# Patient Record
Sex: FEMALE | Race: WHITE | NOT HISPANIC OR LATINO | Employment: FULL TIME | URBAN - METROPOLITAN AREA
[De-identification: names, ages, dates, MRNs, and addresses within clinical notes are randomized per-mention and may not be internally consistent; named-entity substitution may affect disease eponyms.]

---

## 2017-01-04 ENCOUNTER — MYC MEDICAL ADVICE (OUTPATIENT)
Dept: FAMILY MEDICINE | Facility: CLINIC | Age: 47
End: 2017-01-04

## 2017-01-04 DIAGNOSIS — B00.1 RECURRENT COLD SORES: Primary | ICD-10-CM

## 2017-01-05 RX ORDER — VALACYCLOVIR HYDROCHLORIDE 500 MG/1
TABLET, FILM COATED ORAL
Qty: 36 TABLET | Refills: 11 | Status: SHIPPED | OUTPATIENT
Start: 2017-01-05 | End: 2018-02-07

## 2017-01-10 DIAGNOSIS — K95.09 COMPLICATION OF GASTRIC BANDING: Primary | ICD-10-CM

## 2017-01-11 DIAGNOSIS — K95.09 COMPLICATION OF GASTRIC BAND PROCEDURE: Primary | ICD-10-CM

## 2017-01-16 ENCOUNTER — OFFICE VISIT (OUTPATIENT)
Dept: SURGERY | Facility: CLINIC | Age: 47
End: 2017-01-16

## 2017-01-16 ENCOUNTER — ANESTHESIA EVENT (OUTPATIENT)
Dept: SURGERY | Facility: CLINIC | Age: 47
End: 2017-01-16
Payer: COMMERCIAL

## 2017-01-16 ENCOUNTER — ALLIED HEALTH/NURSE VISIT (OUTPATIENT)
Dept: SURGERY | Facility: CLINIC | Age: 47
End: 2017-01-16

## 2017-01-16 VITALS
OXYGEN SATURATION: 97 % | SYSTOLIC BLOOD PRESSURE: 132 MMHG | HEIGHT: 64 IN | DIASTOLIC BLOOD PRESSURE: 57 MMHG | HEART RATE: 86 BPM | BODY MASS INDEX: 43.71 KG/M2 | TEMPERATURE: 98 F | WEIGHT: 256 LBS | RESPIRATION RATE: 16 BRPM

## 2017-01-16 DIAGNOSIS — Z01.818 PREOP GENERAL PHYSICAL EXAM: ICD-10-CM

## 2017-01-16 DIAGNOSIS — Z01.818 PREOP GENERAL PHYSICAL EXAM: Primary | ICD-10-CM

## 2017-01-16 LAB
ABO + RH BLD: NORMAL
ABO + RH BLD: NORMAL
ALBUMIN UR-MCNC: NEGATIVE MG/DL
ANION GAP SERPL CALCULATED.3IONS-SCNC: 6 MMOL/L (ref 3–14)
APPEARANCE UR: CLEAR
BILIRUB UR QL STRIP: NEGATIVE
BLD GP AB SCN SERPL QL: NORMAL
BLOOD BANK CMNT PATIENT-IMP: NORMAL
BLOOD BANK CMNT PATIENT-IMP: NORMAL
BUN SERPL-MCNC: 9 MG/DL (ref 7–30)
CALCIUM SERPL-MCNC: 9.2 MG/DL (ref 8.5–10.1)
CHLORIDE SERPL-SCNC: 106 MMOL/L (ref 94–109)
CO2 SERPL-SCNC: 28 MMOL/L (ref 20–32)
COLOR UR AUTO: ABNORMAL
CREAT SERPL-MCNC: 0.78 MG/DL (ref 0.52–1.04)
ERYTHROCYTE [DISTWIDTH] IN BLOOD BY AUTOMATED COUNT: 16.5 % (ref 10–15)
GFR SERPL CREATININE-BSD FRML MDRD: 80 ML/MIN/1.7M2
GLUCOSE SERPL-MCNC: 90 MG/DL (ref 70–99)
GLUCOSE UR STRIP-MCNC: NEGATIVE MG/DL
HCT VFR BLD AUTO: 35.2 % (ref 35–47)
HGB BLD-MCNC: 10.5 G/DL (ref 11.7–15.7)
HGB UR QL STRIP: ABNORMAL
KETONES UR STRIP-MCNC: NEGATIVE MG/DL
LEUKOCYTE ESTERASE UR QL STRIP: NEGATIVE
MCH RBC QN AUTO: 22.5 PG (ref 26.5–33)
MCHC RBC AUTO-ENTMCNC: 29.8 G/DL (ref 31.5–36.5)
MCV RBC AUTO: 76 FL (ref 78–100)
NITRATE UR QL: NEGATIVE
PH UR STRIP: 8 PH (ref 5–7)
PLATELET # BLD AUTO: 304 10E9/L (ref 150–450)
POTASSIUM SERPL-SCNC: 4.3 MMOL/L (ref 3.4–5.3)
RBC # BLD AUTO: 4.66 10E12/L (ref 3.8–5.2)
RBC #/AREA URNS AUTO: 1 /HPF (ref 0–2)
SODIUM SERPL-SCNC: 140 MMOL/L (ref 133–144)
SP GR UR STRIP: 1.01 (ref 1–1.03)
SPECIMEN EXP DATE BLD: NORMAL
SQUAMOUS #/AREA URNS AUTO: 1 /HPF (ref 0–1)
URN SPEC COLLECT METH UR: ABNORMAL
UROBILINOGEN UR STRIP-MCNC: 0 MG/DL (ref 0–2)
WBC # BLD AUTO: 5.7 10E9/L (ref 4–11)
WBC #/AREA URNS AUTO: 1 /HPF (ref 0–2)

## 2017-01-16 ASSESSMENT — LIFESTYLE VARIABLES: TOBACCO_USE: 1

## 2017-01-16 NOTE — MR AVS SNAPSHOT
After Visit Summary   2017    Elaine Awad    MRN: [id number]           Patient Information     Date Of Birth          1970        Visit Information        Provider Department      2017 9:00 AM Rn, [facility]        Care Instructions    Preparing for Your Surgery      Name:  Elaine Awad   MRN:  [id number]   :  1970   Today's Date:  2017     Arriving for surgery:  Surgery date:  17  Surgery time:  7:30 am  Arrival time:  5:30 am  ]  Please come to:   [facility] [address]  [address] [place]  [address]    -   parking is available in front of the hospital from 5:15 am to 8:00 pm    -  Stop at the Information Desk in the lobby    -   Inform the information person that you are here for surgery. An escort to 3c will be provided. If you would not like an escort, please proceed to 3C on the 3rd floor. [phone]   -  Bring your ID and insurance card.    What can I eat or drink?  -  You may have solid food or milk products until 8 hours prior to your surgery. (11:30 pm 17)  -  You may have water, apple juice, BLACK coffee (NO creamer or nondairy creamer), or 7up/Sprite until 2 hours prior to your surgery. (5:30 am)    Which medicines can I take?  -Do not bring your own medications to the hospital.       -  No Aspirin the week before surgery.  -  No Ibuprofen the day before surgery.  -  Do NOT take these medications in the morning, the day of surgery:  No lotions, gels, creams, or lotions on the skin.    -  Please take these medications the day of surgery:  Omeprazole,        Acetaminophen (Tylenol) if needed    How do I prepare myself?  -  Take two showers: one the night before surgery; and one the morning of surgery.         Use Scrubcare or Hibiclens to wash from neck down.  You may use your own shampoo and conditioner. No other hair products.   -  Do NOT use lotion, powder,  deodorant, or antiperspirant the day of your surgery.  -  Do NOT wear any makeup, fingernail polish or jewelry.    -  Begin using Incentive Spirometer 1 week prior to surgery.  Use 4 times per day, up to 5-10 breaths each time.  Bring Incentive Spirometer to hospital.      Questions or Concerns:  If you have questions or concerns, please call the  Preoperative Assessment Center, Monday-Friday 7AM-7PM:  [phone]      AFTER YOUR SURGERY  Breathing exercises   Breathing exercises help you recover faster. Take deep breaths and let the air out slowly. This will:     Help you wake up after surgery.    Help prevent complications like pneumonia.  Preventing complications will help you go home sooner.   We may give you a breathing device (incentive spirometer) to encourage you to breathe deeply.   Nausea and vomiting   You may feel sick to your stomach after surgery; if so, let your nurse know.    Pain control:  After surgery, you may have pain. Our goal is to help you manage your pain. Pain medicine will help you feel comfortable enough to do activities that will help you heal.  These activities may include breathing exercises, walking and physical therapy.   To help your health care team treat your pain we will ask: 1) If you have pain  2) where it is located 3) describe your pain in your words  Methods of pain control include medications given by mouth, vein or by nerve block for some surgeries.  We may give you a pain control pump that will:  1) Deliver the medicine through a tube placed in your vein  2) Control the amount of medicine you receive  3) Allow you to push a button to deliver a dose of pain medicine  Sequential Compression Device (SCD) or Pneumo Boots:  You may need to wear SCD S on your legs or feet. These are wraps connected to a machine that pumps in air and releases it. The repeated pumping helps prevent blood clots from forming.   Using an Incentive Spirometer  Soon after your surgery, a nurse or  therapist will teach you breathing exercises. These keep your lungs clear, strengthen your breathing muscles, and help prevent complications.  The exercises include doing a deep-breathing exercise using a device called an incentive spirometer.  To do these exercises, you will breathe in through your mouth and not your nose. The incentive spirometer only works correctly if you breathe in through your mouth.  Four steps to clear lungs     Deep breathing expands the lungs, aids circulation, and helps prevent pneumonia.   1. Exhale normally.    Relax and breathe out.  2. Place your lips tightly around the mouthpiece.    Make sure the device is upright and not tilted.  3. Inhale as much air as you can through the mouthpiece (don't breath through your nose).    Inhale slowly and deeply.    Hold your breath long enough to keep the balls or disk raised for at least 3 seconds.    If you re inhaling too quickly, your device may make a tone. If you hear this tone, inhale more slowly.  4. Repeat the exercise regularly.    Do this exercise every hour while you're awake, or as your health care provider instructs.    You will also be taught coughing exercises and be asked to do them regularly on your own.    9161-3132 [employer]. [address] [place] [address]. All rights reserved. This information is not intended as a substitute for professional medical care. Always follow your healthcare professional's instructions.              Follow-ups after your visit        Your next 10 appointments already scheduled     Jan 16, 2017  9:30 AM   (Arrive by 9:15 AM)   PAC EVALUATION with  Pac Cecelia 7   [facility] ([facility])    [address] [place] [id number] [phone]            Jan 16, 2017 10:30 AM   (Arrive by 10:15 AM)   PAC Anesthesia Consult with  Pac Anesthesiologist   [facility] ([facility]  [facility])    [address] [place] [id number] [phone]            Jan 16, 2017 10:45 AM   LAB with  LAB   [facility] ([facility])    [address] [place] [id number] [phone]           Patient must bring picture ID.  Patient should be prepared to give a urine specimen  Please do not eat 10-12 hours before your appointment if you are coming in fasting for labs on lipids, cholesterol, or glucose (sugar).  Pregnant women should follow their Care Team instructions. Water with medications is okay. Do not drink coffee or other fluids.   If you have concerns about taking  your medications, please ask at office or if scheduling via [employer], send a message by clicking on Secure Messaging, Message Your Care Team.            Jan 19, 2017   Procedure with Ciro Deras MD   [facility], Same Day Surgery (--)    [address] [facility] [id number] [phone]            Feb 01, 2017 11:30 AM   (Arrive by 11:15 AM)   RETURN BARIATRIC SURGERY with Ciro Deras MD   [facility] ([facility])    [address] [place] [id number] [phone]              Who to contact     Please call your clinic at [phone] to:    Ask questions about your health    Make or cancel appointments    Discuss your medicines    Learn about your test results    Speak to your doctor   If you have compliments or concerns about an experience at your clinic, or if you wish to file a complaint, please contact [facility] Patient Relations at [phone] or email us at [email or web address]         Additional Information About Your Visit        [employer] Information     [employer] gives you secure access to your electronic health record. If you see a primary care provider, you can also send messages to your care team and make  appointments. If you have questions, please call your primary care clinic.  If you do not have a primary care provider, please call [phone] and they will assist you.      [employer] is an electronic gateway that provides easy, online access to your medical records. With [employer], you can request a clinic appointment, read your test results, renew a prescription or communicate with your care team.     To access your existing account, please contact your [facility] or call [phone] for assistance.        Care EveryWhere ID     This is your Care EveryWhere ID. This could be used by other organizations to access your [facility] medical records  [id number]        Your Vitals Were     Last Period                   01/15/2017            Blood Pressure from Last 3 Encounters:   12/14/16 122/86   11/08/16 145/97   10/27/16 142/74    Weight from Last 3 Encounters:   12/14/16 115.985 kg (255 lb 11.2 oz)   11/08/16 114.76 kg (253 lb)   10/27/16 115.214 kg (254 lb)              Today, you had the following     No orders found for display       Primary Care Provider Office Phone # Fax #    Fernanda Rodriguez [phone]       [facility] [address] [facility] [address]        Thank you!     Thank you for choosing [facility] PREOPERATIVE [facility]ESS[facility] [place]  for your care. Our goal is always to provide you with excellent care. Hearing back from our patients is one way we can continue to improve our services. Please take a few minutes to complete the written survey that you may receive in the mail after your visit with us. Thank you!             Your Updated Medication List - Protect others around you: Learn how to safely use, store and throw away your medicines at www.disposemymeds.org.          This list is accurate as of: 1/16/17  9:19 AM.  Always use your most recent med list.                   Brand Name Dispense Instructions for use    cetirizine 10 MG tablet     zyrTEC     Take 10 mg by mouth daily as needed       fluconazole 150 MG tablet    DIFLUCAN    12 tablet    Take 1 tablet (150 mg) by mouth once a week       fluocinonide 0.05 % solution    LIDEX    30 mL    Apply sparingly to affected area twice daily as needed.  Do not apply to face.       omeprazole 20 MG CR capsule    priLOSEC    90 capsule    TAKE ONE CAPSULE BY MOUTH ONE TIME DAILY       SUMAtriptan 50 MG tablet    IMITREX    9 tablet    TAKE 2 TABLETS BY MOUTH AT ONSET OF HEADACHE FOR MIGRAINE. MAY REPEAT IN 2 HOURS IF NEEDED.. MAX 4 TABS A DAY       triamcinolone 0.1 % ointment    KENALOG    15 g    Apply topically externally to affected areas three times daily as needed       valACYclovir 500 MG tablet    VALTREX    36 tablet    TAKE ONE TABLET BY MOUTH ONE TIME DAILY. THEN INCREASE DOSE TO 4 TABLETS TWICE DAILY FOR 1 DAY AT ONSET OF OUTBREAK AS NEEDED.

## 2017-01-16 NOTE — ANESTHESIA PREPROCEDURE EVALUATION
Anesthesia Evaluation     . Pt has had prior anesthetic. Type: General and MAC      ROS/MED HX    ENT/Pulmonary:     (+)tobacco use, Past use 0.5 PPD for 5 years, quit 1997 packs/day  , . .    Neurologic:       Cardiovascular:  - neg cardiovascular ROS   (+) ----. : . . . :. . No previous cardiac testing       METS/Exercise Tolerance:  >4 METS   Hematologic:  - neg hematologic  ROS       Musculoskeletal:  - neg musculoskeletal ROS       GI/Hepatic:     (+) GERD Asymptomatic on medication,       Renal/Genitourinary:  - ROS Renal section negative       Endo:     (+) Obesity, .      Psychiatric:         Infectious Disease:  - neg infectious disease ROS       Malignancy:      - no malignancy   Other:    (+) No chance of pregnancy C-spine cleared: N/A, no H/O Chronic Pain,no other significant disability   - neg other ROS           Physical Exam  Normal systems: cardiovascular, pulmonary and dental    Airway   Mallampati: III  TM distance: >3 FB  Neck ROM: full    Dental     Cardiovascular   Rhythm and rate: regular and normal      Pulmonary    breath sounds clear to auscultation    Other findings: WBC      5.7   1/16/2017  RBC     4.66   1/16/2017  HGB     10.5   1/16/2017  HCT     35.2   1/16/2017  MCV       76   1/16/2017  MCH     22.5   1/16/2017  MCHC     29.8   1/16/2017  RDW     16.5   1/16/2017  PLT      304   1/16/2017        Last Basic Metabolic Panel:  NA      140   1/16/2017   POTASSIUM      4.3   1/16/2017  CHLORIDE      106   1/16/2017  TAMEKA      9.2   1/16/2017  CO2       28   1/16/2017  BUN        9   1/16/2017  CR     0.78   1/16/2017  GLC       90   1/16/2017             PAC Discussion and Assessment    ASA Classification: 2  Case is suitable for:   Anesthetic techniques and relevant risks discussed: GA  Invasive monitoring and risk discussed: Yes  Types:   Possibility and Risk of blood transfusion discussed: Yes  NPO instructions given:   Additional anesthetic preparation and risks discussed:   Needs  early admission to pre-op area:   Other:     PAC Resident/NP Anesthesia Assessment:  Elaine Aawd is a 47 yo female scheduled for Laparoscopic Removal Of Adjustable Gastric Band, Port And Tube, Esophagogastroduodenoscopy on 1/19/2017 by Dr. Deras.  PAC referral by Dr. Deras for assessment and optimization of anesthesia due to GERD. Previous anesthesia, in 2014 at [facility], without complications.    Cardiac: No current cardiac diagnosis or symptoms  Pulmonary: No current pulmonary diagnosis or symptoms. Smoked 0.5 PPD for 5 years, quit 1997.  GI: GERD, symptoms well controlled with omeprazole.  Eczema, large dry patch on back of neck  Normal activity tolerance without chest pain or shortness of breath. Feasible airway. BMI 44.03     Pt also evaluated by . See recommendations below. Type and screen, CBC and BMP drawn today.  I spent 20 minutes face to face with pt, assessing, examining, and educating        Reviewed and Signed by PAC Mid-Level Provider/Resident  Mid-Level Provider/Resident: KYLE Rojas  Date: 1/16/2017  Time: 1000    Attending Anesthesiologist Anesthesia Assessment:  46 year old for lap removal of gastric band. Chart reviewed, patient seen and evaluated; agree with above assessment.  Patient reports PONV with prior anesthesia. No cardiopulmonary disease; obese.    Patient is appropriate for the planned procedure without further workup or medical management change. The final anesthesia plan will be determined by the physician anesthesiologist caring for the patient on the day of surgery.    Reviewed and Signed by PAC Anesthesiologist  Anesthesiologist: Pamela Gamino MD  Date: 1/16/2017  Time:   Pass/Fail: Pass  Disposition:     PAC Pharmacist Assessment:        Pharmacist:   Date:   Time:      Anesthesia Plan      History & Physical Review  History and physical reviewed and following examination; no interval change.    ASA Status:  2 .    NPO Status:  > 8 hours    Plan for General,  RSI and ETT with Intravenous induction. Maintenance will be Inhalation and Balanced.    PONV prophylaxis:  Ondansetron (or other 5HT-3) and Other (See comment)       Postoperative Care  Postoperative pain management:  IV analgesics and Multi-modal analgesia.      Consents  Anesthetic plan, risks, benefits and alternatives discussed with:  Patient..                          .

## 2017-01-16 NOTE — PATIENT INSTRUCTIONS
Preparing for Your Surgery      Name:  Elaine Awad   MRN:  [id number]   :  1970   Today's Date:  2017     Arriving for surgery:  Surgery date:  17  Surgery time:  7:30 am  Arrival time:  5:30 am  ]  Please come to:   [facility] [address]  [address] [place]  [address]    -   parking is available in front of the hospital from 5:15 am to 8:00 pm    -  Stop at the Information Desk in the lobby    -   Inform the information person that you are here for surgery. An escort to 3c will be provided. If you would not like an escort, please proceed to 3C on the 3rd floor. [phone]   -  Bring your ID and insurance card.    What can I eat or drink?  -  You may have solid food or milk products until 8 hours prior to your surgery. (11:30 pm 17)  -  You may have water, apple juice, BLACK coffee (NO creamer or nondairy creamer), or 7up/Sprite until 2 hours prior to your surgery. (5:30 am)    Which medicines can I take?  -Do not bring your own medications to the hospital.       -  No Aspirin the week before surgery.  -  No Ibuprofen the day before surgery.  -  Do NOT take these medications in the morning, the day of surgery:  No lotions, gels, creams, or lotions on the skin.    -  Please take these medications the day of surgery:  Omeprazole,        Acetaminophen (Tylenol) if needed    How do I prepare myself?  -  Take two showers: one the night before surgery; and one the morning of surgery.         Use Scrubcare or Hibiclens to wash from neck down.  You may use your own shampoo and conditioner. No other hair products.   -  Do NOT use lotion, powder, deodorant, or antiperspirant the day of your surgery.  -  Do NOT wear any makeup, fingernail polish or jewelry.    -  Begin using Incentive Spirometer 1 week prior to surgery.  Use 4 times per day, up to 5-10 breaths each time.  Bring Incentive Spirometer to hospital.      Questions or Concerns:  If you  have questions or concerns, please call the  Preoperative Assessment Center, Monday-Friday 7AM-7PM:  [phone]      AFTER YOUR SURGERY  Breathing exercises   Breathing exercises help you recover faster. Take deep breaths and let the air out slowly. This will:     Help you wake up after surgery.    Help prevent complications like pneumonia.  Preventing complications will help you go home sooner.   We may give you a breathing device (incentive spirometer) to encourage you to breathe deeply.   Nausea and vomiting   You may feel sick to your stomach after surgery; if so, let your nurse know.    Pain control:  After surgery, you may have pain. Our goal is to help you manage your pain. Pain medicine will help you feel comfortable enough to do activities that will help you heal.  These activities may include breathing exercises, walking and physical therapy.   To help your health care team treat your pain we will ask: 1) If you have pain  2) where it is located 3) describe your pain in your words  Methods of pain control include medications given by mouth, vein or by nerve block for some surgeries.  We may give you a pain control pump that will:  1) Deliver the medicine through a tube placed in your vein  2) Control the amount of medicine you receive  3) Allow you to push a button to deliver a dose of pain medicine  Sequential Compression Device (SCD) or Pneumo Boots:  You may need to wear SCD S on your legs or feet. These are wraps connected to a machine that pumps in air and releases it. The repeated pumping helps prevent blood clots from forming.   Using an Incentive Spirometer  Soon after your surgery, a nurse or therapist will teach you breathing exercises. These keep your lungs clear, strengthen your breathing muscles, and help prevent complications.  The exercises include doing a deep-breathing exercise using a device called an incentive spirometer.  To do these exercises, you will breathe in through your mouth  and not your nose. The incentive spirometer only works correctly if you breathe in through your mouth.  Four steps to clear lungs     Deep breathing expands the lungs, aids circulation, and helps prevent pneumonia.   1. Exhale normally.    Relax and breathe out.  2. Place your lips tightly around the mouthpiece.    Make sure the device is upright and not tilted.  3. Inhale as much air as you can through the mouthpiece (don't breath through your nose).    Inhale slowly and deeply.    Hold your breath long enough to keep the balls or disk raised for at least 3 seconds.    If you re inhaling too quickly, your device may make a tone. If you hear this tone, inhale more slowly.  4. Repeat the exercise regularly.    Do this exercise every hour while you're awake, or as your health care provider instructs.    You will also be taught coughing exercises and be asked to do them regularly on your own.    6929-6505 [employer]. [address] [place] [address], [place] [address]. All rights reserved. This information is not intended as a substitute for professional medical care. Always follow your healthcare professional's instructions.

## 2017-01-16 NOTE — H&P
Pre-Operative H & P     CC:  Preoperative exam to assess for increased cardiopulmonary risk while undergoing surgery and anesthesia.    Date of Encounter: 1/16/2017  Primary Care Physician:  Fernanda Rodriguez  Elaine Awad is a 46 year old female who presents for pre-operative H & P in preparation for Laparoscopic Removal Of Adjustable Gastric Band, Port And Tube, Esophagogastroduodenoscopy  with Dr. Deras on 1/19/17 at [facility].       History is obtained from the patient.   Pt had gastric band in 2014 and has experienced nausea, vomiting and intermittent abdominal pain.        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     Yeast infection of the vagina, recurrent       Morbid obesity (H)      HDL deficiency 9/17/2013     Eczema        Past Surgical History  Past Surgical History   Procedure Laterality Date     Colposcopy,bx cervix/endocerv curr  7/1994     Lithotripsy       C treat ectopic preg,rmv tube/ovary       LT     Esophagoscopy, gastroscopy, duodenoscopy (egd), combined  1/2/2014     Procedure: COMBINED ESOPHAGOSCOPY, GASTROSCOPY, DUODENOSCOPY (EGD);;  Surgeon: Eden Stacy MD;  Location: [facility]     Laparoscopic gastric adjustable banding  4/28/2014     Procedure: LAPAROSCOPIC GASTRIC ADJUSTABLE BANDING;  Surgeon: Ciro Deras MD;  Location: [facility] OR     Laparoscopic herniorrhaphy hiatal  4/28/2014     Procedure: LAPAROSCOPIC HERNIORRHAPHY HIATAL;  Surgeon: Ciro Deras MD;  Location: [facility] OR       Hx of Blood transfusions/reactions: none    Hx of abnormal bleeding or anti-platelet use: none    Menstrual history: Patient's last menstrual period was 01/15/2017.:     Steroid use in the last year: none    Personal or FH with difficulty with  Anesthesia:  none    Prior to Admission Medications  Current Outpatient Prescriptions   Medication Sig Dispense Refill     valACYclovir (VALTREX) 500 MG tablet TAKE ONE TABLET BY MOUTH ONE TIME DAILY. THEN INCREASE DOSE TO 4 TABLETS TWICE DAILY FOR 1 DAY AT ONSET OF OUTBREAK AS NEEDED. 36 tablet 11     fluconazole (DIFLUCAN) 150 MG tablet Take 1 tablet (150 mg) by mouth once a week 12 tablet 3     omeprazole (PRILOSEC) 20 MG capsule TAKE ONE CAPSULE BY MOUTH ONE TIME DAILY 90 capsule 3     cetirizine (ZYRTEC) 10 MG tablet Take 10 mg by mouth daily as needed        fluocinonide (LIDEX) 0.05 % external solution Apply sparingly to affected area twice daily as needed.  Do not apply to face. 30 mL 0     SUMAtriptan (IMITREX) 50 MG tablet TAKE 2 TABLETS BY MOUTH AT ONSET OF HEADACHE FOR MIGRAINE. MAY REPEAT IN 2 HOURS IF NEEDED.. 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     Social History Main Topics     Smoking status: Former Smoker -- 0.50 packs/day for 10 years     Types: Cigarettes     Quit date: 12/01/1997     Smokeless tobacco: Never Used     Alcohol Use: 0.0 - 0.5 oz/week      Comment: 1 - 3 per month     Drug Use: No     Sexual Activity:     Partners: Male     Birth Control/ Protection: None     Other Topics Concern      Service No     Blood Transfusions No     Caffeine Concern No     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 pancreatic, melanoma      DIABETES Mother      HEART DISEASE Maternal Grandfather      CHF     HEART DISEASE Paternal Grandmother      CHF     Respiratory Paternal Grandfather      TB     Obesity Brother      C.A.D. Mother      Breast Cancer Maternal Grandmother      Other Cancer Father      pancreatic cancer     Colon Polyps Paternal Grandmother      Colon Polyps Father            Anesthesia Evaluation     . Pt has had prior anesthetic. Type: General and MAC      ROS/MED HX    ENT/Pulmonary:     (+)tobacco use, Past use 0.5 PPD for 5 years, quit 1997 packs/day  , . .    Neurologic:       Cardiovascular:  - neg cardiovascular ROS   (+) ----. : . . . :. . No previous cardiac testing       METS/Exercise Tolerance:  >4 METS   Hematologic:  - neg hematologic  ROS       Musculoskeletal:  - neg musculoskeletal ROS       GI/Hepatic:     (+) GERD Asymptomatic on medication,       Renal/Genitourinary:  - ROS Renal section negative       Endo:     (+) Obesity, .      Psychiatric:     Negative     Infectious Disease:  - neg infectious disease ROS       Malignancy:      - no malignancy   Other:    (+) No chance of pregnancy C-spine cleared: N/A, no H/O Chronic Pain,no other significant disability   - neg other ROS           Physical Exam  Normal systems: cardiovascular, pulmonary and dental    Airway   Mallampati: III  TM distance: >3 FB  Neck ROM: full    Dental   Normal    Cardiovascular   Rhythm and rate: regular and normal      Pulmonary    breath sounds clear to auscultation               The complete review of systems is negative other than noted in the HPI or here.   Temp: 98  F (36.7  C) Temp src: Oral BP: 132/57 mmHg Pulse: 86   Resp: 16 SpO2: 97 %         256 lbs 0 oz  5' 4\"   Body mass index is 43.92 kg/(m^2).       Physical Exam  Constitutional: Awake, alert, cooperative, no " apparent distress, and appears stated age.  Eyes: Pupils equal, round and reactive to light, extra ocular muscles intact, sclera clear, conjunctiva normal.  HENT: Normocephalic, oral pharynx with moist mucus membranes, good dentition. No goiter appreciated.   Respiratory: Clear to auscultation bilaterally, no crackles or wheezing.  Cardiovascular: Regular rate and rhythm, normal S1 and S2, and no murmur noted.  Carotids +2, no bruits. No edema. Palpable pulses to radial  DP and PT arteries.   GI: Normal bowel sounds, soft, non-distended, non-tender, no masses palpated, no hepatosplenomegaly.  Surgical scars: abdomen  Lymph/Hematologic: No cervical lymphadenopathy and no supraclavicular lymphadenopathy.  Genitourinary:  deferred  Skin: Warm and dry.  No rashes at anticipated surgical site. Eczema on back of head, dry skin  Musculoskeletal: Full ROM of neck. There is no redness, warmth, or swelling of the joints. Gross motor strength is normal.    Neurologic: Awake, alert, oriented to name, place and time. Cranial nerves II-XII are grossly intact. Gait is normal.   Neuropsychiatric: Calm, cooperative. Normal affect.     Labs: (personally reviewed)  WBC      5.7   1/16/2017  RBC     4.66   1/16/2017  HGB     10.5   1/16/2017  HCT     35.2   1/16/2017  MCV       76   1/16/2017  MCH     22.5   1/16/2017  MCHC     29.8   1/16/2017  RDW     16.5   1/16/2017  PLT      304   1/16/2017        Last Basic Metabolic Panel:  NA      140   1/16/2017   POTASSIUM      4.3   1/16/2017  CHLORIDE      106   1/16/2017  TAMEKA      9.2   1/16/2017  CO2       28   1/16/2017  BUN        9   1/16/2017  CR     0.78   1/16/2017  GLC       90   1/16/2017        UA RESULTS:  Recent Labs   Lab Test  01/16/17   1019   COLOR  Straw   APPEARANCE  Clear   URINEGLC  Negative   URINEBILI  Negative   URINEKETONE  Negative   SG  1.008   UBLD  Moderate*   URINEPH  8.0*   PROTEIN  Negative   NITRITE  Negative   LEUKEST  Negative   RBCU  1   WBCU  1          EKG: Personally reviewed but formal cardiology read pending: not indicated      ASSESSMENT and PLAN  Elaine Awad is a 46 year old female scheduled to undergo Laparoscopic Removal Of Adjustable Gastric Band, Port And Tube, Esophagogastroduodenoscopy. She has the following specific operative considerations:   - RCRI : Low serious cardiac risks.  0.4% risk of major adverse cardiac event.   - Anesthesia considerations:  Refer to PAC assessment in anesthesia records  - VTE risk: low risk  - SAKSHI # of risks 2/8 = 25%  - Post-op delirium risk: low risk  - Risk of PONV score = 2.  If > 2, anti-emetic intervention recommended.        Previous anesthesia, in 2014 at [facility], without complications.  Cardiac: No current cardiac diagnosis or symptoms  Pulmonary: No current pulmonary diagnosis or symptoms. Smoked 0.5 PPD for 5 years, quit 1997.  GI: GERD, symptoms well controlled with omeprazole.  Eczema, large dry patch on back of neck  Normal activity tolerance without chest pain or shortness of breath. Feasible airway. BMI 44.03        Patient was discussed with Dr Gamino.    KYLE Doan CNS  Preoperative Assessment Center  [facility]  Phone: [phone]  Fax: [phone]

## 2017-01-19 ENCOUNTER — HOSPITAL ENCOUNTER (OUTPATIENT)
Facility: CLINIC | Age: 47
Discharge: HOME | End: 2017-01-19
Attending: SURGERY | Admitting: SURGERY
Payer: COMMERCIAL

## 2017-01-19 ENCOUNTER — ANESTHESIA (OUTPATIENT)
Dept: SURGERY | Facility: CLINIC | Age: 47
End: 2017-01-19
Payer: COMMERCIAL

## 2017-01-19 VITALS
HEIGHT: 64 IN | DIASTOLIC BLOOD PRESSURE: 77 MMHG | TEMPERATURE: 98 F | WEIGHT: 257.5 LBS | OXYGEN SATURATION: 100 % | HEART RATE: 88 BPM | RESPIRATION RATE: 16 BRPM | SYSTOLIC BLOOD PRESSURE: 119 MMHG | BODY MASS INDEX: 43.96 KG/M2

## 2017-01-19 DIAGNOSIS — Z98.84 HISTORY OF REMOVAL OF LAPAROSCOPIC GASTRIC BANDING DEVICE: Primary | ICD-10-CM

## 2017-01-19 LAB — HCG UR QL: NEGATIVE

## 2017-01-19 PROCEDURE — 27210794 ZZH OR GENERAL SUPPLY STERILE: Performed by: SURGERY

## 2017-01-19 PROCEDURE — 25000125 ZZHC RX 250: Performed by: PHYSICIAN ASSISTANT

## 2017-01-19 PROCEDURE — 81025 URINE PREGNANCY TEST: CPT | Performed by: ANESTHESIOLOGY

## 2017-01-19 PROCEDURE — 25000128 H RX IP 250 OP 636: Performed by: NURSE ANESTHETIST, CERTIFIED REGISTERED

## 2017-01-19 PROCEDURE — 25000125 ZZHC RX 250: Performed by: NURSE ANESTHETIST, CERTIFIED REGISTERED

## 2017-01-19 PROCEDURE — 36000070 ZZH SURGERY LEVEL 5 EA 15 ADDTL MIN - UMMC: Performed by: SURGERY

## 2017-01-19 PROCEDURE — 37000009 ZZH ANESTHESIA TECHNICAL FEE, EACH ADDTL 15 MIN: Performed by: SURGERY

## 2017-01-19 PROCEDURE — 37000008 ZZH ANESTHESIA TECHNICAL FEE, 1ST 30 MIN: Performed by: SURGERY

## 2017-01-19 PROCEDURE — 25800025 ZZH RX 258: Performed by: NURSE ANESTHETIST, CERTIFIED REGISTERED

## 2017-01-19 PROCEDURE — 71000027 ZZH RECOVERY PHASE 2 EACH 15 MINS: Performed by: SURGERY

## 2017-01-19 PROCEDURE — 36000068 ZZH SURGERY LEVEL 5 1ST 30 MIN - UMMC: Performed by: SURGERY

## 2017-01-19 PROCEDURE — 25000125 ZZHC RX 250: Performed by: SURGERY

## 2017-01-19 PROCEDURE — 88300 SURGICAL PATH GROSS: CPT | Performed by: SURGERY

## 2017-01-19 PROCEDURE — 25000125 ZZHC RX 250: Performed by: ANESTHESIOLOGY

## 2017-01-19 PROCEDURE — 25000565 ZZH ISOFLURANE, EA 15 MIN: Performed by: SURGERY

## 2017-01-19 PROCEDURE — 40000170 ZZH STATISTIC PRE-PROCEDURE ASSESSMENT II: Performed by: SURGERY

## 2017-01-19 PROCEDURE — 71000014 ZZH RECOVERY PHASE 1 LEVEL 2 FIRST HR: Performed by: SURGERY

## 2017-01-19 PROCEDURE — 25000132 ZZH RX MED GY IP 250 OP 250 PS 637: Performed by: STUDENT IN AN ORGANIZED HEALTH CARE EDUCATION/TRAINING PROGRAM

## 2017-01-19 RX ORDER — NALOXONE HYDROCHLORIDE 0.4 MG/ML
.1-.4 INJECTION, SOLUTION INTRAMUSCULAR; INTRAVENOUS; SUBCUTANEOUS
Status: DISCONTINUED | OUTPATIENT
Start: 2017-01-19 | End: 2017-01-19 | Stop reason: HOSPADM

## 2017-01-19 RX ORDER — SODIUM CHLORIDE, SODIUM LACTATE, POTASSIUM CHLORIDE, CALCIUM CHLORIDE 600; 310; 30; 20 MG/100ML; MG/100ML; MG/100ML; MG/100ML
INJECTION, SOLUTION INTRAVENOUS CONTINUOUS PRN
Status: DISCONTINUED | OUTPATIENT
Start: 2017-01-19 | End: 2017-01-19

## 2017-01-19 RX ORDER — HYDROMORPHONE HYDROCHLORIDE 1 MG/ML
.3-.5 INJECTION, SOLUTION INTRAMUSCULAR; INTRAVENOUS; SUBCUTANEOUS EVERY 10 MIN PRN
Status: DISCONTINUED | OUTPATIENT
Start: 2017-01-19 | End: 2017-01-19 | Stop reason: HOSPADM

## 2017-01-19 RX ORDER — FENTANYL CITRATE 50 UG/ML
25-50 INJECTION, SOLUTION INTRAMUSCULAR; INTRAVENOUS
Status: DISCONTINUED | OUTPATIENT
Start: 2017-01-19 | End: 2017-01-19 | Stop reason: HOSPADM

## 2017-01-19 RX ORDER — SODIUM CHLORIDE, SODIUM LACTATE, POTASSIUM CHLORIDE, CALCIUM CHLORIDE 600; 310; 30; 20 MG/100ML; MG/100ML; MG/100ML; MG/100ML
INJECTION, SOLUTION INTRAVENOUS CONTINUOUS
Status: DISCONTINUED | OUTPATIENT
Start: 2017-01-19 | End: 2017-01-19 | Stop reason: HOSPADM

## 2017-01-19 RX ORDER — ONDANSETRON 2 MG/ML
INJECTION INTRAMUSCULAR; INTRAVENOUS PRN
Status: DISCONTINUED | OUTPATIENT
Start: 2017-01-19 | End: 2017-01-19

## 2017-01-19 RX ORDER — DEXAMETHASONE SODIUM PHOSPHATE 4 MG/ML
INJECTION, SOLUTION INTRA-ARTICULAR; INTRALESIONAL; INTRAMUSCULAR; INTRAVENOUS; SOFT TISSUE PRN
Status: DISCONTINUED | OUTPATIENT
Start: 2017-01-19 | End: 2017-01-19

## 2017-01-19 RX ORDER — ONDANSETRON 4 MG/1
4 TABLET, ORALLY DISINTEGRATING ORAL EVERY 30 MIN PRN
Status: DISCONTINUED | OUTPATIENT
Start: 2017-01-19 | End: 2017-01-19 | Stop reason: HOSPADM

## 2017-01-19 RX ORDER — GLYCOPYRROLATE 0.2 MG/ML
INJECTION, SOLUTION INTRAMUSCULAR; INTRAVENOUS PRN
Status: DISCONTINUED | OUTPATIENT
Start: 2017-01-19 | End: 2017-01-19

## 2017-01-19 RX ORDER — OXYCODONE HYDROCHLORIDE 5 MG/1
5-10 TABLET ORAL EVERY 4 HOURS PRN
Qty: 18 TABLET | Refills: 0 | Status: SHIPPED | OUTPATIENT
Start: 2017-01-19 | End: 2017-02-01

## 2017-01-19 RX ORDER — OXYCODONE HCL 5 MG/5 ML
5-10 SOLUTION, ORAL ORAL EVERY 4 HOURS PRN
Status: DISCONTINUED | OUTPATIENT
Start: 2017-01-19 | End: 2017-01-19 | Stop reason: HOSPADM

## 2017-01-19 RX ORDER — NEOSTIGMINE METHYLSULFATE 1 MG/ML
VIAL (ML) INJECTION PRN
Status: DISCONTINUED | OUTPATIENT
Start: 2017-01-19 | End: 2017-01-19

## 2017-01-19 RX ORDER — PROPOFOL 10 MG/ML
INJECTION, EMULSION INTRAVENOUS PRN
Status: DISCONTINUED | OUTPATIENT
Start: 2017-01-19 | End: 2017-01-19

## 2017-01-19 RX ORDER — ONDANSETRON 2 MG/ML
4 INJECTION INTRAMUSCULAR; INTRAVENOUS EVERY 30 MIN PRN
Status: DISCONTINUED | OUTPATIENT
Start: 2017-01-19 | End: 2017-01-19 | Stop reason: HOSPADM

## 2017-01-19 RX ORDER — FENTANYL CITRATE 50 UG/ML
INJECTION, SOLUTION INTRAMUSCULAR; INTRAVENOUS PRN
Status: DISCONTINUED | OUTPATIENT
Start: 2017-01-19 | End: 2017-01-19

## 2017-01-19 RX ORDER — MEPERIDINE HYDROCHLORIDE 25 MG/ML
12.5 INJECTION INTRAMUSCULAR; INTRAVENOUS; SUBCUTANEOUS
Status: DISCONTINUED | OUTPATIENT
Start: 2017-01-19 | End: 2017-01-19 | Stop reason: HOSPADM

## 2017-01-19 RX ORDER — CLINDAMYCIN PHOSPHATE 900 MG/50ML
900 INJECTION, SOLUTION INTRAVENOUS SEE ADMIN INSTRUCTIONS
Status: DISCONTINUED | OUTPATIENT
Start: 2017-01-19 | End: 2017-01-19 | Stop reason: HOSPADM

## 2017-01-19 RX ORDER — CLINDAMYCIN PHOSPHATE 900 MG/50ML
900 INJECTION, SOLUTION INTRAVENOUS
Status: DISCONTINUED | OUTPATIENT
Start: 2017-01-19 | End: 2017-01-19 | Stop reason: HOSPADM

## 2017-01-19 RX ORDER — ONDANSETRON 4 MG/1
4 TABLET, FILM COATED ORAL EVERY 6 HOURS PRN
Qty: 25 TABLET | Refills: 0 | Status: SHIPPED | OUTPATIENT
Start: 2017-01-19 | End: 2017-02-01

## 2017-01-19 RX ORDER — LIDOCAINE HYDROCHLORIDE 20 MG/ML
INJECTION, SOLUTION INFILTRATION; PERINEURAL PRN
Status: DISCONTINUED | OUTPATIENT
Start: 2017-01-19 | End: 2017-01-19

## 2017-01-19 RX ADMIN — SODIUM CHLORIDE, POTASSIUM CHLORIDE, SODIUM LACTATE AND CALCIUM CHLORIDE: 600; 310; 30; 20 INJECTION, SOLUTION INTRAVENOUS at 08:45

## 2017-01-19 RX ADMIN — FENTANYL CITRATE 50 MCG: 50 INJECTION, SOLUTION INTRAMUSCULAR; INTRAVENOUS at 07:57

## 2017-01-19 RX ADMIN — ONDANSETRON 4 MG: 2 INJECTION INTRAMUSCULAR; INTRAVENOUS at 09:03

## 2017-01-19 RX ADMIN — HYDROMORPHONE HYDROCHLORIDE 0.5 MG: 10 INJECTION, SOLUTION INTRAMUSCULAR; INTRAVENOUS; SUBCUTANEOUS at 09:51

## 2017-01-19 RX ADMIN — PHENYLEPHRINE HYDROCHLORIDE 100 MCG: 10 INJECTION, SOLUTION INTRAMUSCULAR; INTRAVENOUS; SUBCUTANEOUS at 08:27

## 2017-01-19 RX ADMIN — OXYCODONE HYDROCHLORIDE 5 MG: 5 SOLUTION ORAL at 12:36

## 2017-01-19 RX ADMIN — LIDOCAINE HYDROCHLORIDE 80 MG: 20 INJECTION, SOLUTION INFILTRATION; PERINEURAL at 07:30

## 2017-01-19 RX ADMIN — ROCURONIUM BROMIDE 10 MG: 10 INJECTION INTRAVENOUS at 08:00

## 2017-01-19 RX ADMIN — FENTANYL CITRATE 100 MCG: 50 INJECTION, SOLUTION INTRAMUSCULAR; INTRAVENOUS at 07:30

## 2017-01-19 RX ADMIN — ROCURONIUM BROMIDE 20 MG: 10 INJECTION INTRAVENOUS at 07:48

## 2017-01-19 RX ADMIN — Medication 5 MG: at 09:05

## 2017-01-19 RX ADMIN — PROPOFOL 30 MG: 10 INJECTION, EMULSION INTRAVENOUS at 09:09

## 2017-01-19 RX ADMIN — HYDROMORPHONE HYDROCHLORIDE 0.3 MG: 10 INJECTION, SOLUTION INTRAMUSCULAR; INTRAVENOUS; SUBCUTANEOUS at 09:33

## 2017-01-19 RX ADMIN — DEXAMETHASONE SODIUM PHOSPHATE 6 MG: 4 INJECTION, SOLUTION INTRAMUSCULAR; INTRAVENOUS at 07:49

## 2017-01-19 RX ADMIN — MIDAZOLAM HYDROCHLORIDE 2 MG: 1 INJECTION, SOLUTION INTRAMUSCULAR; INTRAVENOUS at 07:14

## 2017-01-19 RX ADMIN — ROCURONIUM BROMIDE 20 MG: 10 INJECTION INTRAVENOUS at 07:34

## 2017-01-19 RX ADMIN — SODIUM CHLORIDE, POTASSIUM CHLORIDE, SODIUM LACTATE AND CALCIUM CHLORIDE: 600; 310; 30; 20 INJECTION, SOLUTION INTRAVENOUS at 06:54

## 2017-01-19 RX ADMIN — ENOXAPARIN SODIUM 40 MG: 40 INJECTION SUBCUTANEOUS at 07:07

## 2017-01-19 RX ADMIN — PROPOFOL 150 MG: 10 INJECTION, EMULSION INTRAVENOUS at 07:30

## 2017-01-19 RX ADMIN — GLYCOPYRROLATE 0.8 MG: 0.2 INJECTION, SOLUTION INTRAMUSCULAR; INTRAVENOUS at 09:05

## 2017-01-19 RX ADMIN — ROCURONIUM BROMIDE 20 MG: 10 INJECTION INTRAVENOUS at 08:09

## 2017-01-19 RX ADMIN — HYDROMORPHONE HYDROCHLORIDE 0.2 MG: 10 INJECTION, SOLUTION INTRAMUSCULAR; INTRAVENOUS; SUBCUTANEOUS at 09:42

## 2017-01-19 RX ADMIN — ONDANSETRON 4 MG: 2 INJECTION INTRAMUSCULAR; INTRAVENOUS at 10:51

## 2017-01-19 RX ADMIN — SUCCINYLCHOLINE CHLORIDE 100 MG: 20 INJECTION, SOLUTION INTRAMUSCULAR; INTRAVENOUS at 07:31

## 2017-01-19 ASSESSMENT — PAIN DESCRIPTION - DESCRIPTORS
DESCRIPTORS: ACHING
DESCRIPTORS: ACHING
DESCRIPTORS: CONSTANT;SHARP

## 2017-01-19 NOTE — OR NURSING
Script sent for oxycodone on patients arrival to PACU. Patient instructed on incentive spirometry. Doing well with it. Lavender and peppermint inhalation offered for breathing and relaxation.

## 2017-01-19 NOTE — IP AVS SNAPSHOT
Same Day Surgery [address] [address]    Phone:  [phone]                                       After Visit Summary   1/19/2017    Elaine Awad    MRN: [id number]           After Visit Summary Signature Page     I have received my discharge instructions, and my questions have been answered. I have discussed any challenges I see with this plan with the nurse or doctor.    ..........................................................................................................................................  Patient/Patient Representative Signature      ..........................................................................................................................................  Patient Representative Print Name and Relationship to Patient    ..................................................               ................................................  Date                                            Time    ..........................................................................................................................................  Reviewed by Signature/Title    ...................................................              ..............................................  Date                                                            Time

## 2017-01-19 NOTE — PROGRESS NOTES
Patient here for a lap band removal.  Will also perform an upper endoscopy.  Will remove the band and all of its components in rare cases small fragments of the band can remain in place. Cleared for surgery.

## 2017-01-19 NOTE — OP NOTE
[facility], [place]  Operative Note    Pre-operative diagnosis: Complication Of Gastric Banding; Band intolerance   Post-operative diagnosis Same   Procedure: Procedure(s):  Laparoscopic Removal Of Adjustable Gastric Band, Port And Tube, Esophagogastroduodenoscopy - Wound Class: I-Clean   - Wound Class: II-Clean Contaminated   Surgeon: Surgeon(s) and Role:     * Ciro Deras MD - Primary     * Carina Benitez MD - Resident - Assisting   Anesthesia: General    Estimated blood loss: 10mL   Drains: None   Specimens:   ID Type Source Tests Collected by Time Destination   1 : LAP BAND AND COMPONENTS Other (specify in comments) Other LABORATORY MISCELLANEOUS ORDER Ciro Deras MD 1/19/2017  8:50 AM       Findings: Lab band removed in whole.  No evidence of erosion of band into stomach.  Upper endoscopy freely patent passage of scope from esophagus into stomach.  .   Complications: None.   Implants: None.       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 Deras in 2014.  Patient interested in band removal as a result of the preoperative diagnosis outlined above.    General risks related to abdominal surgery were reviewed with the patient.    These include, but are not limited to, death, myocardial infarction, pneumonia, urinary tract infection, deep venous thrombosis with or without pulmonary embolus, abdominal infection from bowel injury or abscess, bowel obstruction, wound infection, and bleeding.    Risks related to  adjustable band removal were previously reviewed with the patient.      OPERATIVE PROCEDURE:    The patient was taken to the OR and placed in supine position.  General endotracheal anesthesia was induced without complication.  The patient was prepped and draped in normal sterile fashion.  A time out for patient safety was performed.      The operation was started by making an incision at the location of the subcutaneous port.  This was dissected using cautery.  The port was taken off of the underlying fascia.      A left upper quadrant Veress needle was inserted and the abdomen insufflated to a pressure of 15mmHg with CO2 without complication.  Four ports were placed in total.  The camera port was a 12-mm port.    Attention was then turned towards the intraabdominal removal of the band.  The tubing was followed under the liver and to the upper stomach.  The capsule and adhesions to the band were divided using the hook cautery.      The band was unlatched using a combination of a grasper and [place].  The band was removed from its circumferential pathway.  The capsule was divided to release the restriction of the upper stomach using the cautery.   The band was removed from the abdomen using a grasper.    At this time an upper endoscopy was performed.  This showed a patent passage from esophagus into stomach.  No ulcerations.     Hemostasis was assured.    At the port site 3-0 Vicryl was used on the deeper layers of the skin and then 4-0 Monocryl.  The skin of the 5mm ports were closed with 4-0 Monocryl.  Dermabond were used on the skin.  The patient tolerated the procedure well.      All components of the band were removed and sent to pathology for gross identification.  All sponge and needle counts were correct x 2 at the end of the procedure.  Dr. Deras was present and scrubbed for the entirety of the procedure.      Carina Benitez, PGY-7  [phone]

## 2017-01-19 NOTE — IP AVS SNAPSHOT
MRN:[id number]                      After Visit Summary   1/19/2017    Elaine Awad    MRN: [id number]           Thank you!     Thank you for choosing [facility] for your care. Our goal is always to provide you with excellent care. Hearing back from our patients is one way we can continue to improve our services. Please take a few minutes to complete the written survey that you may receive in the mail after you visit with us. Thank you!        Patient Information     Date Of Birth          1970        About your hospital stay     You were admitted on:  January 19, 2017 You last received care in the:  Same Day Surgery [facility]    You were discharged on:  January 19, 2017       Who to Call     For medical emergencies, please call 911.  For non-urgent questions about your medical care, please call your primary care provider or clinic, [phone]  For questions related to your surgery, please call your surgery clinic        Attending Provider     Provider    Ciro Deras MD       Primary Care Provider Office Phone # Fax #    Fernanda Rodriguez [phone]       [place] [address]        After Care Instructions     Activity       For 4 weeks, lifting restriction of 20 pounds and no abdominal exercises.            Diet as Tolerated: For Post Op Adjustable Band       Clear liquid diet immediately post-operatively, advance diet as tolerated            Dressing       May shower and remove dressing after 24 hours                  Follow-up Appointments     Follow Up       Return to clinic in 1 week                  Your next 10 appointments already scheduled     Feb 01, 2017 11:30 AM   (Arrive by 11:15 AM)   RETURN BARIATRIC SURGERY with Ciro Deras MD   [facility] ([facility])    [address] [place] [address] [id number] [phone]               Further instructions from your care team       [facility], [place]  Same-Day Surgery   Adult Discharge Orders & Instructions     For 24 hours after surgery    1. Get plenty of rest.  A responsible adult must stay with you for at least 24 hours after you leave the hospital.   2. Do not drive or use heavy equipment.  If you have weakness or tingling, don't drive or use heavy equipment until this feeling goes away.  3. Do not drink alcohol.  4. Avoid strenuous or risky activities.  Ask for help when climbing stairs.   5. You may feel lightheaded.  IF so, sit for a few minutes before standing.  Have someone help you get up.   6. If you have nausea (feel sick to your stomach): Drink only clear liquids such as apple juice, ginger ale, broth or 7-Up.  Rest may also help.  Be sure to drink enough fluids.  Move to a regular diet as you feel able.  7. You may have a slight fever. Call the doctor if your fever is over 100 F (37.7 C) (taken under the tongue) or lasts longer than 24 hours.  8. You may have a dry mouth, a sore throat, muscle aches or trouble sleeping.  These should go away after 24 hours.  9. Do not make important or legal decisions.   Call your doctor for any of the followin.  Signs of infection (fever, growing tenderness at the surgery site, a large amount of drainage or bleeding, severe pain, foul-smelling drainage, redness, swelling).    2. It has been over 8 to 10 hours since surgery and you are still not able to urinate (pass water).    To contact a doctor, call Dr Deras's office at [phone] (Bariatric clinic) or:        [phone] and ask for the resident on call for GI (answered 24 hours a day)      Emergency Department:    [facility]: [phone]       (TTY for hearing impaired: [phone])          Pending Results     Date and Time Order Name Status Description    2017 1037 Surgical pathology exam In process             Admission  "Information        Provider Department Dept Phone    1/19/2017 Ciro Deras MD  Preop/Phase -[phone]      Your Vitals Were     Blood Pressure Pulse Temperature    119/77 mmHg 88 98  F (36.7  C) (Oral)    Respirations Height Weight    16 1.626 m (5' 4\") 116.8 kg (257 lb 8 oz)    BMI (Body Mass Index) Pulse Oximetry       44.18 kg/m2 100%       MyChart Information     [facility] gives you secure access to your electronic health record. If you see a primary care provider, you can also send messages to your care team and make appointments. If you have questions, please call your primary care clinic.  If you do not have a primary care provider, please call [phone] and they will assist you.        Care EveryWhere ID     This is your Care EveryWhere ID. This could be used by other organizations to access your [facility] medical records  [id number]           Review of your medicines      START taking        Dose / Directions    ondansetron 4 MG tablet   Commonly known as:  ZOFRAN   Used for:  History of removal of laparoscopic gastric banding device        Dose:  4 mg   Take 1 tablet (4 mg) by mouth every 6 hours as needed for nausea   Quantity:  25 tablet   Refills:  0       oxyCODONE 5 MG IR tablet   Commonly known as:  ROXICODONE   Used for:  History of removal of laparoscopic gastric banding device        Dose:  5-10 mg   Take 1-2 tablets (5-10 mg) by mouth every 4 hours as needed for pain maximum 12 tablet(s) per day   Quantity:  18 tablet   Refills:  0         CONTINUE these medicines which have NOT CHANGED        Dose / Directions    cetirizine 10 MG tablet   Commonly known as:  zyrTEC        Dose:  10 mg   Take 10 mg by mouth daily as needed   Refills:  0       fluconazole 150 MG tablet   Commonly known as:  DIFLUCAN   Used for:  Yeast infection of the vagina        Dose:  150 mg   Take 1 tablet (150 mg) by mouth once a week   Quantity:  12 tablet   Refills:  3       fluocinonide 0.05 % solution "   Commonly known as:  LIDEX   Used for:  Eczema, unspecified type        Apply sparingly to affected area twice daily as needed.  Do not apply to face.   Quantity:  30 mL   Refills:  0       omeprazole 20 MG CR capsule   Commonly known as:  priLOSEC   Used for:  Gastroesophageal reflux disease without esophagitis        TAKE ONE CAPSULE BY MOUTH ONE TIME DAILY   Quantity:  90 capsule   Refills:  3       SUMAtriptan 50 MG tablet   Commonly known as:  IMITREX   Used for:  Migraine without status migrainosus, not intractable, unspecified migraine type        TAKE 2 TABLETS BY MOUTH AT ONSET OF HEADACHE FOR MIGRAINE. MAY REPEAT IN 2 HOURS IF NEEDED.. MAX 4 TABS A DAY   Quantity:  9 tablet   Refills:  11       triamcinolone 0.1 % ointment   Commonly known as:  KENALOG   Used for:  Yeast infection of the vagina        Apply topically externally to affected areas three times daily as needed   Quantity:  15 g   Refills:  1       valACYclovir 500 MG tablet   Commonly known as:  VALTREX   Used for:  Recurrent cold sores        TAKE ONE TABLET BY MOUTH ONE TIME DAILY. THEN INCREASE DOSE TO 4 TABLETS TWICE DAILY FOR 1 DAY AT ONSET OF OUTBREAK AS NEEDED.   Quantity:  36 tablet   Refills:  11            Where to get your medicines      These medications were sent to [facility] [place], [place] - [address]  [address] [address]     Phone:  [phone]    - ondansetron 4 MG tablet      Some of these will need a paper prescription and others can be bought over the counter. Ask your nurse if you have questions.     Bring a paper prescription for each of these medications    - oxyCODONE 5 MG IR tablet             Protect others around you: Learn how to safely use, store and throw away your medicines at www.disposemymeds.org.             Medication List: This is a list of all your medications and when to take them. Check marks below indicate your daily home schedule. Keep this list as  a reference.      Medications           Morning Afternoon Evening Bedtime As Needed    cetirizine 10 MG tablet   Commonly known as:  zyrTEC   Take 10 mg by mouth daily as needed                                fluconazole 150 MG tablet   Commonly known as:  DIFLUCAN   Take 1 tablet (150 mg) by mouth once a week                                fluocinonide 0.05 % solution   Commonly known as:  LIDEX   Apply sparingly to affected area twice daily as needed.  Do not apply to face.                                omeprazole 20 MG CR capsule   Commonly known as:  priLOSEC   TAKE ONE CAPSULE BY MOUTH ONE TIME DAILY                                ondansetron 4 MG tablet   Commonly known as:  ZOFRAN   Take 1 tablet (4 mg) by mouth every 6 hours as needed for nausea                                oxyCODONE 5 MG IR tablet   Commonly known as:  ROXICODONE   Take 1-2 tablets (5-10 mg) by mouth every 4 hours as needed for pain maximum 12 tablet(s) per day                                SUMAtriptan 50 MG tablet   Commonly known as:  IMITREX   TAKE 2 TABLETS BY MOUTH AT ONSET OF HEADACHE FOR MIGRAINE. MAY REPEAT IN 2 HOURS IF NEEDED.. MAX 4 TABS A DAY                                triamcinolone 0.1 % ointment   Commonly known as:  KENALOG   Apply topically externally to affected areas three times daily as needed                                valACYclovir 500 MG tablet   Commonly known as:  VALTREX   TAKE ONE TABLET BY MOUTH ONE TIME DAILY. THEN INCREASE DOSE TO 4 TABLETS TWICE DAILY FOR 1 DAY AT ONSET OF OUTBREAK AS NEEDED.

## 2017-01-19 NOTE — ANESTHESIA CARE TRANSFER NOTE
Patient: Elaine ANGUIANO Ritesh    LAPAROSCOPIC REMOVAL GASTRIC ADJUSTABLE BAND (N/A Abdomen)  COMBINED ESOPHAGOSCOPY, GASTROSCOPY, DUODENOSCOPY (EGD) (N/A Esophagus)  Additional InformationProcedure(s):  Laparoscopic Removal Of Adjustable Gastric Band, Port And Tube, Esophagogastroduodenoscopy - Wound Class: I-Clean   - Wound Class: II-Clean Contaminated    Diagnosis: Complication Of Gastric Banding  Diagnosis Additional Information: No value filed.    Anesthesia Type:   General, RSI     Note:  Airway :Face Mask  Patient transferred to:PACU  Comments: To  PACU awakening and ventilating well color pink  VSS  IV infusing well color pink  Report to nurses  Janes Luke CRNA      Vitals: (Last set prior to Anesthesia Care Transfer)              Electronically Signed By: KYLE VIZCARRA CRNA  January 19, 2017  9:23 AM

## 2017-01-19 NOTE — BRIEF OP NOTE
[facility], [place]    Brief Operative Note    Pre-operative diagnosis: Complication Of Gastric Banding  Post-operative diagnosis * No post-op diagnosis entered *  Procedure: Procedure(s):  Laparoscopic Removal Of Adjustable Gastric Band, Port And Tube, Esophagogastroduodenoscopy - Wound Class: I-Clean   - Wound Class: II-Clean Contaminated  Surgeon: Surgeon(s) and Role:     * Ciro Deras MD - Primary     * Carina Benitez MD - Resident - Assisting  Anesthesia: General   Estimated blood loss: Less than 10 mL  Drains: None  Specimens:   ID Type Source Tests Collected by Time Destination   1 : LAP BAND AND COMPONENTS Other (specify in comments) Other LABORATORY MISCELLANEOUS ORDER Ciro Deras MD 1/19/2017  8:50 AM      Findings:   Intact adjustable lap band removed. Scar band released. Hemostatic at the end of procedure. EGD demonstrated widely patent esophagus and stomach  Complications: None.  Implants: None.

## 2017-01-19 NOTE — ANESTHESIA POSTPROCEDURE EVALUATION
Patient: Elaine ANGUIANO Ritesh    LAPAROSCOPIC REMOVAL GASTRIC ADJUSTABLE BAND (N/A Abdomen)  COMBINED ESOPHAGOSCOPY, GASTROSCOPY, DUODENOSCOPY (EGD) (N/A Esophagus)  Additional InformationProcedure(s):  Laparoscopic Removal Of Adjustable Gastric Band, Port And Tube, Esophagogastroduodenoscopy - Wound Class: I-Clean   - Wound Class: II-Clean Contaminated    Diagnosis:Complication Of Gastric Banding  Diagnosis Additional Information: No value filed.    Anesthesia Type:  General, RSI    Note:  Anesthesia Post Evaluation    Patient location during evaluation: PACU  Patient participation: Able to fully participate in evaluation  Level of consciousness: awake and alert  Pain management: adequate  Airway patency: patent  Cardiovascular status: acceptable  Respiratory status: acceptable  Hydration status: acceptable  PONV: none     Anesthetic complications: None          Last vitals:  Filed Vitals:    01/19/17 0616   BP: 125/85   Pulse: 88   Temp: 36.8  C (98.2  F)   Resp: 16   SpO2: 97%       Electronically Signed By: Jenn Mancera MD  January 19, 2017  9:34 AM

## 2017-01-19 NOTE — DISCHARGE INSTRUCTIONS
[facility]  Same-Day Surgery   Adult Discharge Orders & Instructions     For 24 hours after surgery    1. Get plenty of rest.  A responsible adult must stay with you for at least 24 hours after you leave the hospital.   2. Do not drive or use heavy equipment.  If you have weakness or tingling, don't drive or use heavy equipment until this feeling goes away.  3. Do not drink alcohol.  4. Avoid strenuous or risky activities.  Ask for help when climbing stairs.   5. You may feel lightheaded.  IF so, sit for a few minutes before standing.  Have someone help you get up.   6. If you have nausea (feel sick to your stomach): Drink only clear liquids such as apple juice, ginger ale, broth or 7-Up.  Rest may also help.  Be sure to drink enough fluids.  Move to a regular diet as you feel able.  7. You may have a slight fever. Call the doctor if your fever is over 100 F (37.7 C) (taken under the tongue) or lasts longer than 24 hours.  8. You may have a dry mouth, a sore throat, muscle aches or trouble sleeping.  These should go away after 24 hours.  9. Do not make important or legal decisions.   Call your doctor for any of the followin.  Signs of infection (fever, growing tenderness at the surgery site, a large amount of drainage or bleeding, severe pain, foul-smelling drainage, redness, swelling).    2. It has been over 8 to 10 hours since surgery and you are still not able to urinate (pass water).    To contact a doctor, call Dr Deras's office at [phone] (Bariatric clinic) or:        [phone] and ask for the resident on call for GI (answered 24 hours a day)      Emergency Department:    [facility]: [phone]       (TTY for hearing impaired: [phone])

## 2017-01-20 ENCOUNTER — CARE COORDINATION (OUTPATIENT)
Dept: SURGERY | Facility: CLINIC | Age: 47
End: 2017-01-20

## 2017-01-20 LAB — COPATH REPORT: NORMAL

## 2017-01-20 NOTE — PROGRESS NOTES
RN Post Op Care Coordination Note    Ms. Elaine Awad is a 46 year old female who underwent Gastric band and port removal on 1/19/17 with Dr. Deras for a history of gastric band and port intolerance.  Spoke with Patient.    Support  Patient able to care for self independently     Health Status  Fevers/chills: Patient denies any fever or chills.  Nausea/Vomiting: patient had two episodes of of emesis this morning. Patient relates this to anesthesia. No more episodes since this morning. She is able to tolerate fluids, and food.   Eating/drinking: Patient is able to eat and drink without any complaints.  Bowel habits: Patient reports no bowel movement since surgery. Patient was advised to start taking stool softener.   Urinary: Voiding normally  Drains (KATHY): N/A  Incisions: Patient denies any signs and symptoms of infection. and Patient denies any drainage.  Vascular Assessment: Patient reports good color motion sensitivity with no numbness or tingling.  Pain: Patient reports pain as a 6 out of 10 without pain medication.  The pain is located left sided incision . Pain is managed with Narcotics  Ice/Heat. Ice only.     Activity/Restrictions  Following restrictions outlined by surgeon.    Whom and When to Call  Patient acknowledges understanding of how to manage any medication changes and when to seek medical care.     Patient advised that if after hour medical concerns arise to please call [phone] and choose option 4 to speak to the physician on call.       Follow up appointment scheduled for 2/1/2017 with Dr. Deras.    Patient appreciated phone call.

## 2017-02-01 ENCOUNTER — OFFICE VISIT (OUTPATIENT)
Dept: SURGERY | Facility: CLINIC | Age: 47
End: 2017-02-01

## 2017-02-01 VITALS
WEIGHT: 251.8 LBS | BODY MASS INDEX: 42.99 KG/M2 | SYSTOLIC BLOOD PRESSURE: 126 MMHG | TEMPERATURE: 98.4 F | DIASTOLIC BLOOD PRESSURE: 85 MMHG | HEART RATE: 95 BPM | OXYGEN SATURATION: 98 % | HEIGHT: 64 IN

## 2017-02-01 DIAGNOSIS — E66.09 OBESITY DUE TO EXCESS CALORIES, UNSPECIFIED OBESITY SEVERITY: Primary | ICD-10-CM

## 2017-02-01 NOTE — PATIENT INSTRUCTIONS
Please see the medical weight management team. Referral entered.     Follow up with Dr. Deras as needed.

## 2017-02-01 NOTE — MR AVS SNAPSHOT
After Visit Summary   2/1/2017    Elaine Awad    MRN: [id number]           Patient Information     Date Of Birth          1970        Visit Information        Provider Department      2/1/2017 11:30 AM Ciro Deras MD [facility]        Today's Diagnoses     Obesity due to excess calories, unspecified obesity severity    -  1      Care Instructions    Please see the medical weight management team. Referral entered.     Follow up with Dr. Deras as needed.         Follow-ups after your visit        Additional Services     BARIATRIC ADULT REFERRAL       Your provider has referred you to: [facility]: [facility] - [place] [phone]   [email or web address]    Please see the medical weight management team    Please be aware that coverage of these services is subject to the terms and limitations of your health insurance plan.  Call member services at your health plan with any benefit or coverage questions.      Please bring the following with you to your appointment:      (1) List of current medications   (2) This referral request   (3) Any documents/labs given to you for this referral                  Your next 10 appointments already scheduled     Feb 20, 2017  8:30 AM CST   (Arrive by 8:15 AM)   New Patient Visit with MD ANNMARIE Ferrer [facility] Management ([facility])    [address] [place] [id number] [phone]              Who to contact     Please call your clinic at [phone] to:    Ask questions about your health    Make or cancel appointments    Discuss your medicines    Learn about your test results    Speak to your doctor   If you have compliments or concerns about an experience at your clinic, or if you wish to file a complaint, please contact [facility] Patient Relations at  "[phone] or email us at [email or web address]         Additional Information About Your Visit        [employer] Information     [employer] gives you secure access to your electronic health record. If you see a primary care provider, you can also send messages to your care team and make appointments. If you have questions, please call your primary care clinic.  If you do not have a primary care provider, please call [phone] and they will assist you.      [employer] is an electronic gateway that provides easy, online access to your medical records. With [employer], you can request a clinic appointment, read your test results, renew a prescription or communicate with your care team.     To access your existing account, please contact your [facility] or call [phone] for assistance.        Care EveryWhere ID     This is your Care EveryWhere ID. This could be used by other organizations to access your [facility] medical records  [id number]        Your Vitals Were     Pulse Temperature Height Last Period Pulse Oximetry BMI (Body Mass Index)    95 98.4  F (36.9  C) (Oral) 1.626 m (5' 4.02\") 01/15/2017 98% 43.19 kg/m2       Blood Pressure from Last 3 Encounters:   02/01/17 126/85   01/19/17 119/77   01/16/17 132/57    Weight from Last 3 Encounters:   02/01/17 114.2 kg (251 lb 12.8 oz)   01/19/17 116.8 kg (257 lb 8 oz)   01/16/17 116.1 kg (256 lb)              We Performed the Following     BARIATRIC ADULT REFERRAL          Today's Medication Changes          These changes are accurate as of: 2/1/17 11:59 PM.  If you have any questions, ask your nurse or doctor.               Stop taking these medicines if you haven't already. Please contact your care team if you have questions.     ondansetron 4 MG tablet   Commonly known as:  ZOFRAN   Stopped by:  Ciro Deras MD           oxyCODONE 5 MG IR tablet   Commonly known as:  ROXICODONE   Stopped by:  Ciro Deras MD     "                Primary Care Provider Office Phone # Fax #    Fernanda Rodriguez [phone]       [address] [address]        Thank you!     Thank you for choosing [facility] SURGICAL WEIGHT MANAGEMENT  for your care. Our goal is always to provide you with excellent care. Hearing back from our patients is one way we can continue to improve our services. Please take a few minutes to complete the written survey that you may receive in the mail after your visit with us. Thank you!             Your Updated Medication List - Protect others around you: Learn how to safely use, store and throw away your medicines at www.disposemymeds.org.          This list is accurate as of: 2/1/17 11:59 PM.  Always use your most recent med list.                   Brand Name Dispense Instructions for use    cetirizine 10 MG tablet    zyrTEC     Take 10 mg by mouth daily as needed       fluconazole 150 MG tablet    DIFLUCAN    12 tablet    Take 1 tablet (150 mg) by mouth once a week       fluocinonide 0.05 % solution    LIDEX    30 mL    Apply sparingly to affected area twice daily as needed.  Do not apply to face.       omeprazole 20 MG CR capsule    priLOSEC    90 capsule    TAKE ONE CAPSULE BY MOUTH ONE TIME DAILY       SUMAtriptan 50 MG tablet    IMITREX    9 tablet    TAKE 2 TABLETS BY MOUTH AT ONSET OF HEADACHE FOR MIGRAINE. MAY REPEAT IN 2 HOURS IF NEEDED.. MAX 4 TABS A DAY       triamcinolone 0.1 % ointment    KENALOG    15 g    Apply topically externally to affected areas three times daily as needed       valACYclovir 500 MG tablet    VALTREX    36 tablet    TAKE ONE TABLET BY MOUTH ONE TIME DAILY. THEN INCREASE DOSE TO 4 TABLETS TWICE DAILY FOR 1 DAY AT ONSET OF OUTBREAK AS NEEDED.

## 2017-02-01 NOTE — PROGRESS NOTES
"Patient doing well  /85 mmHg  Pulse 95  Temp(Src) 98.4  F (36.9  C) (Oral)  Ht 1.626 m (5' 4.02\")  Wt 114.216 kg (251 lb 12.8 oz)  BMI 43.20 kg/m2  SpO2 98%  LMP 01/15/2017  Abdomen is soft NT incision CDI  Medically supervised weight loss  Followup prn.  "

## 2017-02-01 NOTE — NURSING NOTE
"(   Chief Complaint   Patient presents with     RECHECK     S/p gastric band removal 1/19/17    )    ( Weight: 251 lb 12.8 oz )  ( Height: 5' 4.02\" )  ( BMI (Calculated): 43.28 )  ( Seminar Weight: 240 lb )  ( Seminar Wt Minus Current Wt (lbs): -11.8 )  ( Last Visits Weight: 255 lb 11.2 oz )  ( Change from Last Visit Weight (lbs): -3.9 )  (   )  (   )    ( BP: 126/85 mmHg )  ( Site: Arm, upper left )  ( Temp: 98.4  F (36.9  C) )  ( Temp src: Oral )  ( Pulse: 95 )  (   )  ( SpO2: 98 % )    (   Patient Active Problem List   Diagnosis     GERD (gastroesophageal reflux disease)     CARDIOVASCULAR SCREENING; LDL GOAL LESS THAN 160     Migraines     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 THEN INCREASE DOSE TO 4 TABLETS TWICE DAILY FOR 1 DAY AT ONSET OF OUTBREAK AS NEEDED. 36 tablet 11     fluconazole (DIFLUCAN) 150 MG tablet Take 1 tablet (150 mg) by mouth once a week 12 tablet 3     omeprazole (PRILOSEC) 20 MG capsule TAKE ONE CAPSULE BY MOUTH ONE TIME DAILY 90 capsule 3     cetirizine (ZYRTEC) 10 MG tablet Take 10 mg by mouth daily as needed        fluocinonide (LIDEX) 0.05 % external solution Apply sparingly to affected area twice daily as needed.  Do not apply to face. 30 mL 0     SUMAtriptan (IMITREX) 50 MG tablet TAKE 2 TABLETS BY MOUTH AT ONSET OF HEADACHE FOR MIGRAINE. MAY REPEAT IN 2 HOURS IF NEEDED.. MAX 4 TABS A DAY 9 tablet 11     triamcinolone (KENALOG) 0.1 % ointment Apply topically externally to affected areas three times daily as needed 15 g 1    )  ( Diabetes Eval:    )    ( Pain Eval:  Data Unavailable )    ( Wound Eval:       )    (   History   Smoking status     Former Smoker -- 0.50 packs/day for 10 " years     Types: Cigarettes     Quit date: 12/01/1997   Smokeless tobacco     Never Used    )    ( Signed By:  Ellen Yañez; February 1, 2017; 11:36 AM )

## 2017-02-01 NOTE — LETTER
"2/1/2017       RE: [facility]ad  [address] [place] [address]     Dear Colleague,    Thank you for referring your patient, Elaine Awad, to the [facility] SURGICAL WEIGHT MANAGEMENT at [facility]. Please see a copy of my visit note below.    Patient doing well  /85 mmHg  Pulse 95  Temp(Src) 98.4  F (36.9  C) (Oral)  Ht 1.626 m (5' 4.02\")  Wt 114.216 kg (251 lb 12.8 oz)  BMI 43.20 kg/m2  SpO2 98%  LMP 01/15/2017  Abdomen is soft NT incision CDI  Medically supervised weight loss  Followup prn.    Again, thank you for allowing me to participate in the care of your patient.      Sincerely,    Cirobennett Deras MD      "

## 2017-02-17 ASSESSMENT — ENCOUNTER SYMPTOMS
POOR WOUND HEALING: 0
NAIL CHANGES: 0
SKIN CHANGES: 0

## 2017-02-20 ENCOUNTER — OFFICE VISIT (OUTPATIENT)
Dept: ENDOCRINOLOGY | Facility: CLINIC | Age: 47
End: 2017-02-20

## 2017-02-20 VITALS
TEMPERATURE: 97.3 F | DIASTOLIC BLOOD PRESSURE: 77 MMHG | HEIGHT: 64 IN | OXYGEN SATURATION: 98 % | WEIGHT: 255 LBS | BODY MASS INDEX: 43.54 KG/M2 | SYSTOLIC BLOOD PRESSURE: 123 MMHG | HEART RATE: 93 BPM

## 2017-02-20 DIAGNOSIS — E66.01 MORBID OBESITY DUE TO EXCESS CALORIES (H): Primary | ICD-10-CM

## 2017-02-20 ASSESSMENT — PAIN SCALES - GENERAL: PAINLEVEL: NO PAIN (0)

## 2017-02-20 NOTE — PATIENT INSTRUCTIONS
Follow up with Dr. Bedoya in 3 months    Olena Bishop, RN care coordinator [phone]        MEDICATION STARTED AT THIS APPOINTMENT    We are starting Qsymia. This is a specific obesity medication and is a combination of Phentermine and Topiramate, formulated as a sustained release, taken one time a day. There are a few different doses of the medication. Doses come in 3.75/23 mg (usually skipped), 7.5/46 mg, 11.25/69 mg, and 15/92 mg. Call the nurse at [phone] if you have any questions or concerns. (Do not stop taking it if you don't think it's working. For some people it works even though they do not feel much different.)    For some of our patients, the pills work right away. They feel and think quite differently about food. Other patients don't feel much of a change but find in fact they have lost weight! Like all weight loss medications, Qsymia works best when you help it work.  This means:    1) Have less tempting high calorie (fattening) food around the house or office    2) Have lower calorie food (fruits, vegetables,low fat meats and dairy) for snacks    3) Eat out only one time or less each week.   4) Eat your meals at a table with the TV or computer off.    Side-effects (generally well tolerated because it is a sustained release medication)    Topiramate:   -Tingling in hands,feet, or face (usually not very troublesome)   -Mental confusion and word finding trouble (about 10% of patients have this.)     -Feeling sleepy or a bit dopey- this goes away very soon after starting.      Phentermine:    -Feelings of racing pulse or rapid heart beat.    -Increased anxiety.   -Some people can get an elevated blood pressure. Because of this we may have  you  come back within a week or so of starting the medication for a blood pressure check.    One of the dangers of topiramate is the possibility of birth defects--if you get pregnant when you are on it, there is the risk that your baby will be born with a  cleft lip or palate.  If you are on topiramate and of child bearing age, you need to be on a reliable form of birth control or refrain from sexual intercourse.     It is very rare for insurance to pay for this and it typically costs around $200 per month. Please check out the website www.qsymia.com and sign up for the Pharmacy savings program to save $75 a month for 12 months if eligible. The medication can also be paid for out of pocket. It may require a prior authorization which could take up to 1-2 weeks.      In order to get refills of this or any medication we prescribe you must be seen in the medical weight mgmt clinic every 2-4 months. Please have your pharmacy fax a refill request to [phone].

## 2017-02-20 NOTE — PROGRESS NOTES
"    New Medical Weight Management Consult    PATIENT:  Elaine Awad  MRN:         [id number]  :         1970  KEAGAN:         2017    Dear Dr. Rodriguez,     I had the pleasure of seeing your patient, Elaine Awad.  Full intake/assessment done to determine barriers to weight loss success and develop a treatment plan.  Elaine Awad is a 46 year old female interested in treatment of medical problems associated with weight.  Her weight today is 255 lbs 0 oz, Body mass index is 43.77 kg/(m^2)., and she has the following co-morbidities:  I Have Reviewed The Following Co-Morbidities With The Patient 2017   I have the following co-morbidities associated with obesity: None of the Above   I have the following co-morbidities associated with obesity: None of the Above       Patient Goals Reviewed With Patient 2017   I am interested in attaining a healthier weight to diminish current health problems related to co-morbid conditions: Yes   I am interested in attaining a healthier weight in order to prevent future health problems: Yes       Referring Provider 2017   Please name the provider who referred you to Medical Weight Management.  If you do not know, please answer: \"I Don't Know\". ikramaden        Wt Readings from Last 4 Encounters:   17 115.7 kg (255 lb)   17 114.2 kg (251 lb 12.8 oz)   17 116.8 kg (257 lb 8 oz)   17 116.1 kg (256 lb)       Weight History Reviewed With Patient 2017   How concerned are you about your weight? Very Concerned   Would you describe your weight gain as gradual? No   I became overweight: In College   The following factors have contributed to my weight gain:  Eating Wrong Types of Food, Lack of Exercise, Genetic (Runs in the Family)   I have tried the following methods to lose weight: Watching Portions or Calories, Exercise, Weight Watchers, Slim Fast or Other Liquid Diets, Weight Loss Surgery   The most weight I have ever lost was: (lbs) " 50   My lowest weight since age 18 was: 170   My highest weight since age 18 was: now    I have the following family history of obesity/being overweight:  Many of my relatives are overweight   Has anyone in your family had weight loss surgery? No       Diet Recall Reviewed With Patient 2/17/2017   How many of glasses of milk do you drink in a typical day? 0   How often do you have a drink of alcohol? Monthly or Less   If you do drink, how many drinks might you have in a day? 1 or 2       Eating Habits Reviewed With Patient 2/17/2017   Generally, my meals include foods like these: bread, pasta, rice, potatoes, corn, crackers, sweet dessert, pop, or juice. A Few Times a Week   Generally, my meals include foods like these: fried meats, brats, burgers, french fries, pizza, cheese, chips, or ice cream. A Few Times a Week   Eat fast food (like [employer]onal[employer], [employer], [email or web address]). A Few Times a Week   Eat at a buffet or sit-down restaurant. A Few Times a Week   Eat most of my meals in front of the TV or computer. A Few Times a Week   Often skip meals, eat at random times, have no regular eating times. Half of the Week   Rarely sit down for a meal but snack or graze throughout.  A Few Times a Week   Eat extra snacks between meals. A Few Times a Week   Eat most of my food at the end of the day. Half of the Week   Eat in the middle of the night or wake up at night to eat. Never   Eat extra snacks to prevent or correct low blood sugar. Never   Eat to prevent acid reflux or stomach pain. Never   Worry about not having enough food to eat. Never   Have you been to the food shelf at least a few times this year? No   I eat when I am depressed, stressed, anxious, or bored. Never   I eat when I am happy or as a reward. Never   I feel hungry all the time even if I just have eaten. Never   Feeling full is important to me. Never   Once I start eating, it is hard to stop. Never   I finish all the food on my plate even if I am already  full. Never   I can't resist eating delicious food or walk past the good food/smell. Never   I eat/snack without noticing that I am eating. Never   I eat when I am preparing the meal. A Few Times a Week   I eat more than usual when I see others eating. Never   I have trouble not eating sweets, ice cream, cookies, or chips if they are around the house. A Few Times a Week   I think about food all day. Never   I feel out of control when eating. Never   I eat a large amount of food, like a loaf of bread, a box of cookies, a pint/quart of ice cream, all at once. Never   I eat a large amount of food even when I am not hungry. Never   I eat rapidly. Never   I eat alone because I feel embarrassed and do not want others to see how much I have eaten. Never   I eat until I am uncomfortably full. Never   I feel bad, disgusted, or guilty after I overeat. Never   I make myself vomit what I have eaten or use laxatives to get rid of food. Never       Activity/Exercise History Reviewed With Patient 2/17/2017   How much of a typical 12 hour day do you spend sitting? Most of the Day   How much of a typical 12 hour day do you spend lying down? Less Than Half the Day   How much of a typical day do you spend walking/standing? Less Than Half the Day   How many hours (not including work) do you spend on the TV/Video Games/Computer/Tablet/Phone? 2-3 Hours   How many times a week are you active for the purpose of exercise? 2-3 Times a Week   What keeps you from being more active? 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 75%   What is your marital status? /In a Relationship   If in a relationship, is your significant other overweight? Yes   Do you have children? Yes   If you have children, are they overweight? Yes       Mental Health History Reviewed With Patient 2/17/2017   Have you ever been physically or sexually abused? No   How often in the past 2 weeks have you felt little interest or pleasure in doing things? Not at all   Over the past 2 weeks how often have you felt down, depressed, or hopeless? Not at all       Sleep History Reviewed With Patient 2/17/2017   How many hours do you sleep at night? 5   Do you think that you snore loudly or has anybody ever heard you snore loudly (louder than talking or so loud it can be heard behind a shut door)? No   Has anyone seen or heard you stop breathing during your sleep? No   Do you often feel tired, fatigued, or sleepy during the day? Yes       MEDICATIONS:   Current Outpatient Prescriptions   Medication Sig Dispense Refill     valACYclovir (VALTREX) 500 MG tablet TAKE ONE TABLET BY MOUTH ONE TIME DAILY. THEN INCREASE DOSE TO 4 TABLETS TWICE DAILY FOR 1 DAY AT ONSET OF OUTBREAK AS NEEDED. 36 tablet 11     fluconazole (DIFLUCAN) 150 MG tablet Take 1 tablet (150 mg) by mouth once a week 12 tablet 3     omeprazole (PRILOSEC) 20 MG capsule TAKE ONE CAPSULE BY MOUTH ONE TIME DAILY 90 capsule 3     cetirizine (ZYRTEC) 10 MG tablet Take 10 mg by mouth daily as needed        fluocinonide (LIDEX) 0.05 % external solution Apply sparingly to affected area twice daily as needed.  Do not apply to face. 30 mL 0     SUMAtriptan (IMITREX) 50 MG tablet TAKE 2 TABLETS BY MOUTH AT ONSET OF HEADACHE FOR MIGRAINE. MAY REPEAT IN 2 HOURS IF NEEDED.. MAX 4 TABS A DAY 9 tablet 11     triamcinolone (KENALOG) 0.1 % ointment Apply topically externally to affected areas three times  "daily as needed 15 g 1       ALLERGIES:   Allergies   Allergen Reactions     Ampicillin Swelling              PHYSICAL EXAM:  /77 (BP Location: Left arm, Patient Position: Chair, Cuff Size: Adult Large)  Pulse 93  Temp 97.3  F (36.3  C) (Oral)  Ht 1.626 m (5' 4\")  Wt 115.7 kg (255 lb)  LMP 02/01/2017 (Approximate)  SpO2 98%  BMI 43.77 kg/m2   A & O x 3  HEENT: NCAT, mucous membranes moist  Respirations unlabored  Location of obesity: Mixed Obesity    ASSESSMENT:  Elaine is a patient with post-bariatric surgery weight gain with significant element of familial/genetic influence and with current health consequences. She is 2 year(s) status post laparoscopic gastric band, now post removal in December 2016. Pre weight ~250, marlon 205. She does need aggressive weight loss plan due to post bariatric weight gain.      Elaine Awad eats a high carb diet, eats a high fat diet, eats fast food once or more per week, eats to obtain specific degree of fullness, eats most meals in front of TV, mostly eats during the evening, tends to snack/graze throughout day, rarely sitting to eat a true meal and has a disorganized meal pattern.    Her problem is complicated by strong craving/reward pathways and unhelpful lifestyle choices    Her ability to lose weight is impacted by lack of confidence.    PLAN:    Volumetrics eating plan  Meal planning    Craving/Reward   Ancillary testing:  N/A.  Food Plan:  Volumetrics and High protein/low carbohydrate.   Activity Plan:  Activity journal.  Supplementary:  N/A.   Medication:  The patient will begin medication in pursuit of improved medical status as influenced by body weight. She will start Qsymia.  There is a mutual understanding of the goals and risks of this therapy. The patient is in agreement. She is educated on dosage regimen and possible side effects.    RTC:    12 weeks.  30/45 minutes spent on counseling and education    Sincerely,    Ra Bedoya MD      "

## 2017-02-20 NOTE — LETTER
"2017       RE: Elaine Awad  [address]  [facility] [address]     Dear Colleague,    Thank you for referring your patient, Elaine Awad, to the [facility] MEDICAL WEIGHT MANAGEMENT at [facility]. Please see a copy of my visit note below.        New Medical Weight Management Consult    PATIENT:  Elaine Awad  MRN:         [id number]  :         1970  [place]:         2017    Dear Dr. Rodriguez,     I had the pleasure of seeing your patient, Elaine Awad.  Full intake/assessment done to determine barriers to weight loss success and develop a treatment plan.  Elaine Awad is a 46 year old female interested in treatment of medical problems associated with weight.  Her weight today is 255 lbs 0 oz, Body mass index is 43.77 kg/(m^2)., and she has the following co-morbidities:  I Have Reviewed The Following Co-Morbidities With The Patient 2017   I have the following co-morbidities associated with obesity: None of the Above   I have the following co-morbidities associated with obesity: None of the Above       Patient Goals Reviewed With Patient 2017   I am interested in attaining a healthier weight to diminish current health problems related to co-morbid conditions: Yes   I am interested in attaining a healthier weight in order to prevent future health problems: Yes       Referring Provider 2017   Please name the provider who referred you to Medical Weight Management.  If you do not know, please answer: \"I Don't Know\". ikramaden        Wt Readings from Last 4 Encounters:   17 115.7 kg (255 lb)   17 114.2 kg (251 lb 12.8 oz)   17 116.8 kg (257 lb 8 oz)   17 116.1 kg (256 lb)       Weight History Reviewed With Patient 2017   How concerned are you about your weight? Very Concerned   Would you describe your weight gain as gradual? No   I became overweight: In College   The following factors have contributed to my weight gain: "  Eating Wrong Types of Food, Lack of Exercise, Genetic (Runs in the Family)   I have tried the following methods to lose weight: Watching Portions or Calories, Exercise, Weight Watchers, Slim Fast or Other Liquid Diets, Weight Loss Surgery   The most weight I have ever lost was: (lbs) 50   My lowest weight since age 18 was: 170   My highest weight since age 18 was: now    I have the following family history of obesity/being overweight:  Many of my relatives are overweight   Has anyone in your family had weight loss surgery? No       Diet Recall Reviewed With Patient 2/17/2017   How many of glasses of milk do you drink in a typical day? 0   How often do you have a drink of alcohol? Monthly or Less   If you do drink, how many drinks might you have in a day? 1 or 2       Eating Habits Reviewed With Patient 2/17/2017   Generally, my meals include foods like these: bread, pasta, rice, potatoes, corn, crackers, sweet dessert, pop, or juice. A Few Times a Week   Generally, my meals include foods like these: fried meats, brats, burgers, french fries, pizza, cheese, chips, or ice cream. A Few Times a Week   Eat fast food (like McDonalds, [employer], [employer]). A Few Times a Week   Eat at a buffet or sit-down restaurant. A Few Times a Week   Eat most of my meals in front of the TV or computer. A Few Times a Week   Often skip meals, eat at random times, have no regular eating times. Half of the Week   Rarely sit down for a meal but snack or graze throughout.  A Few Times a Week   Eat extra snacks between meals. A Few Times a Week   Eat most of my food at the end of the day. Half of the Week   Eat in the middle of the night or wake up at night to eat. Never   Eat extra snacks to prevent or correct low blood sugar. Never   Eat to prevent acid reflux or stomach pain. Never   Worry about not having enough food to eat. Never   Have you been to the food shelf at least a few times this year? No   I eat when I am depressed,  stressed, anxious, or bored. Never   I eat when I am happy or as a reward. Never   I feel hungry all the time even if I just have eaten. Never   Feeling full is important to me. Never   Once I start eating, it is hard to stop. Never   I finish all the food on my plate even if I am already full. Never   I can't resist eating delicious food or walk past the good food/smell. Never   I eat/snack without noticing that I am eating. Never   I eat when I am preparing the meal. A Few Times a Week   I eat more than usual when I see others eating. Never   I have trouble not eating sweets, ice cream, cookies, or chips if they are around the house. A Few Times a Week   I think about food all day. Never   I feel out of control when eating. Never   I eat a large amount of food, like a loaf of bread, a box of cookies, a pint/quart of ice cream, all at once. Never   I eat a large amount of food even when I am not hungry. Never   I eat rapidly. Never   I eat alone because I feel embarrassed and do not want others to see how much I have eaten. Never   I eat until I am uncomfortably full. Never   I feel bad, disgusted, or guilty after I overeat. Never   I make myself vomit what I have eaten or use laxatives to get rid of food. Never       Activity/Exercise History Reviewed With Patient 2/17/2017   How much of a typical 12 hour day do you spend sitting? Most of the Day   How much of a typical 12 hour day do you spend lying down? Less Than Half the Day   How much of a typical day do you spend walking/standing? Less Than Half the Day   How many hours (not including work) do you spend on the TV/Video Games/Computer/Tablet/Phone? 2-3 Hours   How many times a week are you active for the purpose of exercise? 2-3 Times a Week   What keeps you from being more active? 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 /In a Relationship   If in a relationship, is your significant other overweight? Yes   Do you have children? Yes   If you have children, are they overweight? Yes       Mental Health History Reviewed With Patient 2/17/2017   Have you ever been physically or sexually abused? No   How often in the past 2 weeks have you felt little interest or pleasure in doing things? Not at all   Over the past 2 weeks how often have you felt down, depressed, or hopeless? Not at all       Sleep History Reviewed With Patient 2/17/2017   How many hours do you sleep at night? 5   Do you think that you snore loudly or has anybody ever heard you snore loudly (louder than talking or so loud it can be heard behind a shut door)? No   Has anyone seen or heard you stop breathing during your sleep? No   Do you often feel tired, fatigued, or sleepy during the day? Yes       MEDICATIONS:   Current Outpatient Prescriptions   Medication Sig Dispense Refill     valACYclovir (VALTREX) 500 MG tablet TAKE ONE TABLET BY MOUTH ONE TIME DAILY. THEN INCREASE DOSE TO 4 TABLETS TWICE DAILY FOR 1 DAY AT ONSET OF OUTBREAK AS NEEDED. 36 tablet 11     fluconazole (DIFLUCAN) 150 MG tablet Take 1 tablet (150 mg) by mouth once a week 12 tablet 3     omeprazole (PRILOSEC) 20 MG capsule TAKE ONE CAPSULE BY MOUTH ONE TIME DAILY 90 capsule 3     cetirizine (ZYRTEC) 10 MG tablet Take 10 mg by mouth daily as needed        fluocinonide (LIDEX) 0.05 % external solution Apply sparingly to affected area twice daily as needed.   "Do not apply to face. 30 mL 0     SUMAtriptan (IMITREX) 50 MG tablet TAKE 2 TABLETS BY MOUTH AT ONSET OF HEADACHE FOR MIGRAINE. MAY REPEAT IN 2 HOURS IF NEEDED.. MAX 4 TABS A DAY 9 tablet 11     triamcinolone (KENALOG) 0.1 % ointment Apply topically externally to affected areas three times daily as needed 15 g 1       ALLERGIES:   Allergies   Allergen Reactions     Ampicillin Swelling              PHYSICAL EXAM:  /77 (BP Location: Left arm, Patient Position: Chair, Cuff Size: Adult Large)  Pulse 93  Temp 97.3  F (36.3  C) (Oral)  Ht 1.626 m (5' 4\")  Wt 115.7 kg (255 lb)  LMP 02/01/2017 (Approximate)  SpO2 98%  BMI 43.77 kg/m2   A & O x 3  HEENT: NCAT, mucous membranes moist  Respirations unlabored  Location of obesity: Mixed Obesity    ASSESSMENT:  Elaine is a patient with post-bariatric surgery weight gain with significant element of familial/genetic influence and with current health consequences. She is 2 year(s) status post laparoscopic gastric band, now post removal in December 2016. Pre weight ~250, marlon 205. She does need aggressive weight loss plan due to post bariatric weight gain.      Elaine Awad eats a high carb diet, eats a high fat diet, eats fast food once or more per week, eats to obtain specific degree of fullness, eats most meals in front of TV, mostly eats during the evening, tends to snack/graze throughout day, rarely sitting to eat a true meal and has a disorganized meal pattern.    Her problem is complicated by strong craving/reward pathways and unhelpful lifestyle choices    Her ability to lose weight is impacted by lack of confidence.    PLAN:    Volumetrics eating plan  Meal planning    Craving/Reward   Ancillary testing:  N/A.  Food Plan:  Volumetrics and High protein/low carbohydrate.   Activity Plan:  Activity journal.  Supplementary:  N/A.   Medication:  The patient will begin medication in pursuit of improved medical status as influenced by body weight. She will start Qsymia. "  There is a mutual understanding of the goals and risks of this therapy. The patient is in agreement. She is educated on dosage regimen and possible side effects.    RTC:    12 weeks.  30/45 minutes spent on counseling and education    Sincerely,    Ra Bedoya MD

## 2017-02-20 NOTE — MR AVS SNAPSHOT
After Visit Summary   2/20/2017    Elaine Awad    MRN: [id number]           Patient Information     Date Of Birth          1970        Visit Information        Provider Department      2/20/2017 8:30 AM Ra Bedoya MD [facility]        Today's Diagnoses     Morbid obesity due to excess calories (H)    -  1      Care Instructions    Follow up with Dr. Bedoya in 3 months    Olena Bishop, RN care coordinator [phone]        MEDICATION STARTED AT THIS APPOINTMENT    We are starting Qsymia. This is a specific obesity medication and is a combination of Phentermine and Topiramate, formulated as a sustained release, taken one time a day. There are a few different doses of the medication. Doses come in 3.75/23 mg (usually skipped), 7.5/46 mg, 11.25/69 mg, and 15/92 mg. Call the nurse at [phone] if you have any questions or concerns. (Do not stop taking it if you don't think it's working. For some people it works even though they do not feel much different.)    For some of our patients, the pills work right away. They feel and think quite differently about food. Other patients don't feel much of a change but find in fact they have lost weight! Like all weight loss medications, Qsymia works best when you help it work.  This means:    1) Have less tempting high calorie (fattening) food around the house or office    2) Have lower calorie food (fruits, vegetables,low fat meats and dairy) for snacks    3) Eat out only one time or less each week.   4) Eat your meals at a table with the TV or computer off.    Side-effects (generally well tolerated because it is a sustained release medication)    Topiramate:   -Tingling in hands,feet, or face (usually not very troublesome)   -Mental confusion and word finding trouble (about 10% of patients have this.)     -Feeling sleepy or a bit dopey- this goes away very soon after starting.      Phentermine:    -Feelings of  racing pulse or rapid heart beat.    -Increased anxiety.   -Some people can get an elevated blood pressure. Because of this we may have  you  come back within a week or so of starting the medication for a blood pressure check.    One of the dangers of topiramate is the possibility of birth defects--if you get pregnant when you are on it, there is the risk that your baby will be born with a cleft lip or palate.  If you are on topiramate and of child bearing age, you need to be on a reliable form of birth control or refrain from sexual intercourse.     It is very rare for insurance to pay for this and it typically costs around $200 per month. Please check out the website www.qsymia.com and sign up for the Pharmacy savings program to save $75 a month for 12 months if eligible. The medication can also be paid for out of pocket. It may require a prior authorization which could take up to 1-2 weeks.      In order to get refills of this or any medication we prescribe you must be seen in the medical weight mgmt clinic every 2-4 months. Please have your pharmacy fax a refill request to [phone].                  Follow-ups after your visit        Follow-up notes from your care team     Return in about 3 months (around 5/20/2017).      Your next 10 appointments already scheduled     May 20, 2017  9:30 AM CDT   (Arrive by 9:15 AM)   Return Visit with Ra Bedoya MD   [facility] ([facility])    [address] [place] [id number] [phone]              Who to contact     Please call your clinic at [phone] to:    Ask questions about your health    Make or cancel appointments    Discuss your medicines    Learn about your test results    Speak to your doctor   If you have compliments or concerns about an experience at your clinic, or if you wish to file a complaint, please contact [facility] Patient Relations  "at [phone] or email us at [email or web address]         Additional Information About Your Visit        [employer] Information     [employer] gives you secure access to your electronic health record. If you see a primary care provider, you can also send messages to your care team and make appointments. If you have questions, please call your primary care clinic.  If you do not have a primary care provider, please call [phone] and they will assist you.      [employer] is an electronic gateway that provides easy, online access to your medical records. With [employer], you can request a clinic appointment, read your test results, renew a prescription or communicate with your care team.     To access your existing account, please contact your [facility] or call [phone] for assistance.        Care EveryWhere ID     This is your Care EveryWhere ID. This could be used by other organizations to access your [facility] medical records  [id number]        Your Vitals Were     Pulse Temperature Height Last Period Pulse Oximetry BMI (Body Mass Index)    93 97.3  F (36.3  C) (Oral) 5' 4\" 02/01/2017 (Approximate) 98% 43.77 kg/m2       Blood Pressure from Last 3 Encounters:   02/20/17 123/77   02/01/17 126/85   01/19/17 119/77    Weight from Last 3 Encounters:   02/20/17 255 lb   02/01/17 251 lb 12.8 oz   01/19/17 257 lb 8 oz              Today, you had the following     No orders found for display         Today's Medication Changes          These changes are accurate as of: 2/20/17  8:53 AM.  If you have any questions, ask your nurse or doctor.               Start taking these medicines.        Dose/Directions    Phentermine-Topiramate 7.5-46 MG Cp24   Used for:  Morbid obesity due to excess calories (H)   Started by:  Ra Bedoya MD        Dose:  1 capsule   Take 1 capsule by mouth daily   Quantity:  30 capsule   Refills:  5            Where to get your medicines    "   Some of these will need a paper prescription and others can be bought over the counter.  Ask your nurse if you have questions.     Bring a paper prescription for each of these medications     Phentermine-Topiramate 7.5-46 MG Cp24                Primary Care Provider Office Phone # Fax #    Fernanda Rodriguez [phone]       [address] [address]        Thank you!     Thank you for choosing [facility]  for your care. Our goal is always to provide you with excellent care. Hearing back from our patients is one way we can continue to improve our services. Please take a few minutes to complete the written survey that you may receive in the mail after your visit with us. Thank you!             Your Updated Medication List - Protect others around you: Learn how to safely use, store and throw away your medicines at www.disposemymeds.org.          This list is accurate as of: 2/20/17  8:53 AM.  Always use your most recent med list.                   Brand Name Dispense Instructions for use    cetirizine 10 MG tablet    zyrTEC     Take 10 mg by mouth daily as needed       fluconazole 150 MG tablet    DIFLUCAN    12 tablet    Take 1 tablet (150 mg) by mouth once a week       fluocinonide 0.05 % solution    LIDEX    30 mL    Apply sparingly to affected area twice daily as needed.  Do not apply to face.       omeprazole 20 MG CR capsule    priLOSEC    90 capsule    TAKE ONE CAPSULE BY MOUTH ONE TIME DAILY       Phentermine-Topiramate 7.5-46 MG Cp24     30 capsule    Take 1 capsule by mouth daily       SUMAtriptan 50 MG tablet    IMITREX    9 tablet    TAKE 2 TABLETS BY MOUTH AT ONSET OF HEADACHE FOR MIGRAINE. MAY REPEAT IN 2 HOURS IF NEEDED.. MAX 4 TABS A DAY       triamcinolone 0.1 % ointment    KENALOG    15 g    Apply topically externally to affected areas three times daily as needed       valACYclovir 500 MG tablet    VALTREX    36 tablet     TAKE ONE TABLET BY MOUTH ONE TIME DAILY. THEN INCREASE DOSE TO 4 TABLETS TWICE DAILY FOR 1 DAY AT ONSET OF OUTBREAK AS NEEDED.

## 2017-02-20 NOTE — NURSING NOTE
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 THEN INCREASE DOSE TO 4 TABLETS TWICE DAILY FOR 1 DAY AT ONSET OF OUTBREAK AS NEEDED. 36 tablet 11     fluconazole (DIFLUCAN) 150 MG tablet Take 1 tablet (150 mg) by mouth once a week 12 tablet 3     omeprazole (PRILOSEC) 20 MG capsule TAKE ONE CAPSULE BY MOUTH ONE TIME DAILY 90 capsule 3     cetirizine (ZYRTEC) 10 MG tablet Take 10 mg by mouth daily as needed        fluocinonide (LIDEX) 0.05 % external solution Apply sparingly to affected area twice daily as needed.  Do not apply to face. 30 mL 0     SUMAtriptan (IMITREX) 50 MG tablet TAKE 2 TABLETS BY MOUTH AT ONSET OF HEADACHE FOR MIGRAINE. MAY REPEAT IN 2 HOURS IF NEEDED.. MAX 4 TABS A DAY 9 tablet 11     triamcinolone (KENALOG) 0.1 % ointment Apply topically externally to affected areas three times daily as needed 15 g 1    )  ( Diabetes Eval:    )    ( Pain Eval:  No Pain (0) )      History   Smoking Status     Former Smoker     Packs/day: 0.50     Years: 10.00     Types: Cigarettes     Quit date: 12/1/1997   Smokeless Tobacco     Never Used    )    ( Signed By:  Quita Lopez; February 20, 2017; 8:27 AM )  "

## 2017-03-24 DIAGNOSIS — B00.1 RECURRENT COLD SORES: ICD-10-CM

## 2017-03-24 NOTE — TELEPHONE ENCOUNTER
valACYclovir (VALTREX) 500 MG tablet  Last Written Prescription Date: 1/5/17  Last Fill Quantity: 36, # refills: 11  Last Office Visit with G, [facility] or [facility] prescribing provider: 8/1/16        Creatinine   Date Value Ref Range Status   01/16/2017 0.78 0.52 - 1.04 mg/dL Final     Need to call pharmacy at [phone] to let them know that she has refills.

## 2017-03-27 RX ORDER — VALACYCLOVIR HYDROCHLORIDE 500 MG/1
TABLET, FILM COATED ORAL
Qty: 100 TABLET | Refills: 0 | OUTPATIENT
Start: 2017-03-27

## 2017-03-27 NOTE — TELEPHONE ENCOUNTER
Called Pharmacy and let them know that patient has refills. They are going to cancel the request.    Sheryl Herrera CMA ([facility])]

## 2017-04-01 DIAGNOSIS — G43.909 MIGRAINE WITHOUT STATUS MIGRAINOSUS, NOT INTRACTABLE, UNSPECIFIED MIGRAINE TYPE: ICD-10-CM

## 2017-04-03 ENCOUNTER — OFFICE VISIT (OUTPATIENT)
Dept: FAMILY MEDICINE | Facility: CLINIC | Age: 47
End: 2017-04-03
Payer: COMMERCIAL

## 2017-04-03 VITALS
DIASTOLIC BLOOD PRESSURE: 70 MMHG | SYSTOLIC BLOOD PRESSURE: 126 MMHG | HEART RATE: 100 BPM | BODY MASS INDEX: 43.87 KG/M2 | HEIGHT: 64 IN | OXYGEN SATURATION: 97 % | TEMPERATURE: 98.3 F | WEIGHT: 257 LBS

## 2017-04-03 DIAGNOSIS — R07.0 THROAT PAIN: Primary | ICD-10-CM

## 2017-04-03 DIAGNOSIS — R05.9 COUGH: ICD-10-CM

## 2017-04-03 LAB
DEPRECATED S PYO AG THROAT QL EIA: NORMAL
MICRO REPORT STATUS: NORMAL
SPECIMEN SOURCE: NORMAL

## 2017-04-03 PROCEDURE — 87081 CULTURE SCREEN ONLY: CPT | Performed by: FAMILY MEDICINE

## 2017-04-03 PROCEDURE — 99213 OFFICE O/P EST LOW 20 MIN: CPT | Performed by: FAMILY MEDICINE

## 2017-04-03 PROCEDURE — 87880 STREP A ASSAY W/OPTIC: CPT | Performed by: FAMILY MEDICINE

## 2017-04-03 RX ORDER — CODEINE PHOSPHATE AND GUAIFENESIN 10; 100 MG/5ML; MG/5ML
1-2 SOLUTION ORAL EVERY 8 HOURS PRN
Qty: 180 ML | Refills: 0 | Status: SHIPPED | OUTPATIENT
Start: 2017-04-03 | End: 2017-08-14

## 2017-04-03 NOTE — MR AVS SNAPSHOT
After Visit Summary   4/3/2017    Elaine Awad    MRN: [id number]           Patient Information     Date Of Birth          1970        Visit Information        Provider Department      4/3/2017 3:20 PM Tyler David MD [facility]        Today's Diagnoses     Throat pain    -  1    Cough          Care Instructions       * PHARYNGITIS (Sore Throat),REPORT PENDING    Pharyngitis (sore throat) is often due to a virus, but can also be caused by the  strep  bacteria. This is called  strep throat . Both viral and strep infection can cause throat pain that is worse when swallowing, aching all over with headache and fever. Both types of infections are contagious. They may be spread by coughing, kissing or touching others after touching your mouth or nose, so wash your hands often.  A test has been done to determine whether or not you have strep throat. If it is positive for strep infection you will usually need to take antibiotics. If the test is negative, you probably have a viral pharyngitis, and antibiotic treatment will not help you recover.  HOME CARE:    If your symptoms are severe, rest at home for the first 2-3 days. If you are told that your test is positive for strep, you should be off work and school for the first two days of antibiotic treatment. After that, you will no longer be as contagious.    Children: Use acetaminophen (Tylenol) for fever, fussiness or discomfort. In infants over six months of age, you may use ibuprofen (Children's Motrin) instead of Tylenol. [NOTE: If your child has chronic liver or kidney disease or ever had a stomach ulcer or GI bleeding, talk with your doctor before using these medicines.]   (Aspirin should never be used in anyone under 18 years of age who is ill with a fever. It may cause severe liver damage.)  Adults: You may use acetaminophen (Tylenol) 650-1000 mg every 6 hours or ibuprofen (Motrin, Advil) 600 mg every 6-8 hours with food  to control pain, if you are able to take these medicines. [NOTE: If you have chronic liver or kidney disease or ever had a stomach ulcer or GI bleeding, talk with your doctor before using these medicines.]    Throat lozenges or sprays (Chloraseptic and others), or gargling with warm salt water will reduce throat pain. Dissolve 1/2 teaspoon of salt in 1 glass of warm water. This is especially useful just before meals.     FOLLOW UP with your doctor as advised by our staff if you are not improving over the next week.  GET PROMPT MEDICAL ATTENTION  if any of the following occur:    Fever over 101 F (38.3 C) for more than three days    New or worsening ear pain, sinus pain or headache    Unable to swallow liquids or open your mouth wide due to throat pain    Trouble breathing    Muffled voice    New rash       8020-4274 [employer] [facility], [address] [place] [address]. All rights reserved. This information is not intended as a substitute for professional medical care. Always follow your healthcare professional's instructions.     * PHARYNGITIS (Sore Throat),REPORT PENDING    Pharyngitis (sore throat) is often due to a virus, but can also be caused by the  strep  bacteria. This is called  strep throat . Both viral and strep infection can cause throat pain that is worse when swallowing, aching all over with headache and fever. Both types of infections are contagious. They may be spread by coughing, kissing or touching others after touching your mouth or nose, so wash your hands often.  A test has been done to determine whether or not you have strep throat. If it is positive for strep infection you will usually need to take antibiotics. If the test is negative, you probably have a viral pharyngitis, and antibiotic treatment will not help you recover.  HOME CARE:    If your symptoms are severe, rest at home for the first 2-3 days. If you are told that your test is positive for strep, you should be off work and  school for the first two days of antibiotic treatment. After that, you will no longer be as contagious.    Children: Use acetaminophen (Tylenol) for fever, fussiness or discomfort. In infants over six months of age, you may use ibuprofen (Children's Motrin) instead of Tylenol. [NOTE: If your child has chronic liver or kidney disease or ever had a stomach ulcer or GI bleeding, talk with your doctor before using these medicines.]   (Aspirin should never be used in anyone under 18 years of age who is ill with a fever. It may cause severe liver damage.)  Adults: You may use acetaminophen (Tylenol) 650-1000 mg every 6 hours or ibuprofen (Motrin, Advil) 600 mg every 6-8 hours with food to control pain, if you are able to take these medicines. [NOTE: If you have chronic liver or kidney disease or ever had a stomach ulcer or GI bleeding, talk with your doctor before using these medicines.]    Throat lozenges or sprays (Chloraseptic and others), or gargling with warm salt water will reduce throat pain. Dissolve 1/2 teaspoon of salt in 1 glass of warm water. This is especially useful just before meals.     FOLLOW UP with your doctor as advised by our staff if you are not improving over the next week.  GET PROMPT MEDICAL ATTENTION  if any of the following occur:    Fever over 101 F (38.3 C) for more than three days    New or worsening ear pain, sinus pain or headache    Unable to swallow liquids or open your mouth wide due to throat pain    Trouble breathing    Muffled voice    New rash       4921-6162 [employer], [place] [address]. All rights reserved. This information is not intended as a substitute for professional medical care. Always follow your healthcare professional's instructions.     * PHARYNGITIS (Sore Throat),REPORT PENDING    Pharyngitis (sore throat) is often due to a virus, but can also be caused by the  strep  bacteria. This is called  strep throat . Both viral and strep infection  can cause throat pain that is worse when swallowing, aching all over with headache and fever. Both types of infections are contagious. They may be spread by coughing, kissing or touching others after touching your mouth or nose, so wash your hands often.  A test has been done to determine whether or not you have strep throat. If it is positive for strep infection you will usually need to take antibiotics. If the test is negative, you probably have a viral pharyngitis, and antibiotic treatment will not help you recover.  HOME CARE:    If your symptoms are severe, rest at home for the first 2-3 days. If you are told that your test is positive for strep, you should be off work and school for the first two days of antibiotic treatment. After that, you will no longer be as contagious.    Children: Use acetaminophen (Tylenol) for fever, fussiness or discomfort. In infants over six months of age, you may use ibuprofen (Children's Motrin) instead of Tylenol. [NOTE: If your child has chronic liver or kidney disease or ever had a stomach ulcer or GI bleeding, talk with your doctor before using these medicines.]   (Aspirin should never be used in anyone under 18 years of age who is ill with a fever. It may cause severe liver damage.)  Adults: You may use acetaminophen (Tylenol) 650-1000 mg every 6 hours or ibuprofen (Motrin, Advil) 600 mg every 6-8 hours with food to control pain, if you are able to take these medicines. [NOTE: If you have chronic liver or kidney disease or ever had a stomach ulcer or GI bleeding, talk with your doctor before using these medicines.]    Throat lozenges or sprays (Chloraseptic and others), or gargling with warm salt water will reduce throat pain. Dissolve 1/2 teaspoon of salt in 1 glass of warm water. This is especially useful just before meals.     FOLLOW UP with your doctor as advised by our staff if you are not improving over the next week.  GET PROMPT MEDICAL ATTENTION  if any of the  following occur:    Fever over 101 F (38.3 C) for more than three days    New or worsening ear pain, sinus pain or headache    Unable to swallow liquids or open your mouth wide due to throat pain    Trouble breathing    Muffled voice    New rash       4877-4648 [employer], [address] [place] [address], [place] [address]. All rights reserved. This information is not intended as a substitute for professional medical care. Always follow your healthcare professional's instructions.  [facility]    If you have any questions regarding to your visit please contact your care team:       Team Purple:   Clinic Hours Telephone Number   [address] Dr[address]   7am-7pm  Monday - Thursday   7am-5pm  Fridays  [phone]  (Appointment scheduling available 24/7)    Questions about your Visit?   Team Line:  [phone]   Urgent [facility] - [place] - 11am-9pm Monday-Friday Saturday-Sunday- 9am-5pm   [place] - 5pm-9pm Monday-Friday Saturday-Sunday- 9am-5pm  [phone] - [facility]  [phone] - [place]       What options do I have for visits at the clinic other than the traditional office visit?  To expand how we care for you, many of our providers are utilizing electronic visits (e-visits) and telephone visits, when medically appropriate, for interactions with their patients rather than a visit in the clinic.   We also offer nurse visits for many medical concerns. Just like any other service, we will bill your insurance company for this type of visit based on time spent on the phone with your provider. Not all insurance companies cover these visits. Please check with your medical insurance if this type of visit is covered. You will be responsible for any charges that are not paid by your insurance.      E-visits via [employer]:  generally incur a $35.00 fee.  Telephone visits:  Time spent on the phone: *charged based on time that is  spent on the phone in increments of 10 minutes. Estimated cost:   5-10 mins $30.00   11-20 mins. $59.00   21-30 mins. $85.00     Use [employer] (secure email communication and access to your chart) to send your primary care provider a message or make an appointment. Ask someone on your Team how to sign up for [employer].  For a Price Quote for your services, please call our Pred[employer] Line at [phone].  As always, Thank you for trusting us with your health care needs!            Follow-ups after your visit        Your next 10 appointments already scheduled     May 20, 2017  9:30 AM CDT   (Arrive by 9:15 AM)   Return Visit with Ra Bedoya MD   [facility] ([facility])    [address] [place] [id number] [phone]              Who to contact     If you have questions or need follow up information about today's clinic visit or your schedule please contact [facility] directly at [phone].  Normal or non-critical lab and imaging results will be communicated to you by MyChart, letter or phone within 4 business days after the clinic has received the results. If you do not hear from us within 7 days, please contact the clinic through [employer] or phone. If you have a critical or abnormal lab result, we will notify you by phone as soon as possible.  Submit refill requests through [employer] or call your pharmacy and they will forward the refill request to us. Please allow 3 business days for your refill to be completed.          Additional Information About Your Visit        [employer] Information     [employer] gives you secure access to your electronic health record. If you see a primary care provider, you can also send messages to your care team and make appointments. If you have questions, please call your primary care clinic.  If you do not have a primary care provider, please call [phone] and they will assist  "you.        Care EveryWhere ID     This is your Care EveryWhere ID. This could be used by other organizations to access your [place] medical records  [id number]        Your Vitals Were     Pulse Temperature Height Pulse Oximetry BMI (Body Mass Index)       100 98.3  F (36.8  C) 5' 4\" (1.626 m) 97% 44.11 kg/m2        Blood Pressure from Last 3 Encounters:   04/03/17 126/70   02/20/17 123/77   02/01/17 126/85    Weight from Last 3 Encounters:   04/03/17 257 lb (116.6 kg)   02/20/17 255 lb (115.7 kg)   02/01/17 251 lb 12.8 oz (114.2 kg)              We Performed the Following     Beta strep group A culture     Rapid strep screen          Today's Medication Changes          These changes are accurate as of: 4/3/17  3:53 PM.  If you have any questions, ask your nurse or doctor.               Start taking these medicines.        Dose/Directions    guaiFENesin-codeine 100-10 MG/5ML Soln solution   Commonly known as:  ROBITUSSIN AC   Used for:  Cough   Started by:  Tyler David MD        Dose:  1-2 tsp.   Take 5-10 mLs by mouth every 8 hours as needed for cough   Quantity:  180 mL   Refills:  0            Where to get your medicines      Some of these will need a paper prescription and others can be bought over the counter.  Ask your nurse if you have questions.     Bring a paper prescription for each of these medications     guaiFENesin-codeine 100-10 MG/5ML Soln solution                Primary Care Provider Office Phone # Fax #    Fernanda Rodriguez [phone]       [facility] [address] [facility] [address]        Thank you!     Thank you for choosing [facility]  for your care. Our goal is always to provide you with excellent care. Hearing back from our patients is one way we can continue to improve our services. Please take a few minutes to complete the written survey that you may receive in the mail after your visit with us. Thank you!             Your " Updated Medication List - Protect others around you: Learn how to safely use, store and throw away your medicines at www.disposemymeds.org.          This list is accurate as of: 4/3/17  3:53 PM.  Always use your most recent med list.                   Brand Name Dispense Instructions for use    cetirizine 10 MG tablet    zyrTEC     Take 10 mg by mouth daily as needed       fluconazole 150 MG tablet    DIFLUCAN    12 tablet    Take 1 tablet (150 mg) by mouth once a week       fluocinonide 0.05 % solution    LIDEX    30 mL    Apply sparingly to affected area twice daily as needed.  Do not apply to face.       guaiFENesin-codeine 100-10 MG/5ML Soln solution    ROBITUSSIN AC    180 mL    Take 5-10 mLs by mouth every 8 hours as needed for cough       omeprazole 20 MG CR capsule    priLOSEC    90 capsule    TAKE ONE CAPSULE BY MOUTH ONE TIME DAILY       Phentermine-Topiramate 7.5-46 MG Cp24     30 capsule    Take 1 capsule by mouth daily       SUMAtriptan 50 MG tablet    IMITREX    9 tablet    TAKE 2 TABLETS BY MOUTH AT ONSET OF HEADACHE FOR MIGRAINE. MAY REPEAT IN 2 HOURS IF NEEDED.. MAX 4 TABS A DAY       triamcinolone 0.1 % ointment    KENALOG    15 g    Apply topically externally to affected areas three times daily as needed       valACYclovir 500 MG tablet    VALTREX    36 tablet    TAKE ONE TABLET BY MOUTH ONE TIME DAILY. THEN INCREASE DOSE TO 4 TABLETS TWICE DAILY FOR 1 DAY AT ONSET OF OUTBREAK AS NEEDED.

## 2017-04-03 NOTE — PROGRESS NOTES
SUBJECTIVE:                                                    Elaine Awad is a 46 year old female who presents to clinic today for the following health issues:      ENT Symptoms             Symptoms: cc Present Absent Comment   Fever/Chills  x     Fatigue  x     Muscle Aches  x     Eye Irritation   x    Sneezing   x    Nasal Michele/Drg  x     Sinus Pressure/Pain  x     Loss of smell  x     Dental pain   x    Sore Throat  x     Swollen Glands   x    Ear Pain/Fullness  x     Cough  x     Wheeze  x     Chest Pain   x    Shortness of breath  x     Rash       Other         Symptom duration:  4 days   Symptom severity:  severe   Treatments tried:  dayquil,OTC cough,ibuprofen,vicks   Contacts:       Cough, fever, sore throat. Coughing a lot especially at night and cannot sleep   treated with Z pack    PROBLEMS TO ADD ON...    Problem list and histories reviewed & adjusted, as indicated.  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     Yeast infection of the vagina, recurrent      HDL deficiency     Recurrent cold sores     Bariatric surgery status     Morbid obesity (H)     Eczema     Past Surgical History:   Procedure Laterality Date     C TREAT ECTOPIC PREG,RMV TUBE/OVARY      LT     COLPOSCOPY,BX CERVIX/ENDOCERV CURR  7/1994     ESOPHAGOSCOPY, GASTROSCOPY, DUODENOSCOPY (EGD), COMBINED  1/2/2014    Procedure: COMBINED ESOPHAGOSCOPY, GASTROSCOPY, DUODENOSCOPY (EGD);;  Surgeon: Eden Stacy MD;  Location:  GI     ESOPHAGOSCOPY, GASTROSCOPY, DUODENOSCOPY (EGD), COMBINED N/A 1/19/2017    Procedure: COMBINED ESOPHAGOSCOPY, GASTROSCOPY, DUODENOSCOPY (EGD);  Surgeon: Ciro Deras MD;  Location:  OR     LAPAROSCOPIC GASTRIC ADJUSTABLE BANDING  4/28/2014    Procedure: LAPAROSCOPIC GASTRIC ADJUSTABLE  "BANDING;  Surgeon: Ciro Deras MD;  Location: [facility] OR     LAPAROSCOPIC HERNIORRHAPHY HIATAL  4/28/2014    Procedure: LAPAROSCOPIC HERNIORRHAPHY HIATAL;  Surgeon: Ciro Deras MD;  Location: [facility] OR     LAPAROSCOPIC REMOVAL GASTRIC ADJUSTABLE BAND N/A 1/19/2017    Procedure: LAPAROSCOPIC REMOVAL GASTRIC ADJUSTABLE BAND;  Surgeon: Ciro Deras MD;  Location: [facility] OR     LITHOTRIPSY         Social History   Substance Use Topics     Smoking status: Former Smoker     Packs/day: 0.50     Years: 10.00     Types: Cigarettes     Quit date: 12/1/1997     Smokeless tobacco: Never Used     Alcohol use 0.0 - 0.5 oz/week      Comment: 1 - 3 per month     Family History   Problem Relation Age of Onset     CANCER Father 72     d. pancreatic, melanoma      Other Cancer Father      pancreatic cancer     Colon Polyps Father      DIABETES Mother      C.A.D. Mother      Breast Cancer Maternal Grandmother      HEART DISEASE Maternal Grandfather      CHF     HEART DISEASE Paternal Grandmother      CHF     Colon Polyps Paternal Grandmother      Respiratory Paternal Grandfather      TB     Obesity Brother            Reviewed and updated as needed this visit by clinical staff  Tobacco  Allergies  Meds  Med Hx  Surg Hx  Fam Hx  Soc Hx      Reviewed and updated as needed this visit by Provider         ROS:  Constitutional, cardiovascular, gi and gu systems are negative, except as otherwise noted.    OBJECTIVE:                                                    /70 (BP Location: Left arm, Patient Position: Chair, Cuff Size: Adult Large)  Pulse 100  Temp 98.3  F (36.8  C)  Ht 5' 4\" (1.626 m)  Wt 257 lb (116.6 kg)  SpO2 97%  BMI 44.11 kg/m2  Body mass index is 44.11 kg/(m^2).  GENERAL: healthy, alert and no distress  THROAT: Oropharyngeal inflammation  NECK: no adenopathy and thyroid normal to palpation  RESP: lungs clear to auscultation - no rales, rhonchi or wheezes  CV: regular rate and rhythm, no murmur, " click or rub, no peripheral edema  MS: no gross musculoskeletal defects noted, no edema.     ASSESSMENT/PLAN:                                                    (R07.0) Throat pain  (primary encounter diagnosis)  Comment: Encouraged good hydration and discussed signs/symptoms of dehydration. OTC analgesic and saline gargles recommended. Will contact with results of culture when available. Recheck if not improving as expected.  Plan: Rapid strep screen, Beta strep group A culture        (R05) Cough  Comment: Symptomatic treatment  Plan: guaiFENesin-codeine (ROBITUSSIN AC) 100-10         MG/5ML SOLN solution    Call or return to clinic prn if these symptoms worsen or fail to improve as anticipated 1 week    Tyler David MD  [facility]

## 2017-04-03 NOTE — PATIENT INSTRUCTIONS
* PHARYNGITIS (Sore Throat),REPORT PENDING    Pharyngitis (sore throat) is often due to a virus, but can also be caused by the  strep  bacteria. This is called  strep throat . Both viral and strep infection can cause throat pain that is worse when swallowing, aching all over with headache and fever. Both types of infections are contagious. They may be spread by coughing, kissing or touching others after touching your mouth or nose, so wash your hands often.  A test has been done to determine whether or not you have strep throat. If it is positive for strep infection you will usually need to take antibiotics. If the test is negative, you probably have a viral pharyngitis, and antibiotic treatment will not help you recover.  HOME CARE:    If your symptoms are severe, rest at home for the first 2-3 days. If you are told that your test is positive for strep, you should be off work and school for the first two days of antibiotic treatment. After that, you will no longer be as contagious.    Children: Use acetaminophen (Tylenol) for fever, fussiness or discomfort. In infants over six months of age, you may use ibuprofen (Children's Motrin) instead of Tylenol. [NOTE: If your child has chronic liver or kidney disease or ever had a stomach ulcer or GI bleeding, talk with your doctor before using these medicines.]   (Aspirin should never be used in anyone under 18 years of age who is ill with a fever. It may cause severe liver damage.)  Adults: You may use acetaminophen (Tylenol) 650-1000 mg every 6 hours or ibuprofen (Motrin, Advil) 600 mg every 6-8 hours with food to control pain, if you are able to take these medicines. [NOTE: If you have chronic liver or kidney disease or ever had a stomach ulcer or GI bleeding, talk with your doctor before using these medicines.]    Throat lozenges or sprays (Chloraseptic and others), or gargling with warm salt water will reduce throat pain. Dissolve 1/2 teaspoon of salt in 1 glass of  warm water. This is especially useful just before meals.     FOLLOW UP with your doctor as advised by our staff if you are not improving over the next week.  GET PROMPT MEDICAL ATTENTION  if any of the following occur:    Fever over 101 F (38.3 C) for more than three days    New or worsening ear pain, sinus pain or headache    Unable to swallow liquids or open your mouth wide due to throat pain    Trouble breathing    Muffled voice    New rash       [id number] Kevin \[facility]\"", [address] [place] [address], [place] [address]. All rights reserved. This information is not intended as a substitute for professional medical care. Always follow your healthcare professional's instructions.     * PHARYNGITIS (Sore Throat),REPORT PENDING    Pharyngitis (sore throat) is often due to a virus, but can also be caused by the  strep  bacteria. This is called  strep throat . Both viral and strep infection can cause throat pain that is worse when swallowing, aching all over with headache and fever. Both types of infections are contagious. They may be spread by coughing, kissing or touching others after touching your mouth or nose, so wash your hands often.  A test has been done to determine whether or not you have strep throat. If it is positive for strep infection you will usually need to take antibiotics. If the test is negative, you probably have a viral pharyngitis, and antibiotic treatment will not help you recover.  HOME CARE:    If your symptoms are severe, rest at home for the first 2-3 days. If you are told that your test is positive for strep, you should be off work and school for the first two days of antibiotic treatment. After that, you will no longer be as contagious.    Children: Use acetaminophen (Tylenol) for fever, fussiness or discomfort. In infants over six months of age, you may use ibuprofen (Children's Motrin) instead of Tylenol. [NOTE: If your child has chronic liver or kidney disease or ever had a stomach ulcer or  GI bleeding, talk with your doctor before using these medicines.]   (Aspirin should never be used in anyone under 18 years of age who is ill with a fever. It may cause severe liver damage.)  Adults: You may use acetaminophen (Tylenol) 650-1000 mg every 6 hours or ibuprofen (Motrin, Advil) 600 mg every 6-8 hours with food to control pain, if you are able to take these medicines. [NOTE: If you have chronic liver or kidney disease or ever had a stomach ulcer or GI bleeding, talk with your doctor before using these medicines.]    Throat lozenges or sprays (Chloraseptic and others), or gargling with warm salt water will reduce throat pain. Dissolve 1/2 teaspoon of salt in 1 glass of warm water. This is especially useful just before meals.     FOLLOW UP with your doctor as advised by our staff if you are not improving over the next week.  GET PROMPT MEDICAL ATTENTION  if any of the following occur:    Fever over 101 F (38.3 C) for more than three days    New or worsening ear pain, sinus pain or headache    Unable to swallow liquids or open your mouth wide due to throat pain    Trouble breathing    Muffled voice    New rash       [id number] [place] [address]. All rights reserved. This information is not intended as a substitute for professional medical care. Always follow your healthcare professional's instructions.     * PHARYNGITIS (Sore Throat),REPORT PENDING    Pharyngitis (sore throat) is often due to a virus, but can also be caused by the  strep  bacteria. This is called  strep throat . Both viral and strep infection can cause throat pain that is worse when swallowing, aching all over with headache and fever. Both types of infections are contagious. They may be spread by coughing, kissing or touching others after touching your mouth or nose, so wash your hands often.  A test has been done to determine whether or not you have strep throat. If it is positive for strep infection  you will usually need to take antibiotics. If the test is negative, you probably have a viral pharyngitis, and antibiotic treatment will not help you recover.  HOME CARE:    If your symptoms are severe, rest at home for the first 2-3 days. If you are told that your test is positive for strep, you should be off work and school for the first two days of antibiotic treatment. After that, you will no longer be as contagious.    Children: Use acetaminophen (Tylenol) for fever, fussiness or discomfort. In infants over six months of age, you may use ibuprofen (Children's Motrin) instead of Tylenol. [NOTE: If your child has chronic liver or kidney disease or ever had a stomach ulcer or GI bleeding, talk with your doctor before using these medicines.]   (Aspirin should never be used in anyone under 18 years of age who is ill with a fever. It may cause severe liver damage.)  Adults: You may use acetaminophen (Tylenol) 650-1000 mg every 6 hours or ibuprofen (Motrin, Advil) 600 mg every 6-8 hours with food to control pain, if you are able to take these medicines. [NOTE: If you have chronic liver or kidney disease or ever had a stomach ulcer or GI bleeding, talk with your doctor before using these medicines.]    Throat lozenges or sprays (Chloraseptic and others), or gargling with warm salt water will reduce throat pain. Dissolve 1/2 teaspoon of salt in 1 glass of warm water. This is especially useful just before meals.     FOLLOW UP with your doctor as advised by our staff if you are not improving over the next week.  GET PROMPT MEDICAL ATTENTION  if any of the following occur:    Fever over 101 F (38.3 C) for more than three days    New or worsening ear pain, sinus pain or headache    Unable to swallow liquids or open your mouth wide due to throat pain    Trouble breathing    Muffled voice    New rash       [id number] Kevin Powell, [address] [place] [address], [place] [address]. All rights reserved. This information is not  intended as a substitute for professional medical care. Always follow your healthcare professional's instructions.  [facility]    If you have any questions regarding to your visit please contact your care team:       Team Purple:   Clinic Hours Telephone Number   [address] Dr[address]   7am-7pm  Monday - Thursday   7am-5pm  Fridays  [phone]  (Appointment scheduling available 24/7)    Questions about your Visit?   Team Line:  [phone]   Urgent Care - [place] and [place] - 11am-9pm Monday-Friday Saturday-Sunday- 9am-5pm   [place] - 5pm-9pm Monday-Friday Saturday-Sunday- 9am-5pm  [phone] - [facility]  [phone] - [place]       What options do I have for visits at the clinic other than the traditional office visit?  To expand how we care for you, many of our providers are utilizing electronic visits (e-visits) and telephone visits, when medically appropriate, for interactions with their patients rather than a visit in the clinic.   We also offer nurse visits for many medical concerns. Just like any other service, we will bill your insurance company for this type of visit based on time spent on the phone with your provider. Not all insurance companies cover these visits. Please check with your medical insurance if this type of visit is covered. You will be responsible for any charges that are not paid by your insurance.      E-visits via [employer]:  generally incur a $35.00 fee.  Telephone visits:  Time spent on the phone: *charged based on time that is spent on the phone in increments of 10 minutes. Estimated cost:   5-10 mins $30.00   11-20 mins. $59.00   21-30 mins. $85.00     Use [employer] (secure email communication and access to your chart) to send your primary care provider a message or make an appointment. Ask someone on your Team how to sign up for [employer].  For a Price Quote for your services, please call  our Consumer Price Line at [phone].  As always, Thank you for trusting us with your health care needs!

## 2017-04-03 NOTE — NURSING NOTE
"Chief Complaint   Patient presents with     URI       Initial /70 (BP Location: Left arm, Patient Position: Chair, Cuff Size: Adult Large)  Pulse 100  Temp 98.3  F (36.8  C)  Ht 5' 4\" (1.626 m)  Wt 257 lb (116.6 kg)  SpO2 97%  BMI 44.11 kg/m2 Estimated body mass index is 44.11 kg/(m^2) as calculated from the following:    Height as of this encounter: 5' 4\" (1.626 m).    Weight as of this encounter: 257 lb (116.6 kg).  Medication Reconciliation: complete   Marlene Heard MA      "

## 2017-04-04 RX ORDER — SUMATRIPTAN 50 MG/1
TABLET, FILM COATED ORAL
Qty: 9 TABLET | Refills: 0 | Status: SHIPPED | OUTPATIENT
Start: 2017-04-04 | End: 2017-10-27

## 2017-04-04 NOTE — TELEPHONE ENCOUNTER
SUMAtriptan (IMITREX) 50 MG tablet      Last Written Prescription Date: 10/06/2015  Last Fill Quantity: 9, # refills: 11  Last Office Visit with NOEL, WALLACE or [facility] prescribing provider: 04/3/2017       BP Readings from Last 3 Encounters:   04/03/17 126/70   02/20/17 123/77   02/01/17 126/85     Paula Grant MA

## 2017-04-04 NOTE — TELEPHONE ENCOUNTER
Prescription approved per [facility] Refill Protocol.    Lashell Plascencia, RN - BC

## 2017-04-05 LAB
BACTERIA SPEC CULT: NORMAL
MICRO REPORT STATUS: NORMAL
SPECIMEN SOURCE: NORMAL

## 2017-04-27 ENCOUNTER — MYC MEDICAL ADVICE (OUTPATIENT)
Dept: FAMILY MEDICINE | Facility: CLINIC | Age: 47
End: 2017-04-27

## 2017-07-17 ENCOUNTER — OFFICE VISIT (OUTPATIENT)
Dept: OPTOMETRY | Facility: CLINIC | Age: 47
End: 2017-07-17
Payer: COMMERCIAL

## 2017-07-17 DIAGNOSIS — H52.13 MYOPIA OF BOTH EYES WITH ASTIGMATISM: Primary | ICD-10-CM

## 2017-07-17 DIAGNOSIS — H52.203 MYOPIA OF BOTH EYES WITH ASTIGMATISM: Primary | ICD-10-CM

## 2017-07-17 PROCEDURE — 92015 DETERMINE REFRACTIVE STATE: CPT | Performed by: OPTOMETRIST

## 2017-07-17 PROCEDURE — 92004 COMPRE OPH EXAM NEW PT 1/>: CPT | Performed by: OPTOMETRIST

## 2017-07-17 ASSESSMENT — REFRACTION_WEARINGRX
OS_SPHERE: -0.75
OS_CYLINDER: +0.25
OS_AXIS: 075
OD_SPHERE: -0.25

## 2017-07-17 ASSESSMENT — REFRACTION_MANIFEST
OD_AXIS: 090
OS_AXIS: 075
OD_CYLINDER: +0.25
OS_SPHERE: -0.75
OS_CYLINDER: +0.25
OD_SPHERE: -0.50

## 2017-07-17 ASSESSMENT — TONOMETRY
OD_IOP_MMHG: 16
OS_IOP_MMHG: 16
IOP_METHOD: APPLANATION

## 2017-07-17 ASSESSMENT — VISUAL ACUITY
OS_SC: 20/20
OD_SC: 20/20 -1
METHOD: SNELLEN - LINEAR
OS_SC: 20/20 -1
OD_SC: 20/25

## 2017-07-17 ASSESSMENT — CONF VISUAL FIELD
OD_NORMAL: 1
OS_NORMAL: 1

## 2017-07-17 ASSESSMENT — SLIT LAMP EXAM - LIDS
COMMENTS: NORMAL
COMMENTS: NORMAL

## 2017-07-17 ASSESSMENT — CUP TO DISC RATIO
OD_RATIO: 0.3
OS_RATIO: 0.35

## 2017-07-17 ASSESSMENT — EXTERNAL EXAM - LEFT EYE: OS_EXAM: NORMAL

## 2017-07-17 ASSESSMENT — EXTERNAL EXAM - RIGHT EYE: OD_EXAM: NORMAL

## 2017-07-17 NOTE — MR AVS SNAPSHOT
After Visit Summary   7/17/2017    Elaine Awad    MRN: [id number]           Patient Information     Date Of Birth          1970        Visit Information        Provider Department      7/17/2017 8:30 AM Baylee Kaba OD [facility]        Today's Diagnoses     Myopia of both eyes with astigmatism    -  1      Care Instructions        A final glasses prescription was given (optional).  Allow time for adaptation.  The glasses may cause dizziness and affect depth perception for awhile.  Return to clinic 1 year for Comprehensive Vision Exam      Baylee Kaba O.D  [facility]  [address] [place] [address]. [place]  [address]    [phone]                  Follow-ups after your visit        Follow-up notes from your care team     Return in about 1 year (around 7/17/2018) for Eye Exam.      Who to contact     If you have questions or need follow up information about today's clinic visit or your schedule please contact [facility] directly at [phone].  Normal or non-critical lab and imaging results will be communicated to you by [employer], letter or phone within 4 business days after the clinic has received the results. If you do not hear from us within 7 days, please contact the clinic through [employer] or phone. If you have a critical or abnormal lab result, we will notify you by phone as soon as possible.  Submit refill requests through [employer] or call your pharmacy and they will forward the refill request to us. Please allow 3 business days for your refill to be completed.          Additional Information About Your Visit        [employer] Information     [employer] gives you secure access to your electronic health record. If you see a primary care provider, you can also send messages to your care team and make appointments. If you have questions, please call your primary care clinic.  If you do not have a primary care provider, please call [phone] and  they will assist you.        Care EveryWhere ID     This is your Care EveryWhere ID. This could be used by other organizations to access your [facility] medical records  [id number]         Blood Pressure from Last 3 Encounters:   04/03/17 126/70   02/20/17 123/77   02/01/17 126/85    Weight from Last 3 Encounters:   04/03/17 116.6 kg (257 lb)   02/20/17 115.7 kg (255 lb)   02/01/17 114.2 kg (251 lb 12.8 oz)              We Performed the Following     EYE EXAM (SIMPLE-NONBILLABLE)     REFRACTION        Primary Care Provider Office Phone # Fax #    Fernanda Rodriguez [phone]       [address] [address]        Equal Access to Services     MARISSA OSUNA : Hadii crystal jean-baptiste hadasho Soomaali, waaxda luqadaha, qaybta kaalmada adeegyada, faviola osborn hayirish stone . So [facility] [phone].    ATENCIÓN: Si habla español, tiene a randolph disposición servicios gratuitos de asistencia lingüística. Lokesh al [phone].    We comply with applicable federal civil rights laws and [place] laws. We do not discriminate on the basis of race, color, national origin, age, disability sex, sexual orientation or gender identity.            Thank you!     Thank you for choosing [facility]  for your care. Our goal is always to provide you with excellent care. Hearing back from our patients is one way we can continue to improve our services. Please take a few minutes to complete the written survey that you may receive in the mail after your visit with us. Thank you!             Your Updated Medication List - Protect others around you: Learn how to safely use, store and throw away your medicines at www.disposemymeds.org.          This list is accurate as of: 7/17/17  9:45 AM.  Always use your most recent med list.                   Brand Name Dispense Instructions for use Diagnosis    cetirizine 10 MG tablet    zyrTEC     Take 10 mg by mouth daily as needed         fluconazole 150 MG tablet    DIFLUCAN    12 tablet    Take 1 tablet (150 mg) by mouth once a week    Yeast infection of the vagina       fluocinonide 0.05 % solution    LIDEX    30 mL    Apply sparingly to affected area twice daily as needed.  Do not apply to face.    Eczema, unspecified type       guaiFENesin-codeine 100-10 MG/5ML Soln solution    ROBITUSSIN AC    180 mL    Take 5-10 mLs by mouth every 8 hours as needed for cough    Cough       omeprazole 20 MG CR capsule    priLOSEC    90 capsule    TAKE ONE CAPSULE BY MOUTH ONE TIME DAILY    Gastroesophageal reflux disease without esophagitis       Phentermine-Topiramate 7.5-46 MG Cp24     30 capsule    Take 1 capsule by mouth daily    Morbid obesity due to excess calories (H)       SUMAtriptan 50 MG tablet    IMITREX    9 tablet    TAKE TWO TABLETS BY MOUTH AT ONSET OF HEADACHE FOR MIGRAINE. MAY REPEAT IN TWO HOURS IF NEEDED. MAX    Migraine without status migrainosus, not intractable, unspecified migraine type       triamcinolone 0.1 % ointment    KENALOG    15 g    Apply topically externally to affected areas three times daily as needed    Yeast infection of the vagina       valACYclovir 500 MG tablet    VALTREX    36 tablet    TAKE ONE TABLET BY MOUTH ONE TIME DAILY. THEN INCREASE DOSE TO 4 TABLETS TWICE DAILY FOR 1 DAY AT ONSET OF OUTBREAK AS NEEDED.    Recurrent cold sores

## 2017-07-17 NOTE — PATIENT INSTRUCTIONS
A final glasses prescription was given (optional).  Allow time for adaptation.  The glasses may cause dizziness and affect depth perception for awhile.  Return to clinic 1 year for Comprehensive Vision Exam      Baylee Kaba O.D  [address] [facility] [place]  [address]    [phone]

## 2017-07-17 NOTE — PROGRESS NOTES
Chief Complaint   Patient presents with     COMPREHENSIVE EYE EXAM      Accompanied by self  Last Eye Exam: ?  Dilated Previously: Yes    What are you currently using to see?  does not use glasses or contacts       Distance Vision Acuity: Noticed gradual change in both eyes    Near Vision Acuity: Satisfied with vision while reading  unaided    Eye Comfort: good  Do you use eye drops? : No  Occupation or Hobbies: Nurse    Nena Ceballos, Optometric Tech          Medical, surgical and family histories reviewed and updated 7/17/2017.       OBJECTIVE: See Ophthalmology exam    ASSESSMENT:    ICD-10-CM    1. Myopia of both eyes with astigmatism H52.13 EYE EXAM (SIMPLE-NONBILLABLE)    H52.203 REFRACTION      PLAN:   A final glasses prescription was given (optional).  Allow time for adaptation.  The glasses may cause dizziness and affect depth perception for awhile.  Return to clinic 1 year for Comprehensive Vision Exam      Baylee Kaba O.D  [address]  [place]  [address]    [phone]

## 2017-08-14 ENCOUNTER — RADIANT APPOINTMENT (OUTPATIENT)
Dept: GENERAL RADIOLOGY | Facility: CLINIC | Age: 47
End: 2017-08-14
Attending: FAMILY MEDICINE
Payer: COMMERCIAL

## 2017-08-14 ENCOUNTER — OFFICE VISIT (OUTPATIENT)
Dept: FAMILY MEDICINE | Facility: CLINIC | Age: 47
End: 2017-08-14
Payer: COMMERCIAL

## 2017-08-14 VITALS
SYSTOLIC BLOOD PRESSURE: 122 MMHG | WEIGHT: 259 LBS | HEART RATE: 95 BPM | HEIGHT: 64 IN | BODY MASS INDEX: 44.22 KG/M2 | OXYGEN SATURATION: 98 % | DIASTOLIC BLOOD PRESSURE: 74 MMHG | TEMPERATURE: 96.7 F

## 2017-08-14 DIAGNOSIS — M25.561 RIGHT KNEE PAIN, UNSPECIFIED CHRONICITY: ICD-10-CM

## 2017-08-14 DIAGNOSIS — M17.11 PRIMARY OSTEOARTHRITIS OF RIGHT KNEE: Primary | ICD-10-CM

## 2017-08-14 PROCEDURE — 99213 OFFICE O/P EST LOW 20 MIN: CPT | Performed by: FAMILY MEDICINE

## 2017-08-14 PROCEDURE — 73562 X-RAY EXAM OF KNEE 3: CPT | Mod: RT

## 2017-08-14 NOTE — PATIENT INSTRUCTIONS
[facility]    If you have any questions regarding to your visit please contact your care team:       Team Red:   Clinic Hours Telephone Number   Dr. Fernanda Sarkar, NP   7am-7pm  Monday - Thursday   7am-5pm  Fridays  [phone]  (Appointment scheduling available 24/7)    Questions about your visit?   Team Line  [phone]   [facility] [place] [facility] 11am-9pm Monday-Friday Saturday-Sunday- 9am-5pm   [place] - 5pm-9pm Monday-Friday Saturday-Sunday- 9am-5pm  [phone] - [place]   [phone] - [place]       What options do I have for visits at the clinic other than the traditional office visit?  To expand how we care for you, many of our providers are utilizing electronic visits (e-visits) and telephone visits, when medically appropriate, for interactions with their patients rather than a visit in the clinic.   We also offer nurse visits for many medical concerns. Just like any other service, we will bill your insurance company for this type of visit based on time spent on the phone with your provider. Not all insurance companies cover these visits. Please check with your medical insurance if this type of visit is covered. You will be responsible for any charges that are not paid by your insurance.      E-visits via OpenSpirit:  generally incur a $35.00 fee.  Telephone visits:  Time spent on the phone: *charged based on time that is spent on the phone in increments of 10 minutes. Estimated cost:   5-10 mins $30.00   11-20 mins. $59.00   21-30 mins. $85.00     Use Qmercet (secure email communication and access to your chart) to send your primary care provider a message or make an appointment. Ask someone on your Team how to sign up for OpenSpirit.  For a Price Quote for your services, please call our Consumer Price Line at [phone].      As always, Thank you for trusting us with your health care  needs!    Understanding Osteoarthritis of the Knee    A joint is a place where two bones meet. The knee is called a hinge joint. This joint is formed where the thighbone (femur) meets the shinbone (tibia). A healthy knee joint bends freely. Knee osteoarthritis is a condition where parts of the knee joint wear out. This can lead to pain, stiffness, and limited movement.   What is osteoarthritis?  Every joint contains a smooth tissue called cartilage. Cartilage cushions the ends of bones and helps bones in a joint glide smoothly against each other. Knee osteoarthritis occurs when cartilage in the knee joint begins to break down and wear away. Bones may become exposed and rub together. The cartilage may become irritated and rough. This prevents smooth movement of the joint and can lead to pain.  Causes of osteoarthritis of the knee  Causes can include:    Wear and tear from normal use over time    Overuse of the knee during sports or work activities    Being overweight. This increases stress on the knee joint.    Misalignment of the knee joint    Injury to the knee  Symptoms of osteoarthritis of the knee  Common symptoms include:    Pain and swelling around the joint. The pain and swelling get worse with activity and better with rest.    Grinding sound when moving the knee    Reduced knee movement    Knee stiffness. This is often worse first thing in the morning.  Treating osteoarthritis of the knee  Osteoarthritis is a long-term condition. Treatment usually focuses on managing symptoms. Treatment may include:    Over-the-counter or prescription medicines taken by mouth to help relieve pain and swelling    Injections of medicine into the joint to help relieve symptoms for a time    A weight-loss plan for people who are overweight    A plan of physical therapy and exercises to improve the strength and flexibility of the muscles around the knee    Heat or cold therapy to help relieve pain and stiffness    Assistive  devices that help with movement, such as a cane or a walker    Assistive devices that make activities of daily life easier, such as raised toilet seats or shower bars  If other treatments don t do enough to relieve symptoms, you may need surgery to replace the joint. During this surgery, the damaged joint is removed. An artificial knee joint is then put into place. This can help relieve pain and stiffness and restore movement of the knee.     When to call your healthcare provider  Call your healthcare provider right away if you have any of these:    Fever of 100.4 F (38 C) or higher, or as directed    Symptoms that don t get better with prescribed medicines or get worse    New symptoms   Date Last Reviewed: 3/10/2016    8783-4726 [employer]. [address] [place] [address], [place] [id number]. All rights reserved. This information is not intended as a substitute for professional medical care. Always follow your healthcare professional's instructions.        Living with Osteoarthritis  Osteoarthritis is a chronic disease and the most common type of arthritis. But it doesn t have to keep you from leading an active life. You can help control symptoms by exercising and losing weight if you are overweight. Using special tools also helps make life easier. Be sure to see your healthcare provider for scheduled checkups and lab work. If you have questions or concerns between office visits, call your healthcare provider's office.    Make exercise part of your life  Gentle exercise can help lessen your pain. Keep the following in mind:    Choose exercises that improve joint motion and make your muscles stronger. Your healthcare provider or a physical therapist may suggest a few.    Stretching and flexibility activities such as yoga and jose chi may improve pain and joint motion.    Try low-impact sports, such as walking, biking, or doing exercises in a warm pool.    Most people should exercise for at least 30 minutes a day  on most days of the week. This can be broken up into shorter periods throughout the day.    Don t push yourself too hard at first. Slowly build up over time.    Make sure you warm up for 5 to 10 minutes before you exercise.    If pain and stiffness increase, don't exercise as hard or as long.  Watch your weight  If you weigh more than you should, your weight-bearing joints are under extra pressure. This makes your symptoms worse. To reduce pain and stiffness, try shedding a few of those extra pounds. The tips below may help:    Start a weight-loss program with the help of your healthcare provider.    Ask your friends and family for support.    Join a weight-loss group.  Use special tools  Even simple tasks can be hard to do when your joints hurt. Special tools called assistive devices can make things easier by reducing strain and protecting your joints. Ask your healthcare provider where to find these and other helpful tools:    Long-handled reachers or grabbers    Jar openers and button threaders    Large  for pencils, garden tools, and other hand-held objects  Use mobility and other aids  People with arthritis and other joint problems often use mobility aids to help with walking. For example, they may use canes or walkers. They may also use splints or braces to support joints. Talk with your healthcare provider or physical therapist about these aids:    A cane to reduce knee or hip pain and help prevent falls    Splints for your wrists or other joints    A brace to support a weak knee joint    Orthotics for toe and foot involvement  Medical and surgical treatments  Discuss medical treatments with your healthcare provider to help reduce your pain and improve joint mobility.    Topical medicines such as lidocaine, capsaicin, and diclofenac gel    Oral medicines such as acetaminophen, NSAIDs (nonsteroidal anti-inflammatory medicines) such as ibuprofen and naproxen, or opioid narcotics    Injections in affected  joints such as corticosteroids in various joints, or hyaluronic acid in the knee joints    Surgical repair or surgical joint replacement with artificial joints    Complementary therapies such as heat and cold treatment, massage, acupuncture, supplements, cognitive training, meditation, and others. Discuss these options with your healthcare provider.  Date Last Reviewed: 2/14/2016 2000-2017 [employer]. [address] [place] [address], [place] [address]. All rights reserved. This information is not intended as a substitute for professional medical care. Always follow your healthcare professional's instructions.      Discharged by MIGEL Redd

## 2017-08-14 NOTE — NURSING NOTE
"Chief Complaint   Patient presents with     Knee Pain       Initial /74 (BP Location: Left arm, Patient Position: Chair, Cuff Size: Adult Large)  Pulse 95  Temp 96.7  F (35.9  C) (Oral)  Ht 5' 4\" (1.626 m)  Wt 259 lb (117.5 kg)  SpO2 98%  BMI 44.46 kg/m2 Estimated body mass index is 44.46 kg/(m^2) as calculated from the following:    Height as of this encounter: 5' 4\" (1.626 m).    Weight as of this encounter: 259 lb (117.5 kg).  Medication Reconciliation: complete    "

## 2017-08-14 NOTE — MR AVS SNAPSHOT
After Visit Summary   8/14/2017    Elaine Awad    MRN: [id number]           Patient Information     Date Of Birth          1970        Visit Information        Provider Department      8/14/2017 8:00 AM Fernanda Rodriguez MD [facility]        Today's Diagnoses     Primary osteoarthritis of right knee    -  1      Care Instructions    [facility]    If you have any questions regarding to your visit please contact your care team:       Team Red:   Clinic Hours Telephone Number   Dr. Fernanda Sarkar NP   7am-7pm  Monday - Thursday   7am-5pm  Fridays  [phone]  (Appointment scheduling available 24/7)    Questions about your visit?   Team Line  [phone]   Urgent Care - [place] and [facility] - 11am-9pm Monday-Friday Saturday-Sunday- 9am-5pm   [place] - 5pm-9pm Monday-Friday Saturday-Sunday- 9am-5pm  [phone] - [facility]  [phone] - [place]       What options do I have for visits at the clinic other than the traditional office visit?  To expand how we care for you, many of our providers are utilizing electronic visits (e-visits) and telephone visits, when medically appropriate, for interactions with their patients rather than a visit in the clinic.   We also offer nurse visits for many medical concerns. Just like any other service, we will bill your insurance company for this type of visit based on time spent on the phone with your provider. Not all insurance companies cover these visits. Please check with your medical insurance if this type of visit is covered. You will be responsible for any charges that are not paid by your insurance.      E-visits via [employer]:  generally incur a $35.00 fee.  Telephone visits:  Time spent on the phone: *charged based on time that is spent on the phone in increments of 10 minutes. Estimated cost:   5-10 mins $30.00   11-20 mins. $59.00   21-30 mins.  $85.00     Use Grockit (secure email communication and access to your chart) to send your primary care provider a message or make an appointment. Ask someone on your Team how to sign up for Grockit.  For a Price Quote for your services, please call our Consumer Price Line at [phone].      As always, Thank you for trusting us with your health care needs!    Understanding Osteoarthritis of the Knee    A joint is a place where two bones meet. The knee is called a hinge joint. This joint is formed where the thighbone (femur) meets the shinbone (tibia). A healthy knee joint bends freely. Knee osteoarthritis is a condition where parts of the knee joint wear out. This can lead to pain, stiffness, and limited movement.   What is osteoarthritis?  Every joint contains a smooth tissue called cartilage. Cartilage cushions the ends of bones and helps bones in a joint glide smoothly against each other. Knee osteoarthritis occurs when cartilage in the knee joint begins to break down and wear away. Bones may become exposed and rub together. The cartilage may become irritated and rough. This prevents smooth movement of the joint and can lead to pain.  Causes of osteoarthritis of the knee  Causes can include:    Wear and tear from normal use over time    Overuse of the knee during sports or work activities    Being overweight. This increases stress on the knee joint.    Misalignment of the knee joint    Injury to the knee  Symptoms of osteoarthritis of the knee  Common symptoms include:    Pain and swelling around the joint. The pain and swelling get worse with activity and better with rest.    Grinding sound when moving the knee    Reduced knee movement    Knee stiffness. This is often worse first thing in the morning.  Treating osteoarthritis of the knee  Osteoarthritis is a long-term condition. Treatment usually focuses on managing symptoms. Treatment may include:    Over-the-counter or prescription medicines taken by mouth  to help relieve pain and swelling    Injections of medicine into the joint to help relieve symptoms for a time    A weight-loss plan for people who are overweight    A plan of physical therapy and exercises to improve the strength and flexibility of the muscles around the knee    Heat or cold therapy to help relieve pain and stiffness    Assistive devices that help with movement, such as a cane or a walker    Assistive devices that make activities of daily life easier, such as raised toilet seats or shower bars  If other treatments don t do enough to relieve symptoms, you may need surgery to replace the joint. During this surgery, the damaged joint is removed. An artificial knee joint is then put into place. This can help relieve pain and stiffness and restore movement of the knee.     When to call your healthcare provider  Call your healthcare provider right away if you have any of these:    Fever of 100.4 F (38 C) or higher, or as directed    Symptoms that don t get better with prescribed medicines or get worse    New symptoms   Date Last Reviewed: 3/10/2016    7744-1345 [employer]. [address] [place] [address]. All rights reserved. This information is not intended as a substitute for professional medical care. Always follow your healthcare professional's instructions.        Living with Osteoarthritis  Osteoarthritis is a chronic disease and the most common type of arthritis. But it doesn t have to keep you from leading an active life. You can help control symptoms by exercising and losing weight if you are overweight. Using special tools also helps make life easier. Be sure to see your healthcare provider for scheduled checkups and lab work. If you have questions or concerns between office visits, call your healthcare provider's office.    Make exercise part of your life  Gentle exercise can help lessen your pain. Keep the following in mind:    Choose exercises that improve joint  motion and make your muscles stronger. Your healthcare provider or a physical therapist may suggest a few.    Stretching and flexibility activities such as yoga and jose chi may improve pain and joint motion.    Try low-impact sports, such as walking, biking, or doing exercises in a warm pool.    Most people should exercise for at least 30 minutes a day on most days of the week. This can be broken up into shorter periods throughout the day.    Don t push yourself too hard at first. Slowly build up over time.    Make sure you warm up for 5 to 10 minutes before you exercise.    If pain and stiffness increase, don't exercise as hard or as long.  Watch your weight  If you weigh more than you should, your weight-bearing joints are under extra pressure. This makes your symptoms worse. To reduce pain and stiffness, try shedding a few of those extra pounds. The tips below may help:    Start a weight-loss program with the help of your healthcare provider.    Ask your friends and family for support.    Join a weight-loss group.  Use special tools  Even simple tasks can be hard to do when your joints hurt. Special tools called assistive devices can make things easier by reducing strain and protecting your joints. Ask your healthcare provider where to find these and other helpful tools:    Long-handled reachers or grabbers    Jar openers and button threaders    Large  for pencils, garden tools, and other hand-held objects  Use mobility and other aids  People with arthritis and other joint problems often use mobility aids to help with walking. For example, they may use canes or walkers. They may also use splints or braces to support joints. Talk with your healthcare provider or physical therapist about these aids:    A cane to reduce knee or hip pain and help prevent falls    Splints for your wrists or other joints    A brace to support a weak knee joint    Orthotics for toe and foot involvement  Medical and surgical  treatments  Discuss medical treatments with your healthcare provider to help reduce your pain and improve joint mobility.    Topical medicines such as lidocaine, capsaicin, and diclofenac gel    Oral medicines such as acetaminophen, NSAIDs (nonsteroidal anti-inflammatory medicines) such as ibuprofen and naproxen, or opioid narcotics    Injections in affected joints such as corticosteroids in various joints, or hyaluronic acid in the knee joints    Surgical repair or surgical joint replacement with artificial joints    Complementary therapies such as heat and cold treatment, massage, acupuncture, supplements, cognitive training, meditation, and others. Discuss these options with your healthcare provider.  Date Last Reviewed: 2/14/2016 2000-2017 [employer]. [address] [place] [address]. All rights reserved. This information is not intended as a substitute for professional medical care. Always follow your healthcare professional's instructions.      Discharged by MIGEL Redd            Follow-ups after your visit        Additional Services     ORTHOPEDICS ADULT REFERRAL       Your provider has referred you to: [facility]: [facility] [phone]    [email or web address]    Please be aware that coverage of these services is subject to the terms and limitations of your health insurance plan.  Call member services at your health plan with any benefit or coverage questions.      Please bring the following to your appointment:    >>   Any x-rays, CTs or MRIs which have been performed.  Contact the facility where they were done to arrange for  prior to your scheduled appointment.    >>   List of current medications   >>   This referral request   >>   Any documents/labs given to you for this referral                  Follow-up notes from your care team     Return if symptoms worsen or fail to improve.      Your next 10 appointments already scheduled   "   Sep 14, 2017  7:45 AM CDT   PHYSICAL with Maral Mathur MD   [facility]    [address] [place] [id number] [phone]              Who to contact     If you have questions or need follow up information about today's clinic visit or your schedule please contact [facility] directly at [phone].  Normal or non-critical lab and imaging results will be communicated to you by Watchwithhart, letter or phone within 4 business days after the clinic has received the results. If you do not hear from us within 7 days, please contact the clinic through Porous Powert or phone. If you have a critical or abnormal lab result, we will notify you by phone as soon as possible.  Submit refill requests through Busuu or call your pharmacy and they will forward the refill request to us. Please allow 3 business days for your refill to be completed.          Additional Information About Your Visit        Busuu Information     Busuu gives you secure access to your electronic health record. If you see a primary care provider, you can also send messages to your care team and make appointments. If you have questions, please call your primary care clinic.  If you do not have a primary care provider, please call [phone] and they will assist you.        Care EveryWhere ID     This is your Care EveryWhere ID. This could be used by other organizations to access your [place] medical records  [id number]        Your Vitals Were     Pulse Temperature Height Pulse Oximetry BMI (Body Mass Index)       95 96.7  F (35.9  C) (Oral) 5' 4\" (1.626 m) 98% 44.46 kg/m2        Blood Pressure from Last 3 Encounters:   08/14/17 122/74   04/03/17 126/70   02/20/17 123/77    Weight from Last 3 Encounters:   08/14/17 259 lb (117.5 kg)   04/03/17 257 lb (116.6 kg)   02/20/17 255 lb (115.7 kg)              We Performed the Following     ORTHOPEDICS ADULT REFERRAL     "    Primary Care Provider Office Phone # Fax #    Fernanda Rodriguez [phone] [address] [facility] [address]        Equal Access to Services     GABRIELAPRIL THAO : Hadii aad ku hademilieo Soomaali, waaxda luqadaha, qaybta kaalmada adekingsyada, faviola pazgaurang migdalia. So [facility] [phone].    ATENCIÓN: Si habla español, tiene a randolph disposición servicios gratuitos de asistencia lingüística. Llame al [phone].    We comply with applicable federal civil rights laws and [place] laws. We do not discriminate on the basis of race, color, national origin, age, disability sex, sexual orientation or gender identity.            Thank you!     Thank you for choosing [facility]  for your care. Our goal is always to provide you with excellent care. Hearing back from our patients is one way we can continue to improve our services. Please take a few minutes to complete the written survey that you may receive in the mail after your visit with us. Thank you!             Your Updated Medication List - Protect others around you: Learn how to safely use, store and throw away your medicines at www.disposemymeds.org.          This list is accurate as of: 8/14/17  8:57 AM.  Always use your most recent med list.                   Brand Name Dispense Instructions for use Diagnosis    cetirizine 10 MG tablet    zyrTEC     Take 10 mg by mouth daily as needed        fluconazole 150 MG tablet    DIFLUCAN    12 tablet    Take 1 tablet (150 mg) by mouth once a week    Yeast infection of the vagina       omeprazole 20 MG CR capsule    priLOSEC    90 capsule    TAKE ONE CAPSULE BY MOUTH ONE TIME DAILY    Gastroesophageal reflux disease without esophagitis       SUMAtriptan 50 MG tablet    IMITREX    9 tablet    TAKE TWO TABLETS BY MOUTH AT ONSET OF HEADACHE FOR MIGRAINE. MAY REPEAT IN TWO HOURS IF NEEDED. MAX    Migraine without status migrainosus, not intractable, unspecified migraine type        valACYclovir 500 MG tablet    VALTREX    36 tablet    TAKE ONE TABLET BY MOUTH ONE TIME DAILY. THEN INCREASE DOSE TO 4 TABLETS TWICE DAILY FOR 1 DAY AT ONSET OF OUTBREAK AS NEEDED.    Recurrent cold sores

## 2017-08-14 NOTE — PROGRESS NOTES
"  SUBJECTIVE:                                                    Elaine Awad is a 46 year old morbidly obese female who presents to clinic today for the following health issues:    Right Knee Pain      Duration: 3 months    Description (location/character/radiation): right knee pain    Intensity:  Severe while driving    Accompanying signs and symptoms: stiffness but no locking, instability, swelling, or known injury     History (similar episodes/previous evaluation): None    Precipitating or alleviating factors: painful while driving    Therapies tried and outcome: Ace wrap, Ice, Ibuprofen, Icy Hot with temporary relief     ROS:  C: NEGATIVE for fever, chills, change in weight  INTEGUMENTARY/SKIN: intermittent cold sores   MUSCULOSKELETAL:see HPI   N: NEGATIVE for weakness, dizziness or paresthesias    OBJECTIVE:     /74 (BP Location: Left arm, Patient Position: Chair, Cuff Size: Adult Large)  Pulse 95  Temp 96.7  F (35.9  C) (Oral)  Ht 5' 4\" (1.626 m)  Wt 259 lb (117.5 kg)  SpO2 98%  BMI 44.46 kg/m2  Body mass index is 44.46 kg/(m^2).  GENERAL: alert, no distress and obese  MS: no gross musculoskeletal defects noted, no edema  SKIN: no suspicious lesions or rashes  NEURO: Normal strength and tone, mentation intact and speech normal  PSYCH: mentation appears normal, affect normal/bright    Diagnostic Test Results:  Results for orders placed or performed in visit on 04/03/17   Rapid strep screen   Result Value Ref Range    Specimen Description Throat     Rapid Strep A Screen       NEGATIVE: No Group A streptococcal antigen detected by immunoassay, await   culture report.      Micro Report Status FINAL 04/03/2017    Beta strep group A culture   Result Value Ref Range    Specimen Description Throat     Culture Micro No Beta Streptococcus isolated     Micro Report Status FINAL 04/05/2017        ASSESSMENT/PLAN:   (M17.11) Primary osteoarthritis of right knee  (primary encounter diagnosis)  Plan: XR Knee " Right 3 Views, ORTHOPEDICS ADULT         REFERRAL        We discussed treatment options including tylenol as needed, topicals, or orthopedic surgery referral. Counseled to make better food choices, exercise as tolerated, and lose weight. See AVS.       See Patient Instructions    Fernanda Rodriguez MD  [facility]

## 2017-09-11 ENCOUNTER — OFFICE VISIT (OUTPATIENT)
Dept: ORTHOPEDICS | Facility: CLINIC | Age: 47
End: 2017-09-11
Payer: COMMERCIAL

## 2017-09-11 VITALS — BODY MASS INDEX: 44.42 KG/M2 | HEIGHT: 64 IN | RESPIRATION RATE: 18 BRPM | WEIGHT: 260.2 LBS | TEMPERATURE: 98.3 F

## 2017-09-11 DIAGNOSIS — M17.11 PRIMARY OSTEOARTHRITIS OF RIGHT KNEE: Primary | ICD-10-CM

## 2017-09-11 PROCEDURE — 99203 OFFICE O/P NEW LOW 30 MIN: CPT | Performed by: ORTHOPAEDIC SURGERY

## 2017-09-11 NOTE — NURSING NOTE
"Chief Complaint   Patient presents with     Consult     Right knee pain since May 2017, no injury. Pain level 5/10 achy, shooting and episodic. Driving makes the pain worse.       Initial Temp 98.3  F (36.8  C)  Resp 18  Ht 1.626 m (5' 4\")  Wt 118 kg (260 lb 3.2 oz)  BMI 44.66 kg/m2 Estimated body mass index is 44.66 kg/(m^2) as calculated from the following:    Height as of this encounter: 1.626 m (5' 4\").    Weight as of this encounter: 118 kg (260 lb 3.2 oz).  Medication Reconciliation: complete   Jessica Castañeda MA      "

## 2017-09-11 NOTE — MR AVS SNAPSHOT
After Visit Summary   9/11/2017    Elaine Awad    MRN: [id number]           Patient Information     Date Of Birth          1970        Visit Information        Provider Department      9/11/2017 8:15 AM Rosalino Rivas MD [facility]        Care Instructions    Options for osteoarthritis.    Weight loss.  Legs feel better the lighter you are.  Stay active - walking, bicycle, swimming.  Avoid high impact.  Vitamins - Glucosamine 1500 mg/day and chondroitin sulfate 1200 mg/day.  Non-medical options (other things I've heard of):  * tart cherry juice is thought to be a natural anti-inflammatory.    *1 tablespoon of apple cider vinegar daily,  *1 tablespoon of unflavored gelatin daily, [place] Gelatin or [place] Nutrajoint  *Ointments on joints - Icy Hot, BenGay, Capsaicin cream, Mineral Ice, Flexall  Tylenol up to 3000 mg / day.  NSAIDs - Aleve up to 4 tablets per day or ibuprofen up to 12 tablets per day.   Prescription forms.  Steroid injection - cortisone.  Lubricant injection.   brace.  Surgery.  Usually joint replacement. Possible arthroscopy.  Possible MRI to see if medial meniscus tear.              Follow-ups after your visit        Your next 10 appointments already scheduled     Sep 14, 2017  7:45 AM CDT   PHYSICAL with Maral Mathur MD   [facility] ([facility])    [address] [place] [id number] [phone]              Who to contact     If you have questions or need follow up information about today's clinic visit or your schedule please contact [facility] directly at [phone].  Normal or non-critical lab and imaging results will be communicated to you by MyChart, letter or phone within 4 business days after the clinic has received the results. If you do not hear from us within 7 days, please contact the clinic through MyChart or phone. If you have a critical or abnormal  "lab result, we will notify you by phone as soon as possible.  Submit refill requests through [employer] or call your pharmacy and they will forward the refill request to us. Please allow 3 business days for your refill to be completed.          Additional Information About Your Visit        Med[employer] Systemshart Information     G[employer]bler gives you secure access to your electronic health record. If you see a primary care provider, you can also send messages to your care team and make appointments. If you have questions, please call your primary care clinic.  If you do not have a primary care provider, please call [phone] and they will assist you.        Care EveryWhere ID     This is your Care EveryWhere ID. This could be used by other organizations to access your [place] medical records  [id number]        Your Vitals Were     Temperature Respirations Height BMI (Body Mass Index)          98.3  F (36.8  C) 18 1.626 m (5' 4\") 44.66 kg/m2         Blood Pressure from Last 3 Encounters:   08/14/17 122/74   04/03/17 126/70   02/20/17 123/77    Weight from Last 3 Encounters:   09/11/17 118 kg (260 lb 3.2 oz)   08/14/17 117.5 kg (259 lb)   04/03/17 116.6 kg (257 lb)              Today, you had the following     No orders found for display       Primary Care Provider Office Phone # Fax #    Fernanda Rodriguez [phone]       [address] [facility] [address]        Equal Access to Services     APRIL OSUNA AH: Hadii crystal ku hadasho Soomaali, waaxda luqadaha, qaybta kaalmada adeegyada, faviola stone . So [facility] [phone].    ATENCIÓN: Si habla español, tiene a randolph disposición servicios gratuitos de asistencia lingüística. Llame al [phone].    We comply with applicable federal civil rights laws and [place] laws. We do not discriminate on the basis of race, color, national origin, age, disability sex, sexual orientation or gender identity.            Thank you!     Thank you for " choosing [facility]  for your care. Our goal is always to provide you with excellent care. Hearing back from our patients is one way we can continue to improve our services. Please take a few minutes to complete the written survey that you may receive in the mail after your visit with us. Thank you!             Your Updated Medication List - Protect others around you: Learn how to safely use, store and throw away your medicines at www.disposemymeds.org.          This list is accurate as of: 9/11/17  8:42 AM.  Always use your most recent med list.                   Brand Name Dispense Instructions for use Diagnosis    cetirizine 10 MG tablet    zyrTEC     Take 10 mg by mouth daily as needed        fluconazole 150 MG tablet    DIFLUCAN    12 tablet    Take 1 tablet (150 mg) by mouth once a week    Yeast infection of the vagina       omeprazole 20 MG CR capsule    priLOSEC    90 capsule    TAKE ONE CAPSULE BY MOUTH ONE TIME DAILY    Gastroesophageal reflux disease without esophagitis       SUMAtriptan 50 MG tablet    IMITREX    9 tablet    TAKE TWO TABLETS BY MOUTH AT ONSET OF HEADACHE FOR MIGRAINE. MAY REPEAT IN TWO HOURS IF NEEDED. MAX    Migraine without status migrainosus, not intractable, unspecified migraine type       valACYclovir 500 MG tablet    VALTREX    36 tablet    TAKE ONE TABLET BY MOUTH ONE TIME DAILY. THEN INCREASE DOSE TO 4 TABLETS TWICE DAILY FOR 1 DAY AT ONSET OF OUTBREAK AS NEEDED.    Recurrent cold sores

## 2017-09-11 NOTE — LETTER
9/11/2017         RE: Elaine Awad  [address] [facility] [place] [address]        Dear Colleague,    Thank you for referring your patient, Elaine Awad, to the [facility]. Please see a copy of my visit note below.    Elaine Awad is a 46 year old female who is seen in consultation at the request of Dr. Fernanda Rodriguez  for right knee pain.     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     GERD (gastroesophageal reflux disease)      HDL deficiency 9/17/2013     Migraine      Morbid obesity (H)      Recurrent cold sores      Vulvar dermatitis 10/14/2010     Yeast infection of the vagina, recurrent         Past Surgical History:   Procedure Laterality Date     C TREAT ECTOPIC PREG,RMV TUBE/OVARY      LT     COLPOSCOPY,BX CERVIX/ENDOCERV CURR  7/1994     ESOPHAGOSCOPY, GASTROSCOPY, DUODENOSCOPY (EGD), COMBINED  1/2/2014    Procedure: COMBINED ESOPHAGOSCOPY, GASTROSCOPY, DUODENOSCOPY (EGD);;  Surgeon: Eden Stacy MD;  Location:  GI     ESOPHAGOSCOPY, GASTROSCOPY, DUODENOSCOPY (EGD), COMBINED N/A 1/19/2017    Procedure: COMBINED ESOPHAGOSCOPY, GASTROSCOPY, DUODENOSCOPY (EGD);  Surgeon: Ciro Deras MD;  Location: [facility] OR     LAPAROSCOPIC GASTRIC ADJUSTABLE BANDING  4/28/2014    Procedure: LAPAROSCOPIC GASTRIC ADJUSTABLE BANDING;  Surgeon: Ciro Deras MD;  Location: [facility] OR     LAPAROSCOPIC HERNIORRHAPHY HIATAL  4/28/2014    Procedure: LAPAROSCOPIC HERNIORRHAPHY HIATAL;  Surgeon: Ciro Deras MD;  Location: [facility] OR     LAPAROSCOPIC REMOVAL GASTRIC ADJUSTABLE BAND N/A 1/19/2017    Procedure: LAPAROSCOPIC REMOVAL GASTRIC ADJUSTABLE BAND;  Surgeon: Ciro Deras MD;  Location: [facility] OR     LITHOTRIPSY         Family History   Problem Relation Age of Onset     CANCER Father 72     d. pancreatic, melanoma      Other Cancer  Father      pancreatic cancer     Colon Polyps Father      DIABETES Mother      C.A.D. 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 MAY REPEAT IN TWO HOURS IF NEEDED. MAX 9 tablet 0     valACYclovir (VALTREX) 500 MG tablet TAKE ONE TABLET BY MOUTH ONE TIME DAILY. THEN INCREASE DOSE TO 4 TABLETS TWICE DAILY FOR 1 DAY AT ONSET OF OUTBREAK AS NEEDED. 36 tablet 11     fluconazole (DIFLUCAN) 150 MG tablet Take 1 tablet (150 mg) by mouth once a week 12 tablet 3     omeprazole (PRILOSEC) 20 MG capsule TAKE ONE CAPSULE BY MOUTH ONE  "TIME DAILY 90 capsule 3     cetirizine (ZYRTEC) 10 MG tablet Take 10 mg by mouth daily as needed          Allergies   Allergen Reactions     Ampicillin Swelling              REVIEW OF SYSTEMS:  CONSTITUTIONAL:  NEGATIVE for fever, chills, change in weight, not feeling tired  SKIN:  NEGATIVE for worrisome rashes, no skin lumps, no skin ulcers and no non-healing wounds  EYES:  NEGATIVE for vision changes or irritation.  ENT/MOUTH:  NEGATIVE.  No hearing loss, no hoarseness, no difficulty swallowing.  RESP:  NEGATIVE. No cough or shortness of breath.  CV:  NEGATIVE for chest pain, palpitations or peripheral edema  GI:  NEGATIVE for nausea, abdominal pain, heartburn, or change in bowel habits  :  Negative. No dysuria, no hematuria  MUSCULOSKELETAL:  See HPI above  NEURO:  NEGATIVE . No headaches, no dizziness,  no numbness  ENDOCRINE:  NEGATIVE for temperature intolerance, skin/hair changes  HEME/ALLERGY/IMMUNE:  NEGATIVE for bleeding problems  PSYCHIATRIC:  NEGATIVE. no anxiety, no depression.      Exam:  Vitals: Temp 98.3  F (36.8  C)  Resp 18  Ht 1.626 m (5' 4\")  Wt 118 kg (260 lb 3.2 oz)  BMI 44.66 kg/m2  BMI= Body mass index is 44.66 kg/(m^2).  Constitutional:  healthy, alert and no distress  Neuro: Alert and Oriented x 3, Gait normal. Sensation grossly WNL.  HEENT:  Atraumatic, EOMI  Neck:  Neck supple with no tenderness.  Psych: Affect normal   Respiratory: Breathing not labored.  Cardiovascular: normal peripheral pulses  Lymph: no adenopathy  Skin: No rashes,worrisome lesions or skin problems  Spine: straight, no straight leg raising pain.  Hips show full range of motion.  There is no tenderness over the sacro-iliac joints, sciatic notch, or greater trochanters.       Again, thank you for allowing me to participate in the care of your patient.        Sincerely,        Rosalino Rivsa MD    "

## 2017-09-11 NOTE — PATIENT INSTRUCTIONS
Options for osteoarthritis.    Weight loss.  Legs feel better the lighter you are.  Stay active - walking, bicycle, swimming.  Avoid high impact.  Vitamins - Glucosamine 1500 mg/day and chondroitin sulfate 1200 mg/day.  Non-medical options (other things I've heard of):  * tart cherry juice is thought to be a natural anti-inflammatory.    *1 tablespoon of apple cider vinegar daily,  *1 tablespoon of unflavored gelatin daily, [place] Gelatin or [place] Nutrajoint  *Ointments on joints - Icy Hot, BenGay, Capsaicin cream, Mineral Ice, Flexall  Tylenol up to 3000 mg / day.  NSAIDs - Aleve up to 4 tablets per day or ibuprofen up to 12 tablets per day.   Prescription forms.  Steroid injection - cortisone.  Lubricant injection.   brace.  Surgery.  Usually joint replacement. Possible arthroscopy.  Possible MRI to see if medial meniscus tear.

## 2017-09-14 ENCOUNTER — OFFICE VISIT (OUTPATIENT)
Dept: OBGYN | Facility: CLINIC | Age: 47
End: 2017-09-14
Payer: COMMERCIAL

## 2017-09-14 VITALS
BODY MASS INDEX: 45.54 KG/M2 | SYSTOLIC BLOOD PRESSURE: 140 MMHG | WEIGHT: 257 LBS | DIASTOLIC BLOOD PRESSURE: 76 MMHG | HEIGHT: 63 IN

## 2017-09-14 DIAGNOSIS — Z01.411 ENCOUNTER FOR GYNECOLOGICAL EXAMINATION WITH ABNORMAL FINDING: Primary | ICD-10-CM

## 2017-09-14 DIAGNOSIS — B37.31 YEAST INFECTION OF THE VAGINA: ICD-10-CM

## 2017-09-14 LAB
CHOLEST SERPL-MCNC: 138 MG/DL
GLUCOSE SERPL-MCNC: 88 MG/DL (ref 70–99)
HDLC SERPL-MCNC: 53 MG/DL
LDLC SERPL CALC-MCNC: 67 MG/DL
NONHDLC SERPL-MCNC: 85 MG/DL
TRIGL SERPL-MCNC: 92 MG/DL

## 2017-09-14 PROCEDURE — 82947 ASSAY GLUCOSE BLOOD QUANT: CPT | Performed by: OBSTETRICS & GYNECOLOGY

## 2017-09-14 PROCEDURE — 99396 PREV VISIT EST AGE 40-64: CPT | Performed by: OBSTETRICS & GYNECOLOGY

## 2017-09-14 PROCEDURE — 36415 COLL VENOUS BLD VENIPUNCTURE: CPT | Performed by: OBSTETRICS & GYNECOLOGY

## 2017-09-14 PROCEDURE — 80061 LIPID PANEL: CPT | Performed by: OBSTETRICS & GYNECOLOGY

## 2017-09-14 RX ORDER — FLUCONAZOLE 150 MG/1
150 TABLET ORAL WEEKLY
Qty: 12 TABLET | Refills: 3 | Status: SHIPPED | OUTPATIENT
Start: 2017-09-14 | End: 2018-12-06

## 2017-09-14 NOTE — PROGRESS NOTES
Elaine is a 46 year old  female who presents for annual exam.     Menses are irregular and normal lasting 4 days.  Menses flow: normal.  Patient's last menstrual period was 2017.. Using none for contraception.  She is not currently considering pregnancy.  Besides routine health maintenance, she has no other health concerns today .  Periods sometimes 6-7 weeks between  GYNECOLOGIC HISTORY:  Menarche: 10  Age at first intercourse: 15 Number of lifetime partners: <6  Elaine is sexually active with 1 male partner(s) and is currently in monogamous relationship.    History sexually transmitted infections:No STD history  STI testing offered?  Declined  BERTA exposure: Unknown  History of abnormal Pap smear: NO - age 30- 65 PAP every 3 years recommended  Family history of breast CA: Yes (Please explain): maternal grandmother  Family history of uterine/ovarian CA: No    Family history of colon CA: No    HEALTH MAINTENANCE:  Cholesterol: (  Cholesterol   Date Value Ref Range Status   2014 139 <200 mg/dL Final     Comment:     LDL Cholesterol is the primary guide to therapy.   The NCEP recommends further evaluation of: patients with cholesterol greater   than 200 mg/dL if additional risk factors are present, cholesterol greater   than   240 mg/dL, triglycerides greater than 150 mg/dL, or HDL less than 40 mg/dL.     2014 157 <200 mg/dL Final     Comment:     LDL Cholesterol is the primary guide to therapy.   The NCEP recommends further evaluation of: patients with cholesterol greater   than 200 mg/dL if additional risk factors are present, cholesterol greater   than   240 mg/dL, triglycerides greater than 150 mg/dL, or HDL less than 40 mg/dL.      History of abnormal lipids: No  Mammo: 2016 . History of abnormal Mammo: No.  Regular Self Breast Exams: Yes  Calcium/Vitamin D intake: source:  dairy, diet Adequate? 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     GERD (gastroesophageal reflux disease)      HDL deficiency 2013     Migraine      Morbid obesity (H)      Recurrent cold sores      Vulvar dermatitis 10/14/2010     Yeast infection of the vagina, recurrent       Past Surgical History:   Procedure Laterality Date     C TREAT ECTOPIC PREG,RMV TUBE/OVARY      LT     COLPOSCOPY,BX CERVIX/ENDOCERV CURR  1994     ESOPHAGOSCOPY, GASTROSCOPY, DUODENOSCOPY (EGD), COMBINED  2014    Procedure: COMBINED ESOPHAGOSCOPY, GASTROSCOPY, DUODENOSCOPY (EGD);;  Surgeon: Eden Stacy MD;  Location:  GI     ESOPHAGOSCOPY, GASTROSCOPY, DUODENOSCOPY (EGD), COMBINED N/A 2017    Procedure: COMBINED ESOPHAGOSCOPY, GASTROSCOPY, DUODENOSCOPY (EGD);  Surgeon: Ciro Deras MD;  Location:  OR     LAPAROSCOPIC GASTRIC ADJUSTABLE BANDING  2014    Procedure: LAPAROSCOPIC GASTRIC ADJUSTABLE BANDING;  Surgeon: Ciro Deras MD;  Location:  OR     LAPAROSCOPIC HERNIORRHAPHY HIATAL  2014    Procedure: LAPAROSCOPIC HERNIORRHAPHY HIATAL;  Surgeon: Ciro Deras MD;  Location:  OR     LAPAROSCOPIC REMOVAL GASTRIC ADJUSTABLE BAND N/A 2017    Procedure: LAPAROSCOPIC REMOVAL GASTRIC ADJUSTABLE BAND;  Surgeon: Ciro Deras MD;  Location:  OR     LITHOTRIPSY       Family History   Problem Relation Age of Onset     CANCER Father 72     d. pancreatic, melanoma      Other  Cancer Father      pancreatic cancer     Colon Polyps Father      DIABETES Mother      C.A.D. 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 MAY REPEAT IN TWO HOURS IF NEEDED. MAX, Disp: 9 tablet, Rfl: 0     valACYclovir (VALTREX) 500 MG tablet, TAKE ONE TABLET BY MOUTH ONE TIME DAILY. THEN INCREASE DOSE TO 4 TABLETS TWICE DAILY FOR 1 DAY AT ONSET OF OUTBREAK AS NEEDED., Disp: 36 tablet, Rfl: 11     fluconazole (DIFLUCAN) 150 MG tablet, Take 1 tablet (150 mg) by mouth once a week, Disp: 12 tablet, Rfl: 3     omeprazole (PRILOSEC) 20 MG capsule, TAKE ONE  "CAPSULE BY MOUTH ONE TIME DAILY, Disp: 90 capsule, Rfl: 3     cetirizine (ZYRTEC) 10 MG tablet, Take 10 mg by mouth daily as needed , Disp: , Rfl:      Allergies   Allergen Reactions     Ampicillin Swelling              Past medical, surgical, social and family history were reviewed and updated in EPIC.    ROS:   C:     NEGATIVE for fever, chills, change in weight  I:       NEGATIVE for worrisome rashes, moles or lesions  E:     NEGATIVE for vision changes or irritation  E/M: NEGATIVE for ear, mouth and throat problems  R:     NEGATIVE for significant cough or SOB  CV:   NEGATIVE for chest pain, palpitations or peripheral edema  GI:     NEGATIVE for nausea, abdominal pain, heartburn, or change in bowel habits  :   NEGATIVE for frequency, dysuria, hematuria, vaginal discharge, or irregular bleeding  M:     NEGATIVE for significant arthralgias or myalgia  N:      NEGATIVE for weakness, dizziness or paresthesias  E:      NEGATIVE for temperature intolerance, skin/hair changes  P:      NEGATIVE for changes in mood or affect.    EXAM:  /76  Ht 5' 3\" (1.6 m)  Wt 257 lb (116.6 kg)  LMP 08/22/2017  BMI 45.53 kg/m2   BMI: Body mass index is 45.53 kg/(m^2).  Constitutional: healthy, alert and no distress  Head: Normocephalic. No masses, lesions, tenderness or abnormalities  Neck: Neck supple. Trachea midline. No adenopathy. Thyroid symmetric, normal size.   Cardiovascular: RRR.   Respiratory: Negative.   Breast: Breasts reveal mild symmetric fibrocystic densities, but there are no dominant, discrete, fixed or suspicious masses found.  Gastrointestinal: Abdomen soft, non-tender, non-distended. No masses, organomegaly.  :  Vulva:  No external lesions, normal female hair distribution, no inguinal adenopathy.    Urethra:  Midline, non-tender, well supported, no discharge  Vagina:  Moist, pink, no abnormal discharge, no lesions  Uterus:  Normal size, anteverted , non-tender, freely mobile  Ovaries:  No masses " appreciated, non-tender, mobile  Rectal Exam: deferred  Musculoskeletal: extremities normal  Skin: no suspicious lesions or rashes  Psychiatric: Affect appropriate, cooperative,mentation appears normal.     COUNSELING:   Reviewed preventive health counseling, as reflected in patient instructions       Regular exercise       Healthy diet/nutrition       (Nichelle)menopause management   reports that she quit smoking about 19 years ago. Her smoking use included Cigarettes. She has a 5.00 pack-year smoking history. She has never used smokeless tobacco.    Body mass index is 45.53 kg/(m^2).  Weight management plan: Discussed healthy diet and exercise guidelines and patient will follow up in 12 months in clinic to re-evaluate.  FRAX Risk Assessment    ASSESSMENT:  46 year old female with satisfactory annual exam  (Z01.411) Encounter for gynecological examination with abnormal finding  (primary encounter diagnosis)  Comment:   Plan: Lipid Profile (Chol, Trig, HDL, LDL calc),         Glucose            (B37.3) Yeast infection of the vagina  Comment:   Plan: fluconazole (DIFLUCAN) 150 MG tablet        Occasional use, refilled

## 2017-09-14 NOTE — NURSING NOTE
"Chief Complaint   Patient presents with     Physical       Initial /76  Ht 5' 3\" (1.6 m)  Wt 257 lb (116.6 kg)  LMP 2017  BMI 45.53 kg/m2 Estimated body mass index is 45.53 kg/(m^2) as calculated from the following:    Height as of this encounter: 5' 3\" (1.6 m).    Weight as of this encounter: 257 lb (116.6 kg).  BP completed using cuff size: large        The following HM Due: NONE      The following patient reported/Care Every where data was sent to:  P ABSTRACT QUALITY INITIATIVES [09344]       patient has appointment for today    Sarah Davila CMA               "

## 2017-09-14 NOTE — MR AVS SNAPSHOT
"              After Visit Summary   9/14/2017    Elaine Awad    MRN: [id number]           Patient Information     Date Of Birth          1970        Visit Information        Provider Department      9/14/2017 7:45 Maral Pacheco MD [facility]        Today's Diagnoses     Encounter for gynecological examination with abnormal finding    -  1    Yeast infection of the vagina           Follow-ups after your visit        Who to contact     If you have questions or need follow up information about today's clinic visit or your schedule please contact [facility] directly at [phone].  Normal or non-critical lab and imaging results will be communicated to you by MyChart, letter or phone within 4 business days after the clinic has received the results. If you do not hear from us within 7 days, please contact the clinic through Mapittrackithart or phone. If you have a critical or abnormal lab result, we will notify you by phone as soon as possible.  Submit refill requests through Koalah or call your pharmacy and they will forward the refill request to us. Please allow 3 business days for your refill to be completed.          Additional Information About Your Visit        MyChart Information     Koalah gives you secure access to your electronic health record. If you see a primary care provider, you can also send messages to your care team and make appointments. If you have questions, please call your primary care clinic.  If you do not have a primary care provider, please call [phone] and they will assist you.        Care EveryWhere ID     This is your Care EveryWhere ID. This could be used by other organizations to access your [place] medical records  [id number]        Your Vitals Were     Height Last Period BMI (Body Mass Index)             5' 3\" (1.6 m) 08/22/2017 45.53 kg/m2          Blood Pressure from Last 3 Encounters:   09/14/17 140/76   08/14/17 122/74 "   04/03/17 126/70    Weight from Last 3 Encounters:   09/14/17 257 lb (116.6 kg)   09/11/17 260 lb 3.2 oz (118 kg)   08/14/17 259 lb (117.5 kg)              We Performed the Following     Glucose     Lipid Profile (Chol, Trig, HDL, LDL calc)          Where to get your medicines      These medications were sent to [place] [address] [place] [facility] - [address]  [address], [place] [address]     Phone:  [phone]     fluconazole 150 MG tablet          Primary Care Provider Office Phone # Fax #    Fernanda Rodriguez [phone]       [address]  [place] [address]        Equal Access to Services     [facility] : Hadii aad ku hadasho Soomaali, waaxda luqadaha, qaybta kaalmada adeegyada, faviola stone . So [facility] [phone].    ATENCIÓN: Si habla español, tiene a randolph disposición servicios gratuitos de asistencia lingüística. Llame al [phone].    We comply with applicable federal civil rights laws and [place] laws. We do not discriminate on the basis of race, color, national origin, age, disability sex, sexual orientation or gender identity.            Thank you!     Thank you for choosing [facility]  for your care. Our goal is always to provide you with excellent care. Hearing back from our patients is one way we can continue to improve our services. Please take a few minutes to complete the written survey that you may receive in the mail after your visit with us. Thank you!             Your Updated Medication List - Protect others around you: Learn how to safely use, store and throw away your medicines at www.disposemymeds.org.          This list is accurate as of: 9/14/17  6:47 PM.  Always use your most recent med list.                   Brand Name Dispense Instructions for use Diagnosis    cetirizine 10 MG tablet    zyrTEC     Take 10 mg by mouth daily as needed        fluconazole 150 MG tablet    DIFLUCAN    12 tablet    Take 1  tablet (150 mg) by mouth once a week    Yeast infection of the vagina       omeprazole 20 MG CR capsule    priLOSEC    90 capsule    TAKE ONE CAPSULE BY MOUTH ONE TIME DAILY    Gastroesophageal reflux disease without esophagitis       SUMAtriptan 50 MG tablet    IMITREX    9 tablet    TAKE TWO TABLETS BY MOUTH AT ONSET OF HEADACHE FOR MIGRAINE. MAY REPEAT IN TWO HOURS IF NEEDED. MAX    Migraine without status migrainosus, not intractable, unspecified migraine type       valACYclovir 500 MG tablet    VALTREX    36 tablet    TAKE ONE TABLET BY MOUTH ONE TIME DAILY. THEN INCREASE DOSE TO 4 TABLETS TWICE DAILY FOR 1 DAY AT ONSET OF OUTBREAK AS NEEDED.    Recurrent cold sores

## 2017-09-14 NOTE — PROGRESS NOTES
DATE OF VISIT:  2017.      HISTORY OF PRESENT ILLNESS:  Elaine St is a 46-year-old female referred from Dr. Rodriguez for evaluation of right knee pain.  She has had this for the past 4 months at the anterior and medial aspect of the knee.  She has severe pain while driving.  She gets stiffness but no locking or instability.  She has used Ace wrap, ice, ibuprofen or Aleve and Icy Hot.  She has aching, shooting pain, rates it 5/10.  X-rays on 2017, showed mild to moderate osteoarthritis with medial joint narrowing.  It is nowhere near bone on bone.      PHYSICAL EXAMINATION:  Good mobility of both knees.  She has tenderness at the medial aspect of the right knee, negative on the left.  She has no ligamentous laxity of MCL, LCL or cruciates.  She has no significant pain with medial or lateral Bhavesh on the right knee or the left knee.  She has a trace effusion on the right knee, negative on the left.  There is some patellofemoral crepitation on both sides.  It is not painful.  She has no increased warmth or erythema.  Sensation and circulation are intact.      IMPRESSION AND PLAN:  It appears this is right knee osteoarthritis.  We discussed options and she will try Aleve 4 tablets per day.  Avoid kneeling and crawling, work on quad strengthening.  I have also given instructions for other conservative treatment of early arthritis.  We will consider steroid injection in the future.  Since she has only moderate arthritis we may also consider arthroscopy in the future.  We would probably want an MRI scan before then.         NORY ENRIQUEZ MD             D: 2017 20:05   T: 2017 22:58   MT:       Name:     ELAINE ST   MRN:      [id number]        Account:      [id number]   :      1970           Visit Date:   2017      Document: [id number]

## 2017-09-14 NOTE — PROGRESS NOTES
Elaine Awad is a 46 year old female who is seen in consultation at the request of Dr. Fernanda Rodriguez  for right knee pain.     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     GERD (gastroesophageal reflux disease)      HDL deficiency 9/17/2013     Migraine      Morbid obesity (H)      Recurrent cold sores      Vulvar dermatitis 10/14/2010     Yeast infection of the vagina, recurrent         Past Surgical History:   Procedure Laterality Date     C TREAT ECTOPIC PREG,RMV TUBE/OVARY      LT     COLPOSCOPY,BX CERVIX/ENDOCERV CURR  7/1994     ESOPHAGOSCOPY, GASTROSCOPY, DUODENOSCOPY (EGD), COMBINED  1/2/2014    Procedure: COMBINED ESOPHAGOSCOPY, GASTROSCOPY, DUODENOSCOPY (EGD);;  Surgeon: Eden Stacy MD;  Location: [facility]     ESOPHAGOSCOPY, GASTROSCOPY, DUODENOSCOPY (EGD), COMBINED N/A 1/19/2017    Procedure: COMBINED ESOPHAGOSCOPY, GASTROSCOPY, DUODENOSCOPY (EGD);  Surgeon: Ciro Deras MD;  Location: [facility]     LAPAROSCOPIC GASTRIC ADJUSTABLE BANDING  4/28/2014    Procedure: LAPAROSCOPIC GASTRIC ADJUSTABLE BANDING;  Surgeon: Ciro Deras MD;  Location: [facility]     LAPAROSCOPIC HERNIORRHAPHY HIATAL  4/28/2014    Procedure: LAPAROSCOPIC HERNIORRHAPHY HIATAL;  Surgeon: Ciro Deras MD;  Location: [facility]     LAPAROSCOPIC REMOVAL GASTRIC ADJUSTABLE BAND N/A 1/19/2017    Procedure: LAPAROSCOPIC REMOVAL GASTRIC ADJUSTABLE BAND;  Surgeon: Ciro Deras MD;  Location: [facility]     LITHOTRIPSY         Family History   Problem Relation Age of Onset     CANCER Father 72     d. pancreatic, melanoma      Other Cancer Father      pancreatic cancer     Colon Polyps Father      DIABETES Mother      C.A.D. Mother      Breast Cancer Maternal Grandmother      HEART DISEASE Maternal Grandfather      CHF     HEART DISEASE Paternal Grandmother      CHF      Colon Polyps Paternal Grandmother      Respiratory Paternal Grandfather      TB     Obesity Brother      Glaucoma No family hx of      Macular Degeneration No family hx of        Social History     Social History     Marital status:      Spouse name: Chip     Number of children: 2     Years of education: 16     Occupational History     RN [employer]     Social History Main Topics     Smoking status: Former Smoker     Packs/day: 0.50     Years: 10.00     Types: Cigarettes     Quit date: 12/1/1997     Smokeless tobacco: Never Used     Alcohol use 0.0 - 0.5 oz/week      Comment: occasional     Drug use: No     Sexual activity: Yes     Partners: Male     Birth control/ protection: None     Other Topics Concern     Parent/Sibling W/ Cabg, Mi Or Angioplasty Before 65f 55m? 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 MAY REPEAT IN TWO HOURS IF NEEDED. MAX 9 tablet 0     valACYclovir (VALTREX) 500 MG tablet TAKE ONE TABLET BY MOUTH ONE TIME DAILY. THEN INCREASE DOSE TO 4 TABLETS TWICE DAILY FOR 1 DAY AT ONSET OF OUTBREAK AS NEEDED. 36 tablet 11     fluconazole (DIFLUCAN) 150 MG tablet Take 1 tablet (150 mg) by mouth once a week 12 tablet 3     omeprazole (PRILOSEC) 20 MG capsule TAKE ONE CAPSULE BY MOUTH ONE TIME DAILY 90 capsule 3     cetirizine (ZYRTEC) 10 MG tablet Take 10 mg by mouth daily as needed          Allergies   Allergen Reactions     Ampicillin Swelling              REVIEW OF SYSTEMS:  CONSTITUTIONAL:  NEGATIVE for fever,  "chills, change in weight, not feeling tired  SKIN:  NEGATIVE for worrisome rashes, no skin lumps, no skin ulcers and no non-healing wounds  EYES:  NEGATIVE for vision changes or irritation.  ENT/MOUTH:  NEGATIVE.  No hearing loss, no hoarseness, no difficulty swallowing.  RESP:  NEGATIVE. No cough or shortness of breath.  CV:  NEGATIVE for chest pain, palpitations or peripheral edema  GI:  NEGATIVE for nausea, abdominal pain, heartburn, or change in bowel habits  :  Negative. No dysuria, no hematuria  MUSCULOSKELETAL:  See HPI above  NEURO:  NEGATIVE . No headaches, no dizziness,  no numbness  ENDOCRINE:  NEGATIVE for temperature intolerance, skin/hair changes  HEME/ALLERGY/IMMUNE:  NEGATIVE for bleeding problems  PSYCHIATRIC:  NEGATIVE. no anxiety, no depression.      Exam:  Vitals: Temp 98.3  F (36.8  C)  Resp 18  Ht 1.626 m (5' 4\")  Wt 118 kg (260 lb 3.2 oz)  BMI 44.66 kg/m2  BMI= Body mass index is 44.66 kg/(m^2).  Constitutional:  healthy, alert and no distress  Neuro: Alert and Oriented x 3, Gait normal. Sensation grossly WNL.  HEENT:  Atraumatic, EOMI  Neck:  Neck supple with no tenderness.  Psych: Affect normal   Respiratory: Breathing not labored.  Cardiovascular: normal peripheral pulses  Lymph: no adenopathy  Skin: No rashes,worrisome lesions or skin problems  Spine: straight, no straight leg raising pain.  Hips show full range of motion.  There is no tenderness over the sacro-iliac joints, sciatic notch, or greater trochanters.     "

## 2017-09-30 DIAGNOSIS — K21.9 GASTROESOPHAGEAL REFLUX DISEASE WITHOUT ESOPHAGITIS: ICD-10-CM

## 2017-10-02 NOTE — TELEPHONE ENCOUNTER
Prescription approved per [facility] Refill Protocol.    Signed Prescriptions:                        Disp   Refills    omeprazole (PRILOSEC) 20 MG CR capsule     90 cap*2        Sig: TAKE ONE CAPSULE BY MOUTH ONE TIME DAILY  Authorizing Provider: ANABELA ESQUIVEL  Ordering User: LIVIER SALVADOR, RN

## 2017-10-02 NOTE — TELEPHONE ENCOUNTER
Omeprazole       Last Written Prescription Date: 10/11/2016  Last Fill Quantity: 90,  # refills: 3   Last Office Visit with [facility], [facility] or [facility] prescribing provider: 08/14/2017

## 2017-10-26 ENCOUNTER — ALLIED HEALTH/NURSE VISIT (OUTPATIENT)
Dept: NURSING | Facility: CLINIC | Age: 47
End: 2017-10-26
Payer: COMMERCIAL

## 2017-10-26 DIAGNOSIS — Z23 NEED FOR PROPHYLACTIC VACCINATION AND INOCULATION AGAINST INFLUENZA: Primary | ICD-10-CM

## 2017-10-26 PROCEDURE — 90471 IMMUNIZATION ADMIN: CPT

## 2017-10-26 PROCEDURE — 99207 ZZC NO CHARGE NURSE ONLY: CPT

## 2017-10-26 PROCEDURE — 90686 IIV4 VACC NO PRSV 0.5 ML IM: CPT

## 2017-10-26 NOTE — PROGRESS NOTES

## 2017-10-26 NOTE — MR AVS SNAPSHOT
After Visit Summary   10/26/2017    Elaine Awad    MRN: [id number]           Patient Information     Date Of Birth          1970        Visit Information        Provider Department      10/26/2017 4:15 PM  FL[facility] CLINIC [facility]        Today's Diagnoses     Need for prophylactic vaccination and inoculation against influenza    -  1       Follow-ups after your visit        Who to contact     If you have questions or need follow up information about today's clinic visit or your schedule please contact [facility] directly at [phone].  Normal or non-critical lab and imaging results will be communicated to you by Aurigo Softwarehart, letter or phone within 4 business days after the clinic has received the results. If you do not hear from us within 7 days, please contact the clinic through Turf [employer] Clubt or phone. If you have a critical or abnormal lab result, we will notify you by phone as soon as possible.  Submit refill requests through [employer] or call your pharmacy and they will forward the refill request to us. Please allow 3 business days for your refill to be completed.          Additional Information About Your Visit        MyChart Information     [employer] gives you secure access to your electronic health record. If you see a primary care provider, you can also send messages to your care team and make appointments. If you have questions, please call your primary care clinic.  If you do not have a primary care provider, please call [phone] and they will assist you.        Care EveryWhere ID     This is your Care EveryWhere ID. This could be used by other organizations to access your [place] medical records  [id number]         Blood Pressure from Last 3 Encounters:   09/14/17 140/76   08/14/17 122/74   04/03/17 126/70    Weight from Last 3 Encounters:   09/14/17 257 lb (116.6 kg)   09/11/17 260 lb 3.2 oz (118 kg)   08/14/17 259 lb (117.5 kg)              We Performed the  Following     FLU VAC, SPLIT VIRUS IM > 3 YO (QUADRIVALENT) [71790]     Vaccine Administration, Initial [16776]        Primary Care Provider Office Phone # Fax #    Fernanda Rodriguez [phone] [address] [facility] [address] [facility]  [place] [facility] [address]        Equal Access to Services     [facility] : Hadii aad ku hadasho Soomaali, waaxda luqadaha, qaybta kaalmada adeegyada, waxay idiin hayaan adeeg taylor ladeborahn . So [facility] [phone].    ATENCIÓN: Si habla español, tiene a randolph disposición servicios gratuitos de asistencia lingüística. Lokesh al [phone].    We comply with applicable federal civil rights laws and [place] laws. We do not discriminate on the basis of race, color, national origin, age, disability, sex, sexual orientation, or gender identity.            Thank you!     Thank you for choosing [facility]  for your care. Our goal is always to provide you with excellent care. Hearing back from our patients is one way we can continue to improve our services. Please take a few minutes to complete the written survey that you may receive in the mail after your visit with us. Thank you!             Your Updated Medication List - Protect others around you: Learn how to safely use, store and throw away your medicines at www.disposemymeds.org.          This list is accurate as of: 10/26/17  4:30 PM.  Always use your most recent med list.                   Brand Name Dispense Instructions for use Diagnosis    cetirizine 10 MG tablet    zyrTEC     Take 10 mg by mouth daily as needed        fluconazole 150 MG tablet    DIFLUCAN    12 tablet    Take 1 tablet (150 mg) by mouth once a week    Yeast infection of the vagina       omeprazole 20 MG CR capsule    priLOSEC    90 capsule    TAKE ONE CAPSULE BY MOUTH ONE TIME DAILY    Gastroesophageal reflux disease without esophagitis       SUMAtriptan 50 MG tablet    IMITREX    9 tablet    TAKE TWO TABLETS BY MOUTH AT ONSET OF HEADACHE FOR MIGRAINE.  MAY REPEAT IN TWO HOURS IF NEEDED. MAX    Migraine without status migrainosus, not intractable, unspecified migraine type       valACYclovir 500 MG tablet    VALTREX    36 tablet    TAKE ONE TABLET BY MOUTH ONE TIME DAILY. THEN INCREASE DOSE TO 4 TABLETS TWICE DAILY FOR 1 DAY AT ONSET OF OUTBREAK AS NEEDED.    Recurrent cold sores

## 2018-02-19 DIAGNOSIS — B00.1 RECURRENT COLD SORES: ICD-10-CM

## 2018-02-20 RX ORDER — VALACYCLOVIR HYDROCHLORIDE 500 MG/1
TABLET, FILM COATED ORAL
Qty: 30 TABLET | Refills: 0 | Status: SHIPPED | OUTPATIENT
Start: 2018-02-20 | End: 2018-10-24

## 2018-02-20 NOTE — TELEPHONE ENCOUNTER
Called Pharmacy they state they never got a refill for the medication. I told him we sent it on 2/7/2018 but they said they never received it. They do not have any refills for the medication.   Elana RIVER MA

## 2018-02-20 NOTE — TELEPHONE ENCOUNTER
Refill just sent for patient 2/7/18 for one month, MA please call and confirm duplicate.     Palma Keys RN on 2/20/2018 at 2:42 PM

## 2018-02-20 NOTE — TELEPHONE ENCOUNTER
Signed Prescriptions:                        Disp   Refills    valACYclovir (VALTREX) 500 MG tablet       30 tab*0        Sig: Take 1 tablet by mouth daily, increase to 4 tabs by           mouth twice daily at onset of outbreak. Labs           needed for further refills.  Authorizing Provider: ANABELA ESQUIVEL  Ordering User: PALMA CASPER    Replacement script sent, see MA note below original fill was not received by pharmacy. Palma Casper RN on 2/20/2018 at 5:12 PM

## 2018-08-06 DIAGNOSIS — K21.9 GASTROESOPHAGEAL REFLUX DISEASE WITHOUT ESOPHAGITIS: ICD-10-CM

## 2018-08-06 NOTE — TELEPHONE ENCOUNTER
Signed Prescriptions:                        Disp   Refills    omeprazole (PRILOSEC) 20 MG CR capsule     90 cap*0        Sig: TAKE 1 CAPSULE BY MOUTH EVERY DAY  Authorizing Provider: ANABELA ESQUIVEL  Ordering User: KEENA DAMIAN refill given. Patient must follow up with provider before any further refills will be sent.     Keena Damian RN

## 2018-10-22 ASSESSMENT — ENCOUNTER SYMPTOMS
PARESTHESIAS: 1
WEAKNESS: 0
MYALGIAS: 0
CHILLS: 0
ABDOMINAL PAIN: 0
PALPITATIONS: 0
HEARTBURN: 0
DIARRHEA: 0
FEVER: 0
NAUSEA: 0
CONSTIPATION: 0
BREAST MASS: 0
NERVOUS/ANXIOUS: 0
HEADACHES: 0
SORE THROAT: 0
HEMATOCHEZIA: 0
DIZZINESS: 0
ARTHRALGIAS: 0
EYE PAIN: 0
FREQUENCY: 0
JOINT SWELLING: 0
HEMATURIA: 0
COUGH: 0
SHORTNESS OF BREATH: 0
DYSURIA: 0

## 2018-10-24 ENCOUNTER — RADIANT APPOINTMENT (OUTPATIENT)
Dept: MAMMOGRAPHY | Facility: CLINIC | Age: 48
End: 2018-10-24
Payer: COMMERCIAL

## 2018-10-24 ENCOUNTER — OFFICE VISIT (OUTPATIENT)
Dept: FAMILY MEDICINE | Facility: CLINIC | Age: 48
End: 2018-10-24
Payer: COMMERCIAL

## 2018-10-24 VITALS
DIASTOLIC BLOOD PRESSURE: 74 MMHG | BODY MASS INDEX: 44.22 KG/M2 | TEMPERATURE: 97.6 F | WEIGHT: 259 LBS | OXYGEN SATURATION: 98 % | HEIGHT: 64 IN | SYSTOLIC BLOOD PRESSURE: 122 MMHG | HEART RATE: 88 BPM | RESPIRATION RATE: 18 BRPM

## 2018-10-24 DIAGNOSIS — E66.01 MORBID OBESITY (H): ICD-10-CM

## 2018-10-24 DIAGNOSIS — B00.1 RECURRENT COLD SORES: ICD-10-CM

## 2018-10-24 DIAGNOSIS — Z98.84 BARIATRIC SURGERY STATUS: ICD-10-CM

## 2018-10-24 DIAGNOSIS — L30.4 INTERTRIGO: ICD-10-CM

## 2018-10-24 DIAGNOSIS — Z23 NEED FOR PROPHYLACTIC VACCINATION AND INOCULATION AGAINST INFLUENZA: ICD-10-CM

## 2018-10-24 DIAGNOSIS — Z00.00 ROUTINE HISTORY AND PHYSICAL EXAMINATION OF ADULT: Primary | ICD-10-CM

## 2018-10-24 DIAGNOSIS — Z12.31 VISIT FOR SCREENING MAMMOGRAM: ICD-10-CM

## 2018-10-24 DIAGNOSIS — K21.9 GASTROESOPHAGEAL REFLUX DISEASE WITHOUT ESOPHAGITIS: ICD-10-CM

## 2018-10-24 PROCEDURE — 90686 IIV4 VACC NO PRSV 0.5 ML IM: CPT | Performed by: FAMILY MEDICINE

## 2018-10-24 PROCEDURE — 87624 HPV HI-RISK TYP POOLED RSLT: CPT | Performed by: FAMILY MEDICINE

## 2018-10-24 PROCEDURE — 90471 IMMUNIZATION ADMIN: CPT | Performed by: FAMILY MEDICINE

## 2018-10-24 PROCEDURE — G0145 SCR C/V CYTO,THINLAYER,RESCR: HCPCS | Performed by: FAMILY MEDICINE

## 2018-10-24 PROCEDURE — 77067 SCR MAMMO BI INCL CAD: CPT | Mod: TC

## 2018-10-24 PROCEDURE — 99396 PREV VISIT EST AGE 40-64: CPT | Mod: 25 | Performed by: FAMILY MEDICINE

## 2018-10-24 RX ORDER — VALACYCLOVIR HYDROCHLORIDE 500 MG/1
TABLET, FILM COATED ORAL
Qty: 100 TABLET | Refills: 3 | Status: SHIPPED | OUTPATIENT
Start: 2018-10-24 | End: 2019-10-25

## 2018-10-24 RX ORDER — NYSTATIN 100000 [USP'U]/G
POWDER TOPICAL
Qty: 60 G | Refills: 11 | Status: SHIPPED | OUTPATIENT
Start: 2018-10-24 | End: 2020-04-01

## 2018-10-24 ASSESSMENT — ENCOUNTER SYMPTOMS
CONSTIPATION: 0
PARESTHESIAS: 1
HEADACHES: 0
SORE THROAT: 0
HEMATURIA: 0
DIZZINESS: 0
WEAKNESS: 0
ARTHRALGIAS: 0
DYSURIA: 0
BREAST MASS: 0
PALPITATIONS: 0
FREQUENCY: 0
CHILLS: 0
JOINT SWELLING: 0
MYALGIAS: 0
NAUSEA: 0
FEVER: 0
HEARTBURN: 0
NERVOUS/ANXIOUS: 0
EYE PAIN: 0
COUGH: 0
SHORTNESS OF BREATH: 0
ABDOMINAL PAIN: 0
HEMATOCHEZIA: 0
DIARRHEA: 0

## 2018-10-24 NOTE — PATIENT INSTRUCTIONS
Preventive Health Recommendations  Female Ages 40 to 49    Yearly exam:     See your health care provider every year in order to  1. Review health changes.   2. Discuss preventive care.    3. Review your medicines if your doctor prescribed any.      Get a Pap test every three years (unless you have an abnormal result and your provider advises testing more often).      If you get Pap tests with HPV test, you only need to test every 5 years, unless you have an abnormal result. You do not need a Pap test if your uterus was removed (hysterectomy) and you have not had cancer.      You should be tested each year for STDs (sexually transmitted diseases), if you're at risk.     Ask your doctor if you should have a mammogram.      Have a colonoscopy (test for colon cancer) if someone in your family has had colon cancer or polyps before age 50.       Have a cholesterol test every 5 years.       Have a diabetes test (fasting glucose) after age 45. If you are at risk for diabetes, you should have this test every 3 years.    Shots: Get a flu shot each year. Get a tetanus shot every 10 years.     Nutrition:     Eat at least 5 servings of fruits and vegetables each day.    Eat whole-grain bread, whole-wheat pasta and brown rice instead of white grains and rice.    Get adequate Calcium and Vitamin D.      Lifestyle    Exercise at least 150 minutes a week (an average of 30 minutes a day, 5 days a week). This will help you control your weight and prevent disease.    Limit alcohol to one drink per day.    No smoking.     Wear sunscreen to prevent skin cancer.    See your dentist every six months for an exam and cleaning.    [facility]    If you have any questions regarding to your visit please contact your care team:       Team Red:   Clinic Hours Telephone Number   Dr. Fernanda Sarkar, NP 7am-7pm  Monday - Thursday   7am-5pm  Fridays  [phone]  (Appointment  scheduling available 24/7)   Urgent Care - [place] and [place] - 11am-9pm Monday-Friday Saturday-Sunday- 9am-5pm   [place] - 5pm-9pm Monday-Friday Saturday-Sunday- 9am-5pm  [phone] - [place]  [phone] - [place]       What options do I have for a visit other than an office visit? We offer electronic visits (e-visits) and telephone visits, when medically appropriate.  Please check with your medical insurance to see if these types of visits are covered, as you will be responsible for any charges that are not paid by your insurance.      You can use Chargemaster (secure electronic communication) to access to your chart, send your primary care provider a message, or make an appointment. Ask a team member how to get started.     For a price quote for your services, please call our Consumer Price Line at [phone] or our Imaging Cost estimation line at [phone] (for imaging tests).    Lia Irizarry MA

## 2018-10-24 NOTE — MR AVS SNAPSHOT
After Visit Summary   10/24/2018    Elaine Awad    MRN: [id number]           Patient Information     Date Of Birth          1970        Visit Information        Provider Department      10/24/2018 8:00 AM Fernanda Rodriguez MD [facility]        Today's Diagnoses     Routine history and physical examination of adult    -  1    Morbid obesity (H)        Bariatric surgery status        Gastroesophageal reflux disease without esophagitis        Recurrent cold sores        Intertrigo        Need for prophylactic vaccination and inoculation against influenza          Care Instructions      Preventive Health Recommendations  Female Ages 40 to 49    Yearly exam:     See your health care provider every year in order to  1. Review health changes.   2. Discuss preventive care.    3. Review your medicines if your doctor prescribed any.      Get a Pap test every three years (unless you have an abnormal result and your provider advises testing more often).      If you get Pap tests with HPV test, you only need to test every 5 years, unless you have an abnormal result. You do not need a Pap test if your uterus was removed (hysterectomy) and you have not had cancer.      You should be tested each year for STDs (sexually transmitted diseases), if you're at risk.     Ask your doctor if you should have a mammogram.      Have a colonoscopy (test for colon cancer) if someone in your family has had colon cancer or polyps before age 50.       Have a cholesterol test every 5 years.       Have a diabetes test (fasting glucose) after age 45. If you are at risk for diabetes, you should have this test every 3 years.    Shots: Get a flu shot each year. Get a tetanus shot every 10 years.     Nutrition:     Eat at least 5 servings of fruits and vegetables each day.    Eat whole-grain bread, whole-wheat pasta and brown rice instead of white grains and rice.    Get adequate Calcium and Vitamin  D.      Lifestyle    Exercise at least 150 minutes a week (an average of 30 minutes a day, 5 days a week). This will help you control your weight and prevent disease.    Limit alcohol to one drink per day.    No smoking.     Wear sunscreen to prevent skin cancer.    See your dentist every six months for an exam and cleaning.    [facility]    If you have any questions regarding to your visit please contact your care team:       Team Red:   Clinic Hours Telephone Number   Dr. Fernanda Sarkar, NP 7am-7pm  Monday - Thursday   7am-5pm  Fridays  [phone]  (Appointment scheduling available 24/7)   Urgent Care - [place] and [facility] - 11am-9pm Monday-Friday Saturday-Sunday- 9am-5pm   [place] - 5pm-9pm Monday-Friday Saturday-Sunday- 9am-5pm  [phone] - [place]  [phone] - [place]       What options do I have for a visit other than an office visit? We offer electronic visits (e-visits) and telephone visits, when medically appropriate.  Please check with your medical insurance to see if these types of visits are covered, as you will be responsible for any charges that are not paid by your insurance.      You can use [employer] (secure electronic communication) to access to your chart, send your primary care provider a message, or make an appointment. Ask a team member how to get started.     For a price quote for your services, please call our Consumer Price Line at [phone] or our Imaging Cost estimation line at [phone] (for imaging tests).    Lia Irizarry MA            Follow-ups after your visit        Follow-up notes from your care team     Return in about 1 year (around 10/24/2019) for physical.      Who to contact     If you have questions or need follow up information about today's clinic visit or your schedule please contact [facility] directly at [phone].  Normal or non-critical lab and  "imaging results will be communicated to you by MyChart, letter or phone within 4 business days after the clinic has received the results. If you do not hear from us within 7 days, please contact the clinic through [employer]ineering or phone. If you have a critical or abnormal lab result, we will notify you by phone as soon as possible.  Submit refill requests through [employer]ineering or call your pharmacy and they will forward the refill request to us. Please allow 3 business days for your refill to be completed.          Additional Information About Your Visit        [employer] Information     [employer] gives you secure access to your electronic health record. If you see a primary care provider, you can also send messages to your care team and make appointments. If you have questions, please call your primary care clinic.  If you do not have a primary care provider, please call [phone] and they will assist you.        Care EveryWhere ID     This is your Care EveryWhere ID. This could be used by other organizations to access your [facility] medical records  [id number]        Your Vitals Were     Pulse Temperature Respirations Height Last Period Pulse Oximetry    88 97.6  F (36.4  C) (Oral) 18 5' 3.75\" (1.619 m) 10/10/2018 (Approximate) 98%    Breastfeeding? BMI (Body Mass Index)                No 44.81 kg/m2           Blood Pressure from Last 3 Encounters:   10/24/18 122/74   09/14/17 140/76   08/14/17 122/74    Weight from Last 3 Encounters:   10/24/18 259 lb (117.5 kg)   09/14/17 257 lb (116.6 kg)   09/11/17 260 lb 3.2 oz (118 kg)              We Performed the Following     FLU VACCINE, SPLIT VIRUS, IM (QUADRIVALENT) [79170]- >3 YRS     Vaccine Administration, Initial [61494]          Today's Medication Changes          These changes are accurate as of 10/24/18  8:55 AM.  If you have any questions, ask your nurse or doctor.               Start taking these medicines.        Dose/Directions    nystatin 225499 UNIT/GM Powd   Commonly " known as:  MYCOSTATIN   Used for:  Intertrigo   Started by:  Fernanda Rodriguez MD        Apply to affected area under breasts TID as needed   Quantity:  60 g   Refills:  11         These medicines have changed or have updated prescriptions.        Dose/Directions    valACYclovir 500 MG tablet   Commonly known as:  VALTREX   This may have changed:  additional instructions   Used for:  Recurrent cold sores   Changed by:  Fernanda Rodriguez MD        Take 1 tablet by mouth daily, increase to 4 tabs by mouth twice daily at onset of outbreak   Quantity:  100 tablet   Refills:  3            Where to get your medicines      These medications were sent to [facility] - [place] [address] [facility] [address], [facility] [address]     Phone:  [phone]     nystatin 782301 UNIT/GM Powd    omeprazole 20 MG CR capsule    valACYclovir 500 MG tablet                Primary Care Provider Office Phone # Fax #    Fernanda Rodriguez [phone] [address] [facility] [address]        Equal Access to Services     [facility]: Hadii aad ku hadasho Soomaali, waaxda luqadaha, qaybta kaalmada adeegyada, waxay anurag stone . So [facility] [phone].    ATENCIÓN: Si habla español, tiene a randolph disposición servicios gratuitos de asistencia lingüística. [facility] [phone].    We comply with applicable federal civil rights laws and [place] laws. We do not discriminate on the basis of race, color, national origin, age, disability, sex, sexual orientation, or gender identity.            Thank you!     Thank you for choosing [facility]  for your care. Our goal is always to provide you with excellent care. Hearing back from our patients is one way we can continue to improve our services. Please take a few minutes to complete the written survey that you may receive in the mail after your visit with us. Thank you!             Your  Updated Medication List - Protect others around you: Learn how to safely use, store and throw away your medicines at www.disposemymeds.org.          This list is accurate as of 10/24/18  8:55 AM.  Always use your most recent med list.                   Brand Name Dispense Instructions for use Diagnosis    cetirizine 10 MG tablet    zyrTEC     Take 10 mg by mouth daily as needed        fluconazole 150 MG tablet    DIFLUCAN    12 tablet    Take 1 tablet (150 mg) by mouth once a week    Yeast infection of the vagina       nystatin 691340 UNIT/GM Powd    MYCOSTATIN    60 g    Apply to affected area under breasts TID as needed    Intertrigo       omeprazole 20 MG CR capsule    priLOSEC    90 capsule    TAKE 1 CAPSULE BY MOUTH EVERY DAY    Gastroesophageal reflux disease without esophagitis       valACYclovir 500 MG tablet    VALTREX    100 tablet    Take 1 tablet by mouth daily, increase to 4 tabs by mouth twice daily at onset of outbreak    Recurrent cold sores

## 2018-10-24 NOTE — PROGRESS NOTES
SUBJECTIVE:   CC: Elaine Awad is an 47 year old woman  with Hx of gastric bypass who presents for preventive health visit.     Also presents for follow-up of GERD controlled with medication. Patient has recurrent cold sores controlled with antiviral medication.     Physical   Annual:     Getting at least 3 servings of Calcium per day:  Yes    Bi-annual eye exam:  Yes    Dental care twice a year:  Yes    Sleep apnea or symptoms of sleep apnea:  None    Diet:  Regular (no restrictions)    Frequency of exercise:  2-3 days/week    Duration of exercise:  30-45 minutes    Taking medications regularly:  Yes    Medication side effects:  Not applicable    Additional concerns today:  No    Today's PHQ-2 Score:   PHQ-2 (  [employer]) 10/22/2018   Q1: Little interest or pleasure in doing things 0   Q2: Feeling down, depressed or hopeless 0   PHQ-2 Score 0   Q1: Little interest or pleasure in doing things Not at all   Q2: Feeling down, depressed or hopeless Not at all   PHQ-2 Score 0       Abuse: Current or Past(Physical, Sexual or Emotional)- No  Do you feel safe in your environment - Yes    Social History   Substance Use Topics     Smoking status: Former Smoker     Packs/day: 0.50     Years: 10.00     Types: Cigarettes     Quit date: 1997     Smokeless tobacco: Never Used     Alcohol use 0.0 - 0.5 oz/week      Comment: occasional     Alcohol Use 10/22/2018   If you drink alcohol do you typically have greater than 3 drinks per day OR greater than 7 drinks per week? No       Reviewed orders with patient.  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     Yeast infection of the vagina, recurrent      Recurrent cold sores     Bariatric surgery status     Morbid obesity (H)     Eczema     Degenerative joint  disease of knee, right     Primary osteoarthritis of right knee     Intertrigo     Past Surgical History:   Procedure Laterality Date     C TREAT ECTOPIC PREG,RMV TUBE/OVARY      LT     COLPOSCOPY,BX CERVIX/ENDOCERV CURR  7/1994     ESOPHAGOSCOPY, GASTROSCOPY, DUODENOSCOPY (EGD), COMBINED  1/2/2014    Procedure: COMBINED ESOPHAGOSCOPY, GASTROSCOPY, DUODENOSCOPY (EGD);;  Surgeon: Eden Stacy MD;  Location: [facility]     ESOPHAGOSCOPY, GASTROSCOPY, DUODENOSCOPY (EGD), COMBINED N/A 1/19/2017    Procedure: COMBINED ESOPHAGOSCOPY, GASTROSCOPY, DUODENOSCOPY (EGD);  Surgeon: Ciro Deras MD;  Location: [facility]     LAPAROSCOPIC GASTRIC ADJUSTABLE BANDING  4/28/2014    Procedure: LAPAROSCOPIC GASTRIC ADJUSTABLE BANDING;  Surgeon: Ciro Deras MD;  Location: [facility]     LAPAROSCOPIC HERNIORRHAPHY HIATAL  4/28/2014    Procedure: LAPAROSCOPIC HERNIORRHAPHY HIATAL;  Surgeon: Ciro Deras MD;  Location: [facility]     LAPAROSCOPIC REMOVAL GASTRIC ADJUSTABLE BAND N/A 1/19/2017    Procedure: LAPAROSCOPIC REMOVAL GASTRIC ADJUSTABLE BAND;  Surgeon: Ciro Deras MD;  Location: [facility]     LITHOTRIPSY         Social History   Substance Use Topics     Smoking status: Former Smoker     Packs/day: 0.50     Years: 10.00     Types: Cigarettes     Quit date: 12/1/1997     Smokeless tobacco: Never Used     Alcohol use 0.0 - 0.5 oz/week      Comment: occasional     Family History   Problem Relation Age of Onset     Cancer Father 72     d. pancreatic, melanoma      Colon Polyps Father      Diabetes Mother      C.A.D. Mother      Breast Cancer Maternal Grandmother      HEART DISEASE Maternal Grandfather      CHF     HEART DISEASE Paternal Grandmother      CHF     Colon Polyps Paternal Grandmother      Respiratory Paternal Grandfather      TB     Obesity Brother      Glaucoma No family hx of      Macular Degeneration No family hx of          Current Outpatient Prescriptions   Medication Sig Dispense Refill      cetirizine (ZYRTEC) 10 MG tablet Take 10 mg by mouth daily as needed        fluconazole (DIFLUCAN) 150 MG tablet Take 1 tablet (150 mg) by mouth once a week 12 tablet 3     nystatin (MYCOSTATIN) 835767 UNIT/GM POWD Apply to affected area under breasts TID as needed 60 g 11     omeprazole (PRILOSEC) 20 MG CR capsule TAKE 1 CAPSULE BY MOUTH EVERY DAY 90 capsule 3     valACYclovir (VALTREX) 500 MG tablet Take 1 tablet by mouth daily, increase to 4 tabs by mouth twice daily at onset of outbreak 100 tablet 3     [DISCONTINUED] omeprazole (PRILOSEC) 20 MG CR capsule TAKE 1 CAPSULE BY MOUTH EVERY DAY 90 capsule 0     [DISCONTINUED] valACYclovir (VALTREX) 500 MG tablet Take 1 tablet by mouth daily, increase to 4 tabs by mouth twice daily at onset of outbreak. Labs needed for further refills. 30 tablet 0     Allergies   Allergen Reactions     Ampicillin Swelling              Patient under age 50, mutual decision reflected in health maintenance.      Pertinent mammograms are reviewed under the imaging tab.  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   HENT: Negative for congestion, ear pain, hearing loss and sore throat.    Eyes:  "Negative for pain and visual disturbance.   Respiratory: Negative for cough and shortness of breath.    Cardiovascular: Negative for chest pain, palpitations and peripheral edema.   Gastrointestinal: Negative for abdominal pain, constipation, diarrhea, heartburn, hematochezia and nausea.   Breasts:  Negative for tenderness, breast mass and discharge.   Genitourinary: Negative for dysuria, frequency, genital sores, hematuria, pelvic pain, urgency, vaginal bleeding and vaginal discharge.   Musculoskeletal: Negative for arthralgias, joint swelling and myalgias.   Skin: Positive for rash.   Neurological: Positive for paresthesias. Negative for dizziness, weakness and headaches.   Psychiatric/Behavioral: Negative for mood changes. The patient is not nervous/anxious.      CONSTITUTIONAL: NEGATIVE for fever, chills, change in weight  INTEGUMENTARU/SKIN: NEGATIVE for worrisome rashes, moles or lesions  EYES: NEGATIVE for vision changes or irritation  ENT: NEGATIVE for ear, mouth and throat problems  RESP: NEGATIVE for significant cough or SOB  BREAST: NEGATIVE for masses, tenderness or discharge  CV: NEGATIVE for chest pain, palpitations or peripheral edema  GI: NEGATIVE for nausea, abdominal pain, heartburn, or change in bowel habits  : NEGATIVE for unusual urinary or vaginal symptoms. Periods are regular.  MUSCULOSKELETAL: NEGATIVE for significant arthralgias or myalgia  NEURO: NEGATIVE for weakness, dizziness or paresthesias  PSYCHIATRIC: NEGATIVE for changes in mood or affect     OBJECTIVE:   /74  Pulse 88  Temp 97.6  F (36.4  C) (Oral)  Resp 18  Ht 5' 3.75\" (1.619 m)  Wt 259 lb (117.5 kg)  LMP 10/10/2018 (Approximate)  SpO2 98%  Breastfeeding? No  BMI 44.81 kg/m2  Physical Exam  GENERAL: alert, no distress and obese  EYES: Eyes grossly normal to inspection, PERRL and conjunctivae and sclerae normal  HENT: ear canals and TM's normal, nose and mouth without ulcers or lesions  NECK: no adenopathy, no " asymmetry, masses, or scars and thyroid normal to palpation  RESP: lungs clear to auscultation - no rales, rhonchi or wheezes  BREAST: normal without masses, tenderness or nipple discharge and no palpable axillary masses or adenopathy  CV: regular rate and rhythm, normal S1 S2, no S3 or S4, no murmur, click or rub, no peripheral edema and peripheral pulses strong  ABDOMEN: soft, nontender, no hepatosplenomegaly, no masses and bowel sounds normal   (female): normal female external genitalia, normal urethral meatus, vaginal mucosa pink, moist, well rugated, and normal cervix/adnexa/uterus without masses or discharge  MS: no gross musculoskeletal defects noted, no edema  SKIN: no suspicious lesions or rashes; minor erythema under breasts   NEURO: Normal strength and tone, mentation intact and speech normal  PSYCH: mentation appears normal, affect normal/bright    Diagnostic Test Results:  Results for orders placed or performed in visit on 09/14/17   Lipid Profile (Chol, Trig, HDL, LDL calc)   Result Value Ref Range    Cholesterol 138 <200 mg/dL    Triglycerides 92 <150 mg/dL    HDL Cholesterol 53 >49 mg/dL    LDL Cholesterol Calculated 67 <100 mg/dL    Non HDL Cholesterol 85 <130 mg/dL   Glucose   Result Value Ref Range    Glucose 88 70 - 99 mg/dL       ASSESSMENT/PLAN:   (Z00.00) Routine history and physical examination of adult  (primary encounter diagnosis)    (E66.01) Morbid obesity (H)  (Z98.84) Bariatric surgery status  Plan: recommended follow-up with bariatrics     (K21.9) Gastroesophageal reflux disease without esophagitis  Comment: Well controlled with medications without side effects.   Plan: omeprazole (PRILOSEC) 20 MG CR capsule          (B00.1) Recurrent cold sores  Comment: Well controlled with medications without side effects.   Plan: valACYclovir (VALTREX) 500 MG tablet          (L30.4) Intertrigo  Plan: nystatin (MYCOSTATIN) 781907 UNIT/GM POWD                COUNSELING:  Reviewed preventive health  "counseling, as reflected in patient instructions  Special attention given to:        Regular exercise       Healthy diet/nutrition       Osteoporosis Prevention/Bone Health       HIV screeninx in teen years, 1x in adult years, and at intervals if high risk       The 10-year ASCVD risk score (Arianna SMITH Jr, et al., 2013) is: 0.5%    Values used to calculate the score:      Age: 47 years      Sex: Female      Is Non- : No      Diabetic: No      Tobacco smoker: No      Systolic Blood Pressure: 122 mmHg      Is BP treated: No      HDL Cholesterol: 53 mg/dL      Total Cholesterol: 138 mg/dL    BP Readings from Last 1 Encounters:   10/24/18 122/74     Estimated body mass index is 44.81 kg/(m^2) as calculated from the following:    Height as of this encounter: 5' 3.75\" (1.619 m).    Weight as of this encounter: 259 lb (117.5 kg).    BP Screening:   Last 3 BP Readings:    BP Readings from Last 3 Encounters:   10/24/18 122/74   17 140/76   17 122/74       The following was recommended to the patient:  Re-screen BP within a year and recommended lifestyle modifications  Weight management plan: Discussed healthy diet and exercise guidelines and patient will follow up in 12 months in clinic to re-evaluate.     reports that she quit smoking about 20 years ago. Her smoking use included Cigarettes. She has a 5.00 pack-year smoking history. She has never used smokeless tobacco.      Counseling Resources:  ATP IV Guidelines  Pooled Cohorts Equation Calculator  Breast Cancer Risk Calculator  FRAX Risk Assessment  ICSI Preventive Guidelines  Dietary Guidelines for Americans,   USDA's MyPlate  ASA Prophylaxis  Lung CA Screening    Fernanda Rodriguez MD  [facility]    Injectable Influenza Immunization Documentation    1.  Is the person to be vaccinated sick today?   No    2. Does the person to be vaccinated have an allergy to a component   of the vaccine?   No  Egg Allergy Algorithm " Link    3. Has the person to be vaccinated ever had a serious reaction   to influenza vaccine in the past?   No    4. Has the person to be vaccinated ever had Guillain-Barré syndrome?   No    Form completed by Talya GARCIA MA

## 2018-10-29 LAB
COPATH REPORT: NORMAL
PAP: NORMAL

## 2018-10-31 LAB
FINAL DIAGNOSIS: NORMAL
HPV HR 12 DNA CVX QL NAA+PROBE: NEGATIVE
HPV16 DNA SPEC QL NAA+PROBE: NEGATIVE
HPV18 DNA SPEC QL NAA+PROBE: NEGATIVE
SPECIMEN DESCRIPTION: NORMAL
SPECIMEN SOURCE CVX/VAG CYTO: NORMAL

## 2018-12-06 DIAGNOSIS — B37.31 YEAST INFECTION OF THE VAGINA: ICD-10-CM

## 2018-12-06 NOTE — TELEPHONE ENCOUNTER
Pharmacy sent refill request for weekly fluconazole.  Last office visit with you was 9/2017.  Pt had AE with  10/2018.  Do you want to refill her fluconazole now and schedule to see you in March or should we see if Dr. Ni wants to refill it since she saw her most recently?  Maite Deshpande RN

## 2018-12-08 RX ORDER — FLUCONAZOLE 150 MG/1
150 TABLET ORAL WEEKLY
Qty: 12 TABLET | Refills: 0 | Status: SHIPPED | OUTPATIENT
Start: 2018-12-08 | End: 2019-08-12

## 2019-03-30 ENCOUNTER — E-VISIT (OUTPATIENT)
Dept: FAMILY MEDICINE | Facility: CLINIC | Age: 49
End: 2019-03-30
Payer: COMMERCIAL

## 2019-03-30 DIAGNOSIS — H10.13 ALLERGIC CONJUNCTIVITIS, BILATERAL: Primary | ICD-10-CM

## 2019-03-30 PROCEDURE — 99444 ZZC PHYSICIAN ONLINE EVALUATION & MANAGEMENT SERVICE: CPT | Performed by: FAMILY MEDICINE

## 2019-04-10 ENCOUNTER — MYC MEDICAL ADVICE (OUTPATIENT)
Dept: FAMILY MEDICINE | Facility: CLINIC | Age: 49
End: 2019-04-10

## 2019-04-10 DIAGNOSIS — J30.9 ALLERGIC RHINITIS, UNSPECIFIED SEASONALITY, UNSPECIFIED TRIGGER: ICD-10-CM

## 2019-04-17 ENCOUNTER — OFFICE VISIT (OUTPATIENT)
Dept: ALLERGY | Facility: CLINIC | Age: 49
End: 2019-04-17
Payer: COMMERCIAL

## 2019-04-17 VITALS — HEART RATE: 85 BPM | DIASTOLIC BLOOD PRESSURE: 81 MMHG | SYSTOLIC BLOOD PRESSURE: 133 MMHG | OXYGEN SATURATION: 99 %

## 2019-04-17 DIAGNOSIS — J31.0 NONALLERGIC RHINITIS: Primary | ICD-10-CM

## 2019-04-17 PROCEDURE — 95004 PERQ TESTS W/ALRGNC XTRCS: CPT | Performed by: ALLERGY & IMMUNOLOGY

## 2019-04-17 PROCEDURE — 99203 OFFICE O/P NEW LOW 30 MIN: CPT | Mod: 25 | Performed by: ALLERGY & IMMUNOLOGY

## 2019-04-17 RX ORDER — FEXOFENADINE HCL 180 MG/1
180 TABLET ORAL PRN
COMMUNITY

## 2019-04-17 NOTE — LETTER
4/17/2019         RE: Elaine Awad  [address] [facility] [place] [address]        Dear Colleague,    Thank you for referring your patient, Elaine Awad, to the [facility]. Please see a copy of my visit note below.    Dear Fernanda Rodriguez MD,    Thank you for referring your patient Elaine Awad to the Allergy/Immunology Clinic. Elaine Awad was seen in the Allergy Clinic at [facility]. The following are my recommendations regarding her Nonallergic Rhinitis    1. Continue ketotifen eye drops, 1 drop in each eye up to three times daily as needed  2. Increase fexofenadine to 180mg twice daily  3. May begin nasal steroid such as fluticasone for worsening rhinoconjunctivitis symptoms - 2 sprays in each nostril daily  4. If ocular symptoms are not manageable with the above medications may add steroid eye drop to be used for 3 to 5 days  5. Follow-up in 1 year as needed      Elaine Awad is a 48 year old White female being seen today at the request of Dr. Rodriguez in consultation for allergies. She states that she has had allergies for many years, seem to be getting worse over time. Her most bothersome symptoms are itchy, watery, and burning eyes. She also has had swelling of her eyes. Linda also reports symptoms of sneezing, itchy nose, and rhinorrhea. Her symptoms are worse in the spring and later summer/early fall. In addition to her rhinoconjunctivitis symptoms she also has itchy skin but this is a chronic problem. She has been using ketotifen eye drops as well as fexofenadine during the day and diphenhydramine at night. The eye drops have been helpful but she does not find the oral antihistamines to be particularly effective. Linda has not used any nasal medications.    Linda has not seen an allergist or had allergy testing in the past.      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 pancreatic, melanoma      Colon Polyps Father      Diabetes Mother      C.A.D. Mother      Breast Cancer Maternal Grandmother      Heart Disease Maternal Grandfather         CHF     Heart Disease Paternal Grandmother         CHF     Colon Polyps Paternal Grandmother      Respiratory Paternal Grandfather         TB     Obesity Brother      Glaucoma No family hx of      Macular Degeneration No family hx of      Past Surgical History:   Procedure Laterality Date     C TREAT ECTOPIC PREG,RMV TUBE/OVARY      LT     COLPOSCOPY,BX CERVIX/ENDOCERV CURR  7/1994     ESOPHAGOSCOPY, GASTROSCOPY, DUODENOSCOPY (EGD), COMBINED  1/2/2014    Procedure: COMBINED ESOPHAGOSCOPY, GASTROSCOPY, DUODENOSCOPY (EGD);;  Surgeon: Eden Stacy MD;  Location:  GI     ESOPHAGOSCOPY, GASTROSCOPY, DUODENOSCOPY (EGD), COMBINED N/A 1/19/2017    Procedure: COMBINED ESOPHAGOSCOPY, GASTROSCOPY, DUODENOSCOPY (EGD);  Surgeon: Ciro Deras MD;  [facility]     LAPAROSCOPIC GASTRIC ADJUSTABLE BANDING  4/28/2014    Procedure: LAPAROSCOPIC GASTRIC ADJUSTABLE BANDING;  Surgeon: Ciro Deras MD;  [facility]: [facility]     LAPAROSCOPIC HERNIORRHAPHY HIATAL  4/28/2014    Procedure: LAPAROSCOPIC HERNIORRHAPHY HIATAL;  Surgeon: Ciro Deras MD;  [facility]     LAPAROSCOPIC REMOVAL GASTRIC ADJUSTABLE BAND N/A 1/19/2017    Procedure: LAPAROSCOPIC REMOVAL GASTRIC ADJUSTABLE BAND;  Surgeon: Ciro Deras MD;  Location: [facility]     LITHOTRIPSY         ENVIRONMENTAL HISTORY: [place]. The home is heated with a forced air. They does have central air conditioning. The patient's  bedroom is furnished with fabric window coverings.  Pets inside the house include 2 dog(s). There is not history of cockroach or mice infestation. There is/are 0 smokers in the house.  The house does not have a damp basement.     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. She has missed 0 days of school/work due to symptoms. She lives with her spouse and two sons.  Her spouse works in maintenance.    REVIEW OF SYSTEMS:  General: negative for weight gain. negative for weight loss. negative for changes in sleep.   Eyes: positive  for itching. positive  for redness. positive  for tearing/watering. Also notes burning, swelling negative for vision changes  Ears: negative for fullness. negative for hearing loss. negative for dizziness.   Nose: negative for snoring.negative for changes in smell. positive  for drainage, sneezing.   Throat: negative for hoarseness. negative for sore throat. negative for trouble swallowing.   Lungs: negative for cough. negative for shortness of breath.negative for wheezing. negative for sputum production.   Cardiovascular: negative for chest pain. negative for swelling of ankles. negative for fast or irregular heartbeat.   Gastrointestinal: negative for nausea. negative for heartburn. negative for acid reflux.   Musculoskeletal: negative for joint pain. negative for joint stiffness. negative for joint swelling.   Neurologic: negative for seizures. negative for fainting. negative for weakness.   Psychiatric: negative for changes in mood. negative for anxiety.   Endocrine: negative for cold intolerance. negative for heat intolerance. negative for tremors.   Hematologic: negative for easy bruising. negative for easy bleeding.  Integumentary: negative for rash. negative for scaling. negative for nail changes.       Current Outpatient Medications:      fluconazole (DIFLUCAN) 150 MG tablet, Take 1 tablet (150 mg) by mouth once a week, Disp: 12 tablet, Rfl:  0     nystatin (MYCOSTATIN) 562743 UNIT/GM POWD, Apply to affected area under breasts TID as needed, Disp: 60 g, Rfl: 11     omeprazole (PRILOSEC) 20 MG CR capsule, TAKE 1 CAPSULE BY MOUTH EVERY DAY, Disp: 90 capsule, Rfl: 3     cetirizine (ZYRTEC) 10 MG tablet, Take 10 mg by mouth daily as needed , Disp: , Rfl:      fexofenadine (ALLEGRA) 180 MG tablet, Take 180 mg by mouth daily, Disp: , Rfl:      ketotifen (ZADITOR/REFRESH ANTI-ITCH) 0.025 % ophthalmic solution, Place 1 drop into both eyes 2 times daily (Patient not taking: Reported on 4/17/2019), Disp: 10 mL, Rfl: 1     valACYclovir (VALTREX) 500 MG tablet, Take 1 tablet by mouth daily, increase to 4 tabs by mouth twice daily at onset of outbreak (Patient not taking: Reported on 4/17/2019), Disp: 100 tablet, Rfl: 3  Immunization History   Administered Date(s) Administered     HepB 07/15/1991, 08/19/1991, 02/05/1992     Influenza (H1N1) 01/04/2010     Influenza (IIV3) PF 01/04/2012, 10/02/2012, 12/01/2013     Influenza Vaccine IM 3yrs+ 4 Valent IIV4 10/26/2017, 10/24/2018     TDAP Vaccine (Adacel) 06/20/2011     Varicella 10/08/2012, 11/20/2012     Allergies   Allergen Reactions     Ampicillin Swelling              EXAM:   /81 (BP Location: Right arm, Patient Position: Sitting, Cuff Size: Adult Large)   Pulse 85   SpO2 99%   GENERAL APPEARANCE: alert, cooperative and not in distress  SKIN: no rashes, no lesions  HEAD: atraumatic, normocephalic  EYES: lids and lashes normal, conjunctivae and sclerae clear, pupils equal, round, reactive to light, EOM full and intact  ENT: no scars or lesions, nasal exam showed no discharge, swelling or lesions noted, otoscopy showed external auditory canals clear, tympanic membranes normal, tongue midline and normal, soft palate, uvula, and tonsils normal  NECK: no asymmetry, masses, or scars, supple without significant adenopathy  LUNGS: unlabored respirations, no intercostal retractions or accessory muscle use, clear to  auscultation without rales or wheezes  HEART: regular rate and rhythm without murmurs and normal S1 and S2  MUSCULOSKELETAL: no musculoskeletal defects are noted  NEURO: no focal deficits noted  PSYCH: does not appear depressed or anxious    WORKUP: Skin testing    ENVIRONMENTAL PERCUTANEOUS SKIN TESTING: ADULT  [facility] 4/17/2019   Ordering Physician Dr. Lozano    Interpreting Physician Dr. Lozano   Testing Technician Palma S   Location Back   Time start:  2:17 AM   Time End:  2:32 AM   Positive Control: Histatrol*ALK 1 mg/ml 7x22   Negative Control: 50% Glycerin 0   Cat Hair*ALK (10,000 BAU/ml) 0   AP Dog Hair/Dander (1:100 w/v) 0   Dust Mite p. 30,000 AU/ml 0   Dust Mite f. (30,000 AU/ml) 0   Félix (W/F in millimeters) 0   Cristian Grass (100,000 BAU/mL) 0   Red Cedar (W/F in millimeters) 0   Maple/[place] (W/F in millimeters) 0   Hackberry (W/F in millimeters) 0   [place] (W/F in millimeters) 0   [place] *ALK (W/F in millimeters) 0   American Elm (W/F in millimeters) 0   [place] (W/F in millimeters) 0   [place] (W/F in millimeters) 0   Birch Mix (W/F in millimeters) 0   [place] (W/F in millimeters) 0   Oak (W/F in millimeters) 0   [place] (W/F in millimeters) 0   [place] (W/F in millimeters) 0   White Tam (W/F in millimeters) 0   Careless (W/F in millimeters) 0   Nettle (W/F in millimeters) 0   English Plantain (W/F in millimeters) 0   Kochia (W/F in millimeters) 0   Lamb's Quarter (W/F in millimeters) 0   Marshelder (W/F in millimeters) 0   Ragweed Mix* ALK (W/F in millimeters) 0   Russian Thistle (W/F in millimeters) 0   Sagebrush/Mugwort (W/F in millimeters) 0   Sheep Sorrel (W/F in millimeters) 0   Feather Mix* ALK (W/F in millimeters) 0   Penicillium Mix (1:10 w/v) 0   Curvularia spicifera (1:10 w/v) 0   Epicoccum (1:10 w/v) 0   Aspergillus fumigatus (1:10 w/v): 0   Alternaria tenius (1:10 w/v) 0   H. Cladosporium (1:10 w/v) 0   Phoma herbarum (1:10 w/v) 0      Appropriate  response to positive and negative controls. All tests negative.    ASSESSMENT/PLAN:  Elaine Awad is a 48 year old female here for evaluation of allergies. Skin testing was negative for evidence of aeroallergen sensitization. Linda reports rhinoconjunctivitis symptoms that are worse in the spring and fall seasons. We discussed that the seasonal pattern of her symptoms as well as response to antihistamines may represent a phenomenon called local allergic rhinitis. However, given that allergic sensitization is not identifiable on testing immunotherapy would not be a consideration for long-term management of her symptoms.    1. Continue ketotifen eye drops, 1 drop in each eye up to three times daily as needed  2. Increase fexofenadine to 180mg twice daily  3. May begin nasal steroid such as fluticasone for worsening rhinoconjunctivitis symptoms - 2 sprays in each nostril daily  4. If ocular symptoms are not manageable with the above medications may add steroid eye drop to be used for 3 to 5 days  5. Follow-up in 1 year as needed      Mario Lozano MD  Allergy/Immunology  [place], [place]      Chart documentation done in part with Dragon Voice Recognition Software. Although reviewed after completion, some word and grammatical errors may remain.      Again, thank you for allowing me to participate in the care of your patient.        Sincerely,        Mario Lozano MD

## 2019-04-17 NOTE — PROGRESS NOTES
Dear Fernanda Rodriguez MD,    Thank you for referring your patient Elaine Awad to the Allergy/Immunology Clinic. Elaine Awad was seen in the Allergy Clinic at [facility]. The following are my recommendations regarding her Nonallergic Rhinitis    1. Continue ketotifen eye drops, 1 drop in each eye up to three times daily as needed  2. Increase fexofenadine to 180mg twice daily  3. May begin nasal steroid such as fluticasone for worsening rhinoconjunctivitis symptoms - 2 sprays in each nostril daily  4. If ocular symptoms are not manageable with the above medications may add steroid eye drop to be used for 3 to 5 days  5. Follow-up in 1 year as needed      Elaine Awad is a 48 year old White female being seen today at the request of Dr. Rodriguez in consultation for allergies. She states that she has had allergies for many years, seem to be getting worse over time. Her most bothersome symptoms are itchy, watery, and burning eyes. She also has had swelling of her eyes. Linda also reports symptoms of sneezing, itchy nose, and rhinorrhea. Her symptoms are worse in the spring and later summer/early fall. In addition to her rhinoconjunctivitis symptoms she also has itchy skin but this is a chronic problem. She has been using ketotifen eye drops as well as fexofenadine during the day and diphenhydramine at night. The eye drops have been helpful but she does not find the oral antihistamines to be particularly effective. Linda has not used any nasal medications.    Linda has not seen an allergist or had allergy testing in the past.      Past Medical History:   Diagnosis Date     Allergic rhinitis      Degenerative joint disease of knee, right      Eczema      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     GERD (gastroesophageal reflux disease)      Migraine      Morbid obesity (H)      Recurrent cold sores      Vulvar dermatitis 10/14/2010     Yeast infection of the vagina, recurrent       Family History    Problem Relation Age of Onset     Cancer Father 72        d. pancreatic, melanoma      Colon Polyps Father      Diabetes Mother      C.A.D. Mother      Breast Cancer Maternal Grandmother      Heart Disease Maternal Grandfather         CHF     Heart Disease Paternal Grandmother         CHF     Colon Polyps Paternal Grandmother      Respiratory Paternal Grandfather         TB     Obesity Brother      Glaucoma No family hx of      Macular Degeneration No family hx of      Past Surgical History:   Procedure Laterality Date     C TREAT ECTOPIC PREG,RMV TUBE/OVARY      LT     COLPOSCOPY,BX CERVIX/ENDOCERV CURR  7/1994     ESOPHAGOSCOPY, GASTROSCOPY, DUODENOSCOPY (EGD), COMBINED  1/2/2014    Procedure: COMBINED ESOPHAGOSCOPY, GASTROSCOPY, DUODENOSCOPY (EGD);;  Surgeon: Eden Stacy MD;  Location: [facility]     ESOPHAGOSCOPY, GASTROSCOPY, DUODENOSCOPY (EGD), COMBINED N/A 1/19/2017    Procedure: COMBINED ESOPHAGOSCOPY, GASTROSCOPY, DUODENOSCOPY (EGD);  Surgeon: Ciro Deras MD;  [facility]     LAPAROSCOPIC GASTRIC ADJUSTABLE BANDING  4/28/2014    Procedure: LAPAROSCOPIC GASTRIC ADJUSTABLE BANDING;  Surgeon: Ciro Deras MD;  [facility]     LAPAROSCOPIC HERNIORRHAPHY HIATAL  4/28/2014    Procedure: LAPAROSCOPIC HERNIORRHAPHY HIATAL;  Surgeon: Ciro Deras MD;  [facility]     LAPAROSCOPIC REMOVAL GASTRIC ADJUSTABLE BAND N/A 1/19/2017    Procedure: LAPAROSCOPIC REMOVAL GASTRIC ADJUSTABLE BAND;  Surgeon: Ciro Deras MD;  Location: [facility]     LITHOTRIPSY         ENVIRONMENTAL HISTORY: [place] The home is heated with a forced air. They does have central air conditioning. The patient's bedroom is furnished with fabric window coverings.  Pets inside the house include 2 dog(s). There is not history of cockroach or mice infestation. There is/are 0 smokers in the house.  The house does not have a damp basement.     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 She lives with her spouse and two sons.  Her spouse works in maintenance.    REVIEW OF SYSTEMS:  General: negative for weight gain. negative for weight loss. negative for changes in sleep.   Eyes: positive  for itching. positive  for redness. positive  for tearing/watering. Also notes burning, swelling negative for vision changes  Ears: negative for fullness. negative for hearing loss. negative for dizziness.   Nose: negative for snoring.negative for changes in smell. positive  for drainage, sneezing.   Throat: negative for hoarseness. negative for sore throat. negative for trouble swallowing.   Lungs: negative for cough. negative for shortness of breath.negative for wheezing. negative for sputum production.   Cardiovascular: negative for chest pain. negative for swelling of ankles. negative for fast or irregular heartbeat.   Gastrointestinal: negative for nausea. negative for heartburn. negative for acid reflux.   Musculoskeletal: negative for joint pain. negative for joint stiffness. negative for joint swelling.   Neurologic: negative for seizures. negative for fainting. negative for weakness.   Psychiatric: negative for changes in mood. negative for anxiety.   Endocrine: negative for cold intolerance. negative for heat intolerance. negative for tremors.   Hematologic: negative for easy bruising. negative for easy bleeding.  Integumentary: negative for rash. negative for scaling. negative for nail changes.       Current Outpatient Medications:      fluconazole (DIFLUCAN) 150 MG tablet, Take 1 tablet (150 mg) by mouth once a week, Disp: 12 tablet, Rfl: 0     nystatin (MYCOSTATIN) 929196 UNIT/GM POWD, Apply to affected area under breasts TID as needed, Disp: 60 g, Rfl: 11     omeprazole (PRILOSEC) 20 MG CR capsule, TAKE 1 CAPSULE BY MOUTH EVERY DAY, Disp: 90 capsule, Rfl: 3     cetirizine (ZYRTEC)  10 MG tablet, Take 10 mg by mouth daily as needed , Disp: , Rfl:      fexofenadine (ALLEGRA) 180 MG tablet, Take 180 mg by mouth daily, Disp: , Rfl:      ketotifen (ZADITOR/REFRESH ANTI-ITCH) 0.025 % ophthalmic solution, Place 1 drop into both eyes 2 times daily (Patient not taking: Reported on 4/17/2019), Disp: 10 mL, Rfl: 1     valACYclovir (VALTREX) 500 MG tablet, Take 1 tablet by mouth daily, increase to 4 tabs by mouth twice daily at onset of outbreak (Patient not taking: Reported on 4/17/2019), Disp: 100 tablet, Rfl: 3  Immunization History   Administered Date(s) Administered     HepB 07/15/1991, 08/19/1991, 02/05/1992     Influenza (H1N1) 01/04/2010     Influenza (IIV3) PF 01/04/2012, 10/02/2012, 12/01/2013     Influenza Vaccine IM 3yrs+ 4 Valent IIV4 10/26/2017, 10/24/2018     TDAP Vaccine (Adacel) 06/20/2011     Varicella 10/08/2012, 11/20/2012     Allergies   Allergen Reactions     Ampicillin Swelling              EXAM:   /81 (BP Location: Right arm, Patient Position: Sitting, Cuff Size: Adult Large)   Pulse 85   SpO2 99%   GENERAL APPEARANCE: alert, cooperative and not in distress  SKIN: no rashes, no lesions  HEAD: atraumatic, normocephalic  EYES: lids and lashes normal, conjunctivae and sclerae clear, pupils equal, round, reactive to light, EOM full and intact  ENT: no scars or lesions, nasal exam showed no discharge, swelling or lesions noted, otoscopy showed external auditory canals clear, tympanic membranes normal, tongue midline and normal, soft palate, uvula, and tonsils normal  NECK: no asymmetry, masses, or scars, supple without significant adenopathy  LUNGS: unlabored respirations, no intercostal retractions or accessory muscle use, clear to auscultation without rales or wheezes  HEART: regular rate and rhythm without murmurs and normal S1 and S2  MUSCULOSKELETAL: no musculoskeletal defects are noted  NEURO: no focal deficits noted  PSYCH: does not appear depressed or anxious    WORKUP:  Skin testing    ENVIRONMENTAL PERCUTANEOUS SKIN TESTING: ADULT  [facility] 4/17/2019   Ordering Physician Dr. Lozano    Interpreting Physician Dr. Lozano   Testing Technician Palma S   Location Back   Time start:  2:17 AM   Time End:  2:32 AM   Positive Control: Histatrol*ALK 1 mg/ml 7x22   Negative Control: 50% Glycerin 0   Cat Hair*ALK (10,000 BAU/ml) 0   AP Dog Hair/Dander (1:100 w/v) 0   Dust Mite p. 30,000 AU/ml 0   Dust Mite f. (30,000 AU/ml) 0   Félix (W/F in millimeters) 0   Cristian Grass (100,000 BAU/mL) 0   Red Cedar (W/F in millimeters) 0   Maple/[place] (W/F in millimeters) 0   Hackberry (W/F in millimeters) 0   [place] (W/F in millimeters) 0   [place] *ALK (W/F in millimeters) 0   American Elm (W/F in millimeters) 0   [place] (W/F in millimeters) 0   Black [place] (W/F in millimeters) 0   Birch Mix (W/F in millimeters) 0   [place] (W/F in millimeters) 0   Oak (W/F in millimeters) 0   [place] (W/F in millimeters) 0   [place] (W/F in millimeters) 0   White Tam (W/F in millimeters) 0   Careless (W/F in millimeters) 0   Nettle (W/F in millimeters) 0   English Plantain (W/F in millimeters) 0   Kochia (W/F in millimeters) 0   Lamb's Quarter (W/F in millimeters) 0   Marshelder (W/F in millimeters) 0   Ragweed Mix* ALK (W/F in millimeters) 0   Russian Thistle (W/F in millimeters) 0   Sagebrush/Mugwort (W/F in millimeters) 0   Sheep Sorrel (W/F in millimeters) 0   Feather Mix* ALK (W/F in millimeters) 0   Penicillium Mix (1:10 w/v) 0   Curvularia spicifera (1:10 w/v) 0   Epicoccum (1:10 w/v) 0   Aspergillus fumigatus (1:10 w/v): 0   Alternaria tenius (1:10 w/v) 0   H. Cladosporium (1:10 w/v) 0   Phoma herbarum (1:10 w/v) 0      Appropriate response to positive and negative controls. All tests negative.    ASSESSMENT/PLAN:  Elaine Awad is a 48 year old female here for evaluation of allergies. Skin testing was negative for evidence of aeroallergen sensitization. Linda dee  rhinoconjunctivitis symptoms that are worse in the spring and fall seasons. We discussed that the seasonal pattern of her symptoms as well as response to antihistamines may represent a phenomenon called local allergic rhinitis. However, given that allergic sensitization is not identifiable on testing immunotherapy would not be a consideration for long-term management of her symptoms.    1. Continue ketotifen eye drops, 1 drop in each eye up to three times daily as needed  2. Increase fexofenadine to 180mg twice daily  3. May begin nasal steroid such as fluticasone for worsening rhinoconjunctivitis symptoms - 2 sprays in each nostril daily  4. If ocular symptoms are not manageable with the above medications may add steroid eye drop to be used for 3 to 5 days  5. Follow-up in 1 year as needed      Mario Lozano MD  Allergy/Immunology  [facility] and [place], [place]      Chart documentation done in part with Dragon Voice Recognition Software. Although reviewed after completion, some word and grammatical errors may remain.

## 2019-04-17 NOTE — PATIENT INSTRUCTIONS
If you have any questions regarding your allergies, asthma, or what we discussed during your visit today please call the allergy clinic or contact us via "[employer] The Simple, Fast and Elegant Tablet Sales App".    [facility] RN Line: [phone]  [facility]: [phone]  [facility] Scheduling Line: [phone]      Continue zaditor (ketotifen) eye drops - 1 drop in each eye up to 3 times daily    Increase the allegra (fexofenadine) up to twice daily    If your symptoms are particularly bothersome we can try a steroid eye drop for up to 5 days - call or send a "[employer] - The Simple, Fast and Elegant Tablet Sales App" message    ENVIRONMENTAL PERCUTANEOUS SKIN TESTING: ADULT  [facility] 4/17/2019   Ordering Physician Dr. Lozano    Interpreting Physician Dr. Lozano   Testing Technician [place]   Location Back   Time start:  2:17 AM   Time End:  2:32 AM   Positive Control: Histatrol*ALK 1 mg/ml 7x22   Negative Control: 50% Glycerin 0   Cat Hair*ALK (10,000 BAU/ml) 0   AP Dog Hair/Dander (1:100 w/v) 0   Dust Mite p. 30,000 AU/ml 0   Dust Mite f. (30,000 AU/ml) 0   Félix (W/F in millimeters) 0   Cristian Grass (100,000 BAU/mL) 0   Red Cedar (W/F in millimeters) 0   Maple/[place] (W/F in millimeters) 0   Hackberry (W/F in millimeters) 0   Sparta (W/F in millimeters) 0   Green *ALK (W/F in millimeters) 0   American Elm (W/F in millimeters) 0   Benson (W/F in millimeters) 0   Black Rifton (W/F in millimeters) 0   Birch Mix (W/F in millimeters) 0   Whitesville (W/F in millimeters) 0   Oak (W/F in millimeters) 0   Cocklebur (W/F in millimeters) 0   Fritch (W/F in millimeters) 0   White Tam (W/F in millimeters) 0   Careless (W/F in millimeters) 0   Nettle (W/F in millimeters) 0   English Plantain (W/F in millimeters) 0   Kochia (W/F in millimeters) 0   Lamb's Quarter (W/F in millimeters) 0   Marshelder (W/F in millimeters) 0   Ragweed Mix* ALK (W/F in millimeters) 0   Russian Thistle (W/F in millimeters) 0   Sagebrush/Mugwort (W/F in  millimeters) 0   Sheep Sorrel (W/F in millimeters) 0   Feather Mix* ALK (W/F in millimeters) 0   Penicillium Mix (1:10 w/v) 0   Curvularia spicifera (1:10 w/v) 0   Epicoccum (1:10 w/v) 0   Aspergillus fumigatus (1:10 w/v): 0   Alternaria tenius (1:10 w/v) 0   H. Cladosporium (1:10 w/v) 0   Phoma herbarum (1:10 w/v) 0

## 2019-05-06 DIAGNOSIS — G43.909 MIGRAINE WITHOUT STATUS MIGRAINOSUS, NOT INTRACTABLE, UNSPECIFIED MIGRAINE TYPE: ICD-10-CM

## 2019-05-07 DIAGNOSIS — B37.31 YEAST INFECTION OF THE VAGINA: ICD-10-CM

## 2019-05-07 NOTE — TELEPHONE ENCOUNTER
Pending Prescriptions:                       Disp   Refills    fluconazole (DIFLUCAN) 150 MG tablet      12 tab*0            Sig: Take 1 tablet (150 mg) by mouth once a week    Most recently saw FP for AE. Will send to them to refill if appropriate, otherwise patient will need to be seen by OBGYN as she has not been seen since 2017.  Elvira Brown

## 2019-05-07 NOTE — TELEPHONE ENCOUNTER
SUMAtriptan (IMITREX) 50 MG tablet  Last Written Prescription Date:  01/22/2018  Last Fill Quantity: 9,   # refills: 1  Last Office Visit: 10/24/2018  Future Office visit:       Routing refill request to provider for review/approval because:  Drug not active on patient's medication list

## 2019-05-08 RX ORDER — SUMATRIPTAN 50 MG/1
TABLET, FILM COATED ORAL
Qty: 9 TABLET | Refills: 1 | Status: SHIPPED | OUTPATIENT
Start: 2019-05-08 | End: 2020-10-08

## 2019-05-08 NOTE — TELEPHONE ENCOUNTER
Called and verified with patient if she is taking SUMAtriptan. Patient reported that she takes it for migraines as needed.   MICHELLE Salter

## 2019-05-08 NOTE — TELEPHONE ENCOUNTER
"Routing refill request to provider for review/approval because:  Previously prescribed by [facility]yn    Requested Prescriptions   Pending Prescriptions Disp Refills     fluconazole (DIFLUCAN) 150 MG tablet 12 tablet 0     Sig: Take 1 tablet (150 mg) by mouth once a week       Antifungal Agents Failed - 5/7/2019  1:44 PM        Failed - Recent (12 mo) or future (30 days) visit within the authorizing provider's specialty     Patient had office visit in the last 12 months or has a visit in the next 30 days with authorizing provider or within the authorizing provider's specialty.  See \"Patient Info\" tab in inbasket, or \"Choose Columns\" in Meds & Orders section of the refill encounter.              Failed - Not Fluconazole or Terconazole      If oral Fluconazole or Terconazole, may refill if indicated in progress notes.           Passed - Medication is active on med list        Hawa Tabor RN  "

## 2019-05-09 RX ORDER — FLUCONAZOLE 150 MG/1
150 TABLET ORAL WEEKLY
Start: 2019-05-09

## 2019-05-09 NOTE — TELEPHONE ENCOUNTER
Refused Prescriptions:                       Disp   Refills    fluconazole (DIFLUCAN) 150 MG tablet                       Sig: Take 1 tablet (150 mg) by mouth once a week  Refused By: ANABELA ESQUIVEL  Reason for Refusal: Appt required, please call patient

## 2019-06-06 ENCOUNTER — TELEPHONE (OUTPATIENT)
Dept: SURGERY | Facility: CLINIC | Age: 49
End: 2019-06-06

## 2019-06-06 NOTE — TELEPHONE ENCOUNTER
Spoke to patient over the phone to check in on her regarding s/p LAGB and how she was doing since she has not followed-up in clinic for awhile.  Patient notes that she had her Band removed in Jan 2017 since it was giving her problems.  Patient notes that she did trial Medical Weight Management in 2018, however did not feel successful with it at that time.  Patient however is willing to trial Medical Weight Management again, however is requesting a different provider to see if she would have more success.  Provided scheduling number for [facility] Medical Weight Management program.  Patient seemed accepting and had no further questions at this time.

## 2019-07-01 ENCOUNTER — MYC MEDICAL ADVICE (OUTPATIENT)
Dept: PEDIATRICS | Facility: CLINIC | Age: 49
End: 2019-07-01

## 2019-07-01 DIAGNOSIS — E66.01 MORBID OBESITY (H): Primary | ICD-10-CM

## 2019-07-26 ENCOUNTER — TELEPHONE (OUTPATIENT)
Dept: ENDOCRINOLOGY | Facility: CLINIC | Age: 49
End: 2019-07-26

## 2019-07-26 NOTE — TELEPHONE ENCOUNTER
"Patient scheduled for 24 week healthy lifestyle plan.    Name: Elaine Awad      Ht: 5' 3.75\"    Wt: 259    BMI: 44.8    Co-morbidities: Yes     Did patient receive 24 week welcome letter: unsure sent one to her my chart(if patient did not receive will send via My Chart)    Scheduled to see CNP or ROLANDO for New 24 week provider visit on Monday 7/29/19 at 11 am    [place] or [facility] employee: Unsure    -$499 on day of consult, can use HSA card OR $99 for employees     -Meal replacements additional cost CANNOT use HSA $    -Check with insurance regarding coverage for medical weight management provider visit, dietitian and labs.    Left message regarding her appointment for Monday 7/29/19.    Please view our website @ [email or web address]    Instructed to complete pre-visit questionnaires on My Chart.    [phone] Contact Center phone number      Ellen Yañez          "

## 2019-07-29 ENCOUNTER — OFFICE VISIT (OUTPATIENT)
Dept: ENDOCRINOLOGY | Facility: CLINIC | Age: 49
End: 2019-07-29
Payer: COMMERCIAL

## 2019-07-29 VITALS
DIASTOLIC BLOOD PRESSURE: 79 MMHG | HEIGHT: 64 IN | WEIGHT: 256.5 LBS | OXYGEN SATURATION: 100 % | SYSTOLIC BLOOD PRESSURE: 145 MMHG | HEART RATE: 96 BPM | BODY MASS INDEX: 43.79 KG/M2

## 2019-07-29 DIAGNOSIS — E66.01 MORBID OBESITY (H): Primary | ICD-10-CM

## 2019-07-29 DIAGNOSIS — E66.01 MORBID OBESITY (H): ICD-10-CM

## 2019-07-29 LAB
ALBUMIN SERPL-MCNC: 3.8 G/DL (ref 3.4–5)
ALP SERPL-CCNC: 90 U/L (ref 40–150)
ALT SERPL W P-5'-P-CCNC: 21 U/L (ref 0–50)
ANION GAP SERPL CALCULATED.3IONS-SCNC: 3 MMOL/L (ref 3–14)
AST SERPL W P-5'-P-CCNC: 14 U/L (ref 0–45)
BILIRUB SERPL-MCNC: 0.5 MG/DL (ref 0.2–1.3)
BUN SERPL-MCNC: 11 MG/DL (ref 7–30)
CALCIUM SERPL-MCNC: 9.2 MG/DL (ref 8.5–10.1)
CHLORIDE SERPL-SCNC: 106 MMOL/L (ref 94–109)
CHOLEST SERPL-MCNC: 139 MG/DL
CO2 SERPL-SCNC: 27 MMOL/L (ref 20–32)
CREAT SERPL-MCNC: 0.76 MG/DL (ref 0.52–1.04)
ERYTHROCYTE [DISTWIDTH] IN BLOOD BY AUTOMATED COUNT: 19 % (ref 10–15)
GFR SERPL CREATININE-BSD FRML MDRD: >90 ML/MIN/{1.73_M2}
GLUCOSE SERPL-MCNC: 96 MG/DL (ref 70–99)
HBA1C MFR BLD: 4.9 % (ref 0–5.6)
HCT VFR BLD AUTO: 30.3 % (ref 35–47)
HDLC SERPL-MCNC: 52 MG/DL
HGB BLD-MCNC: 7.9 G/DL (ref 11.7–15.7)
LDLC SERPL CALC-MCNC: 71 MG/DL
MCH RBC QN AUTO: 17.3 PG (ref 26.5–33)
MCHC RBC AUTO-ENTMCNC: 26.1 G/DL (ref 31.5–36.5)
MCV RBC AUTO: 66 FL (ref 78–100)
NONHDLC SERPL-MCNC: 88 MG/DL
PLATELET # BLD AUTO: 327 10E9/L (ref 150–450)
POTASSIUM SERPL-SCNC: 4.2 MMOL/L (ref 3.4–5.3)
PROT SERPL-MCNC: 7.4 G/DL (ref 6.8–8.8)
PTH-INTACT SERPL-MCNC: 90 PG/ML (ref 18–80)
RBC # BLD AUTO: 4.56 10E12/L (ref 3.8–5.2)
SODIUM SERPL-SCNC: 136 MMOL/L (ref 133–144)
TRIGL SERPL-MCNC: 84 MG/DL
TSH SERPL DL<=0.005 MIU/L-ACNC: 0.78 MU/L (ref 0.4–4)
WBC # BLD AUTO: 5.9 10E9/L (ref 4–11)

## 2019-07-29 ASSESSMENT — MIFFLIN-ST. JEOR: SCORE: 1774.35

## 2019-07-29 NOTE — LETTER
"2019       RE: Elaine Awad  [id number] [address] [address]     Dear Colleague,    Thank you for referring your patient, Elaine Awad, to the [facility] MEDICAL WEIGHT MANAGEMENT at [facility]. Please see a copy of my visit note below.        New 24 week plan/ New Medical Weight Management Consult    PATIENT:  Elaine Awad  MRN:         [id number]  :         1970  KEAGAN:         2019    Dear Fernanda Rodriguez,    I had the pleasure of seeing your patient, Elaine Awad.  Full intake/assessment done to determine barriers to weight loss success and develop a treatment plan.  Elaine Awad is a 48 year old female interested in treatment of medical problems associated with weight.  Her weight today is 256 lbs 8 oz, Body mass index is 44.39 kg/m ., and she has the following co-morbidities:       2019   I have the following co-morbidities associated with obesity: GERD (Reflux), Weight Bearing Joint Pain       Lap band 2014 Dr Deras  Removed in 2017 due to left sided abdominal pain which resolved after band removal    Has seen Dr Bedoya and prescribed Qsymia but never picked it up    Called by our EMILY Mcneal to follow up in clinic.    Patient Goals Reviewed With Patient 2019   I am interested in attaining a healthier weight to diminish current health problems related to co-morbid conditions: Yes   I am interested in attaining a healthier weight in order to prevent future health problems: Yes       Referring Provider 2019   Please name the provider who referred you to Medical Weight Management.  If you do not know, please answer: \"I Don't Know\". Dr. robertson       Wt Readings from Last 4 Encounters:   19 116.3 kg (256 lb 8 oz)   10/24/18 117.5 kg (259 lb)   17 116.6 kg (257 lb)   17 118 kg (260 lb 3.2 oz)       Weight History Reviewed With Patient 2019   How concerned are you about your weight? Very Concerned "   Would you describe your weight gain as gradual? Yes   I became overweight: After Pregnancy   The following factors have contributed to my weight gain:  After Quitting Smoking, Lack of Exercise, Genetic (Runs in the Family)   I have tried the following methods to lose weight: Exercise, Weight Watchers, Weight Loss Surgery   The most weight I have ever lost was: (lbs) -   My lowest weight since age 18 was: -   My highest weight since age 18 was: -   I have the following family history of obesity/being overweight:  Many of my relatives are overweight   Has anyone in your family had weight loss surgery? No       Diet Recall Reviewed With Patient 7/26/2019   How many glasses of juice do you drink in a typical day? 1   How many of glasses of milk do you drink in a typical day? 0   How many 8oz glasses of sugar containing drinks such as Polo-Aid/sweet tea do you drink in a day? 0   How many cans/bottles of sugar pop/soda/tea/sports drinks do you drink in a day? 1   How many cans/bottles of diet pop/soda/tea or sports drink do you drink in a day? 0   How often do you have a drink of alcohol? Monthly or Less   If you do drink, how many drinks might you have in a day? 1 or 2       Eating Habits Reviewed With Patient 7/26/2019   Generally, my meals include foods like these: bread, pasta, rice, potatoes, corn, crackers, sweet dessert, pop, or juice. A Few Times a Week   Generally, my meals include foods like these: fried meats, brats, burgers, french fries, pizza, cheese, chips, or ice cream. A Few Times a Week   Eat fast food (like [employer] [place], [employer], Taco Bell). A Few Times a Week   Eat at a buffet or sit-down restaurant. Never   Eat most of my meals in front of the TV or computer. Never   Often skip meals, eat at random times, have no regular eating times. Almost Everyday   Rarely sit down for a meal but snack or graze throughout.  A Few Times a Week   Eat extra snacks between meals. A Few Times a Week   Eat most of my  food at the end of the day. A Few Times a Week   Eat in the middle of the night or wake up at night to eat. Never   Eat extra snacks to prevent or correct low blood sugar. Never   Eat to prevent acid reflux or stomach pain. Never   Worry about not having enough food to eat. Never   Have you been to the food shelf at least a few times this year? No   I eat when I am depressed, stressed, anxious, or bored. Never   I eat when I am happy or as a reward. Never   I feel hungry all the time even if I just have eaten. Never   Feeling full is important to me. Never   Once I start eating, it is hard to stop. Never   I finish all the food on my plate even if I am already full. A Few Times a Week   I can't resist eating delicious food or walk past the good food/smell. A Few Times a Week   I eat/snack without noticing that I am eating. Never   I eat when I am preparing the meal. A Few Times a Week   I eat more than usual when I see others eating. Never   I have trouble not eating sweets, ice cream, cookies, or chips if they are around the house. Almost Everyday   I think about food all day. Never   I feel out of control when eating. Never   I eat a large amount of food, like a loaf of bread, a box of cookies, a pint/quart of ice cream, all at once. Never   I eat a large amount of food even when I am not hungry. Never   I eat rapidly. Never   I eat alone because I feel embarrassed and do not want others to see how much I have eaten. Never   I eat until I am uncomfortably full. Never   I feel bad, disgusted, or guilty after I overeat. Never   I make myself vomit what I have eaten or use laxatives to get rid of food. Never       Activity/Exercise History Reviewed With Patient 7/26/2019   How much of a typical 12 hour day do you spend sitting? Half the Day   How much of a typical 12 hour day do you spend lying down? Less Than Half the Day   How much of a typical day do you spend walking/standing? Less Than Half the Day   How many  hours (not including work) do you spend on the TV/Video Games/Computer/Tablet/Phone? 2-3 Hours   How many times a week are you active for the purpose of exercise? 2-3 Times a Week   How many total minutes do you spend doing some activity for the purpose of exercising when you exercise? 15-30 Minutes   What keeps you from being more active? 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 75%   What is your marital status? /In a Relationship   If in a relationship, is your significant other overweight? No   Do you have children? Yes   If you have children, are they overweight? Yes       Mental Health History Reviewed With Patient 7/26/2019   Have you ever been physically or sexually abused? No   How often in the past 2 weeks have you felt little interest or pleasure in doing things? Not at all   Over the past 2 weeks how often have you felt down, depressed, or hopeless? Not at all       Sleep History Reviewed With Patient 7/26/2019   How many hours do you sleep at night? 7   Do you think that you snore loudly or has anybody ever heard you snore loudly (louder than talking or so loud it can be heard behind a shut door)? No   Has anyone seen or heard you stop breathing during your sleep? No   Do you often feel tired, fatigued, or sleepy during the day? Yes       MEDICATIONS:   Current Outpatient Medications   Medication Sig Dispense Refill     cetirizine (ZYRTEC)  "10 MG tablet Take 10 mg by mouth daily as needed        fluconazole (DIFLUCAN) 150 MG tablet Take 1 tablet (150 mg) by mouth once a week 12 tablet 0     nystatin (MYCOSTATIN) 713542 UNIT/GM POWD Apply to affected area under breasts TID as needed 60 g 11     omeprazole (PRILOSEC) 20 MG CR capsule TAKE 1 CAPSULE BY MOUTH EVERY DAY 90 capsule 3     Phentermine-Topiramate 7.5-46 MG CP24 Take 1 capsule by mouth daily 30 capsule 3     SUMAtriptan (IMITREX) 50 MG tablet TAKE 2 TABLETS BY MOUTH AT ONSET OF MIGRAINE, MAY REPEAT IN 2 HOURS 9 tablet 1     fexofenadine (ALLEGRA) 180 MG tablet Take 180 mg by mouth daily       ketotifen (ZADITOR/REFRESH ANTI-ITCH) 0.025 % ophthalmic solution Place 1 drop into both eyes 2 times daily (Patient not taking: Reported on 4/17/2019) 10 mL 1     valACYclovir (VALTREX) 500 MG tablet Take 1 tablet by mouth daily, increase to 4 tabs by mouth twice daily at onset of outbreak (Patient not taking: Reported on 4/17/2019) 100 tablet 3       ALLERGIES:   Allergies   Allergen Reactions     Ampicillin Swelling              PHYSICAL EXAM:  /79 (BP Location: Left arm, Patient Position: Sitting, Cuff Size: Adult Large)   Pulse 96   Ht 1.619 m (5' 3.74\")   Wt 116.3 kg (256 lb 8 oz)   SpO2 100%   BMI 44.39 kg/m      A & O x 3  HEENT: NCAT, mucous membranes moist  Respirations unlabored  Location of obesity: Mixed Obesity    ASSESSMENT:  Elaine is a patient with post-bariatric surgery weight gain with significant element of familial/genetic influence and with current health consequences. She does need aggressive weight loss plan due to BMI 44.  Elaine Awad eats a high carb diet, eats fast food once or more per week, uses food as a reward, uses food as mood management, has perception of intense hunger and has a disorganized meal pattern.    Her problem is complicated by a hunger disorder and strong craving/reward pathways    Hx of lap band and then band removal by Dr Deras due to left " sided pain.    PLAN:    Start 24 week plan  Start date 7/29/19  Grad date 1/13/20    Instructions per today's visit:   Medications started today: Qsymia  9401-5873 meal replacement plan  Labs today or when fasting    Appointments to be scheduled today at the :  Week 2: Dietitian and Health  (already scheduled) August 15th  Week 3: Health   Week 4: Health   Week 5: Health   Week 6: Dietitian  Week 14: Layla Cyr PA-C (provider visit)      Orders Placed This Encounter   Procedures     MEAL REPLACEMENT PLAN COMPREHENSIVE MEDICAL WEIGHT MANAGEMENT PROGRAM     CBC with platelets     Comprehensive metabolic panel     Hemoglobin A1c     Lipid panel reflex to direct LDL Fasting     Vitamin D Deficiency     TSH with free T4 reflex     Parathyroid Hormone Intact     FOLLOW UP 24 WEEK HEALTHY LIFESTYLE PLAN       TIME: 30 min spent on evaluation, management, counseling, education, & motivational interviewing with greater than 50 % of the total time was spent on counseling and coordinating care    Sincerely,    Layla Cyr PA-C

## 2019-07-29 NOTE — PATIENT INSTRUCTIONS
Welcome to our 24 Week Healthy Lifestyle Plan!     We are excited to join you on your weight loss journey    Thank you for allowing us the privilege of caring for you. We hope we provided you with the excellent service you deserve.    Please let us know if there is anything else we can do so that we can be sure you are leaving completely satisfied with your care experience.    You saw Layla Cyr PA-C today.     [place] or [facility] employee: No    Instructions per today's visit:   Medications started today: Qsymia  1343-1356 meal replacement plan  Labs today or when fasting    Appointments to be scheduled today at the :  Week 2: Dietitian and Health  (already scheduled) August 15th  Week 3: Health   Week 4: Health   Week 5: Health   Week 6: Dietitian    Week 14: Layla Cyr PA-C     Given support group handout today.    Cooking class and exercise dates will be given at future appointments with health  or dietitian.    [employer] Website:  [email or web address]    Meal Replacement Refills: Contact pharmacy phone number: [phone] or [facility] your care team if you need another refill     To reach MAMIE Beltran for any questions/concerns call [phone]    To schedule appointments with our team, please call [phone]     Please call during clinic hours Monday through Friday 8:00a - 4:00p if you have questions or you can contact us via [employer] at anytime.      Lab results will be communicated through My Chart or letter (if My Chart not used). Please call the clinic if you have not received communication after 1 week or if you have any questions.?    Our FAX number is [phone]          Thank you,    Medical Weight Management Team    MEDICATION STARTED AT THIS APPOINTMENT    We are starting Qsymia. This is a specific obesity medication and is a combination of Phentermine and Topiramate, formulated as a sustained release, taken one time a day. There are a few different  doses of the medication. Doses come in 3.75/23 mg (usually skipped), 7.5/46 mg, 11.25/69 mg, and 15/92 mg. Call the nurse at [phone] if you have any questions or concerns. (Do not stop taking it if you don't think it's working. For some people it works even though they do not feel much different.)    For some of our patients, the pills work right away. They feel and think quite differently about food. Other patients don't feel much of a change but find in fact they have lost weight! Like all weight loss medications, Qsymia works best when you help it work.  This means:    1) Have less tempting high calorie (fattening) food around the house or office    2) Have lower calorie food (fruits, vegetables,low fat meats and dairy) for snacks    3) Eat out only one time or less each week.   4) Eat your meals at a table with the TV or computer off.    Side-effects (generally well tolerated because it is a sustained release medication)    Topiramate:   -Tingling in hands,feet, or face (usually not very troublesome)   -Mental confusion and word finding trouble (about 10% of patients have this.)     -Feeling sleepy or a bit dopey- this goes away very soon after starting.      Phentermine:    -Feelings of racing pulse or rapid heart beat.    -Increased anxiety.   -Some people can get an elevated blood pressure. Because of this we may have  you  come back within a week or so of starting the medication for a blood pressure check.    One of the dangers of topiramate is the possibility of birth defects--if you get pregnant when you are on it, there is the risk that your baby will be born with a cleft lip or palate.  If you are on topiramate and of child bearing age, you need to be on a reliable form of birth control or refrain from sexual intercourse.     It is very rare for insurance to pay for this and it typically costs around $200 per month. Please check out the website www.qsymia.com and sign up for the Pharmacy savings  program to save $75 a month for 12 months if eligible. The medication can also be paid for out of pocket. It may require a prior authorization which could take up to 1-2 weeks.      In order to get refills of this or any medication we prescribe you must be seen in the medical weight mgmt clinic every 2-4 months. Please have your pharmacy fax a refill request to [phone].

## 2019-07-29 NOTE — PROGRESS NOTES
"    New 24 week plan/ New Medical Weight Management Consult    PATIENT:  Elaine Awad  MRN:         [id number]  :         1970  [place]:         2019    Dear Fernanda Rodriguez,    I had the pleasure of seeing your patient, Elaine Awad.  Full intake/assessment done to determine barriers to weight loss success and develop a treatment plan.  Elaine Awad is a 48 year old female interested in treatment of medical problems associated with weight.  Her weight today is 256 lbs 8 oz, Body mass index is 44.39 kg/m ., and she has the following co-morbidities:       2019   I have the following co-morbidities associated with obesity: GERD (Reflux), Weight Bearing Joint Pain       Lap band 2014 Dr Deras  Removed in 2017 due to left sided abdominal pain which resolved after band removal    Has seen Dr Bedoya and prescribed Qsymia but never picked it up    Called by our EMILY Mcneal to follow up in clinic.    Patient Goals Reviewed With Patient 2019   I am interested in attaining a healthier weight to diminish current health problems related to co-morbid conditions: Yes   I am interested in attaining a healthier weight in order to prevent future health problems: Yes       Referring Provider 2019   Please name the provider who referred you to Medical Weight Management.  If you do not know, please answer: \"I Don't Know\". Dr. robertson       Wt Readings from Last 4 Encounters:   19 116.3 kg (256 lb 8 oz)   10/24/18 117.5 kg (259 lb)   17 116.6 kg (257 lb)   17 118 kg (260 lb 3.2 oz)       Weight History Reviewed With Patient 2019   How concerned are you about your weight? Very Concerned   Would you describe your weight gain as gradual? Yes   I became overweight: After Pregnancy   The following factors have contributed to my weight gain:  After Quitting Smoking, Lack of Exercise, Genetic (Runs in the Family)   I have tried the following methods to lose weight: Exercise, " Weight Watchers, Weight Loss Surgery   The most weight I have ever lost was: (lbs) -   My lowest weight since age 18 was: -   My highest weight since age 18 was: -   I have the following family history of obesity/being overweight:  Many of my relatives are overweight   Has anyone in your family had weight loss surgery? No       Diet Recall Reviewed With Patient 7/26/2019   How many glasses of juice do you drink in a typical day? 1   How many of glasses of milk do you drink in a typical day? 0   How many 8oz glasses of sugar containing drinks such as Polo-Aid/sweet tea do you drink in a day? 0   How many cans/bottles of sugar pop/soda/tea/sports drinks do you drink in a day? 1   How many cans/bottles of diet pop/soda/tea or sports drink do you drink in a day? 0   How often do you have a drink of alcohol? Monthly or Less   If you do drink, how many drinks might you have in a day? 1 or 2       Eating Habits Reviewed With Patient 7/26/2019   Generally, my meals include foods like these: bread, pasta, rice, potatoes, corn, crackers, sweet dessert, pop, or juice. A Few Times a Week   Generally, my meals include foods like these: fried meats, brats, burgers, french fries, pizza, cheese, chips, or ice cream. A Few Times a Week   Eat fast food (like McDonalds, Burger Joe, Taco Bell). A Few Times a Week   Eat at a buffet or sit-down restaurant. Never   Eat most of my meals in front of the TV or computer. Never   Often skip meals, eat at random times, have no regular eating times. Almost Everyday   Rarely sit down for a meal but snack or graze throughout.  A Few Times a Week   Eat extra snacks between meals. A Few Times a Week   Eat most of my food at the end of the day. A Few Times a Week   Eat in the middle of the night or wake up at night to eat. Never   Eat extra snacks to prevent or correct low blood sugar. Never   Eat to prevent acid reflux or stomach pain. Never   Worry about not having enough food to eat. Never   Have  you been to the food shelf at least a few times this year? No   I eat when I am depressed, stressed, anxious, or bored. Never   I eat when I am happy or as a reward. Never   I feel hungry all the time even if I just have eaten. Never   Feeling full is important to me. Never   Once I start eating, it is hard to stop. Never   I finish all the food on my plate even if I am already full. A Few Times a Week   I can't resist eating delicious food or walk past the good food/smell. A Few Times a Week   I eat/snack without noticing that I am eating. Never   I eat when I am preparing the meal. A Few Times a Week   I eat more than usual when I see others eating. Never   I have trouble not eating sweets, ice cream, cookies, or chips if they are around the house. Almost Everyday   I think about food all day. Never   I feel out of control when eating. Never   I eat a large amount of food, like a loaf of bread, a box of cookies, a pint/quart of ice cream, all at once. Never   I eat a large amount of food even when I am not hungry. Never   I eat rapidly. Never   I eat alone because I feel embarrassed and do not want others to see how much I have eaten. Never   I eat until I am uncomfortably full. Never   I feel bad, disgusted, or guilty after I overeat. Never   I make myself vomit what I have eaten or use laxatives to get rid of food. Never       Activity/Exercise History Reviewed With Patient 7/26/2019   How much of a typical 12 hour day do you spend sitting? Half the Day   How much of a typical 12 hour day do you spend lying down? Less Than Half the Day   How much of a typical day do you spend walking/standing? Less Than Half the Day   How many hours (not including work) do you spend on the TV/Video Games/Computer/Tablet/Phone? 2-3 Hours   How many times a week are you active for the purpose of exercise? 2-3 Times a Week   How many total minutes do you spend doing some activity for the purpose of exercising when you exercise?  15-30 Minutes   What keeps you from being more active? 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 75%   What is your marital status? /In a Relationship   If in a relationship, is your significant other overweight? No   Do you have children? Yes   If you have children, are they overweight? Yes       Mental Health History Reviewed With Patient 7/26/2019   Have you ever been physically or sexually abused? No   How often in the past 2 weeks have you felt little interest or pleasure in doing things? Not at all   Over the past 2 weeks how often have you felt down, depressed, or hopeless? Not at all       Sleep History Reviewed With Patient 7/26/2019   How many hours do you sleep at night? 7   Do you think that you snore loudly or has anybody ever heard you snore loudly (louder than talking or so loud it can be heard behind a shut door)? No   Has anyone seen or heard you stop breathing during your sleep? No   Do you often feel tired, fatigued, or sleepy during the day? Yes       MEDICATIONS:   Current Outpatient Medications   Medication Sig Dispense Refill     cetirizine (ZYRTEC) 10 MG tablet Take 10 mg by mouth daily as needed        fluconazole (DIFLUCAN) 150 MG tablet Take 1 tablet (150 mg) by mouth once a week 12 tablet 0     nystatin (MYCOSTATIN) 497637 UNIT/GM POWD Apply to affected area under breasts TID as needed 60 g 11     omeprazole (PRILOSEC) 20 MG  "CR capsule TAKE 1 CAPSULE BY MOUTH EVERY DAY 90 capsule 3     Phentermine-Topiramate 7.5-46 MG CP24 Take 1 capsule by mouth daily 30 capsule 3     SUMAtriptan (IMITREX) 50 MG tablet TAKE 2 TABLETS BY MOUTH AT ONSET OF MIGRAINE, MAY REPEAT IN 2 HOURS 9 tablet 1     fexofenadine (ALLEGRA) 180 MG tablet Take 180 mg by mouth daily       ketotifen (ZADITOR/REFRESH ANTI-ITCH) 0.025 % ophthalmic solution Place 1 drop into both eyes 2 times daily (Patient not taking: Reported on 4/17/2019) 10 mL 1     valACYclovir (VALTREX) 500 MG tablet Take 1 tablet by mouth daily, increase to 4 tabs by mouth twice daily at onset of outbreak (Patient not taking: Reported on 4/17/2019) 100 tablet 3       ALLERGIES:   Allergies   Allergen Reactions     Ampicillin Swelling              PHYSICAL EXAM:  /79 (BP Location: Left arm, Patient Position: Sitting, Cuff Size: Adult Large)   Pulse 96   Ht 1.619 m (5' 3.74\")   Wt 116.3 kg (256 lb 8 oz)   SpO2 100%   BMI 44.39 kg/m     A & O x 3  HEENT: NCAT, mucous membranes moist  Respirations unlabored  Location of obesity: Mixed Obesity    ASSESSMENT:  Elaine is a patient with post-bariatric surgery weight gain with significant element of familial/genetic influence and with current health consequences. She does need aggressive weight loss plan due to BMI 44.  Elaine Awad eats a high carb diet, eats fast food once or more per week, uses food as a reward, uses food as mood management, has perception of intense hunger and has a disorganized meal pattern.    Her problem is complicated by a hunger disorder and strong craving/reward pathways    Hx of lap band and then band removal by Dr Deras due to left sided pain.    PLAN:    Start 24 week plan  Start date 7/29/19  Grad date 1/13/20    Instructions per today's visit:   Medications started today: Qsymia  [id number] meal replacement plan  Labs today or when fasting    Appointments to be scheduled today at the :  Week 2: " Dietitian and Health  (already scheduled) August 15th  Week 3: Health   Week 4: Health   Week 5: Health   Week 6: Dietitian  Week 14: Layla Cyr PA-C (provider visit)      Orders Placed This Encounter   Procedures     MEAL REPLACEMENT PLAN COMPREHENSIVE MEDICAL WEIGHT MANAGEMENT PROGRAM     CBC with platelets     Comprehensive metabolic panel     Hemoglobin A1c     Lipid panel reflex to direct LDL Fasting     Vitamin D Deficiency     TSH with free T4 reflex     Parathyroid Hormone Intact     FOLLOW UP 24 WEEK HEALTHY LIFESTYLE PLAN       TIME: 30 min spent on evaluation, management, counseling, education, & motivational interviewing with greater than 50 % of the total time was spent on counseling and coordinating care    Sincerely,    Layla Cyr PA-C

## 2019-07-30 ENCOUNTER — TELEPHONE (OUTPATIENT)
Dept: SURGERY | Facility: CLINIC | Age: 49
End: 2019-07-30

## 2019-07-30 ENCOUNTER — TELEPHONE (OUTPATIENT)
Dept: FAMILY MEDICINE | Facility: CLINIC | Age: 49
End: 2019-07-30

## 2019-07-30 DIAGNOSIS — D64.9 ANEMIA, UNSPECIFIED TYPE: Primary | ICD-10-CM

## 2019-07-30 DIAGNOSIS — D50.9 IRON DEFICIENCY ANEMIA, UNSPECIFIED IRON DEFICIENCY ANEMIA TYPE: ICD-10-CM

## 2019-07-30 DIAGNOSIS — D64.9 LOW HEMOGLOBIN: Primary | ICD-10-CM

## 2019-07-30 PROBLEM — Z98.84 S/P GASTRIC BYPASS: Status: ACTIVE | Noted: 2019-07-30

## 2019-07-30 PROBLEM — Z98.84 S/P GASTRIC BYPASS: Status: RESOLVED | Noted: 2019-07-30 | Resolved: 2019-07-30

## 2019-07-30 LAB — DEPRECATED CALCIDIOL+CALCIFEROL SERPL-MC: 22 UG/L (ref 20–75)

## 2019-07-30 RX ORDER — FERROUS SULFATE 325(65) MG
325 TABLET, DELAYED RELEASE (ENTERIC COATED) ORAL 2 TIMES DAILY
Qty: 180 TABLET | Refills: 1 | Status: SHIPPED | OUTPATIENT
Start: 2019-07-30 | End: 2019-08-12

## 2019-07-30 NOTE — TELEPHONE ENCOUNTER
----- Message from Layla Cyr PA-C sent at 7/30/2019  6:55 AM CDT -----  Jean Josephe,     This is my new 24 week.  Her hgb is 7.9.  Looks like it was 10 in the past.  Can you call her to see what is going on?  Does she have a hx of anemia? Does she have any current bleed? Heavy menstrual bleeding?  Need to f/u with PCP to evaluate ASAP.  Thanks Layla

## 2019-07-30 NOTE — TELEPHONE ENCOUNTER
"Spoke with patient regarding her lab results. Specifically the hemoglobin. Patient states she is positive for her menses and has been since Thursday/friday. She reports very heavy bleeding and states she has a tendency to run low on her hemoglobin. She states 7.9 is \"not unusual for me.\" Patient denies any other symptoms and reports feeling \"just fine.\" She admits to not taking her supplements as she should and says she will start back up. She says I can expect her Vitamin D level to come back low and then she laughed. She commits to restarting her supplements and will call if she experiences any symptoms of active bleeding such as lightheadedness, dizziness, nausea or visible abnormal bleeding. Patient was given my direct number and will call if needed.   "

## 2019-07-30 NOTE — TELEPHONE ENCOUNTER
Spoke with patient again regarding her Hgb being low. It is recommended that she start taking FeSO4 by mouth. Writer LAURE scribed script to patient's preferred pharmacy. It was recommended to patient that she make an appointment to see her primary MD to evaluate the low hgb with more iron studies and patient was encouraged to address the abnormal menses with her OB/gyn. Patient seems to be unsure of the concern but will do what is recommended.

## 2019-07-31 NOTE — TELEPHONE ENCOUNTER
Pt was given provider's message as written. Lab only appt scheduled. F/u scheduled with Dr. Rodriguez.    Venecia Alonso RN  [facility]

## 2019-07-31 NOTE — TELEPHONE ENCOUNTER
Please call patient: your recent lab tests show anemia. Follow-up for repeat lab tests this week, then follow-up in clinic.   Fernanda Rodriguez MD

## 2019-08-01 DIAGNOSIS — D64.9 ANEMIA, UNSPECIFIED TYPE: ICD-10-CM

## 2019-08-01 LAB
BASOPHILS # BLD AUTO: 0 10E9/L (ref 0–0.2)
BASOPHILS NFR BLD AUTO: 0.7 %
DIFFERENTIAL METHOD BLD: ABNORMAL
EOSINOPHIL # BLD AUTO: 0.1 10E9/L (ref 0–0.7)
EOSINOPHIL NFR BLD AUTO: 2.1 %
ERYTHROCYTE [DISTWIDTH] IN BLOOD BY AUTOMATED COUNT: 19.7 % (ref 10–15)
FERRITIN SERPL-MCNC: 2 NG/ML (ref 8–252)
HCT VFR BLD AUTO: 27.9 % (ref 35–47)
HGB BLD-MCNC: 7.8 G/DL (ref 11.7–15.7)
IRON SATN MFR SERPL: 3 % (ref 15–46)
IRON SERPL-MCNC: 13 UG/DL (ref 35–180)
LYMPHOCYTES # BLD AUTO: 1.9 10E9/L (ref 0.8–5.3)
LYMPHOCYTES NFR BLD AUTO: 32.8 %
MCH RBC QN AUTO: 18.1 PG (ref 26.5–33)
MCHC RBC AUTO-ENTMCNC: 28 G/DL (ref 31.5–36.5)
MCV RBC AUTO: 65 FL (ref 78–100)
MONOCYTES # BLD AUTO: 0.8 10E9/L (ref 0–1.3)
MONOCYTES NFR BLD AUTO: 13.2 %
NEUTROPHILS # BLD AUTO: 3 10E9/L (ref 1.6–8.3)
NEUTROPHILS NFR BLD AUTO: 51.2 %
PLATELET # BLD AUTO: 342 10E9/L (ref 150–450)
RBC # BLD AUTO: 4.32 10E12/L (ref 3.8–5.2)
TIBC SERPL-MCNC: 462 UG/DL (ref 240–430)
VIT B12 SERPL-MCNC: 247 PG/ML (ref 193–986)
WBC # BLD AUTO: 5.8 10E9/L (ref 4–11)

## 2019-08-01 PROCEDURE — 82607 VITAMIN B-12: CPT | Performed by: FAMILY MEDICINE

## 2019-08-01 PROCEDURE — 85025 COMPLETE CBC W/AUTO DIFF WBC: CPT | Performed by: FAMILY MEDICINE

## 2019-08-01 PROCEDURE — 82728 ASSAY OF FERRITIN: CPT | Performed by: FAMILY MEDICINE

## 2019-08-01 PROCEDURE — 83540 ASSAY OF IRON: CPT | Performed by: FAMILY MEDICINE

## 2019-08-01 PROCEDURE — 36415 COLL VENOUS BLD VENIPUNCTURE: CPT | Performed by: FAMILY MEDICINE

## 2019-08-01 PROCEDURE — 83550 IRON BINDING TEST: CPT | Performed by: FAMILY MEDICINE

## 2019-08-01 RX ORDER — LANOLIN ALCOHOL/MO/W.PET/CERES
1000 CREAM (GRAM) TOPICAL DAILY
COMMUNITY
End: 2021-05-19

## 2019-08-01 NOTE — RESULT ENCOUNTER NOTE
Please call patient:    Your anemia has not improved or worsened. Are you having any bleeding, dizziness, or shortness of breath? If you are feeling well, it is safe to take over-the-counter iron sulfate 325 mg twice daily and vitamin B12 1000 mcg daily until your appointment with me in clinic. Let's consider referral to our hematologist for iron infusions.     Fernanda Rodriguez MD

## 2019-08-12 ENCOUNTER — OFFICE VISIT (OUTPATIENT)
Dept: FAMILY MEDICINE | Facility: CLINIC | Age: 49
End: 2019-08-12
Payer: COMMERCIAL

## 2019-08-12 VITALS
OXYGEN SATURATION: 100 % | WEIGHT: 257 LBS | RESPIRATION RATE: 14 BRPM | BODY MASS INDEX: 43.87 KG/M2 | SYSTOLIC BLOOD PRESSURE: 136 MMHG | HEART RATE: 87 BPM | HEIGHT: 64 IN | TEMPERATURE: 97.1 F | DIASTOLIC BLOOD PRESSURE: 78 MMHG

## 2019-08-12 DIAGNOSIS — N92.0 MENORRHAGIA WITH REGULAR CYCLE: ICD-10-CM

## 2019-08-12 DIAGNOSIS — D50.9 IRON DEFICIENCY ANEMIA, UNSPECIFIED IRON DEFICIENCY ANEMIA TYPE: Primary | ICD-10-CM

## 2019-08-12 DIAGNOSIS — E53.8 B12 DEFICIENCY: ICD-10-CM

## 2019-08-12 DIAGNOSIS — E66.01 MORBID OBESITY (H): ICD-10-CM

## 2019-08-12 LAB — HGB BLD-MCNC: 9 G/DL (ref 11.7–15.7)

## 2019-08-12 PROCEDURE — 99214 OFFICE O/P EST MOD 30 MIN: CPT | Performed by: FAMILY MEDICINE

## 2019-08-12 PROCEDURE — 85018 HEMOGLOBIN: CPT | Performed by: FAMILY MEDICINE

## 2019-08-12 PROCEDURE — 36415 COLL VENOUS BLD VENIPUNCTURE: CPT | Performed by: FAMILY MEDICINE

## 2019-08-12 RX ORDER — FERROUS SULFATE 325(65) MG
325 TABLET, DELAYED RELEASE (ENTERIC COATED) ORAL DAILY
COMMUNITY
Start: 2019-08-12 | End: 2020-10-19

## 2019-08-12 ASSESSMENT — MIFFLIN-ST. JEOR: SCORE: 1776.61

## 2019-08-12 NOTE — RESULT ENCOUNTER NOTE
Linda,    Your hemoglobin is already looking some better. Take the supplements as we discussed and follow-up for lab only recheck in 1 month. Call our appointment line at [phone] or go to [email or web address].     Fernanda Rodriguez MD

## 2019-08-12 NOTE — PROGRESS NOTES
"Subjective     Elaine Awad is a 48 year old female who presents to clinic today for the following health issues:    HPI   Chief Complaint   Patient presents with     Results     Elaine Awad is a 48 year old female  with Hx of removal of gastric lap band who presents with anemia. She endorses heavy periods. Symptom onset has been unchanged for a time period of years. Severity is described as moderate. Course of her symptoms over time is unchanged. Anemia was found incidentally recently, and she denies any significant dizziness, shortness of breath, fatigue, or new symptoms.       Reviewed and updated as needed this visit by Provider  Tobacco  Allergies  Meds  Problems  Med Hx  Surg Hx  Fam Hx         Review of Systems   ROS COMP: CONSTITUTIONAL: NEGATIVE for fever, chills, change in weight  RESP: NEGATIVE for significant cough or SOB  CV: NEGATIVE for chest pain, palpitations or peripheral edema  GI: NEGATIVE for nausea, abdominal pain, heartburn, or change in bowel habits   female: menses: frequency: q month  MUSCULOSKELETAL: NEGATIVE for significant arthralgias or myalgia  HEME/ALLERGY/IMMUNE: anemia      Objective    /78   Pulse 87   Temp 97.1  F (36.2  C) (Oral)   Resp 14   Ht 1.619 m (5' 3.74\")   Wt 116.6 kg (257 lb)   SpO2 100%   BMI 44.47 kg/m    Body mass index is 44.47 kg/m .  Physical Exam   GENERAL: alert, no distress and obese  RESP: lungs clear to auscultation - no rales, rhonchi or wheezes  CV: regular rate and rhythm, normal S1 S2, no S3 or S4, no murmur, click or rub, no peripheral edema   MS: extremities normal- no gross deformities noted  PSYCH: mentation appears normal, affect normal/bright    Diagnostic Test Results:  Labs reviewed in Epic  Results for orders placed or performed in visit on 19   Hemoglobin   Result Value Ref Range    Hemoglobin 9.0 (L) 11.7 - 15.7 g/dL           Assessment & Plan     (D50.9) Iron deficiency anemia, unspecified iron " "deficiency anemia type  (primary encounter diagnosis)  (N92.0) Menorrhagia with regular cycle  Plan: OB/GYN REFERRAL, ferrous sulfate (FE TABS) 325         (65 Fe) MG EC tablet        Follow-up in 1 month for lab    (E53.8) B12 deficiency  Plan: take supplement     (E66.01) Morbid obesity (H)  BMI:   Estimated body mass index is 44.47 kg/m  as calculated from the following:    Height as of this encounter: 1.619 m (5' 3.74\").    Weight as of this encounter: 116.6 kg (257 lb).   Weight management plan: Discussed healthy diet and exercise guidelines            Return in about 1 month (around 9/12/2019) for lab only check.    Fernanda Rodriguez MD  [facility]      "

## 2019-08-22 ENCOUNTER — TELEPHONE (OUTPATIENT)
Dept: SURGERY | Facility: CLINIC | Age: 49
End: 2019-08-22

## 2019-08-29 ENCOUNTER — DOCUMENTATION ONLY (OUTPATIENT)
Dept: LAB | Facility: CLINIC | Age: 49
End: 2019-08-29

## 2019-08-29 DIAGNOSIS — D50.8 OTHER IRON DEFICIENCY ANEMIA: Primary | ICD-10-CM

## 2019-09-12 DIAGNOSIS — D50.8 OTHER IRON DEFICIENCY ANEMIA: ICD-10-CM

## 2019-09-12 PROBLEM — Z86.2 HISTORY OF ANEMIA: Status: ACTIVE | Noted: 2019-09-12

## 2019-09-12 LAB — HGB BLD-MCNC: 13 G/DL (ref 11.7–15.7)

## 2019-09-12 PROCEDURE — 36415 COLL VENOUS BLD VENIPUNCTURE: CPT | Performed by: FAMILY MEDICINE

## 2019-09-12 PROCEDURE — 85018 HEMOGLOBIN: CPT | Performed by: FAMILY MEDICINE

## 2019-09-12 NOTE — RESULT ENCOUNTER NOTE
Your anemia has resolved. Continue a daily iron supplement as long as you are having periods.     Fernanda Rodriguez MD

## 2019-10-15 DIAGNOSIS — K21.9 GASTROESOPHAGEAL REFLUX DISEASE WITHOUT ESOPHAGITIS: ICD-10-CM

## 2019-10-16 ENCOUNTER — OFFICE VISIT (OUTPATIENT)
Dept: OBGYN | Facility: CLINIC | Age: 49
End: 2019-10-16
Payer: COMMERCIAL

## 2019-10-16 VITALS
BODY MASS INDEX: 45.69 KG/M2 | OXYGEN SATURATION: 98 % | SYSTOLIC BLOOD PRESSURE: 134 MMHG | WEIGHT: 264 LBS | DIASTOLIC BLOOD PRESSURE: 84 MMHG | HEART RATE: 99 BPM

## 2019-10-16 DIAGNOSIS — B37.31 YEAST VAGINITIS: ICD-10-CM

## 2019-10-16 DIAGNOSIS — K21.9 GASTROESOPHAGEAL REFLUX DISEASE WITHOUT ESOPHAGITIS: ICD-10-CM

## 2019-10-16 DIAGNOSIS — N92.0 MENORRHAGIA WITH REGULAR CYCLE: Primary | ICD-10-CM

## 2019-10-16 DIAGNOSIS — D50.0 IRON DEFICIENCY ANEMIA DUE TO CHRONIC BLOOD LOSS: ICD-10-CM

## 2019-10-16 PROCEDURE — 99214 OFFICE O/P EST MOD 30 MIN: CPT | Performed by: OBSTETRICS & GYNECOLOGY

## 2019-10-16 RX ORDER — FLUCONAZOLE 150 MG/1
150 TABLET ORAL ONCE
Qty: 1 TABLET | Refills: 3 | Status: SHIPPED | OUTPATIENT
Start: 2019-10-16 | End: 2020-03-03

## 2019-10-16 NOTE — PROGRESS NOTES
"Elaine is a 48 year old , with one ectopic referred here by she presents today with complaint of abnormal uterine bleeding.    She reports that she has had heavy bleeding when her menses start.  She has bleeding for 6 days and she bleeds through 1.5 boxes of tampons a month.  She will bleed through both tampons and pads.  She has had clots, the size of a golf ball.  She has anemia and she has been on Fe supplementation and her hgb has increased.    She has some lightheadedness but no dizziness.  She has had associated fatigue.  No shortness of breath.    She has had this type of bleeding \"for a while.\"    She has not noted any aggravating or alleviating factors.    She has had the Lap band placed and she had a lot of pain with it, so the Lap band was removed.  She has not had a bypass.          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     GERD (gastroesophageal reflux disease)      Iron deficiency anemia      Menorrhagia      Migraine      Morbid obesity (H)      Recurrent cold sores      Vulvar dermatitis 10/14/2010     Yeast infection of the vagina, recurrent         Past Surgical History:   Procedure Laterality Date     C TREAT ECTOPIC PREG,RMV TUBE/OVARY      LT     COLPOSCOPY,BX CERVIX/ENDOCERV CURR  1994     ESOPHAGOSCOPY, GASTROSCOPY, DUODENOSCOPY (EGD), COMBINED  2014    Procedure: COMBINED ESOPHAGOSCOPY, GASTROSCOPY, DUODENOSCOPY (EGD);;  Surgeon: Eden Stacy MD;  Location: [facility]" ESOPHAGOSCOPY, GASTROSCOPY, DUODENOSCOPY (EGD), COMBINED N/A 2017    Procedure: COMBINED ESOPHAGOSCOPY, GASTROSCOPY, DUODENOSCOPY (EGD);  Surgeon: Ciro Deras MD;  Location: [facility]     LAPAROSCOPIC GASTRIC ADJUSTABLE BANDING  2014    Procedure: LAPAROSCOPIC GASTRIC ADJUSTABLE BANDING;  Surgeon: Ciro Deras MD;  Location: [facility]     LAPAROSCOPIC HERNIORRHAPHY HIATAL  2014    Procedure: LAPAROSCOPIC HERNIORRHAPHY HIATAL;  Surgeon: Ciro Deras MD;  Location: [facility]     LAPAROSCOPIC REMOVAL GASTRIC ADJUSTABLE BAND N/A 2017    Procedure: LAPAROSCOPIC REMOVAL GASTRIC ADJUSTABLE BAND;  Surgeon: Ciro Deras MD;  Location: [facility]     LITHOTRIPSY         OB History    Para Term  AB Living   3 2 0 0 1 2   SAB TAB Ectopic Multiple Live Births   0 0 1 0 2      # Outcome Date GA Lbr Felipe/2nd Weight Sex Delivery Anes PTL Lv   3 Ectopic            2 Para               Birth Comments: kidney stones   1 Para                Gynecological History         Patient's last menstrual period was 10/09/2019 (exact date).    No STD/no PID/no IUD      see above HPI.         Allergies   Allergen Reactions     Ampicillin Swelling              Current Outpatient Medications   Medication Sig Dispense Refill     cyanocobalamin (VITAMIN B-12) 1000 MCG tablet Take 1 tablet (1,000 mcg) by mouth daily       ferrous sulfate (FE TABS) 325 (65 Fe) MG EC tablet Take 1 tablet (325 mg) by mouth daily       nystatin (MYCOSTATIN) 616966 UNIT/GM POWD Apply to affected area under breasts TID as needed 60 g 11     omeprazole (PRILOSEC) 20 MG CR capsule TAKE 1 CAPSULE BY MOUTH EVERY DAY 90 capsule 3     cetirizine (ZYRTEC) 10 MG tablet Take 10 mg by mouth daily as needed        fexofenadine (ALLEGRA) 180 MG tablet Take 180 mg by mouth daily       Phentermine-Topiramate 7.5-46 MG CP24 Take 1 capsule by mouth daily (Patient not taking: Reported on 10/16/2019) 30 capsule 3     SUMAtriptan (IMITREX)  50 MG tablet TAKE 2 TABLETS BY MOUTH AT ONSET OF MIGRAINE, MAY REPEAT IN 2 HOURS 9 tablet 1     valACYclovir (VALTREX) 500 MG tablet Take 1 tablet by mouth daily, increase to 4 tabs by mouth twice daily at onset of outbreak 100 tablet 3       Social History     Socioeconomic History     Marital status:      Spouse name: Mohinder     Number of children: 2     Years of education: 16     Highest education level: Not on file   Occupational History     Occupation: RN     Employer: [employer]   Social Needs     Financial resource strain: Not on file     Food insecurity:     Worry: Not on file     Inability: Not on file     Transportation needs:     Medical: Not on file     Non-medical: Not on file   Tobacco Use     Smoking status: Former Smoker     Packs/day: 0.50     Years: 10.00     Pack years: 5.00     Types: Cigarettes     Last attempt to quit: 1997     Years since quittin.8     Smokeless tobacco: Never Used   Substance and Sexual Activity     Alcohol use: Yes     Alcohol/week: 0.0 - 0.8 standard drinks     Comment: occasional     Drug use: No     Sexual activity: Yes     Partners: Male     Birth control/protection: None   Lifestyle     Physical activity:     Days per week: Not on file     Minutes per session: Not on file     Stress: Not on file   Relationships     Social connections:     Talks on phone: Not on file     Gets together: Not on file     Attends [employer] service: Not on file     Active member of club or organization: Not on file     Attends meetings of clubs or organizations: Not on file     Relationship status: Not on file     Intimate partner violence:     Fear of current or ex partner: Not on file     Emotionally abused: Not on file     Physically abused: Not on file     Forced sexual activity: Not on file   Other Topics Concern     Parent/sibling w/ CABG, MI or angioplasty before 65F 55M? No      Service No     Blood Transfusions No     Caffeine Concern No      Occupational Exposure Yes     Comment: nurse     Hobby Hazards No     Sleep Concern No     Stress Concern No     Weight Concern No     Special Diet No     Back Care No     Exercise Yes     Bike Helmet Yes     Seat Belt Yes     Self-Exams Yes   Social History Narrative     Not on file       Family History   Problem Relation Age of Onset     Cancer Father 72        d. pancreatic, melanoma      Colon Polyps Father      Diabetes Mother      C.A.D. Mother      Breast Cancer Maternal Grandmother      Heart Disease Maternal Grandfather         CHF     Heart Disease Paternal Grandmother         CHF     Colon Polyps Paternal Grandmother      Respiratory Paternal Grandfather         TB     Obesity Brother      Glaucoma No family hx of      Macular Degeneration No family hx of          Review of Systems:  10 point ROS of systems including Constitutional, Eyes, Respiratory, Cardiovascular, Gastroenterology, Genitourinary, Integumentary, Muscularskeletal, Psychiatric were all negative except for pertinent positives noted in my HPI and in the PMH.          EXAM:  /84 (BP Location: Right arm, Cuff Size: Adult Large)   Pulse 99   Wt 119.7 kg (264 lb)   LMP 10/09/2019 (Exact Date)   SpO2 98%   BMI 45.69 kg/m    Body mass index is 45.69 kg/m .  General Appearance:  healthy, alert, active, no distress  Skin:  Normal skin turgor  Neuro:  Alert, cranial nerves grossly intact  HEENT: NCAT  Neck:  No masses or lesions carotids are +2/4. No bruits heard  Lungs:  Good respiratory effort   Abdomen: Soft, nontender.  Normal bowel sounds.  No masses  Pelvic exam:  Not performed today.   Extremities:  No clubbing, cyanosis or edema.        ASSESSMENT:  Abnormal uterine bleeding  Chronic blood loss anemia       PLAN:  Continue with the Fe supplementation.   We discussed the bleeding profiles as people age and approach menopause.  Even though menstrual changes and irregularities are common and expected, they may also indicate or  precipitate endometrial abnormalities.    I have ordered the ultrasound for evaluation of the uterine structures.   The patient and I discussed the options for evaluation.  The EMB, SIS and the D&C were reviewed with her.  The EMB will not remove a polyp, but will give a tissue sample.  The SIS will further evaluate the lining, but no tissue sample, and should she have a suspected polyp, it would not be removed.  The D&C is more expensive but is currently the gold standard.  She is interested in the EMB.  She will premedicate with NSAIDs.     TT 30 min  CT and review of records and labs, greater than 50%, as noted above in the HPI and in the Plan    Filipe Muñoz MD

## 2019-10-21 ENCOUNTER — ANCILLARY PROCEDURE (OUTPATIENT)
Dept: ULTRASOUND IMAGING | Facility: CLINIC | Age: 49
End: 2019-10-21
Attending: OBSTETRICS & GYNECOLOGY
Payer: COMMERCIAL

## 2019-10-21 DIAGNOSIS — N92.0 MENORRHAGIA WITH REGULAR CYCLE: ICD-10-CM

## 2019-10-21 DIAGNOSIS — D50.0 IRON DEFICIENCY ANEMIA DUE TO CHRONIC BLOOD LOSS: ICD-10-CM

## 2019-10-21 PROCEDURE — 76856 US EXAM PELVIC COMPLETE: CPT

## 2019-10-21 PROCEDURE — 76830 TRANSVAGINAL US NON-OB: CPT

## 2019-10-25 DIAGNOSIS — B00.1 RECURRENT COLD SORES: ICD-10-CM

## 2019-10-28 RX ORDER — VALACYCLOVIR HYDROCHLORIDE 500 MG/1
TABLET, FILM COATED ORAL
Qty: 100 TABLET | Refills: 0 | Status: SHIPPED | OUTPATIENT
Start: 2019-10-28 | End: 2020-08-25

## 2019-10-30 ENCOUNTER — OFFICE VISIT (OUTPATIENT)
Dept: OBGYN | Facility: CLINIC | Age: 49
End: 2019-10-30
Payer: COMMERCIAL

## 2019-10-30 VITALS
WEIGHT: 262.6 LBS | SYSTOLIC BLOOD PRESSURE: 127 MMHG | OXYGEN SATURATION: 98 % | DIASTOLIC BLOOD PRESSURE: 83 MMHG | BODY MASS INDEX: 45.44 KG/M2

## 2019-10-30 DIAGNOSIS — N92.0 MENORRHAGIA WITH REGULAR CYCLE: Primary | ICD-10-CM

## 2019-10-30 DIAGNOSIS — N83.202 BILATERAL OVARIAN CYSTS: ICD-10-CM

## 2019-10-30 DIAGNOSIS — N83.201 BILATERAL OVARIAN CYSTS: ICD-10-CM

## 2019-10-30 DIAGNOSIS — D50.0 IRON DEFICIENCY ANEMIA DUE TO CHRONIC BLOOD LOSS: ICD-10-CM

## 2019-10-30 LAB — HCG UR QL: NEGATIVE

## 2019-10-30 PROCEDURE — 99213 OFFICE O/P EST LOW 20 MIN: CPT | Mod: 25 | Performed by: OBSTETRICS & GYNECOLOGY

## 2019-10-30 PROCEDURE — 88305 TISSUE EXAM BY PATHOLOGIST: CPT | Performed by: OBSTETRICS & GYNECOLOGY

## 2019-10-30 PROCEDURE — 58100 BIOPSY OF UTERUS LINING: CPT | Performed by: OBSTETRICS & GYNECOLOGY

## 2019-10-30 PROCEDURE — 81025 URINE PREGNANCY TEST: CPT | Performed by: OBSTETRICS & GYNECOLOGY

## 2019-10-30 NOTE — PROGRESS NOTES
"Elaine is a 48 year old  she presents today with complaint of abnormal uterine bleeding.    She reports that she has had heavy bleeding when her menses start.  She has bleeding for 6 days and she bleeds through 1.5 boxes of tampons a month.  She will bleed through both tampons and pads.  She has had clots, the size of a golf ball.  She has anemia and she has been on Fe supplementation and her hgb has increased.    She has some lightheadedness but no dizziness.  She has had associated fatigue.  No shortness of breath.    She has had this type of bleeding \"for a while.\"    She has not noted any aggravating or alleviating factors.     She had the following ultrasound performed last week and we reviewed the results and the films.     PELVIC ULTRASOUND WITH ENDOVAGINAL TRANSDUCER IMAGING 10/21/2019 9:25 AM   HISTORY: Menorrhagia with regular cycle. Iron deficiency anemia due to chronic blood loss.  TECHNIQUE:  Endovaginal sonography was added to the transabdominal exam.  COMPARISON: None.  FINDINGS:   Endometrium: Endometrium is 10 mm thick.  Uterus: Measures 9.4 x 5.4 x 6.1 cm.  Right ovary: Measures 3.3 x 2.0 x 3.9 cm with multiple cysts, measuring up to 2.3 x 1.9 x 2.1 cm.  Left ovary: Measures 1.7 x 2.0 x 2.0 cm with a 1.1 x 0.9 x 1.0 cm cyst noted.  Additional findings: None.                                                              IMPRESSION:  Thickened endometrium likely due to secretory phase of menstrual cycle. Multiple cysts seen in the ovaries likely physiologic, measuring up to 2.3 cm in the right ovary.       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     GERD (gastroesophageal reflux disease)      Iron deficiency anemia      Menorrhagia      Migraine      Morbid obesity (H)      Recurrent cold sores      Vulvar dermatitis 10/14/2010     Yeast infection of the vagina, recurrent         Past Surgical History:   Procedure Laterality Date     C TREAT ECTOPIC PREG,RMV TUBE/OVARY      LT     COLPOSCOPY,BX CERVIX/ENDOCERV CURR  7/1994     ESOPHAGOSCOPY, GASTROSCOPY, DUODENOSCOPY (EGD), COMBINED  1/2/2014    Procedure: COMBINED ESOPHAGOSCOPY, GASTROSCOPY, DUODENOSCOPY (EGD);;  Surgeon: Eden Stacy MD;  Location: [facility]     ESOPHAGOSCOPY, GASTROSCOPY, DUODENOSCOPY (EGD), COMBINED N/A 1/19/2017    Procedure: COMBINED ESOPHAGOSCOPY, GASTROSCOPY, DUODENOSCOPY (EGD);  Surgeon: Ciro Deras MD;  Location[facility]     LAPAROSCOPIC GASTRIC ADJUSTABLE BANDING  4/28/2014    Procedure: LAPAROSCOPIC GASTRIC ADJUSTABLE BANDING;  Surgeon: Ciro Deras MD;  Location: [facility]     LAPAROSCOPIC HERNIORRHAPHY HIATAL  4/28/2014    Procedure: LAPAROSCOPIC HERNIORRHAPHY HIATAL;  Surgeon: Ciro Deras MD;  [facility]     LAPAROSCOPIC REMOVAL GASTRIC ADJUSTABLE BAND N/A 1/19/2017    Procedure: LAPAROSCOPIC REMOVAL GASTRIC ADJUSTABLE BAND;  Surgeon: Ciro Deras MD;  Location: [facility]     LITHOTRIPSY            Allergies   Allergen Reactions     Ampicillin Swelling              Current Outpatient Medications   Medication Sig Dispense Refill     cetirizine (ZYRTEC) 10 MG tablet Take 10 mg by mouth daily as needed        cyanocobalamin (VITAMIN B-12) 1000 MCG tablet Take 1 tablet (1,000 mcg) by mouth daily       ferrous sulfate (FE TABS) 325 (65 Fe) MG EC tablet Take 1 tablet (325 mg) by mouth daily       fexofenadine (ALLEGRA) 180 MG tablet Take 180 mg by mouth daily       nystatin (MYCOSTATIN)  432754 UNIT/GM POWD Apply to affected area under breasts TID as needed 60 g 11     omeprazole (PRILOSEC) 20 MG DR capsule TAKE 1 CAPSULE BY MOUTH EVERY DAY 90 capsule 2     SUMAtriptan (IMITREX) 50 MG tablet TAKE 2 TABLETS BY MOUTH AT ONSET OF MIGRAINE, MAY REPEAT IN 2 HOURS 9 tablet 1     valACYclovir (VALTREX) 500 MG tablet TAKE 1 TABLET BY MOUTH DAILY. INCREASE TO 4 TABLET BY MOUTH 2 TIMES DAILY AT ONSET OF OUTBREAK 100 tablet 0     Phentermine-Topiramate 7.5-46 MG CP24 Take 1 capsule by mouth daily (Patient not taking: Reported on 10/16/2019) 30 capsule 3       Social History     Socioeconomic History     Marital status:      Spouse name: Mohinder     Number of children: 2     Years of education: 16     Highest education level: Not on file   Occupational History     Occupation: RN     Employer: [employer]   Social Needs     Financial resource strain: Not on file     Food insecurity:     Worry: Not on file     Inability: Not on file     Transportation needs:     Medical: Not on file     Non-medical: Not on file   Tobacco Use     Smoking status: Former Smoker     Packs/day: 0.50     Years: 10.00     Pack years: 5.00     Types: Cigarettes     Last attempt to quit: 1997     Years since quittin.9     Smokeless tobacco: Never Used   Substance and Sexual Activity     Alcohol use: Yes     Alcohol/week: 0.0 - 0.8 standard drinks     Comment: occasional     Drug use: No     Sexual activity: Yes     Partners: Male     Birth control/protection: None   Lifestyle     Physical activity:     Days per week: Not on file     Minutes per session: Not on file     Stress: Not on file   Relationships     Social connections:     Talks on phone: Not on file     Gets together: Not on file     Attends Zoroastrian service: Not on file     Active member of club or organization: Not on file     Attends meetings of clubs or organizations: Not on file     Relationship status: Not on file     Intimate partner  violence:     Fear of current or ex partner: Not on file     Emotionally abused: Not on file     Physically abused: Not on file     Forced sexual activity: Not on file   Other Topics Concern     Parent/sibling w/ CABG, MI or angioplasty before 65F 55M? 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 pancreatic, melanoma      Colon Polyps Father      Diabetes Mother      C.A.D. Mother      Breast Cancer Maternal Grandmother      Heart Disease Maternal Grandfather         CHF     Heart Disease Paternal Grandmother         CHF     Colon Polyps Paternal Grandmother      Respiratory Paternal Grandfather         TB     Obesity Brother      Glaucoma No family hx of      Macular Degeneration No family hx of        Review of Systems:  10 point ROS of systems including Constitutional, Eyes, Respiratory, Cardiovascular, Gastroenterology, Genitourinary, Integumentary, Muscularskeletal, Psychiatric were all negative except for pertinent positives noted in my HPI and in the PMH.        Exam  /83 (BP Location: Right arm, Cuff Size: Adult Large)   Wt 119.1 kg (262 lb 9.6 oz)   LMP 10/09/2019 (Exact Date)   SpO2 98%   BMI 45.44 kg/m    General:  WNWD female, NAD  Alert  Oriented x 3  Gait:  Normal  Skin:  Normal skin turgor  HEENT:  NC/AT, EOMI  Abdomen:  Non-tender, non-distended.  Vulva: No external lesions, normal hair distribution, no adenopathy  BUS:  Normal, no masses noted  Urethra:  No hypermobility seen  Urethral meatus:  No masses noted  Vagina: Moist, pink, no abnormal discharge, well rugated, no lesions  Cervix:  Smooth, pink, no visible lesions  Uterus: Normal size, anteverted, non-tender, mobile  Ovaries: No mass, non-tender, mobile  Perianal:  No masses noted  Extremities:  No clubbing, no cyanosis and no edema.      Assessment  Menorrhagia with regular cycle  Chronic blood loss anemia  Bilateral ovarian cysts      Plan  She is ok with the EMB today.   The ultrasound shows bilateral cysts, but some appear complex, so I suggest to repeat the ultrasound in about 6 to 8 weeks, to document stability and/or resolution.      Filipe Muñoz MD        PROCEDURE NOTE:  The procedure was reviewed with patient.  After consenting to the procedure she was placed into the dorsal lithotomy position.  The examination was performed.  The speculum was placed into the vagina.  The cervix was prepped with Betadine.  The anterior lip of the cervix was grasped with the Allis tenaculum.  The uterus was sounded to 9 cm.  The Pipelle was used to sample the endometrium.  3 passes were made with the Pipelle.   Instruments were removed and the specimen was sent to pathology.  Results to the patient in 1-2 weeks when they are returned.      Filipe Muñoz MD

## 2019-11-04 LAB — COPATH REPORT: NORMAL

## 2019-11-06 NOTE — PROGRESS NOTES
"New 24 Week Healthy Lifestyle Nutrition Note    Reason for visit: Nutrition Assessment    Elaine Awad is a 48 year old female presenting today for new nutrition assessment consult. Pt is referred by HUMBERTO Burns.    Use of meal replacements: Yes   Number of meal replacements being used per day: 2   - VLC protein shake at breakfast (160 calories)  - Fresh food meal at lunch (300-400 calories)  - VLC protein bar as afternoon snack (160 calories)  - Fresh food meal at dinner (300-400 calories)  Calorie Level: 1289-8404 per day     Current vitamin/mineral supplements: MVI with iron, B12, iron daily (forgets sometimes, tries to take in the evening 3-4x/wk), prilosec.     ANTHROPOMETRICS:   Initial Weight 7/29/19: 256.5 lbs with BMI of 44.39 kg/m   Recent Weight (10/30/19): 262.6 lbs    NUTRITION HISTORY:   - Doesn't like to cook, likes quick and easy meals.  - Used to cook more when children lived at home.       Recent Diet Recall:   Breakfast: yogurt with fruit and granola or rice crispies, + coffee with milk (2%) or half and half.  Lunch: skips lunch if at office (3x/week); sandwich (meat + swiss + fulton + wheat bread); left-overs  Afternoon snack: granola bar  Dinner: Chicken + pasta with cream sauce  Late night snacks: Not usually.   Beverages: Water (48 oz), coffee (1-4 cups with splash of creamer), OJ, tea + honey, 1 can coke (states only thing that helps with migraines).     Dining out: 1x/wk sit-down restaurant on the weekend ([employer] last time and had fish)    - Cravings: chocolate, pretzels.   - Eating Habits: \"Eats whatever's around.\" Invests little time in to meal prep. Easily tempted by snack foods when they are around. Frequently skipping lunch when not at home.     ADDITIONAL INFORMATION:    - Waking at 6:15-6:30 am. Heads to work around 8:30 am. Working until 4:30pm. Works from home 3 days per week. Tends to eat more on days when she is at home.   - Getting ~7 hours sleep per night.    PHYSICAL " ACTIVITY:  Daily dog walking (1/2 mile, 10-15 mins walk)    MEDICATIONS FOR WEIGHT LOSS:  Qsymia - Has not started, plans to start soon.    NUTRITION DIAGNOSIS:   Food and nutrition related knowledge deficit r/t lack of prior exposure to nutrition education of meal replacement program aeb pt unable to verbalize understanding of how to use Bariatrix meal replacements for weight loss.    INTERVENTION:  Nutrition Prescription:  Recommended energy/nutrient modification  Calorie modification 2386-4071 calories per day  Volumetrics/The Plate Method  60-90 grams protein per day    Implementation:  Assessed learning needs and learning preferences  Nutrition Education:   - Reviewed meal planning using plate method (higher protein, higher non-starchy veg, limited-to-no starch <1 cup). Discussed keeping frozen vegetables, or low calorie frozen meals (<600 mg Na) available for times when she doesn't have a meal planned, or to make meal planning easier.   - Discussed implementation of exercise  - Provided instruction on practicing mindfulness with eating  - Discussed using journal/phone brigitte to track intake. Discussed using for insight on caloric value of fresh-prepared meals.   - Discussed low iron and vitamin D labs.  - Pt purchased Awdio protein bars in clinic today. RD placed order for Smoothie Box (56 servings) for pt to order from [facility].      GOALS:  1. Start 7166-1724 calories per day  - 2 product/meal replacements + 2 fresh foods at one meal per day  - The one meal per day should have a lean protein, non-starchy vegetables and small about of starches: follow the plate method  - Meals and snacks should be consumed slowly 20-30 minutes and chew to apple sauce consistency.  - Check out this website for recipe ideas using the products: https://www.[employer].Appticles/   2. Continue taking multivitamin, and iron (set a reminder on phone to take before bed)  3. Beverage goal - Avoid calorie containing  beverages. Reduce coke by 50% over next month, try using black coffee for migraine relief   4. Activity goal - Increase daily walking to 30 minutes.    Patient Understanding: good  Expected Compliance: fair-good  Follow-Up Plans: Mindfulness, meal planning, dietary journal       FOLLOW UP  1 month    TIME SPENT WITH PATIENT: 30 Minutes     Gay Peoples RD, LD

## 2019-11-07 ENCOUNTER — OFFICE VISIT (OUTPATIENT)
Dept: ENDOCRINOLOGY | Facility: CLINIC | Age: 49
End: 2019-11-07
Payer: COMMERCIAL

## 2019-11-07 ENCOUNTER — OFFICE VISIT (OUTPATIENT)
Dept: SURGERY | Facility: CLINIC | Age: 49
End: 2019-11-07
Payer: COMMERCIAL

## 2019-11-07 DIAGNOSIS — E66.01 MORBID OBESITY (H): Primary | ICD-10-CM

## 2019-11-07 DIAGNOSIS — E66.9 OBESITY: Primary | ICD-10-CM

## 2019-11-07 NOTE — LETTER
2019       RE: Elaine Awad  [address] [facility] [address]     Dear Colleague,    Thank you for referring your patient, Elaine Awad, to the [facility] MEDICAL WEIGHT MANAGEMENT at [facility]. Please see a copy of my visit note below.    LORENZO GUZMAN     Referred by: Self    Progress Notes    New Weight Management Health Coaching Note    Elaine Awad          MRN: [id number]  : 1970  [place]: 2019    Health  Visit #: 1  In Person Visit:     ASSESSMENT:  Initial Start Date: 2019 (revised from July date)  Initial weight: 256 lbs 8 oz  Graduation Date: 19  Current Weight (lbs): 263.8 lb  Average Daily Water (ounces): 45 oz or more  Weight Loss Medication: Qsymia (not started yet)  Nutrition Plan: Did the trial week. Will talk to the RD. Prefers to have the smoothies and bars.    PATIENT INFORMATION PROVIDED:  1.) 24 Week Healthy Lifestyle Plan Overview Handout:    Explained the structure of the 24 week plan    Reviewed scheduling information    Discussed option to do coaching sessions via phone or in person  2.) Cooking and Exercise Class Handout  3.) Support Group Handout  4.) Explain the role of a HEALTH  and the FOUR Pillars of Health      INITIAL INTAKE:    The four pillars of well-being may impact your ability to manage weight.   Level of Satisfaction:   Rate your current level of satisfaction on a scale of 1-10 in each pillar.     Sleep (1 -10): varies; 6. Would like to be at an 8/9.    Movement/Activity (1 -10): 4; and would like to be at 8/9    Nutrition (1 -10):  4; would like to be at 8/9.    Stress Management (1 -10): 6; would want to be at an 8/9.      Pt says she knows she can lose the weight; she has done it before, but it is the keeping it off that is hard.           NOTES: (from Layla, )  Elaine is a patient with post-bariatric surgery weight gain with significant element of familial/genetic influence and  with current health consequences. She does need aggressive weight loss plan due to BMI 44.  Elaine Awad eats a high carb diet, eats fast food once or more per week, uses food as a reward, uses food as mood management, has perception of intense hunger and has a disorganized meal pattern.     Her problem is complicated by a hunger disorder and strong craving/reward pathways     Hx of lap band and then band removal by Dr Deras due to left sided pain.       FOLLOW-UP:    To schedule your next visit, please call [phone].      TIME:   20 minutes spent with patient, including charting time. (pt arrived 15 min late)       SIGNATURE:  Jayda Tom  Lead Health   [facility] Weight Managment  [facility] [place]

## 2019-11-07 NOTE — PROGRESS NOTES
LORENZO GUZMAN     Referred by: Self    Progress Notes    New Weight Management Health Coaching Note    Elaine Awad          MRN: [id number]  : 1970  [place]: 2019    Health  Visit #: 1  In Person Visit:     ASSESSMENT:  Initial Start Date: 2019 (revised from July date)  Initial weight: 256 lbs 8 oz  Graduation Date: 19  Current Weight (lbs): 263.8 lb  Average Daily Water (ounces): 45 oz or more  Weight Loss Medication: Qsymia (not started yet)  Nutrition Plan: Did the trial week. Will talk to the RD. Prefers to have the smoothies and bars.    PATIENT INFORMATION PROVIDED:  1.) 24 Week Healthy Lifestyle Plan Overview Handout:    Explained the structure of the 24 week plan    Reviewed scheduling information    Discussed option to do coaching sessions via phone or in person  2.) Cooking and Exercise Class Handout  3.) Support Group Handout  4.) Explain the role of a HEALTH  and the FOUR Pillars of Health      INITIAL INTAKE:    The four pillars of well-being may impact your ability to manage weight.   Level of Satisfaction:   Rate your current level of satisfaction on a scale of 1-10 in each pillar.     Sleep (1 -10): varies; 6. Would like to be at an 8/9.    Movement/Activity (1 -10): 4; and would like to be at 8/9    Nutrition (1 -10):  4; would like to be at 8/9.    Stress Management (1 -10): 6; would want to be at an 8/9.      Pt says she knows she can lose the weight; she has done it before, but it is the keeping it off that is hard.           NOTES: (from Layla, )  Elaine is a patient with post-bariatric surgery weight gain with significant element of familial/genetic influence and with current health consequences. She does need aggressive weight loss plan due to BMI 44.  Elaine Awad eats a high carb diet, eats fast food once or more per week, uses food as a reward, uses food as mood management, has perception of intense hunger and has a disorganized meal  pattern.     Her problem is complicated by a hunger disorder and strong craving/reward pathways     Hx of lap band and then band removal by Dr Deras due to left sided pain.       FOLLOW-UP:    To schedule your next visit, please call [phone].      TIME:   20 minutes spent with patient, including charting time. (pt arrived 15 min late)       SIGNATURE:  Jayda Tom  Lead Health   [facility] Weight Managment  [facility] [place]

## 2019-11-07 NOTE — PATIENT INSTRUCTIONS
Nutrition Goals  1. Start 2692-4717 calories per day  - 2 product/meal replacements + 2 fresh foods at one meal per day  -  The one meal per day should have a lean protein, non-starchy vegetables and small about of starches: follow the plate method  - Meals and snacks should be consumed slowly 20-30 minutes and chew to apple sauce consistency.  - Check out this website for recipe ideas using the products: https://www.SeeWhy/   2. Continue taking multivitamin, and iron (set a reminder on phone to take before bed)  3. Beverage goal - Avoid calorie containing beverages. Reduce coke by 50% over next month, try using black coffee for migraine relief   4. Activity goal - Increase daily walking to 30 minutes.    Follow-up with RD in one month    Gay Peoples RD, LD  If you would like to schedule or reschedule an appointment with the RD, please call [phone]

## 2019-11-07 NOTE — LETTER
"11/7/2019       RE: Elaine Awad  [address] [facility] [address]     Dear Colleague,    Thank you for referring your patient, Elaine Awad, to the [facility] SURGICAL WEIGHT MANAGEMENT at [facility]. Please see a copy of my visit note below.    New 24 Week Healthy Lifestyle Nutrition Note    Reason for visit: Nutrition Assessment    Elaine Awad is a 48 year old female presenting today for new nutrition assessment consult. Pt is referred by HUMBERTO Burns.    Use of meal replacements: Yes   Number of meal replacements being used per day: 2   - VLC protein shake at breakfast (160 calories)  - Fresh food meal at lunch (300-400 calories)  - VLC protein bar as afternoon snack (160 calories)  - Fresh food meal at dinner (300-400 calories)  Calorie Level: 7518-4420 per day     Current vitamin/mineral supplements: MVI with iron, B12, iron daily (forgets sometimes, tries to take in the evening 3-4x/wk), prilosec.     ANTHROPOMETRICS:   Initial Weight 7/29/19: 256.5 lbs with BMI of 44.39 kg/m   Recent Weight (10/30/19): 262.6 lbs    NUTRITION HISTORY:   - Doesn't like to cook, likes quick and easy meals.  - Used to cook more when children lived at home.       Recent Diet Recall:   Breakfast: yogurt with fruit and granola or rice crispies, + coffee with milk (2%) or half and half.  Lunch: skips lunch if at office (3x/week); sandwich (meat + swiss + fulton + wheat bread); left-overs  Afternoon snack: granola bar  Dinner: Chicken + pasta with cream sauce  Late night snacks: Not usually.   Beverages: Water (48 oz), coffee (1-4 cups with splash of creamer), OJ, tea + honey, 1 can coke (states only thing that helps with migraines).     Dining out: 1x/wk sit-down restaurant on the weekend ([place] last time and had fish)    - Cravings: chocolate, pretzels.   - Eating Habits: \"Eats whatever's around.\" Invests little time in to meal prep. Easily tempted by snack foods when they are " around. Frequently skipping lunch when not at home.     ADDITIONAL INFORMATION:    - Waking at 6:15-6:30 am. Heads to work around 8:30 am. Working until 4:30pm. Works from home 3 days per week. Tends to eat more on days when she is at home.   - Getting ~7 hours sleep per night.    PHYSICAL ACTIVITY:  Daily dog walking (1/2 mile, 10-15 mins walk)    MEDICATIONS FOR WEIGHT LOSS:  Qsymia - Has not started, plans to start soon.    NUTRITION DIAGNOSIS:   Food and nutrition related knowledge deficit r/t lack of prior exposure to nutrition education of meal replacement program aeb pt unable to verbalize understanding of how to use Bariatrix meal replacements for weight loss.    INTERVENTION:  Nutrition Prescription:  Recommended energy/nutrient modification  Calorie modification 1185-8070 calories per day  Volumetrics/The Plate Method  60-90 grams protein per day    Implementation:  Assessed learning needs and learning preferences  Nutrition Education:   - Reviewed meal planning using plate method (higher protein, higher non-starchy veg, limited-to-no starch <1 cup). Discussed keeping frozen vegetables, or low calorie frozen meals (<600 mg Na) available for times when she doesn't have a meal planned, or to make meal planning easier.   - Discussed implementation of exercise  - Provided instruction on practicing mindfulness with eating  - Discussed using journal/phone brigitte to track intake. Discussed using for insight on caloric value of fresh-prepared meals.   - Discussed low iron and vitamin D labs.  - Pt purchased [facility] protein bars in clinic today. RD placed order for Smoothie Box (56 servings) for pt to order from [facility].      GOALS:  1. Start 8424-7736 calories per day  - 2 product/meal replacements + 2 fresh foods at one meal per day  - The one meal per day should have a lean protein, non-starchy vegetables and small about of starches: follow the plate method  - Meals and snacks should be consumed  slowly 20-30 minutes and chew to apple sauce consistency.  - Check out this website for recipe ideas using the products: https://www.[employer]itel/   2. Continue taking multivitamin, and iron (set a reminder on phone to take before bed)  3. Beverage goal - Avoid calorie containing beverages. Reduce coke by 50% over next month, try using black coffee for migraine relief   4. Activity goal - Increase daily walking to 30 minutes.    Patient Understanding: good  Expected Compliance: fair-good  Follow-Up Plans: Mindfulness, meal planning, dietary journal       FOLLOW UP  1 month    TIME SPENT WITH PATIENT: 30 Minutes     Gay Peoples RD, LD    Ewelina Kim RD

## 2019-11-08 ENCOUNTER — HEALTH MAINTENANCE LETTER (OUTPATIENT)
Age: 49
End: 2019-11-08

## 2019-11-11 ENCOUNTER — OFFICE VISIT (OUTPATIENT)
Dept: OPTOMETRY | Facility: CLINIC | Age: 49
End: 2019-11-11
Payer: COMMERCIAL

## 2019-11-11 DIAGNOSIS — Z01.00 ENCOUNTER FOR EXAMINATION OF EYES AND VISION WITHOUT ABNORMAL FINDINGS: Primary | ICD-10-CM

## 2019-11-11 DIAGNOSIS — H52.4 PRESBYOPIA: ICD-10-CM

## 2019-11-11 DIAGNOSIS — H52.13 MYOPIA OF BOTH EYES: ICD-10-CM

## 2019-11-11 PROCEDURE — 92015 DETERMINE REFRACTIVE STATE: CPT | Performed by: OPTOMETRIST

## 2019-11-11 PROCEDURE — 92014 COMPRE OPH EXAM EST PT 1/>: CPT | Performed by: OPTOMETRIST

## 2019-11-11 ASSESSMENT — VISUAL ACUITY
OS_CC: 20/25
OD_SC+: -1
CORRECTION_TYPE: GLASSES
OS_CC: 20/80
OS_SC: 20/40
OD_SC: 20/25
METHOD: SNELLEN - LINEAR
OD_CC: 20/80-1
OD_CC: 20/25

## 2019-11-11 ASSESSMENT — REFRACTION_MANIFEST
OS_SPHERE: -1.50
OD_SPHERE: -1.25
OS_SPHERE: -1.00
METHOD_AUTOREFRACTION: 1
OD_SPHERE: -1.00
OD_ADD: +1.00
OD_CYLINDER: +0.25
OD_AXIS: 102
OS_CYLINDER: SPHERE
OS_CYLINDER: +0.25
OS_AXIS: 064
OS_ADD: +1.00
OD_CYLINDER: SPHERE

## 2019-11-11 ASSESSMENT — REFRACTION_WEARINGRX
OD_AXIS: 090
OS_CYLINDER: +0.25
OS_SPHERE: -0.75
OD_SPHERE: -0.50
SPECS_TYPE: SVL
OS_AXIS: 075
OD_CYLINDER: +0.25

## 2019-11-11 ASSESSMENT — TONOMETRY
OD_IOP_MMHG: 20
IOP_METHOD: APPLANATION
OS_IOP_MMHG: 20

## 2019-11-11 ASSESSMENT — CONF VISUAL FIELD
OD_NORMAL: 1
OS_NORMAL: 1

## 2019-11-11 ASSESSMENT — SLIT LAMP EXAM - LIDS
COMMENTS: NORMAL
COMMENTS: NORMAL

## 2019-11-11 ASSESSMENT — EXTERNAL EXAM - RIGHT EYE: OD_EXAM: NORMAL

## 2019-11-11 ASSESSMENT — EXTERNAL EXAM - LEFT EYE: OS_EXAM: NORMAL

## 2019-11-11 ASSESSMENT — CUP TO DISC RATIO
OD_RATIO: 0.35
OS_RATIO: 0.35

## 2019-11-11 NOTE — PATIENT INSTRUCTIONS
Elaine was advised of today's exam findings.  Fill glasses prescription  Allow 2 weeks to adapt to change in glasses  Copy of glasses Rx provided today. Distance-only, per patient request.   Return in 2 years for eye exam, or sooner if needed.    The effects of the dilating drops last for 4- 6 hours.  You will be more sensitive to light and vision will be blurry up close.  Mydriatic sunglasses were given if needed.      Chance Reynolds O.D.  [facility]  [address] [place] [address]. [place]  [place]  [address]    [phone]

## 2019-11-11 NOTE — LETTER
11/11/2019         RE: Elaine Awad  [address] [facility] [address]        Dear Colleague,    Thank you for referring your patient, Elaine Awad, to the [facility]. Please see a copy of my visit note below.    Chief Complaint   Patient presents with     Annual Eye Exam         Last Eye Exam: 7-  Dilated Previously: Yes    What are you currently using to see?  glasses       Distance Vision Acuity: Noticed gradual change in both eyes    Near Vision Acuity: Satisfied with vision while reading  with glasses    Eye Comfort: good  Do you use eye drops? : No  Occupation or Hobbies: public health nurse    Kacey Uriarte Optometric Assistant, A.B.O.C.          Medical, surgical and family histories reviewed and updated 11/11/2019.       OBJECTIVE: See Ophthalmology exam    ASSESSMENT:    ICD-10-CM    1. Encounter for examination of eyes and vision without abnormal findings Z01.00 EYE EXAM (SIMPLE-NONBILLABLE)   2. Myopia of both eyes H52.13 EYE EXAM (SIMPLE-NONBILLABLE)     REFRACTION   3. Presbyopia H52.4 EYE EXAM (SIMPLE-NONBILLABLE)     REFRACTION      PLAN:     Patient Instructions   Elaine was advised of today's exam findings.  Fill glasses prescription  Allow 2 weeks to adapt to change in glasses  Copy of glasses Rx provided today. Distance-only, per patient request.   Return in 2 years for eye exam, or sooner if needed.    The effects of the dilating drops last for 4- 6 hours.  You will be more sensitive to light and vision will be blurry up close.  Mydriatic sunglasses were given if needed.      Chance Reynolds O.D.  [facility]  [address] [place] [address]. [place]  [address]    [phone]           Again, thank you for allowing me to participate in the care of your patient.        Sincerely,        Chance Reynolds, LARRY

## 2019-11-11 NOTE — PROGRESS NOTES
Chief Complaint   Patient presents with     Annual Eye Exam         Last Eye Exam: 7-  Dilated Previously: Yes    What are you currently using to see?  glasses       Distance Vision Acuity: Noticed gradual change in both eyes    Near Vision Acuity: Satisfied with vision while reading  with glasses    Eye Comfort: good  Do you use eye drops? : No  Occupation or Hobbies: public health nurse    Kacey Uriarte Optometric Assistant, A.B.O.C.          Medical, surgical and family histories reviewed and updated 11/11/2019.       OBJECTIVE: See Ophthalmology exam    ASSESSMENT:    ICD-10-CM    1. Encounter for examination of eyes and vision without abnormal findings Z01.00 EYE EXAM (SIMPLE-NONBILLABLE)   2. Myopia of both eyes H52.13 EYE EXAM (SIMPLE-NONBILLABLE)     REFRACTION   3. Presbyopia H52.4 EYE EXAM (SIMPLE-NONBILLABLE)     REFRACTION      PLAN:     Patient Instructions   Elaine was advised of today's exam findings.  Fill glasses prescription  Allow 2 weeks to adapt to change in glasses  Copy of glasses Rx provided today. Distance-only, per patient request.   Return in 2 years for eye exam, or sooner if needed.    The effects of the dilating drops last for 4- 6 hours.  You will be more sensitive to light and vision will be blurry up close.  Mydriatic sunglasses were given if needed.      Chance Reynolds O.D.  [address]. [place]  [address]    [phone]

## 2019-11-15 ENCOUNTER — OFFICE VISIT (OUTPATIENT)
Dept: ENDOCRINOLOGY | Facility: CLINIC | Age: 49
End: 2019-11-15
Payer: COMMERCIAL

## 2019-11-15 DIAGNOSIS — E66.9 OBESITY: Primary | ICD-10-CM

## 2019-11-15 NOTE — PROGRESS NOTES
LORENZO GUZMAN    Progress Notes    Return Weight Management Health Coaching Note    Health  Visit #: 2    Telephone Visit:     ASSESSMENT:  Initial Weight: 256 lbs 8 oz in July: last weight was 263 lb)     Current Weight (lbs):  NA      Average Daily Water (ounces): 45 oz daily or more  Weight Loss Medication: Qsymia, started?-not picked up yet    Nutrition Plan: (smoothies and bars) Yes   Number of meal replacements being used per day: 2   - VLC protein shake at breakfast (160 calories)  - Fresh food meal at lunch (300-400 calories)  - VLC protein bar as afternoon snack (160 calories)  - Fresh food meal at dinner (300-400 calories)  Calorie Level: 5939-6165 per day       PILLAR(S) DISCUSSED IN THIS VISIT:     [facility] [employer] [facility]; Struggling to go though, lack of motivation. (did the under the line/above the line exercise)     [facility].(pack gym bag)      GOALS:     Will go to the y 2 times this week.     PATIENT EDUCATION PROVIDED:        NOTES: Told pt to call and schedule next hc session for next week.   She said she is not used to talking about herself.      FOLLOW-UP:  To schedule your next visit, please call [phone].      TIME:    30 minutes spent with patient, including charting time.       SIGNATURE:  Lorenzo Guzman  Lead Health   [facility] Weight Managment  [facility] [place]

## 2019-11-15 NOTE — LETTER
11/15/2019       RE: Elaine Awad  [address] [address] [facility] [address]     Dear Colleague,    Thank you for referring your patient, Elaine Awad, to the [facility] MEDICAL WEIGHT MANAGEMENT at [facility]. Please see a copy of my visit note below.    LORENZO GUZMAN    Progress Notes    Return Weight Management Health Coaching Note    Health  Visit #: 2    Telephone Visit:     ASSESSMENT:  Initial Weight: 256 lbs 8 oz in July: last weight was 263 lb)     Current Weight (lbs):  NA      Average Daily Water (ounces): 45 oz daily or more  Weight Loss Medication: Qsymia, started?-not picked up yet    Nutrition Plan: (smoothies and bars) Yes   Number of meal replacements being used per day: 2   - VLC protein shake at breakfast (160 calories)  - Fresh food meal at lunch (300-400 calories)  - VLC protein bar as afternoon snack (160 calories)  - Fresh food meal at dinner (300-400 calories)  Calorie Level: 0825-6044 per day       PILLAR(S) DISCUSSED IN THIS VISIT:     [facility] [employer] [facility]; Struggling to go though, lack of motivation. (did the under the line/above the line exercise)     [facility] per [facility] is my [facility].(pack gym bag)      GOALS:     Will go to the y 2 times this week.     PATIENT EDUCATION PROVIDED:        NOTES: Told pt to call and schedule next hc session for next week.   She said she is not used to talking about herself.      FOLLOW-UP:  To schedule your next visit, please call [phone].      TIME:    30 minutes spent with patient, including charting time.       SIGNATURE:  Lorenzo Guzman  Lead Health   [facility]  [facility] [place]

## 2019-11-26 ENCOUNTER — ALLIED HEALTH/NURSE VISIT (OUTPATIENT)
Dept: NURSING | Facility: CLINIC | Age: 49
End: 2019-11-26
Payer: COMMERCIAL

## 2019-11-26 DIAGNOSIS — Z23 NEED FOR PROPHYLACTIC VACCINATION AND INOCULATION AGAINST INFLUENZA: Primary | ICD-10-CM

## 2019-11-26 PROCEDURE — 90471 IMMUNIZATION ADMIN: CPT

## 2019-11-26 PROCEDURE — 90686 IIV4 VACC NO PRSV 0.5 ML IM: CPT

## 2019-11-26 PROCEDURE — 99207 ZZC NO CHARGE NURSE ONLY: CPT

## 2019-12-05 ENCOUNTER — TELEPHONE (OUTPATIENT)
Dept: SURGERY | Facility: CLINIC | Age: 49
End: 2019-12-05

## 2019-12-10 ENCOUNTER — ANCILLARY PROCEDURE (OUTPATIENT)
Dept: ULTRASOUND IMAGING | Facility: CLINIC | Age: 49
End: 2019-12-10
Attending: OBSTETRICS & GYNECOLOGY
Payer: COMMERCIAL

## 2019-12-10 DIAGNOSIS — N83.202 BILATERAL OVARIAN CYSTS: ICD-10-CM

## 2019-12-10 DIAGNOSIS — N83.201 BILATERAL OVARIAN CYSTS: ICD-10-CM

## 2019-12-10 PROCEDURE — 76856 US EXAM PELVIC COMPLETE: CPT

## 2019-12-10 PROCEDURE — 76830 TRANSVAGINAL US NON-OB: CPT

## 2019-12-13 ENCOUNTER — ANCILLARY PROCEDURE (OUTPATIENT)
Dept: MAMMOGRAPHY | Facility: CLINIC | Age: 49
End: 2019-12-13
Attending: FAMILY MEDICINE
Payer: COMMERCIAL

## 2019-12-13 DIAGNOSIS — Z12.31 VISIT FOR SCREENING MAMMOGRAM: ICD-10-CM

## 2019-12-13 PROCEDURE — 77067 SCR MAMMO BI INCL CAD: CPT | Mod: TC

## 2020-01-08 ENCOUNTER — TELEPHONE (OUTPATIENT)
Dept: SURGERY | Facility: CLINIC | Age: 50
End: 2020-01-08

## 2020-01-16 ENCOUNTER — OFFICE VISIT (OUTPATIENT)
Dept: OBGYN | Facility: CLINIC | Age: 50
End: 2020-01-16
Payer: COMMERCIAL

## 2020-01-16 VITALS
HEART RATE: 71 BPM | WEIGHT: 266.9 LBS | DIASTOLIC BLOOD PRESSURE: 93 MMHG | BODY MASS INDEX: 46.19 KG/M2 | SYSTOLIC BLOOD PRESSURE: 146 MMHG | OXYGEN SATURATION: 94 %

## 2020-01-16 DIAGNOSIS — N92.0 MENORRHAGIA WITH REGULAR CYCLE: ICD-10-CM

## 2020-01-16 DIAGNOSIS — D50.0 IRON DEFICIENCY ANEMIA DUE TO CHRONIC BLOOD LOSS: Primary | ICD-10-CM

## 2020-01-16 PROCEDURE — 99213 OFFICE O/P EST LOW 20 MIN: CPT | Performed by: OBSTETRICS & GYNECOLOGY

## 2020-01-16 RX ORDER — ACETAMINOPHEN AND CODEINE PHOSPHATE 120; 12 MG/5ML; MG/5ML
0.35 SOLUTION ORAL DAILY
Qty: 84 TABLET | Refills: 3 | Status: SHIPPED | OUTPATIENT
Start: 2020-01-16 | End: 2020-10-19

## 2020-01-16 NOTE — PROGRESS NOTES
"Elaine is a 48 year old female, , who presents today for follow up of abnormal uterine bleeding.    As noted previously,  she has had heavy bleeding when her menses start.  She has bleeding for 6 days and she bleeds through 1.5 boxes of tampons a month.  She will bleed through both tampons and pads.  She has had clots, the size of a golf ball.  She has anemia and she has been on Fe supplementation and her hgb has increased.    She has some lightheadedness but no dizziness.  She has had associated fatigue.  No shortness of breath.    She has had this type of bleeding \"for a while.\"    She has not noted any aggravating or alleviating factors.     She has Fe deficiency anemia, which has responded to Fe supplementation    Component      Latest Ref Rng & Units 2019   WBC      4.0 - 11.0 10e9/L 5.8     RBC Count      3.8 - 5.2 10e12/L 4.32     Hemoglobin      11.7 - 15.7 g/dL 7.8 (LL) 9.0 (L) 13.0   Hematocrit      35.0 - 47.0 % 27.9 (L)     Platelet Count      150 - 450 10e9/L 342     Iron      35 - 180 ug/dL 13 (L)     Iron Binding Cap      240 - 430 ug/dL 462 (H)     Iron Saturation Index      15 - 46 % 3 (L)     Vitamin B12      193 - 986 pg/mL 247     Ferritin      8 - 252 ng/mL 2 (L)             She had an ultrasound last  and then last month.  We reviewed the October ultrasound previously, and the December ultrasound is reviewed (films and report) this visit.      PELVIC ULTRASOUND PELVIC ULTRASOUND WITH ENDOVAGINAL TRANSDUCER  December 10, 2019 8:05 AM   HISTORY: Follow up ultrasound for complex ovarian cysts. Bilateral ovarian cysts.   TECHNIQUE: Endovaginal sonography was added to the transabdominal exam.  COMPARISON: Ultrasound dated 10/21/2019.  FINDINGS:    Endometrium: Endometrial stripe is 5 mm thick.   Uterus: Uterus is normal in size, shape and position measuring 8.0 x 4.3 x 4.6 cm. Small fibroid in the posterior upper uterine body measuring 1.4 x 0.7 x 1.2 cm.  Right " ovary: 2.8 x 1.5 x 2.1 cm. Few small follicles measuring less than 2 cm.  Left ovary: 1.8 x 2.1 x 2.1 cm. Few small follicles measuring less than 2 cm.  Additional findings: None.                                                               IMPRESSION:    1. Small uterine fibroid.  2. Endometrium measures 5 mm.  3. Ovaries unremarkable containing a few small follicles which require no further evaluation.    Her EMB from 10/30/2019 is as follows:    Endometrium, biopsy:   - Multiple fragments of benign secretory endometrium, negative for   hyperplasia, atypia and malignancy.         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     GERD (gastroesophageal reflux disease)      Iron deficiency anemia      Menorrhagia      Migraine      Morbid obesity (H)      Recurrent cold sores      Vulvar dermatitis 10/14/2010     Yeast infection of the vagina, recurrent         Past Surgical History:   Procedure Laterality Date     C TREAT ECTOPIC PREG,RMV TUBE/OVARY      LT     COLPOSCOPY,BX CERVIX/ENDOCERV CURR  7/1994     ESOPHAGOSCOPY, GASTROSCOPY, DUODENOSCOPY (EGD), COMBINED  1/2/2014    Procedure: COMBINED ESOPHAGOSCOPY, GASTROSCOPY, DUODENOSCOPY (EGD);;  Surgeon: Eden Stacy MD;  Location:  GI     ESOPHAGOSCOPY, GASTROSCOPY, DUODENOSCOPY (EGD), COMBINED N/A 1/19/2017    Procedure: COMBINED ESOPHAGOSCOPY, GASTROSCOPY, DUODENOSCOPY (EGD);  Surgeon: Ciro Deras MD;  Location:  OR     LAPAROSCOPIC GASTRIC ADJUSTABLE BANDING  4/28/2014    Procedure: LAPAROSCOPIC GASTRIC ADJUSTABLE BANDING;  Surgeon: Ciro Deras MD;  Location:  OR     LAPAROSCOPIC HERNIORRHAPHY HIATAL  4/28/2014    Procedure: LAPAROSCOPIC HERNIORRHAPHY HIATAL;  Surgeon: Ciro Deras MD;  Location:  OR     LAPAROSCOPIC REMOVAL GASTRIC ADJUSTABLE BAND N/A 1/19/2017     Procedure: LAPAROSCOPIC REMOVAL GASTRIC ADJUSTABLE BAND;  Surgeon: Ciro Deras MD;  Location: [facility] OR     LITHOTRIPSY          Allergies   Allergen Reactions     Ampicillin Swelling              Current Outpatient Medications   Medication Sig Dispense Refill     cetirizine (ZYRTEC) 10 MG tablet Take 10 mg by mouth daily as needed        cyanocobalamin (VITAMIN B-12) 1000 MCG tablet Take 1 tablet (1,000 mcg) by mouth daily       ferrous sulfate (FE TABS) 325 (65 Fe) MG EC tablet Take 1 tablet (325 mg) by mouth daily       nystatin (MYCOSTATIN) 827840 UNIT/GM POWD Apply to affected area under breasts TID as needed 60 g 11     omeprazole (PRILOSEC) 20 MG DR capsule TAKE 1 CAPSULE BY MOUTH EVERY DAY 90 capsule 2     SUMAtriptan (IMITREX) 50 MG tablet TAKE 2 TABLETS BY MOUTH AT ONSET OF MIGRAINE, MAY REPEAT IN 2 HOURS 9 tablet 1     fexofenadine (ALLEGRA) 180 MG tablet Take 180 mg by mouth daily       topiramate (TOPAMAX) 25 MG tablet 25mg at bedtime for week 1, 50mg at bedtime for 1 week, and 75mg at bedtime thereafter 90 tablet 3     valACYclovir (VALTREX) 500 MG tablet TAKE 1 TABLET BY MOUTH DAILY. INCREASE TO 4 TABLET BY MOUTH 2 TIMES DAILY AT ONSET OF OUTBREAK 100 tablet 0       Social History     Socioeconomic History     Marital status:      Spouse name: Mohinder     Number of children: 2     Years of education: 16     Highest education level: Not on file   Occupational History     Occupation: RN     Employer: [employer]   Social Needs     Financial resource strain: Not on file     Food insecurity:     Worry: Not on file     Inability: Not on file     Transportation needs:     Medical: Not on file     Non-medical: Not on file   Tobacco Use     Smoking status: Former Smoker     Packs/day: 0.50     Years: 10.00     Pack years: 5.00     Types: Cigarettes     Last attempt to quit: 1997     Years since quittin.1     Smokeless tobacco: Never Used   Substance and Sexual Activity      Alcohol use: Yes     Alcohol/week: 0.0 - 0.8 standard drinks     Comment: occasional     Drug use: No     Sexual activity: Yes     Partners: Male     Birth control/protection: None   Lifestyle     Physical activity:     Days per week: Not on file     Minutes per session: Not on file     Stress: Not on file   Relationships     Social connections:     Talks on phone: Not on file     Gets together: Not on file     Attends Mandaen service: Not on file     Active member of club or organization: Not on file     Attends meetings of clubs or organizations: Not on file     Relationship status: Not on file     Intimate partner violence:     Fear of current or ex partner: Not on file     Emotionally abused: Not on file     Physically abused: Not on file     Forced sexual activity: Not on file   Other Topics Concern     Parent/sibling w/ CABG, MI or angioplasty before 65F 55M? 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 pancreatic, melanoma      Colon Polyps Father      Diabetes Mother      C.A.D. Mother      Breast Cancer Maternal Grandmother      Heart Disease Maternal Grandfather         CHF     Heart Disease Paternal Grandmother         CHF     Colon Polyps Paternal Grandmother      Respiratory Paternal Grandfather         TB     Obesity Brother      Glaucoma No family hx of      Macular Degeneration No family hx of          Review of Systems:  10 point ROS of systems including Constitutional, Eyes, Respiratory, Cardiovascular, Gastroenterology, Genitourinary, Integumentary, Muscularskeletal, Psychiatric were all negative  except for pertinent positives noted in my HPI and in the PMH.      Exam  BP (!) 146/93 (BP Location: Right arm, Cuff Size: Adult Large)   Pulse 71   Wt 121.1 kg (266 lb 14.4 oz)   LMP 12/23/2019 (Exact Date)   SpO2 94%   BMI 46.19 kg/m    General:  WNWD female, NAD  Alert  Oriented x 3  Gait:  Normal  Skin:  Normal skin turgor  HEENT:  NC/AT, EOMI  Abdomen:  Non-tender, non-distended.  Pelvic exam:  Not performed  Extremities:  No clubbing, no cyanosis and no edema.        Assessment  Menorrhagia with regular cycles  Fe deficiency anemia, secondary to blood loss  Migraine headaches (with auras?)        Plan  The patient and I reviewed her past workup including the ultrasounds and the EMB pathology results.  She desires to use medical management, rather than a surgical approach at this time.  Since she has migraine headaches (which sound as if they are with auras), the combination products would be risky and not indicated.  She is interested in using the Progestin only products and oral is good for her.  The oral progestin only pills are reviewed, how to take them and the side effects especially the breakthrough bleeding.  To minimize the BTB, she should plan to take the pills at almost exactly every 24 hours.    I would consider returning in about 3 months for OCP check.   Questions seem to be answered.     Filipe Muñoz MD

## 2020-01-30 ENCOUNTER — OFFICE VISIT (OUTPATIENT)
Dept: ENDOCRINOLOGY | Facility: CLINIC | Age: 50
End: 2020-01-30
Payer: COMMERCIAL

## 2020-01-30 ENCOUNTER — OFFICE VISIT (OUTPATIENT)
Dept: SURGERY | Facility: CLINIC | Age: 50
End: 2020-01-30
Payer: COMMERCIAL

## 2020-01-30 VITALS
HEART RATE: 80 BPM | WEIGHT: 267.4 LBS | SYSTOLIC BLOOD PRESSURE: 139 MMHG | OXYGEN SATURATION: 99 % | BODY MASS INDEX: 45.65 KG/M2 | DIASTOLIC BLOOD PRESSURE: 80 MMHG | HEIGHT: 64 IN

## 2020-01-30 DIAGNOSIS — E66.01 MORBID OBESITY, UNSPECIFIED OBESITY TYPE (H): ICD-10-CM

## 2020-01-30 DIAGNOSIS — E66.9 OBESITY: Primary | ICD-10-CM

## 2020-01-30 DIAGNOSIS — E66.01 MORBID OBESITY (H): ICD-10-CM

## 2020-01-30 RX ORDER — TOPIRAMATE 25 MG/1
TABLET, FILM COATED ORAL
Qty: 90 TABLET | Refills: 3 | Status: SHIPPED | OUTPATIENT
Start: 2020-01-30 | End: 2020-02-13 | Stop reason: SINTOL

## 2020-01-30 ASSESSMENT — PAIN SCALES - GENERAL: PAINLEVEL: NO PAIN (0)

## 2020-01-30 ASSESSMENT — MIFFLIN-ST. JEOR: SCORE: 1822.92

## 2020-01-30 NOTE — PATIENT INSTRUCTIONS
Nutrition Goals  1. Aim for 2925-3818 calories per day  - List food/meals/drinks in food journal to help with mindfulness  - Example meal plan   - Breakfast:   - Lunch: Open faced sandwich (swiss + roast beef/chicken + mustard + 1 pc whole wheat bread)   - Dinner: Lean cuisine   - Snack: non-starchy vegetable, or small pc of fruit (or 1/2 cup)  - Focus on lean protein and non-starchy vegetables and small about of starches for meals you cook yourself: follow the plate method  - Meals and snacks should be consumed slowly 20-30 minutes and chew to apple sauce consistency.  - Check out this website for recipe ideas using the products: https://www.[employer]/   2. Continue taking multivitamin, and iron (set a reminder on phone to take in the afternoon)  3. Beverage goal - Continue calorie containing beverages.  4. Activity goal - Increase, aiming to go to gym for 2 days per week for at least 30 mins.    Follow-up with RD in one month    Gay Peoples RD, LD  If you would like to schedule or reschedule an appointment with the RD, please call [phone]

## 2020-01-30 NOTE — PATIENT INSTRUCTIONS
"GOALS:      1.) Call ways to [facility], [phone] to schedule cooking and exercise classes.     2.) I will go to the [employer] on Tuesdays, right after work.     Try both forms of self talk:    '[place] up, and go Linda. Get over it. Just do it.\"    \"it's ok. I don't want to go, but I know I will feel better after. I can relax in the hot tub after I walk.\"    Reminder: If I don't go, what is my plan? (examples, I can go another day; I can focus more on my eating right now, I will try again next week.       "

## 2020-01-30 NOTE — PROGRESS NOTES
"LORENZO GUZMAN     Progress Notes    Return Weight Management Health Coaching Note    Elaine Awad          MRN: [id number]  : 1970  KEAGAN: 2020    Health  Visit #: 3  Type of Visit: In-person    ASSESSMENT:  Initial Weight:  256 lbs 8 oz in July    Current Weight (lbs): 267 lbs 6.4 oz   She has gained 11 lbs.      Average Daily Water (ounces): 48 oz   Weight Loss Medication: Start topiramate; ramp to 75 mg  Nutrition Plan: Yes   Number of meal replacements being used per day: 1 (bar for snack)    - Fresh food meal at breakfast and  lunch (300-400 calories)  - VLC protein bar as afternoon snack (160 calories)  - Fresh food meal at dinner (300-400 calories)  Calorie Level: 1396-8654 per day   Method Products Purchased: NA    NTERVAL HISTORY:  24 week plan follow up.  Started 2019 and didn't continue due to anemia and heavy periods.  Anemia improved but still having heavy periods. She is on birth control pill for the last 2 weeks.  Restarted  with HC and RD visit  Seeing HC and RD again today  Grad date May     She has struggled to stay motivated.  She didn't start topiramate and follow up since November.  She is ready to get on track.    Cooking Class: Needs to schedule  Exercise Class: Needs to schedule  Healthcare Provider: Klarissa GUILLERMO(S) DISCUSSED IN THIS VISIT:       Exercise; wants to be at 7/8.  Consistency; 3 times would be my reach goal.     [place] location; has a walking track.        GOALS:      1.) Call [facility] to [facility], [phone] to schedule cooking and exercise classes.     2.) I will go to the [facility] on , right after work.     Try both forms of self talk:    '[place] up, and go Linda. Get over it. Just do it.\"    \"it's ok. I don't want to go, but I know I will feel better after. I can relax in the hot tub after I walk.\"    Reminder: If I don't go, what is my plan? (examples, I can go another day; I can focus more on my eating right now, I " will try again next week.     NOTES:    Will do phone hc sessions, and in person when here to RD. Make more hc and RD sessions in lobby.      FOLLOW-UP:  To schedule your next visit, please call [phone].      TIME:  30 minutes     SIGNATURE:  Jayda Tom, Certified Health  on 1/30/2020 at 1:37 PM

## 2020-01-30 NOTE — ASSESSMENT & PLAN NOTE
1/30/20  ASSESSMENT:   24 week follow up.  She has struggled to stay motivated.  She didn't start topiramate and follow up since November.  She is ready to get on track.    PLAN:   Start topiramate ramp to 75mg  See RD and HC today and per 24 week plan  Schedule cooking classes asap  See Layla Cyr in May

## 2020-01-30 NOTE — PROGRESS NOTES
Return Medical Weight Management Note     Elaine Awad  MRN:  [id number]  :  1970  KEAGAN:  2020    Dear Dr Fernanda Rodriguez,    I had the pleasure of seeing your patient Elaine Awad. She is a 49 year old female who I am continuing to see for treatment of obesity related to:       2019   I have the following health issues associated with obesity: GERD (Reflux), Weight Bearing Joint Pain   I have the following symptoms associated with obesity: Knee Pain, GERD (Reflux)       ASSESSMENT:   24 week follow up.  She has struggled to stay motivated.  She didn't start topiramate and follow up since November.  She is ready to get on track.    PLAN:     Start topiramate ramp to 75mg  See RD and HC today and per 24 week plan  Schedule cooking classes asap  See Layla Cyr in May     INTERVAL HISTORY:  24 week plan follow up.  Started 2019 and didn't continue due to anemia and heavy periods.  Anemia improved but still having heavy periods. She is on birth control pill for the last 2 weeks.  Restarted  with HC and RD visit  Seeing HC and RD again today  Grad date May    Weight loss: Starting weight 256 lbs  Weight today 267 lbs 6.4 oz   She has gained 11 lbs.    Exercise: has [employer] membership but not motivated    Cooking class: hasn't sign up    Exercise class: hasn't signed up    Cook book: got cookbook and she doesn't cook    Journal: uses brigitte on phone, doesn't use journal    Sleep changes: sleeps fine    Stress: working long hours and stressful    Products per day: Not using meal replacements, didn't like the taste    Health /dietitian visits: 2 HC visits, 1 RD visit    Anti obesity Medications: never started weight loss medications    CURRENT WEIGHT:   267 lbs 6.4 oz    Initial Weight: 116.3 kg (256 lb 8 oz)  Last Visits Weight: 116.3 kg (256 lb 8 oz)  Cumulative weight loss (lbs): -10.9  Weight Loss Percentage: 0.96%  Waist Circumference (cm): 141 cm    MEDICATIONS:   Current Outpatient  "Medications   Medication Sig Dispense Refill     cetirizine (ZYRTEC) 10 MG tablet Take 10 mg by mouth daily as needed        cyanocobalamin (VITAMIN B-12) 1000 MCG tablet Take 1 tablet (1,000 mcg) by mouth daily       ferrous sulfate (FE TABS) 325 (65 Fe) MG EC tablet Take 1 tablet (325 mg) by mouth daily       fexofenadine (ALLEGRA) 180 MG tablet Take 180 mg by mouth daily       norethindrone (MICRONOR) 0.35 MG tablet Take 1 tablet (0.35 mg) by mouth daily 84 tablet 3     nystatin (MYCOSTATIN) 750704 UNIT/GM POWD Apply to affected area under breasts TID as needed 60 g 11     omeprazole (PRILOSEC) 20 MG DR capsule TAKE 1 CAPSULE BY MOUTH EVERY DAY 90 capsule 2     SUMAtriptan (IMITREX) 50 MG tablet TAKE 2 TABLETS BY MOUTH AT ONSET OF MIGRAINE, MAY REPEAT IN 2 HOURS 9 tablet 1     valACYclovir (VALTREX) 500 MG tablet TAKE 1 TABLET BY MOUTH DAILY. INCREASE TO 4 TABLET BY MOUTH 2 TIMES DAILY AT ONSET OF OUTBREAK 100 tablet 0        VITALS:  /80 (BP Location: Left arm, Patient Position: Sitting, Cuff Size: Adult Large)   Pulse 80   Ht 1.626 m (5' 4\")   Wt 121.3 kg (267 lb 6.4 oz)   SpO2 99%   BMI 45.90 kg/m        Time: 15 min spent on evaluation, management, counseling, education, & motivational interviewing with greater than 50 % of the total time was spent on counseling and coordinating care    Sincerely,    Layla Cyr PA-C  "

## 2020-01-30 NOTE — PROGRESS NOTES
"24 Week Healthy Lifestyle Nutrition Note    Reason for visit: Nutrition reassessment    Elaine Awad is a 48 year old female presenting today for follow-up nutrition assessment consult. Pt is referred by HUMBERTO Burns.    Use of meal replacements: Yes   Number of meal replacements being used per day: 1  - VLC protein bar at breakfast (160 calories)  - Fresh food meal at lunch (300-400 calories)  - Fresh food meal at dinner (300-400 calories)  - Snack: non-starchy vegetable or small pc of fruit  Calorie Level: 2331-3241 per day     Current vitamin/mineral supplements: MVI with iron, B12, iron daily (upset stomach, taking 3-4 days per week), prilosec.     ANTHROPOMETRICS:   Initial Weight 7/29/19: 256.5 lbs with BMI of 44.39 kg/m   Recent Weight (10/30/19): 262.6 lbs  Current weight: 267.4 lbs (+4.8 in the past 3 months, +10.9 lbs from initial weight)    NUTRITION HISTORY:   - Doesn't like to cook, likes quick and easy meals.  - Used to cook more when children lived at home.     - Did not like the smoothies. Felt bars were ok.    - Cravings: chocolate, pretzels.   - Eating Habits: \"Eats whatever's around.\" Invests little time in to meal prep. Easily tempted by snack foods when they are around. Frequently skipping lunch when not at home.     Recent Diet Recall:  Breakfast: Skips; granola bar with coffee (and low fat milk)  Lunch: Skips; sandwich (swiss + roast beef/chicken + fulton + 2 pc whole wheat bread)   Dinner: Lean cuisine, chicken + vegetables  Snacks: low fat yopliat (vanilla + fruit); strawberries, raspberries, bananas  Beverages: Water (48 oz), coffee in am, tea (sleepy time tea without sweetener)  Dining Out: Once week - sit-down restaurants (pizza, hamburger)    Progress Toward Previous Goals:  1. Start 7185-8630 calories per day - Not met, states she has never been able to follow through with tracking  - 2 product/meal replacements + 2 fresh foods at one meal per day  - The one meal per day should " have a lean protein, non-starchy vegetables and small about of starches: follow the plate method  - Meals and snacks should be consumed slowly 20-30 minutes and chew to apple sauce consistency. - Not consciously doing this   - Check out this website for recipe ideas using the products: https://www.[employer]/   2. Continue taking multivitamin, and iron (set a reminder on phone to take before bed) - Met  3. Beverage goal - Avoid calorie containing beverages. Reduce coke by 50% over next month, try using black coffee for migraine relief - Met, continues  4. Activity goal - Increase daily walking to 30 minutes. - Not met    ADDITIONAL INFORMATION:    - Waking at 6:15-6:30 am. Heads to work around 8:30 am. Working until 4:30pm. Works from home 3 days per week. Tends to eat more on days when she is at home.   - Getting ~7 hours sleep per night.  - Pt with with h/o anemia     PHYSICAL ACTIVITY:  Daily dog walking (1/2 mile, 10-15 mins walk). [employer] membership - going once per week for 30-60 mins (treadmill, stationary bikes, occ weight).     MEDICATIONS FOR WEIGHT LOSS:  Qsymia - Has not started, plans to start soon.    NUTRITION DIAGNOSIS:   Food and nutrition related knowledge deficit r/t lack of prior exposure to nutrition education of meal replacement program aeb pt unable to verbalize understanding of how to use Bariatrix meal replacements for weight loss.    INTERVENTION:  Nutrition Prescription:  Recommended energy/nutrient modification  Calorie modification 1017-3535 calories per day  Volumetrics/The Plate Method  60-90 grams protein per day    Implementation:  Assessed learning needs and learning preferences  Nutrition Education: Reviewed meal planning using plate method (encouraged pt o focus on lean protein and non-starchy vegetables at meals, with a small amount of starches if needed). Pt does not invest a lot of time in cooking. Discussed continuing to use meal replacements she likes for convenience.  Pt to use VLC protein bar, and low calorie frozen meals for 1-2 meals per day. Pt would like to increase weekly exercise to 2 days/wk. Discussed mindful eating and encouraged pt to use food journal to help with accountability. Pt purchased VLC protein bars in clinic today. Provided pt with list of goals and RD contact info.      GOALS:  1. Aim for 8775-3973 calories per day  - List food/meals/drinks in food journal to help with mindfulness  - Example meal plan   - Breakfast: Protein bar   - Lunch: Open faced sandwich (swiss + roast beef/chicken + mustard + 1 pc whole wheat bread)   - Dinner: Lean cuisine   - Snack: non-starchy vegetable, or small pc of fruit (or 1/2 cup)  - Focus on lean protein and non-starchy vegetables and small about of starches for meals you cook yourself: follow the plate method  - Meals and snacks should be consumed slowly 20-30 minutes and chew to apple sauce consistency.  - Check out this website for recipe ideas using the products: https://www.[employer]/   2. Continue taking multivitamin, and iron (set a reminder on phone to take in the afternoon)  3. Beverage goal - Continue calorie containing beverages.  4. Activity goal - Increase, aiming to go to gym for 2 days per week for at least 30 mins.    Patient Understanding: good  Expected Compliance: fair-good  Follow-Up Plans: Mindfulness, meal planning, dietary journal       FOLLOW UP  1 month    TIME SPENT WITH PATIENT: 30 Minutes     Gay Peoples, EMILY, LD

## 2020-01-30 NOTE — PATIENT INSTRUCTIONS
Start topiramate ramp to 75mg  See RD and HC today and per 24 week plan  Schedule cooking classes asap  See Layla Cyr in May

## 2020-01-30 NOTE — LETTER
2020       RE: Elaine Awad  [address] [facility] [address]     Dear Colleague,    Thank you for referring your patient, Elaine Awad, to the [facility] MEDICAL WEIGHT MANAGEMENT at [facility]. Please see a copy of my visit note below.    LORENZO GUZMAN     Progress Notes    Return Weight Management Health Coaching Note    Elaine Awad          MRN: [id number]  : 1970  KEAGAN: 2020    Health  Visit #: 3  Type of Visit: In-person    ASSESSMENT:  Initial Weight:  256 lbs 8 oz in July    Current Weight (lbs): 267 lbs 6.4 oz   She has gained 11 lbs.      Average Daily Water (ounces): 48 oz   Weight Loss Medication: Start topiramate; ramp to 75 mg  Nutrition Plan: Yes   Number of meal replacements being used per day: 1 (bar for snack)    - Fresh food meal at breakfast and  lunch (300-400 calories)  - VLC protein bar as afternoon snack (160 calories)  - Fresh food meal at dinner (300-400 calories)  Calorie Level: 9700-2003 per day   Method Products Purchased: NA    NTERVAL HISTORY:  24 week plan follow up.  Started 2019 and didn't continue due to anemia and heavy periods.  Anemia improved but still having heavy periods. She is on birth control pill for the last 2 weeks.  Restarted  with HC and RD visit  Seeing HC and RD again today  Grad date May     She has struggled to stay motivated.  She didn't start topiramate and follow up since November.  She is ready to get on track.    Cooking Class: Needs to schedule  Exercise Class: Needs to schedule  Healthcare Provider: Klarissa GUILLERMO(S) DISCUSSED IN THIS VISIT:       Exercise; wants to be at 7/8.  Consistency; 3 times would be my reach goal.     [place] location; has a walking track.        GOALS:      1.) Call [employer] to [facility], [phone] to schedule cooking and exercise classes.     2.) [facility] , right after [facility].     Try both forms of self  "talk:    'Bentley up, and go Linda. Get over it. Just do it.\"    \"it's ok. I don't want to go, but I know I will feel better after. I can relax in the hot tub after I walk.\"    Reminder: If I don't go, what is my plan? (examples, I can go another day; I can focus more on my eating right now, I will try again next week.     NOTES:    Will do phone hc sessions, and in person when here to RD. Make more hc and RD sessions in [facility].      FOLLOW-UP:  To schedule your next visit, please call [phone].      TIME:  30 minutes     SIGNATURE:  Jayda Tom, Certified Health  on 1/30/2020 at 1:37 PM      "

## 2020-01-30 NOTE — NURSING NOTE
"(   Chief Complaint   Patient presents with     RECHECK     Follow up 24 weeks.    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     Yeast infection of the vagina, recurrent      Recurrent cold sores     Morbid obesity (H)     Eczema     Primary osteoarthritis of right knee     Intertrigo     Nonallergic rhinitis     B12 deficiency     Menorrhagia     History of anemia    )  (   Current Outpatient Medications   Medication Sig Dispense Refill     cetirizine (ZYRTEC) 10 MG tablet Take 10 mg by mouth daily as needed        cyanocobalamin (VITAMIN B-12) 1000 MCG tablet Take 1 tablet (1,000 mcg) by mouth daily       ferrous sulfate (FE TABS) 325 (65 Fe) MG EC tablet Take 1 tablet (325 mg) by mouth daily       fexofenadine (ALLEGRA) 180 MG tablet Take 180 mg by mouth daily       norethindrone (MICRONOR) 0.35 MG tablet Take 1 tablet (0.35 mg) by mouth daily 84 tablet 3     nystatin (MYCOSTATIN) 161305 UNIT/GM POWD Apply to affected area under breasts TID as needed 60 g 11     omeprazole (PRILOSEC) 20 MG DR capsule TAKE 1 CAPSULE BY MOUTH EVERY DAY 90 capsule 2     SUMAtriptan (IMITREX) 50 MG tablet TAKE 2 TABLETS BY MOUTH AT ONSET OF MIGRAINE, MAY REPEAT IN 2 HOURS 9 tablet 1     valACYclovir (VALTREX) 500 MG tablet TAKE 1 TABLET BY MOUTH DAILY. INCREASE TO 4 TABLET BY MOUTH 2 TIMES DAILY AT ONSET OF OUTBREAK 100 tablet 0    )  ( Diabetes Eval:    )    ( Pain Eval:  No Pain " (0) )    ( Wound Eval:       )    (   History   Smoking Status     Former Smoker     Packs/day: 0.50     Years: 10.00     Types: Cigarettes     Quit date: 12/1/1997   Smokeless Tobacco     Never Used    )    ( Signed By:  Elyssa Frederick EMT; January 30, 2020; 1:04 PM )

## 2020-01-30 NOTE — LETTER
"1/30/2020       RE: Elaine Awad  [address] [facility] [address]     Dear Colleague,    Thank you for referring your patient, Elaine Awad, to the [facility] SURGICAL WEIGHT MANAGEMENT at [facility]. Please see a copy of my visit note below.    24 Week Healthy Lifestyle Nutrition Note    Reason for visit: Nutrition reassessment    Elaine Awad is a 48 year old female presenting today for follow-up nutrition assessment consult. Pt is referred by HUMBERTO Burns.    Use of meal replacements: Yes   Number of meal replacements being used per day: 1  - VLC protein bar at breakfast (160 calories)  - Fresh food meal at lunch (300-400 calories)  - Fresh food meal at dinner (300-400 calories)  - Snack: non-starchy vegetable or small pc of fruit  Calorie Level: 7907-6264 per day     Current vitamin/mineral supplements: MVI with iron, B12, iron daily (upset stomach, taking 3-4 days per week), prilosec.     ANTHROPOMETRICS:   Initial Weight 7/29/19: 256.5 lbs with BMI of 44.39 kg/m   Recent Weight (10/30/19): 262.6 lbs  Current weight: 267.4 lbs (+4.8 in the past 3 months, +10.9 lbs from initial weight)    NUTRITION HISTORY:   - Doesn't like to cook, likes quick and easy meals.  - Used to cook more when children lived at home.     - Did not like the smoothies. Felt bars were ok.    - Cravings: chocolate, pretzels.   - Eating Habits: \"Eats whatever's around.\" Invests little time in to meal prep. Easily tempted by snack foods when they are around. Frequently skipping lunch when not at home.     Recent Diet Recall:  Breakfast: Skips; granola bar with coffee (and low fat milk)  Lunch: Skips; sandwich (swiss + roast beef/chicken + fulton + 2 pc whole wheat bread)   Dinner: Lean cuisine, chicken + vegetables  Snacks: low fat yopliat (vanilla + fruit); strawberries, raspberries, bananas  Beverages: Water (48 oz), coffee in am, tea (sleepy time tea without sweetener)  Dining Out: Once week " - sit-down restaurants (pizza, hamburger)    Progress Toward Previous Goals:  1. Start 0271-6688 calories per day - Not met, states she has never been able to follow through with tracking  - 2 product/meal replacements + 2 fresh foods at one meal per day  - The one meal per day should have a lean protein, non-starchy vegetables and small about of starches: follow the plate method  - Meals and snacks should be consumed slowly 20-30 minutes and chew to apple sauce consistency. - Not consciously doing this   - Check out this website for recipe ideas using the products: https://www.HackMyPic/   2. Continue taking multivitamin, and iron (set a reminder on phone to take before bed) - Met  3. Beverage goal - Avoid calorie containing beverages. Reduce coke by 50% over next month, try using black coffee for migraine relief - Met, continues  4. Activity goal - Increase daily walking to 30 minutes. - Not met    ADDITIONAL INFORMATION:    - Waking at 6:15-6:30 am. Heads to work around 8:30 am. Working until 4:30pm. Works from home 3 days per week. Tends to eat more on days when she is at home.   - Getting ~7 hours sleep per night.  - Pt with with h/o anemia     PHYSICAL ACTIVITY:  Daily dog walking (1/2 mile, 10-15 mins walk). [employer] membership - going once per week for 30-60 mins (treadmill, stationary bikes, occ weight).     MEDICATIONS FOR WEIGHT LOSS:  Qsymia - Has not started, plans to start soon.    NUTRITION DIAGNOSIS:   Food and nutrition related knowledge deficit r/t lack of prior exposure to nutrition education of meal replacement program aeb pt unable to verbalize understanding of how to use Bariatrix meal replacements for weight loss.    INTERVENTION:  Nutrition Prescription:  Recommended energy/nutrient modification  Calorie modification 2589-5388 calories per day  Volumetrics/The Plate Method  60-90 grams protein per day    Implementation:  Assessed learning needs and learning preferences  Nutrition  Education: Reviewed meal planning using plate method (encouraged pt o focus on lean protein and non-starchy vegetables at meals, with a small amount of starches if needed). Pt does not invest a lot of time in cooking. Discussed continuing to use meal replacements she likes for convenience. Pt to use VLC protein bar, and low calorie frozen meals for 1-2 meals per day. Pt would like to increase weekly exercise to 2 days/wk. Discussed mindful eating and encouraged pt to use food journal to help with accountability. Pt purchased VLC protein bars in clinic today. Provided pt with list of goals and RD contact info.      GOALS:  1. Aim for 7314-9235 calories per day  - List food/meals/drinks in food journal to help with mindfulness  - Example meal plan   - Breakfast: Protein bar   - Lunch: Open faced sandwich (swiss + roast beef/chicken + mustard + 1 pc whole wheat bread)   - Dinner: Lean cuisine   - Snack: non-starchy vegetable, or small pc of fruit (or 1/2 cup)  - Focus on lean protein and non-starchy vegetables and small about of starches for meals you cook yourself: follow the plate method  - Meals and snacks should be consumed slowly 20-30 minutes and chew to apple sauce consistency.  - Check out this website for recipe ideas using the products: https://www.[employer]/   2. Continue taking multivitamin, and iron (set a reminder on phone to take in the afternoon)  3. Beverage goal - Continue calorie containing beverages.  4. Activity goal - Increase, aiming to go to gym for 2 days per week for at least 30 mins.    Patient Understanding: good  Expected Compliance: fair-good  Follow-Up Plans: Mindfulness, meal planning, dietary journal       FOLLOW UP  1 month    TIME SPENT WITH PATIENT: 30 Minutes     Gay Peoples RD, LD  Ewelina Kim RD

## 2020-01-30 NOTE — LETTER
2020       RE: Elaine Awad  [address] [facility] [address]     Dear Colleague,    Thank you for referring your patient, Elaine Awad, to the [facility] MEDICAL WEIGHT MANAGEMENT at [facility]. Please see a copy of my visit note below.        Return Medical Weight Management Note     Elaine Awad  MRN:  [id number]  :  1970  KEAGAN:  2020    Dear Dr Fernanda Rodriguez,    I had the pleasure of seeing your patient Elaine Awad. She is a 49 year old female who I am continuing to see for treatment of obesity related to:       2019   I have the following health issues associated with obesity: GERD (Reflux), Weight Bearing Joint Pain   I have the following symptoms associated with obesity: Knee Pain, GERD (Reflux)       ASSESSMENT:   24 week follow up.  She has struggled to stay motivated.  She didn't start topiramate and follow up since November.  She is ready to get on track.    PLAN:     Start topiramate ramp to 75mg  See RD and HC today and per 24 week plan  Schedule cooking classes asap  See Layla Cyr in May     INTERVAL HISTORY:  24 week plan follow up.  Started 2019 and didn't continue due to anemia and heavy periods.  Anemia improved but still having heavy periods. She is on birth control pill for the last 2 weeks.  Restarted  with HC and RD visit  Seeing HC and RD again today  Grad date May    Weight loss: Starting weight 256 lbs  Weight today 267 lbs 6.4 oz   She has gained 11 lbs.    Exercise: has [employer] membership but not motivated    Cooking class: hasn't sign up    Exercise class: hasn't signed up    Cook book: got cookbook and she doesn't cook    Journal: uses brigitte on phone, doesn't use journal    Sleep changes: sleeps fine    Stress: working long hours and stressful    Products per day: Not using meal replacements, didn't like the taste    Health /dietitian visits: 2 HC visits, 1 RD visit    Anti obesity Medications: never  "started weight loss medications    CURRENT WEIGHT:   267 lbs 6.4 oz    Initial Weight: 116.3 kg (256 lb 8 oz)  Last Visits Weight: 116.3 kg (256 lb 8 oz)  Cumulative weight loss (lbs): -10.9  Weight Loss Percentage: 0.96%  Waist Circumference (cm): 141 cm    MEDICATIONS:   Current Outpatient Medications   Medication Sig Dispense Refill     cetirizine (ZYRTEC) 10 MG tablet Take 10 mg by mouth daily as needed        cyanocobalamin (VITAMIN B-12) 1000 MCG tablet Take 1 tablet (1,000 mcg) by mouth daily       ferrous sulfate (FE TABS) 325 (65 Fe) MG EC tablet Take 1 tablet (325 mg) by mouth daily       fexofenadine (ALLEGRA) 180 MG tablet Take 180 mg by mouth daily       norethindrone (MICRONOR) 0.35 MG tablet Take 1 tablet (0.35 mg) by mouth daily 84 tablet 3     nystatin (MYCOSTATIN) 278887 UNIT/GM POWD Apply to affected area under breasts TID as needed 60 g 11     omeprazole (PRILOSEC) 20 MG DR capsule TAKE 1 CAPSULE BY MOUTH EVERY DAY 90 capsule 2     SUMAtriptan (IMITREX) 50 MG tablet TAKE 2 TABLETS BY MOUTH AT ONSET OF MIGRAINE, MAY REPEAT IN 2 HOURS 9 tablet 1     valACYclovir (VALTREX) 500 MG tablet TAKE 1 TABLET BY MOUTH DAILY. INCREASE TO 4 TABLET BY MOUTH 2 TIMES DAILY AT ONSET OF OUTBREAK 100 tablet 0        VITALS:  /80 (BP Location: Left arm, Patient Position: Sitting, Cuff Size: Adult Large)   Pulse 80   Ht 1.626 m (5' 4\")   Wt 121.3 kg (267 lb 6.4 oz)   SpO2 99%   BMI 45.90 kg/m         Time: 15 min spent on evaluation, management, counseling, education, & motivational interviewing with greater than 50 % of the total time was spent on counseling and coordinating care    Sincerely,    Layla Cyr PA-C    Again, thank you for allowing me to participate in the care of your patient.      Sincerely,    Layla Cyr PA-C      "

## 2020-02-06 ENCOUNTER — TELEPHONE (OUTPATIENT)
Dept: SURGERY | Facility: CLINIC | Age: 50
End: 2020-02-06

## 2020-02-07 ENCOUNTER — VIRTUAL VISIT (OUTPATIENT)
Dept: ENDOCRINOLOGY | Facility: CLINIC | Age: 50
End: 2020-02-07
Payer: COMMERCIAL

## 2020-02-07 DIAGNOSIS — E66.9 OBESITY: Primary | ICD-10-CM

## 2020-02-07 NOTE — PROGRESS NOTES
"JAYDA GUZMAN     Progress Notes    Return Weight Management Health Coaching Note    Elaine Awad          MRN: [id number]  : 1970  KEAGAN: 2020    Health  Visit #: 4  Type of Visit: Telephone    ASSESSMENT:  Initial Weight:  256.8 lbs    Current Weight (lbs) NA  Average Daily Water (ounces): 48 oz  Weight Loss Medication: Topiramate; making her tired.   Nutrition Plan: 1 meal replacement product; bar for snack  Cooking Class: Registered for March  Exercise Class: No  Healthcare Provider: Klarissa      PREVIOUS GOAL(S) REVIEW:    1.) Call [employer], [phone] to schedule cooking and exercise classes.  MARCH cooking class registered     2.) I will go to the [facility] [facility] on , right after work. MET     Try both forms of self talk:     'Bentley up, and go Linda. Get over it. Just do it.\"     \"it's ok. I don't want to go, but I know I will feel better after. I can relax in the hot tub after I walk.\"     Reminder: If I don't go, what is my plan? (examples, I can go another day; I can focus more on my eating right now, I will try again next week.       PILLAR(S) DISCUSSED IN THIS VISIT:       Pt feels good with where she is right now; feels good about taking the medication; started exercising 1-2 times per week.  She is tired due to starting the topiramate she says, but other than that feels fine.  When asked if she would like to check in on any of the 4 pillars of health, pt said she didn't feel a need to. We decided to do another phone coaching session before she leaves for her vacation, on .      FOLLOW-UP: Pt is schedule for HC and RD on 3/5/20  To schedule your next visit, please call [phone].      TIME: 15 minutes       SIGNATURE:  Jayda Guzman, Certified Health  on 2020 at 2:23 PM  "

## 2020-02-13 ENCOUNTER — TELEPHONE (OUTPATIENT)
Dept: ENDOCRINOLOGY | Facility: CLINIC | Age: 50
End: 2020-02-13

## 2020-02-13 DIAGNOSIS — E66.01 MORBID OBESITY, UNSPECIFIED OBESITY TYPE (H): Primary | ICD-10-CM

## 2020-02-13 RX ORDER — PHENTERMINE HYDROCHLORIDE 15 MG/1
15 CAPSULE ORAL EVERY MORNING
Qty: 30 CAPSULE | Refills: 0 | Status: SHIPPED | OUTPATIENT
Start: 2020-02-13 | End: 2020-08-26

## 2020-02-13 NOTE — TELEPHONE ENCOUNTER
Prior Authorization Retail Medication Request    Medication/Dose: Phentermine HCI 15MG  ICD code (if different than what is on RX):    Previously Tried and Failed: Exercise, Weight Watchers, Weight Loss Surgery  Rationale: My patient is a 49 year old female interested in treatment of medical problems associated with weight.  Her weight today is 267 lbs 6.4 oz, Body mass index is 45.9 kg/m ., and she has the following co-morbidities: GERD (Reflux), Weight Bearing Joint Pain. She does need aggressive weight loss plan due to BMI.    Insurance Name: 10-PREFERREDONE Ph: [phone]    Insurance ID:  [id number]       Pharmacy Information (if different than what is on RX)  Name: [id number] - [place], [place] - [address] AT [facility]   Phone:  [phone]

## 2020-02-13 NOTE — TELEPHONE ENCOUNTER
Patient experiencing side effects from Topiramate. Per Layla Cyr PA-C, discontinue. Will start Phentermine 15mg cap. Patient given information via [employer] and was instructed to check BP and pulse in 1 week and follow up with Lauren Bloch by phone in 1 month. Rx routed to provider for sign off.

## 2020-02-14 NOTE — TELEPHONE ENCOUNTER
Patient does not have [employer] indicated in PA request. Per Cristina at pharmacy, she still has [employer]. [place] correctly reflects current insurance.    Central Prior Authorization Team   Phone: [phone]      PA Initiation-Manually faxed to [employer]    Medication: Phentermine HCI 15MG-PA initiated  Insurance Company: [employer]na - Phone [phone] Fax [phone]  Pharmacy Filling the Rx: [id number] [address]  Filling Pharmacy Phone: [phone]  Filling Pharmacy Fax:    Start Date: 2/14/2020

## 2020-02-18 NOTE — TELEPHONE ENCOUNTER
Prior Authorization Approval    Authorization Effective Date: 2/18/2020  Authorization Expiration Date: 5/18/2020  Medication: Phentermine HCI 15MG-PA approved  Approved Dose/Quantity:   Reference #:     Insurance Company: [employer]na - Phone [phone] Fax [phone]  Expected CoPay:       CoPay Card Available:      Foundation Assistance Needed:    Which Pharmacy is filling the prescription (Not needed for infusion/clinic administered): [facility] #[id number] - [place], [place] - [address] [place] [address]  Pharmacy Notified: Yes  Patient Notified: No0-Pharmacy will contact

## 2020-02-20 ENCOUNTER — VIRTUAL VISIT (OUTPATIENT)
Dept: ENDOCRINOLOGY | Facility: CLINIC | Age: 50
End: 2020-02-20
Payer: COMMERCIAL

## 2020-02-20 DIAGNOSIS — E66.9 OBESITY: Primary | ICD-10-CM

## 2020-02-20 NOTE — PROGRESS NOTES
JAYDA GUZMAN     Progress Notes    Return Weight Management Health Coaching Note    Elaine Awad          MRN: [id number]  : 1970  KEAGAN: 2020    Health  Visit #: 5  Type of Visit: Telephone    ASSESSMENT:  Initial Weight:  256.8 lb    Current Weight (lbs): self reported; 263 lb  Average Daily Water (ounces): 48 oz  Weight Loss Medication: phentermine ready for pick-up; insurance denied it but it was approved so likely she picked up too early; susan will message her, and won't start it til after vacation.   Nutrition Plan: bar for snack;no other products   Cooking Class: march  Exercise Class: no  Healthcare Provider: yisel    On vacation for a week, starting Saturday.     Feeling discouraged having floaties in her eyes, from topamax still even thought stopped it a week ago. Messaged Layla to let her know.       PREVIOUS GOAL(S) REVIEW:   I will go to the [employer] on , right after work; DID NOT MAKE IT THIS WEEK DUE TO BEING SICK, BUT WENT PREVIOUSLY      PILLAR(S) DISCUSSED IN THIS VISIT:     Pt is feeling discouraged with her lack of progress. Talked about chosing one thing to focus on that would make her feel proud of herself for doing one healthy behavior she feels good about. Pt mentioned that her work schedule is too inconsistent so doesn't know when she can eat lunch; pt will therefore try to focus on bringing one healthy meal to work, and will be ok with eating it whenever she can during the work day and not at a set time.       GOALS:     PT will try planning one healthy meal for herself at work (and not worrying about when she will eat it) to ensure that she has something to eat so is not too hungry later; says she eats little during the day and overeats at night.       FOLLOW-UP:  To schedule your next visit, please call [phone].      TIME: 25 minutes       SIGNATURE:  Jayda Guzman, Certified Health  on 2020 at 1:14 PM

## 2020-02-23 ENCOUNTER — HEALTH MAINTENANCE LETTER (OUTPATIENT)
Age: 50
End: 2020-02-23

## 2020-03-03 ENCOUNTER — OFFICE VISIT (OUTPATIENT)
Dept: FAMILY MEDICINE | Facility: CLINIC | Age: 50
End: 2020-03-03
Payer: COMMERCIAL

## 2020-03-03 ENCOUNTER — VIRTUAL VISIT (OUTPATIENT)
Dept: FAMILY MEDICINE | Facility: OTHER | Age: 50
End: 2020-03-03

## 2020-03-03 ENCOUNTER — MYC MEDICAL ADVICE (OUTPATIENT)
Dept: OBGYN | Facility: CLINIC | Age: 50
End: 2020-03-03

## 2020-03-03 VITALS
HEIGHT: 63 IN | RESPIRATION RATE: 20 BRPM | BODY MASS INDEX: 46.35 KG/M2 | SYSTOLIC BLOOD PRESSURE: 124 MMHG | HEART RATE: 107 BPM | DIASTOLIC BLOOD PRESSURE: 76 MMHG | TEMPERATURE: 98.3 F | WEIGHT: 261.6 LBS | OXYGEN SATURATION: 98 %

## 2020-03-03 DIAGNOSIS — J20.9 ACUTE BRONCHITIS, UNSPECIFIED ORGANISM: ICD-10-CM

## 2020-03-03 DIAGNOSIS — L27.0 DRUG RASH: Primary | ICD-10-CM

## 2020-03-03 DIAGNOSIS — E66.01 MORBID OBESITY (H): ICD-10-CM

## 2020-03-03 PROBLEM — M17.11 PRIMARY OSTEOARTHRITIS OF RIGHT KNEE: Status: ACTIVE | Noted: 2017-09-13

## 2020-03-03 PROBLEM — J31.0 NONALLERGIC RHINITIS: Status: ACTIVE | Noted: 2019-04-17

## 2020-03-03 PROCEDURE — 99214 OFFICE O/P EST MOD 30 MIN: CPT | Performed by: FAMILY MEDICINE

## 2020-03-03 RX ORDER — CODEINE PHOSPHATE AND GUAIFENESIN 10; 100 MG/5ML; MG/5ML
1-2 SOLUTION ORAL EVERY 4 HOURS PRN
Qty: 180 ML | Refills: 0 | Status: SHIPPED | OUTPATIENT
Start: 2020-03-03 | End: 2020-07-30

## 2020-03-03 RX ORDER — PREDNISONE 20 MG/1
TABLET ORAL
Qty: 6 TABLET | Refills: 0 | Status: SHIPPED | OUTPATIENT
Start: 2020-03-03 | End: 2020-07-30

## 2020-03-03 ASSESSMENT — PAIN SCALES - GENERAL: PAINLEVEL: MODERATE PAIN (5)

## 2020-03-03 ASSESSMENT — MIFFLIN-ST. JEOR: SCORE: 1780.74

## 2020-03-03 NOTE — LETTER
March 3, 2020      Elaine Awda  [address] [facility] [address]        To Whom It May Concern:    Elaine Awad was seen in our clinic. Excuse her absence tomorrow for health reasons. She may return to work without restrictions.      Sincerely,        Fernanda Rodriguez MD

## 2020-03-03 NOTE — TELEPHONE ENCOUNTER
Patient was seen in clinic on 1/16/2020 with Dr. Muñoz for abnormal uterine bleeding, per that OV plan,     Plan  The patient and I reviewed her past workup including the ultrasounds and the EMB pathology results.  She desires to use medical management, rather than a surgical approach at this time.  Since she has migraine headaches (which sound as if they are with auras), the combination products would be risky and not indicated.  She is interested in using the Progestin only products and oral is good for her.  The oral progestin only pills are reviewed, how to take them and the side effects especially the breakthrough bleeding.  To minimize the BTB, she should plan to take the pills at almost exactly every 24 hours.    I would consider returning in about 3 months for OCP check.   Questions seem to be answered.      Filipe Muñoz MD    Patient was prescribed Norethindrone (Micronor) 0.35 mg tablets to take daily by mouth.     Quita Del Cid RN on 3/3/2020 at 9:43 AM

## 2020-03-03 NOTE — PROGRESS NOTES
"Date: 2020 01:41:47  Clinician: Jazmin Mike  Clinician NPI: [id number]  Patient: Elaine Awad  Patient : 1970  Patient Address: [address] [place] [id number]  Patient Phone: [phone]  Visit Protocol: Allergic rhinitis  Patient Summary:  Elaine is a 49 year old ( : 1970 ) female who initiated a Visit for evaluation of seasonal allergies.  When asked the question \"Please sign me up to receive news, health information and promotions from [employer].\", Elaine responded \"Yes\".   A synchronous visit is necessary because the patient reported the following abnormal symptoms:   Symptoms noted are not consistent with allergies   She is currently experiencing allergy symptoms. She notes coughing with cough as the most bothersome.  These symptoms have been present for several days.   No known irritants and allergens aggravate these symptoms.   Elaine has not received allergy shots. The patient's allergy symptoms have NOT been confirmed by skin allergy testing. Other family members have a history of eczema. No other family members have a history of allergies, asthma, or eczema.   Elaine denies tooth pain, feeling feverish, facial pressure, scratchy throat, plugged ears, headache, having nasal ulcers, a septum deviation or other sinus wounds, runny nose, sneezing, itchy eyes, and wheezing. She also denies dyspnea.   She denies smoking or using smokeless tobacco.   PREVIOUS ALLERGY MEDICATIONS:   Diphenhydramine (Benadryl); somewhat effective   She denies pregnancy and denies breastfeeding. She has menstruated in the past month.   Additional information as reported by the patient (free text): Cough x3 weeks     MEDICATIONS: clindamycin HCl oral, Prilosec oral, ALLERGIES: ampicillin  Clinician Response:  Dear Elaine,  I am sorry you are not feeling well. To determine the most appropriate care for you, I would like you to be seen in person to further discuss your health history and " symptoms.  You will not be charged for this Visit. Thank you for trusting us with your care.   Diagnosis: Refer for additional evaluation  Diagnosis ICD: R69  Triage Notes: I reviewed the patient's history, verified their identity, and explained the Visit process.    Allergic reaction no severe symptoms will take at [facility] Benadryl and follow up  Synchronous Triage: phone, status: completed, duration: 231 seconds

## 2020-03-04 NOTE — PATIENT INSTRUCTIONS
Patient Education     What Is Acute Bronchitis?  Acute bronchitis is when the airways in your lungs (bronchial tubes) becomered and swollen (inflamed). It is usually caused by a viral infection. But it can also occur because of a bacteria or allergen. Symptoms include a cough that produces yellow or greenish mucus and can last for days or sometimes weeks.  Lungs with bronchitis  Bronchitis often occurs with a cold or the flu virus. The airways become inflamed (red and swollen). There is a deep hacking cough from the extra mucus. Other symptoms may include:    Wheezing    Chest discomfort    Shortness of breath    Mild fever  A second infection, this time due to bacteria, may then occur. And airways irritated by allergens or smoke are more likely to get infected.  Making a diagnosis  A physical exam, health history, and certain tests help your healthcare provider make the diagnosis.  Health history  Your healthcare provider will ask you about previous illnesses and your current symptoms. You will also be asked about what medications you currently take, including your use of over-the-counter drugs, vitamins, herbs, and supplements.  The exam  Your provider listens to your chest for signs of congestion. He or she may also check your ears, nose, and throat.  Possible tests    A sputum test for bacteria. This requires a sample of mucus from your lungs.    A nasal or throat swab. This tests to see if you have a bacterial or viral infection.    A chest X-ray. This is done if your healthcare provider thinks you have pneumonia.    Tests to check for an underlying condition. Other tests may be done to check for things such as allergies, asthma, or COPD (chronic obstructive pulmonary disease). You may need to see a specialist for more lung function testing.    Treating a cough  The main treatment for bronchitis is easing symptoms. Avoiding smoke, allergens, and other things that trigger coughing can often help. If the  infection is bacterial, you may be given antibiotics. During the illness, it's important to get plenty of sleep. To ease symptoms:    Don t smoke. Also avoid secondhand smoke.    Use a humidifier. Or try breathing in steam from a hot shower. This may help loosen mucus.    Drink a lot of water and juice. They can soothe the throat and may help thin mucus.    Sit up or use extra pillows when in bed. This helps to lessen coughing and congestion.    Ask your provider about using medicine. Ask about using cough medicine, pain and fever medicine, or a decongestant.  Antibiotics  Most cases of bronchitis are caused by cold or flu viruses. They don t need antibiotics to treat them, even if your mucus is thick and green or yellow. Antibiotics don t treat viral illness and antibiotics have not been shown to have any benefit in cases of acute bronchitis. Taking antibiotics when they are not needed increases your risk of getting an infection later that is antibiotic-resistant. Antibiotics can also cause severe cases of diarrhea that require other antibiotics to treat.  It is important that you accept your healthcare provider's opinion to not use antibiotics. Your provider will prescribe antibiotics if the infection is caused by bacteria. If they are prescribed:    Take all of the medicine. Take the medicine until it is used up, even if symptoms have improved. If you don t, the bronchitis may come back.    Take the medicines as directed. For instance, some medicines should be taken with food.    Ask about side effects. Ask your provider or pharmacist what side effects are common, and what to do about them.  Follow-up care  You should see your provider again in 2 to 3 weeks. By this time, symptoms should have improved. An infection that lasts longer may mean you have a more serious problem.  Prevention    Avoid tobacco smoke. If you smoke, quit. Stay away from smoky places. Ask friends and family not to smoke around you, or in  your home or car.    Get checked for allergies.    Ask your provider about getting a yearly flu shot. Also ask about pneumococcal or pneumonia shots.    Wash your hands often. This helps reduce the chance of picking up viruses that cause colds and flu.    Contact your healthcare provider immediately if:    Symptoms worsen, or you have new symptoms    Breathing problems worsen    Symptoms don t get better within a week, or within 3 days of taking antibioticsCall 911 if you are:    wheezing    feeling lightheaded or dizzy    unable to talk    skin or nails looks blue or pale    Inside healthy lungs    Air travels in and out of the lungs through the airways. The linings of these airways produce sticky mucus. This mucus traps particles that enter the lungs. Tiny structures called cilia then sweep the particles out of the airways.     Healthy airway: Airways are normally open. Air moves in and out easily.      Healthy cilia: Tiny, hairlike cilia sweep mucus and particles up and out of the airways.        Inflamed airway: Inflammation and extra mucus narrow the airway, causing shortness of breath.      Impaired cilia: Extra mucus impairs cilia, causing congestion and wheezing. Smoking makes the problem worse.     Date Last Reviewed: 2/1/2017 2000-2019 [email or web address]. [address] [place] [address], [place] [address]. All rights reserved. This information is not intended as a substitute for professional medical care. Always follow your healthcare professional's instructions.

## 2020-03-04 NOTE — PROGRESS NOTES
"Subjective     Elaine Awad is a 49 year old female who presents to clinic today for the following health issues:    HPI   Rash  Onset: last night    Description:   Location: all over the body  Character: blotchy, raised, painful, burning, red  Itching (Pruritis): YES - a lot    Progression of Symptoms:  worsening    Accompanying Signs & Symptoms:  Fever: no   Body aches or joint pain: no   Sore throat symptoms: no   Recent cold symptoms: YES - dry cough x 3 weeks     History:   Previous similar rash: no     Precipitating factors:   Exposure to similar rash: no   New exposures:  medication doxycycline 100mg every 12 hrs prescribed through a video visit in [place]  Recent travel: YES [place]    Alleviating factors:  Benadryl     Therapies Tried and outcome: benadryl helps and allegra - Helps for few hrs and comes back              Reviewed and updated as needed this visit by Provider  Tobacco  Allergies  Meds  Problems  Med Hx  Surg Hx  Fam Hx         Review of Systems   ROS COMP: CONSTITUTIONAL: NEGATIVE for fever, chills, change in weight  INTEGUMENTARY/SKIN: rash generalized  ENT/MOUTH: NEGATIVE for ear, mouth and throat problems  RESP:cough-non productive  CV: NEGATIVE for chest pain, palpitations or peripheral edema  MUSCULOSKELETAL: NEGATIVE for significant arthralgias or myalgia      Objective    /76   Pulse 107   Temp 98.3  F (36.8  C) (Oral)   Resp 20   Ht 1.6 m (5' 3\")   Wt 118.7 kg (261 lb 9.6 oz)   LMP 02/24/2020   SpO2 98%   BMI 46.34 kg/m    Body mass index is 46.34 kg/m .  Physical Exam   GENERAL: alert, no distress and obese  EYES: Eyes grossly normal to inspection, PERRL and conjunctivae and sclerae normal  HENT: ear canals and TM's normal, nose and mouth without ulcers or lesions  NECK: no adenopathy, no asymmetry, masses, or scars and thyroid normal to palpation  RESP: lungs clear to auscultation - no rales, rhonchi or wheezes  CV: regular rate and rhythm, normal S1 S2, no S3 or " "S4, no murmur, click or rub, no peripheral edema    MS: no gross musculoskeletal defects noted, no edema  SKIN: diffuse red coalesced hives over extremities, face, neck and trunk   PSYCH: mentation appears normal, affect normal/bright    Diagnostic Test Results:  Labs reviewed in Epic  No results found for this or any previous visit (from the past 24 hour(s)).        Assessment & Plan     (L27.0) Drug rash  (primary encounter diagnosis)   Plan: predniSONE (DELTASONE) 20 MG tablet        Discussed risks and benefits of this medication. Continue over-the-counter antihistamine as needed. Call or return to clinic as needed if these symptoms worsen or fail to improve as anticipated.     (J20.9) Acute bronchitis, unspecified organism  Comment: likely viral   Plan: guaiFENesin-codeine (ROBITUSSIN AC) 100-10         MG/5ML solution        Discussed risks and benefits of this medication. Call or return to clinic as needed if these symptoms worsen or fail to improve as anticipated.     (E66.01) Morbid obesity (H)  BMI:   Estimated body mass index is 46.34 kg/m  as calculated from the following:    Height as of this encounter: 1.6 m (5' 3\").    Weight as of this encounter: 118.7 kg (261 lb 9.6 oz).   Weight management plan: Patient referred to endocrine and/or weight management specialty            Return in about 1 year (around 3/3/2021) for physical (fasting labs up to one week prior).    Fernanda Rodriguez MD  [facility]    "

## 2020-03-11 ENCOUNTER — TELEPHONE (OUTPATIENT)
Dept: SURGERY | Facility: CLINIC | Age: 50
End: 2020-03-11

## 2020-03-11 NOTE — TELEPHONE ENCOUNTER
I called patient and she has not had any changes in the bleeding profile since starting the OCPs.  We discussed the options and she would like to use medical management rather than surgical management at this time.  She would like to have the Depo Provera first.  The risks and benefits and the goals and limitations reviewed.  Questions seem to be answered.   She would like to have the Depo Provera on Friday.  The appointment is made.  Filipe Muñoz MD

## 2020-03-13 ENCOUNTER — ALLIED HEALTH/NURSE VISIT (OUTPATIENT)
Dept: OBGYN | Facility: CLINIC | Age: 50
End: 2020-03-13
Payer: COMMERCIAL

## 2020-03-13 DIAGNOSIS — Z32.00 PREGNANCY EXAMINATION OR TEST, PREGNANCY UNCONFIRMED: Primary | ICD-10-CM

## 2020-03-13 DIAGNOSIS — N92.0 MENORRHAGIA: ICD-10-CM

## 2020-03-13 LAB — HCG UR QL: NEGATIVE

## 2020-03-13 PROCEDURE — 96372 THER/PROPH/DIAG INJ SC/IM: CPT | Performed by: OBSTETRICS & GYNECOLOGY

## 2020-03-13 PROCEDURE — 81025 URINE PREGNANCY TEST: CPT | Performed by: OBSTETRICS & GYNECOLOGY

## 2020-03-13 PROCEDURE — 99207 ZZC NO CHARGE NURSE ONLY: CPT | Performed by: OBSTETRICS & GYNECOLOGY

## 2020-03-13 RX ORDER — MEDROXYPROGESTERONE ACETATE 150 MG/ML
150 INJECTION, SUSPENSION INTRAMUSCULAR
Status: DISCONTINUED | OUTPATIENT
Start: 2020-03-13 | End: 2021-05-19

## 2020-03-13 RX ADMIN — MEDROXYPROGESTERONE ACETATE 150 MG: 150 INJECTION, SUSPENSION INTRAMUSCULAR at 10:10

## 2020-03-30 ENCOUNTER — TELEPHONE (OUTPATIENT)
Dept: SURGERY | Facility: CLINIC | Age: 50
End: 2020-03-30

## 2020-03-30 NOTE — TELEPHONE ENCOUNTER
MTM referral from: [facility] visit (referral by provider)    MTM referral outreach attempt #2 on March 30, 2020 at 10:48 AM      Outcome: Patient not reachable after several attempts, will route to MTM Pharmacist/Provider as an FYI. Thank you for the referral.    See [facility] Coordinator

## 2020-03-31 DIAGNOSIS — L30.4 INTERTRIGO: ICD-10-CM

## 2020-04-01 RX ORDER — NYSTATIN 100000 [USP'U]/G
POWDER TOPICAL
Qty: 60 G | Refills: 4 | Status: SHIPPED | OUTPATIENT
Start: 2020-04-01 | End: 2021-04-20

## 2020-04-23 DIAGNOSIS — K21.9 GASTROESOPHAGEAL REFLUX DISEASE WITHOUT ESOPHAGITIS: ICD-10-CM

## 2020-05-06 ENCOUNTER — TELEPHONE (OUTPATIENT)
Dept: ENDOCRINOLOGY | Facility: CLINIC | Age: 50
End: 2020-05-06

## 2020-05-26 ENCOUNTER — ALLIED HEALTH/NURSE VISIT (OUTPATIENT)
Dept: NURSING | Facility: CLINIC | Age: 50
End: 2020-05-26
Payer: COMMERCIAL

## 2020-05-26 DIAGNOSIS — Z30.42 DEPO-PROVERA CONTRACEPTIVE STATUS: Primary | ICD-10-CM

## 2020-05-26 PROCEDURE — 96372 THER/PROPH/DIAG INJ SC/IM: CPT

## 2020-05-26 RX ADMIN — MEDROXYPROGESTERONE ACETATE 150 MG: 150 INJECTION, SUSPENSION INTRAMUSCULAR at 14:13

## 2020-07-20 ENCOUNTER — OFFICE VISIT (OUTPATIENT)
Dept: OPHTHALMOLOGY | Facility: CLINIC | Age: 50
End: 2020-07-20
Payer: COMMERCIAL

## 2020-07-20 DIAGNOSIS — H53.19 PHOTOPSIA OF LEFT EYE: Primary | ICD-10-CM

## 2020-07-20 PROCEDURE — 92004 COMPRE OPH EXAM NEW PT 1/>: CPT | Performed by: STUDENT IN AN ORGANIZED HEALTH CARE EDUCATION/TRAINING PROGRAM

## 2020-07-20 ASSESSMENT — CUP TO DISC RATIO
OS_RATIO: 0.4
OD_RATIO: 0.4

## 2020-07-20 ASSESSMENT — SLIT LAMP EXAM - LIDS
COMMENTS: NORMAL
COMMENTS: NORMAL

## 2020-07-20 ASSESSMENT — VISUAL ACUITY
METHOD: SNELLEN - LINEAR
OD_CC: 20/20
CORRECTION_TYPE: GLASSES
OD_CC+: -1
OS_CC: 20/20

## 2020-07-20 ASSESSMENT — TONOMETRY
IOP_METHOD: APPLANATION
OS_IOP_MMHG: 17
OD_IOP_MMHG: 16

## 2020-07-20 ASSESSMENT — EXTERNAL EXAM - LEFT EYE: OS_EXAM: NORMAL

## 2020-07-20 ASSESSMENT — EXTERNAL EXAM - RIGHT EYE: OD_EXAM: NORMAL

## 2020-07-20 NOTE — PATIENT INSTRUCTIONS
Referral to [facility] specialist ([facility] [place]) for evaluation of flashes left eye    Siomara Tate MD  [phone]

## 2020-07-20 NOTE — PROGRESS NOTES
Current Eye Medications:  Has allergy drops, takes PRN     Subjective:  Here for flashes left eye. This has been going on since Feb. Was started on Topamax for diet in Jan 2020, was on it for a couple weeks and these flashes started happening. So then Dr. Cyr at the [facility] told her to stop and the flashes would go away, but they haven't.  Flashes are happening every day, multiple times a day, lasts a second then goes away.  Father did have cancer in the eye, had the eye removed and then pancreatic and liver cancer developed thirty years later.      Objective:  See Ophthalmology Exam.       Assessment:  Elaine Awad is a 49 year old female who presents with:   Encounter Diagnosis   Name Primary?     Photopsias of left eye Unclear etiology.        Plan:  Referral to retina specialist ([facility] [place]) for evaluation of flashes left eye    Siomara Tate MD  [phone]

## 2020-07-21 ENCOUNTER — TELEPHONE (OUTPATIENT)
Dept: OPHTHALMOLOGY | Facility: CLINIC | Age: 50
End: 2020-07-21

## 2020-07-21 NOTE — TELEPHONE ENCOUNTER
Pt left a message on the voicemail stating that she was in to see Dr. Tate yesterday (7/20/20) and was given a referral to [facility]. Pt stated that she just called to try to get an appt set up and they told her they have not received the referral. Stated they need that before they can schedule. Please fax over referral to [facility].

## 2020-07-21 NOTE — TELEPHONE ENCOUNTER
Faxed over referral to [facility] and also last note via right fax. Called patient to let her know this was done and that she can now call for an appointment.

## 2020-07-27 ENCOUNTER — DOCUMENTATION ONLY (OUTPATIENT)
Dept: OPHTHALMOLOGY | Facility: CLINIC | Age: 50
End: 2020-07-27

## 2020-07-29 DIAGNOSIS — K21.9 GASTROESOPHAGEAL REFLUX DISEASE WITHOUT ESOPHAGITIS: ICD-10-CM

## 2020-07-30 DIAGNOSIS — B37.31 YEAST INFECTION OF THE VAGINA: ICD-10-CM

## 2020-07-30 RX ORDER — FLUCONAZOLE 150 MG/1
150 TABLET ORAL ONCE
Start: 2020-07-30 | End: 2020-07-30

## 2020-07-30 NOTE — TELEPHONE ENCOUNTER
Refused Prescriptions:                       Disp   Refills    fluconazole (DIFLUCAN) 150 MG tablet                       Sig: Take 1 tablet (150 mg) by mouth once for 1 dose as           needed for recurrent yeast infections  Refused By: ANABELA ESQUIVEL  Reason for Refusal: Patient needs appointment

## 2020-08-04 ENCOUNTER — MYC MEDICAL ADVICE (OUTPATIENT)
Dept: OBGYN | Facility: CLINIC | Age: 50
End: 2020-08-04

## 2020-08-04 DIAGNOSIS — B37.31 YEAST INFECTION OF THE VAGINA: Primary | ICD-10-CM

## 2020-08-05 NOTE — TELEPHONE ENCOUNTER
Per 3/3 mychart encounter:  I called patient and she has not had any changes in the bleeding profile since starting the OCPs.  We discussed the options and she would like to use medical management rather than surgical management at this time.  She would like to have the Depo Provera first.  The risks and benefits and the goals and limitations reviewed.  Questions seem to be answered.   She would like to have the Depo Provera on Friday.  The appointment is made.  Filipe Muñoz MD    Last Depo Provera injection was on 5/6/2020.

## 2020-08-06 RX ORDER — FLUCONAZOLE 150 MG/1
150 TABLET ORAL ONCE
Qty: 1 TABLET | Refills: 4 | Status: SHIPPED | OUTPATIENT
Start: 2020-08-06 | End: 2020-08-06

## 2020-08-17 ENCOUNTER — ALLIED HEALTH/NURSE VISIT (OUTPATIENT)
Dept: NURSING | Facility: CLINIC | Age: 50
End: 2020-08-17
Payer: COMMERCIAL

## 2020-08-17 DIAGNOSIS — N92.0 MENORRHAGIA: Primary | ICD-10-CM

## 2020-08-17 PROCEDURE — 99207 ZZC NO CHARGE NURSE ONLY: CPT

## 2020-08-17 PROCEDURE — 96372 THER/PROPH/DIAG INJ SC/IM: CPT

## 2020-08-17 RX ADMIN — MEDROXYPROGESTERONE ACETATE 150 MG: 150 INJECTION, SUSPENSION INTRAMUSCULAR at 09:23

## 2020-08-24 NOTE — NURSING NOTE
Milton Gar CMA 08/17/20 0925 - Clinic Administered Medication Documentation      Depo Provera Documentation    URINE HCG: not indicated    Depo-Provera Standing Order inclusion/exclusion criteria reviewed.   Patient meets: inclusion criteria     BP: Data Unavailable  LAST PAP/EXAM: Lab Results       Component                Value               Date                       PAP                                          10/24/2018            OTHER-NIL, See Result    Prior to injection, verified patient identity using patient's name and date of birth. Medication was administered. Please see MAR and medication order for additional information.     Was entire vial of medication used? Yes  Vial/Syringe: Single dose vial  Expiration Date:  Mar 2021    Patient instructed to report any adverse reaction to staff immediately .  NEXT INJECTION DUE: 11/2/20 - 11/16/20

## 2020-08-25 ENCOUNTER — MYC REFILL (OUTPATIENT)
Dept: FAMILY MEDICINE | Facility: CLINIC | Age: 50
End: 2020-08-25

## 2020-08-25 DIAGNOSIS — B00.1 RECURRENT COLD SORES: ICD-10-CM

## 2020-08-26 RX ORDER — VALACYCLOVIR HYDROCHLORIDE 500 MG/1
TABLET, FILM COATED ORAL
Qty: 100 TABLET | Refills: 2 | Status: SHIPPED | OUTPATIENT
Start: 2020-08-26 | End: 2023-01-17

## 2020-08-26 NOTE — TELEPHONE ENCOUNTER
"Routing refill request to provider for review/approval because:  Labs not current:  Cr    Requested Prescriptions   Pending Prescriptions Disp Refills    valACYclovir (VALTREX) 500 MG tablet 100 tablet 0     Sig: TAKE 1 TABLET BY MOUTH DAILY. INCREASE TO 4 TABLET BY MOUTH 2 TIMES DAILY AT ONSET OF OUTBREAK       Antivirals for Herpes Protocol Failed - 8/25/2020  4:54 PM        Failed - Normal serum creatinine on file in past 12 months     Recent Labs   Lab Test 07/29/19  1220   CR 0.76       Ok to refill medication if creatinine is low          Passed - Patient is age 12 or older        Passed - Recent (12 mo) or future (30 days) visit within the authorizing provider's specialty     Patient has had an office visit with the authorizing provider or a provider within the authorizing providers department within the previous 12 mos or has a future within next 30 days. See \"Patient Info\" tab in inbasket, or \"Choose Columns\" in Meds & Orders section of the refill encounter.              Passed - Medication is active on med list           Hawa Jones RN  "

## 2020-09-30 ENCOUNTER — MYC MEDICAL ADVICE (OUTPATIENT)
Dept: FAMILY MEDICINE | Facility: CLINIC | Age: 50
End: 2020-09-30

## 2020-10-07 DIAGNOSIS — G43.909 MIGRAINE WITHOUT STATUS MIGRAINOSUS, NOT INTRACTABLE, UNSPECIFIED MIGRAINE TYPE: ICD-10-CM

## 2020-10-08 RX ORDER — SUMATRIPTAN 50 MG/1
TABLET, FILM COATED ORAL
Qty: 9 TABLET | Refills: 1 | Status: SHIPPED | OUTPATIENT
Start: 2020-10-08 | End: 2020-10-19 | Stop reason: SINTOL

## 2020-10-19 ENCOUNTER — VIRTUAL VISIT (OUTPATIENT)
Dept: FAMILY MEDICINE | Facility: CLINIC | Age: 50
End: 2020-10-19
Payer: COMMERCIAL

## 2020-10-19 DIAGNOSIS — Z86.2 HISTORY OF ANEMIA: ICD-10-CM

## 2020-10-19 DIAGNOSIS — N92.0 MENORRHAGIA WITH REGULAR CYCLE: ICD-10-CM

## 2020-10-19 DIAGNOSIS — G43.909 MIGRAINE WITHOUT STATUS MIGRAINOSUS, NOT INTRACTABLE, UNSPECIFIED MIGRAINE TYPE: Primary | ICD-10-CM

## 2020-10-19 DIAGNOSIS — E66.01 MORBID OBESITY (H): ICD-10-CM

## 2020-10-19 PROBLEM — J31.0 NONALLERGIC RHINITIS: Status: RESOLVED | Noted: 2019-04-17 | Resolved: 2020-10-19

## 2020-10-19 PROCEDURE — 99214 OFFICE O/P EST MOD 30 MIN: CPT | Mod: 95 | Performed by: FAMILY MEDICINE

## 2020-10-19 RX ORDER — RIZATRIPTAN BENZOATE 5 MG/1
5-10 TABLET, ORALLY DISINTEGRATING ORAL
Qty: 9 TABLET | Refills: 11 | Status: SHIPPED | OUTPATIENT
Start: 2020-10-19 | End: 2021-09-08

## 2020-10-19 NOTE — PROGRESS NOTES
"Elaine Awad is a 49 year old female who is being evaluated via a billable video visit.      The patient has been notified of following:     \"This video visit will be conducted via a call between you and your physician/provider. We have found that certain health care needs can be provided without the need for an in-person physical exam.  This service lets us provide the care you need with a video conversation.  If a prescription is necessary we can send it directly to your pharmacy.  If lab work is needed we can place an order for that and you can then stop by our lab to have the test done at a later time.    Video visits are billed at different rates depending on your insurance coverage.  Please reach out to your insurance provider with any questions.    If during the course of the call the physician/provider feels a video visit is not appropriate, you will not be charged for this service.\"    Patient has given verbal consent for Video visit? Yes  How would you like to obtain your AVS? Mail a copy  If you are dropped from the video visit, the video invite should be resent to: Send to e-mail at: [email or web address]  Will anyone else be joining your video visit? No      Subjective     Elaine Awad is a 49 year old female who presents today via video visit for the following health issues:    HPI     Headache  Onset/Duration: 10 years  Description  Location: above right eye and forehead   Character: sharp pain  Frequency:  1 x per week  Duration:  3 days  Wake with headaches: YES  Able to do daily activities when headache present: no   Intensity:  mild, severe  Progression of Symptoms:  same  Accompanying signs and symptoms:  Stiff neck: no  Neck or upper back pain: no  Sinus or URI symptoms YES  Fever: no  Nausea or vomiting: no  Dizziness: no  Numbness/tingling: no  Weakness: no  Visual changes: none  History  Head trauma: no  Family history of migraines: no  Daily pain medication use: no  Previous tests for " headaches: no  Neurologist evaluation: no  Precipitating or Alleviating factors (light/sound/sleep/caffeine): none  Therapies tried and outcome: Imitrex    Outcome - works somewhat but it knocks her out and he can only take 1  Frequent/daily pain medication use: no        Video Start Time: 4:21 pm        Review of Systems   CONSTITUTIONAL: POSITIVE for weight gain; NEGATIVE for fever, chills   RESP: NEGATIVE for significant cough or SOB   female: menses: irregular      Objective           Vitals:  No vitals were obtained today due to virtual visit.    Physical Exam     GENERAL: alert and no distress  EYES: Eyes grossly normal to inspection.  No discharge or erythema, or obvious scleral/conjunctival abnormalities.  RESP: No audible wheeze, cough, or visible cyanosis.  No visible retractions or increased work of breathing.    SKIN: Visible skin clear. No significant rash, abnormal pigmentation or lesions.  NEURO: Cranial nerves grossly intact.  Mentation and speech appropriate for age.  PSYCH: Mentation appears normal, affect normal/bright, judgement and insight intact, normal speech and appearance well-groomed.          Assessment & Plan     Migraine without status migrainosus, not intractable, unspecified migraine type  -sedative side effects to imitrex, making this less ideal on work days; will try a different abortive medication   - rizatriptan (MAXALT-MLT) 5 MG ODT; Take 1-2 tablets (5-10 mg) by mouth at onset of headache for migraine May repeat in 2 hours. Max 6 tablets/24 hours.  -Discussed risks and benefits of this medication.  -avoid triggers as able  -Call or return to clinic as needed if these symptoms worsen or fail to improve as anticipated.     Morbid obesity (H)  -follow-up with bariatrics clinic     Menorrhagia with regular cycle  History of anemia  -controlled with Depo, but with side effects weight gain   -overall intolerant to oral iron supplement due to diarrhea   - Hemoglobin;  "Future  -follow-up with gynecology to consider IUD or surgical options        BMI:   Estimated body mass index is 46.34 kg/m  as calculated from the following:    Height as of 3/3/20: 1.6 m (5' 3\").    Weight as of 3/3/20: 118.7 kg (261 lb 9.6 oz).   Weight management plan: Patient referred to endocrine and/or weight management specialty         See Patient Instructions    Return in about 1 year (around 10/19/2021) for physical.    Fernanda Rodriguez MD  [facility]      Video-Visit Details    Type of service:  Video Visit    Video End Time:4:36 pm    Originating Location (pt. Location): Home    Distant Location (provider location):  [facility]     Platform used for Video Visit: Sourav          "

## 2020-11-07 DIAGNOSIS — Z12.31 VISIT FOR SCREENING MAMMOGRAM: ICD-10-CM

## 2021-01-20 ENCOUNTER — VIRTUAL VISIT (OUTPATIENT)
Dept: FAMILY MEDICINE | Facility: CLINIC | Age: 51
End: 2021-01-20
Payer: COMMERCIAL

## 2021-01-20 DIAGNOSIS — Z12.11 SCREEN FOR COLON CANCER: ICD-10-CM

## 2021-01-20 DIAGNOSIS — H10.9 CONJUNCTIVITIS OF BOTH EYES, UNSPECIFIED CONJUNCTIVITIS TYPE: Primary | ICD-10-CM

## 2021-01-20 PROCEDURE — 99213 OFFICE O/P EST LOW 20 MIN: CPT | Mod: 95 | Performed by: FAMILY MEDICINE

## 2021-01-20 RX ORDER — OLOPATADINE HYDROCHLORIDE 1 MG/ML
1 SOLUTION/ DROPS OPHTHALMIC 2 TIMES DAILY
Qty: 5 ML | Refills: 1 | Status: SHIPPED | OUTPATIENT
Start: 2021-01-20 | End: 2021-06-30

## 2021-01-20 NOTE — PROGRESS NOTES
Linda is a 50 year old who is being evaluated via a billable video visit.      How would you like to obtain your AVS? MyChart  If the video visit is dropped, the invitation should be resent by: Text to cell phone: [phone]  Will anyone else be joining your video visit? No      Video Start Time: 3.01 PM  Assessment & Plan     Conjunctivitis of both eyes, unspecified conjunctivitis type  SEE EPIC care orders  The potential side effects of this medication have been discussed with the patient.  Call if any significant problems with these are experienced.  Follow up if not better 1 week  - olopatadine (PATANOL) 0.1 % ophthalmic solution; Place 1 drop into both eyes 2 times daily    Screen for colon cancer  Advised colonoscopy or  - COLOGUARD([employer] SCIENC[employer])                       Return in about 1 week (around 1/27/2021), or if symptoms worsen or fail to improve, for recheck/Please schedule appointment.    Diana Meng MD  [facility] [place]    Subjective     Linda is a 50 year old who presents to clinic today for the following health issues  HPI       Eye(s) Problem  Onset/Duration: a month ago  Description:   Location: YES- bilateral left is worse  Pain: no  Redness: YES  Accompanying Signs & Symptoms:  Discharge/mattering: YES- in the morning and watery through out the day  Swelling: YES  Visual changes: no  Fever: no  Nasal Congestion: no  Bothered by bright lights: no  History:  Trauma: no  Foreign body exposure: no  Wearing contacts: no  Precipitating or alleviating factors: unknown   Therapies tried and outcome: allergy eye drops 2 times a day not helping, benadryl, allegra but nothing helping  Pt has allergies  Review of Systems   Rest of the ROS is Negative except see above and Problem list [stable]        Objective           Vitals:  No vitals were obtained today due to virtual visit.    Physical Exam   GENERAL: Healthy, alert and no distress  EYES: mild erythema of the conjunctiva  No  matting  No swelling Noted  RESP: No audible wheeze, cough, or visible cyanosis.  No visible retractions or increased work of breathing.    SKIN: Visible skin clear. No significant rash, abnormal pigmentation or lesions.  NEURO: Cranial nerves grossly intact.  Mentation and speech appropriate for age.  PSYCH: Mentation appears normal, affect normal/bright, judgement and insight intact, normal speech and appearance well-groomed.                Video-Visit Details    Type of service:  Video Visit    Video End Time:3.11 PM    Originating Location (pt. Location): Home    Distant Location (provider location):  [facility]     Platform used for Video Visit: [employer]

## 2021-02-05 ENCOUNTER — TRANSFERRED RECORDS (OUTPATIENT)
Dept: HEALTH INFORMATION MANAGEMENT | Facility: CLINIC | Age: 51
End: 2021-02-05

## 2021-02-05 LAB — COLOGUARD-ABSTRACT: NEGATIVE

## 2021-02-11 ENCOUNTER — TELEPHONE (OUTPATIENT)
Dept: FAMILY MEDICINE | Facility: CLINIC | Age: 51
End: 2021-02-11

## 2021-02-11 NOTE — TELEPHONE ENCOUNTER
Results have been received from [employer] for     The Cologuard Results negative    Results date 2/6/2021    Results have been placed in provider basket- provider to review and sign off on results.  Please send back to team with OK to send result letter and any additional follow-up needed.     Argelia Mensah,       sent copy of results to scanning.

## 2021-02-11 NOTE — LETTER
February 16, 2021      Elaine Awad  [address] [address] [address]      Dear Elaine,    The result of your recent Cologuard testing was negative. A negative result means that Cologuard did not detect significant levels of DNA and/or hemoglobin biomarkers in the stool which are associated with colon cancer or precancer.  Thank you for completing your screening, your next screening should be completed in 3 years.  If you have any questions or concerns, please contact your care team at [phone].     Sincerely,      Diana Meng MD/dt

## 2021-04-11 ENCOUNTER — HEALTH MAINTENANCE LETTER (OUTPATIENT)
Age: 51
End: 2021-04-11

## 2021-04-20 DIAGNOSIS — K21.9 GASTROESOPHAGEAL REFLUX DISEASE WITHOUT ESOPHAGITIS: ICD-10-CM

## 2021-04-20 DIAGNOSIS — L30.4 INTERTRIGO: ICD-10-CM

## 2021-04-20 RX ORDER — NYSTATIN 100000 [USP'U]/G
POWDER TOPICAL
Qty: 60 G | Refills: 4 | Status: SHIPPED | OUTPATIENT
Start: 2021-04-20 | End: 2022-05-11

## 2021-04-22 ENCOUNTER — TELEPHONE (OUTPATIENT)
Dept: ENDOCRINOLOGY | Facility: CLINIC | Age: 51
End: 2021-04-22

## 2021-04-22 ENCOUNTER — VIRTUAL VISIT (OUTPATIENT)
Dept: ENDOCRINOLOGY | Facility: CLINIC | Age: 51
End: 2021-04-22
Payer: COMMERCIAL

## 2021-04-22 VITALS — BODY MASS INDEX: 50.02 KG/M2 | HEIGHT: 64 IN | WEIGHT: 293 LBS

## 2021-04-22 DIAGNOSIS — E66.01 MORBID OBESITY (H): Primary | ICD-10-CM

## 2021-04-22 PROCEDURE — 99215 OFFICE O/P EST HI 40 MIN: CPT | Mod: 95 | Performed by: PHYSICIAN ASSISTANT

## 2021-04-22 RX ORDER — PEN NEEDLE, DIABETIC 31 GX5/16"
NEEDLE, DISPOSABLE MISCELLANEOUS
Qty: 100 EACH | Refills: 1 | Status: SHIPPED | OUTPATIENT
Start: 2021-04-22 | End: 2021-05-05

## 2021-04-22 ASSESSMENT — MIFFLIN-ST. JEOR: SCORE: 1938.58

## 2021-04-22 ASSESSMENT — PAIN SCALES - GENERAL: PAINLEVEL: NO PAIN (0)

## 2021-04-22 NOTE — NURSING NOTE
"Chief Complaint   Patient presents with     Follow Up      Follow-up Weight Management       Vitals:    04/22/21 0718   Weight: 133.4 kg (294 lb)   Height: 1.626 m (5' 4\")       Body mass index is 50.46 kg/m .                            "

## 2021-04-22 NOTE — PROGRESS NOTES
Linda is a 50 year old who is being evaluated via a billable video visit.      How would you like to obtain your AVS? MyChart  If the video visit is dropped, the invitation should be resent by: Text to cell phone: [phone]  Will anyone else be joining your video visit? No    Video Start Time: 731  Video-Visit Details    Type of service:  Video Visit    Video End Time:800    Originating Location (pt. Location): Home    Distant Location (provider location):  [facility] [place]     Platform used for Video Visit: [facility] Medical Weight Management Note     Elaine Awad  MRN:  [id number]  :  1970  KEAG[place]:  2021    Dear Fernanda Rodriguez MD,    I had the pleasure of seeing your patient Elaine Awad. She is a 50 year old female who I am continuing to see for treatment of obesity related to:       2019   I have the following health issues associated with obesity: GERD (Reflux), Weight Bearing Joint Pain   I have the following symptoms associated with obesity: Knee Pain, GERD (Reflux)       Assessment & Plan   Problem List Items Addressed This Visit        Endocrine Diagnoses    Morbid obesity (H) - Primary     Hx of starting 24 week plan. Was supposed to graduate May 2020 but stopped visits with  2020  Starting weight 256 lbs  Weight last visit 2020 267 lbs  Weight today 294 lbs  Started depo shot for irregular periods and gained more weight. Stopped 2020  Working from home during pandemic  Lap band  Dr Deras  Removed in 2017 due to left sided abdominal pain which resolved after band removal  Hx of trying topiramate but stopped  Has migraines but less often  No DM. A1C 4.9  No HTN dx or treatment or CAD. 139/80 at our last clinic visit. No hx of anxiety  Hx of kidney stones during pregnancy 20 years ago  No hx of glaucoma  Has never tried GLP1, unsure about coverage. Her insurance didn't cover   No hx of pancreatitis or thyroid  "cancer  Isn't interested in bupriopion, hasn't tried it.  Eating out once weekly, but usually cooks at home. Lives with  and 23 yr old son.  Exercise: 30 min treadmill walking most days    PLAN:  [facility]abs any [facility] lab.  To find a lab location near you, please call [phone].  Consider Saxenda, will do PA and see if covered by insurance  Follow up DM pharmacist in 1 month phone  Follow up Layla in 2-3 months  Follow up Gay DIAZ soon  [facility]  visits 3 for $99  Can consider phentermine 15mg at future visit if needed.  Will need to monitor blood pressure closer.   Check BP at local [facility] pharmacy soon to get baseline   E-store info send via [employer]         Relevant Medications    liraglutide - Weight Management (SAXENDA) 18 MG/3ML pen    insulin pen needle (B-D U/F) 31G X 8 MM miscellaneous    Other Relevant Orders    Comprehensive metabolic panel    CBC with platelets    Hemoglobin A1c    Vitamin D Deficiency    TSH with free T4 reflex    Parathyroid Hormone Intact    Lipid panel reflex to direct LDL Fasting             45 minutes spent on the date of the encounter doing chart review, history and exam, documentation and further activities per the note      CURRENT WEIGHT:   294 lbs 0 oz    Initial Weight (lbs): 256 lbs  Last Visits Weight: 121.5 kg (267 lb 13.7 oz)  Cumulative weight loss (lbs): -38  Weight Loss Percentage: -14.84%    Changes and Difficulties 4/21/2021   I have made the following changes to my diet since my last visit: I try to watch what i eat   With regards to my diet, I am still struggling with: ?   I have made the following changes to my activity/exercise since my last visit: Trying   With regards to my activity/exercise, I am still struggling with: Knee pain       VITALS:  Ht 1.626 m (5' 4\")   Wt 133.4 kg (294 lb)   BMI 50.46 kg/m      MEDICATIONS:   Current Outpatient Medications   Medication Sig Dispense Refill     cyanocobalamin (VITAMIN B-12) 1000 MCG tablet Take 1 " "tablet (1,000 mcg) by mouth daily       fexofenadine (ALLEGRA) 180 MG tablet Take 180 mg by mouth daily       insulin pen needle (B-D U/F) 31G X 8 MM miscellaneous Use 100 pen needles daily or as directed. 100 each 1     liraglutide - Weight Management (SAXENDA) 18 MG/3ML pen Week 1: 0.6 mg subcutaneous daily, Week 2: 1.2 mg daily, Week 3: 1.8 mg daily, Week 4: 2.4 mg daily, Week 5 & on: 3 mg daily 15 mL 1     nystatin (NYSTOP) 806016 UNIT/GM external powder APPLY TO THE AFFECTED AREA UNDER BREASTS THREE TIMES DAILY AS NEEDED. 60 g 4     olopatadine (PATANOL) 0.1 % ophthalmic solution Place 1 drop into both eyes 2 times daily 5 mL 1     omeprazole (PRILOSEC) 20 MG DR capsule TAKE 1 CAPSULE BY MOUTH EVERY DAY 90 capsule 1     rizatriptan (MAXALT-MLT) 5 MG ODT Take 1-2 tablets (5-10 mg) by mouth at onset of headache for migraine May repeat in 2 hours. Max 6 tablets/24 hours. 9 tablet 11     valACYclovir (VALTREX) 500 MG tablet TAKE 1 TABLET BY MOUTH DAILY. INCREASE TO 4 TABLET BY MOUTH 2 TIMES DAILY AT ONSET OF OUTBREAK 100 tablet 2       Weight Loss Medication History Reviewed With Patient 4/21/2021   If you are not taking a weight loss medication that was prescribed to you, please indicate why: Other   Are you having any side effects from the weight loss medication that we have prescribed you? No       PHYSICAL EXAM:  Objective    Ht 1.626 m (5' 4\")   Wt 133.4 kg (294 lb)   BMI 50.46 kg/m    Vitals - Patient Reported  Pain Score: No Pain (0)        Physical Exam   GENERAL: Healthy, alert and no distress  EYES: Eyes grossly normal to inspection.  No discharge or erythema, or obvious scleral/conjunctival abnormalities.  RESP: No audible wheeze, cough, or visible cyanosis.  No visible retractions or increased work of breathing.    SKIN: Visible skin clear. No significant rash, abnormal pigmentation or lesions.  NEURO: Cranial nerves grossly intact.  Mentation and speech appropriate for age.  PSYCH: Mentation appears " normal, affect normal/bright, judgement and insight intact, normal speech and appearance well-groomed.      Sincerely,    Layla Cyr PA-C

## 2021-04-22 NOTE — LETTER
2021       RE: Elaine Awad  [address] [address] [facility] [address]     Dear Colleague,    Thank you for referring your patient, Elaine Awad, to the [facility] [place] at [facility]. Please see a copy of my visit note below.    Linda is a 50 year old who is being evaluated via a billable video visit.      How would you like to obtain your AVS? MyChart  If the video visit is dropped, the invitation should be resent by: Text to cell phone: [phone]  Will anyone else be joining your video visit? No    Video Start Time: 731  Video-Visit Details    Type of service:  Video Visit    Video End Time:800    Originating Location (pt. Location): Home    Distant Location (provider location):  [facility] [place]     Platform used for Video Visit: [employer]     [facility] Medical Weight Management Note     Elaine Awad  MRN:  [id number]  :  1970  [place]:  2021    Dear Fernanda Rodriguez MD,    I had the pleasure of seeing your patient Elaine Awad. She is a 50 year old female who I am continuing to see for treatment of obesity related to:       2019   I have the following health issues associated with obesity: GERD (Reflux), Weight Bearing Joint Pain   I have the following symptoms associated with obesity: Knee Pain, GERD (Reflux)       Assessment & Plan   Problem List Items Addressed This Visit        Endocrine Diagnoses    Morbid obesity (H) - Primary     Hx of starting 24 week plan. Was supposed to graduate May 2020 but stopped visits with  2020  Starting weight 256 lbs  Weight last visit 2020 267 lbs  Weight today 294 lbs  Started depo shot for irregular periods and gained more weight. Stopped 2020  Working from home during pandemic  Lap band  Dr Deras  Removed in 2017 due to left sided abdominal pain which resolved after band removal  Hx of trying topiramate but  stopped  Has migraines but less often  No DM. A1C 4.9  No HTN dx or treatment or CAD. 139/80 at our last clinic visit. No hx of anxiety  Hx of kidney stones during pregnancy 20 years ago  No hx of glaucoma  Has never tried GLP1, unsure about coverage. Her insurance didn't cover   No hx of pancreatitis or thyroid cancer  Isn't interested in bupriopion, hasn't tried it.  Eating out once weekly, but usually cooks at home. Lives with  and 23 yr old son.  Exercise: 30 min treadmill walking most days    PLAN:  Labs any [facility].  To find a lab location near you, please call [phone].  Consider Saxenda, will do PA and see if covered by insurance  Follow up [facility] pharmacist in 1 month phone  Follow up Layla in 2-3 months  Follow up Gay DIAZ soon  [facility]  visits 3 for $99  Can consider phentermine 15mg at future visit if needed.  Will need to monitor blood pressure closer.   Check BP at local [facility] pharmacy soon to get baseline   E-store info send via [employer]         Relevant Medications    liraglutide - Weight Management (SAXENDA) 18 MG/3ML pen    insulin pen needle (B-D U/F) 31G X 8 MM miscellaneous    Other Relevant Orders    Comprehensive metabolic panel    CBC with platelets    Hemoglobin A1c    Vitamin D Deficiency    TSH with free T4 reflex    Parathyroid Hormone Intact    Lipid panel reflex to direct LDL Fasting             45 minutes spent on the date of the encounter doing chart review, history and exam, documentation and further activities per the note      CURRENT WEIGHT:   294 lbs 0 oz    Initial Weight (lbs): 256 lbs  Last Visits Weight: 121.5 kg (267 lb 13.7 oz)  Cumulative weight loss (lbs): -38  Weight Loss Percentage: -14.84%    Changes and Difficulties 4/21/2021   I have made the following changes to my diet since my last visit: I try to watch what i eat   With regards to my diet, I am still struggling with: ?   I have made the following changes to my activity/exercise since my last  "visit: Trying   With regards to my activity/exercise, I am still struggling with: Knee pain       VITALS:  Ht 1.626 m (5' 4\")   Wt 133.4 kg (294 lb)   BMI 50.46 kg/m      MEDICATIONS:   Current Outpatient Medications   Medication Sig Dispense Refill     cyanocobalamin (VITAMIN B-12) 1000 MCG tablet Take 1 tablet (1,000 mcg) by mouth daily       fexofenadine (ALLEGRA) 180 MG tablet Take 180 mg by mouth daily       insulin pen needle (B-D U/F) 31G X 8 MM miscellaneous Use 100 pen needles daily or as directed. 100 each 1     liraglutide - Weight Management (SAXENDA) 18 MG/3ML pen Week 1: 0.6 mg subcutaneous daily, Week 2: 1.2 mg daily, Week 3: 1.8 mg daily, Week 4: 2.4 mg daily, Week 5 & on: 3 mg daily 15 mL 1     nystatin (NYSTOP) 244068 UNIT/GM external powder APPLY TO THE AFFECTED AREA UNDER BREASTS THREE TIMES DAILY AS NEEDED. 60 g 4     olopatadine (PATANOL) 0.1 % ophthalmic solution Place 1 drop into both eyes 2 times daily 5 mL 1     omeprazole (PRILOSEC) 20 MG DR capsule TAKE 1 CAPSULE BY MOUTH EVERY DAY 90 capsule 1     rizatriptan (MAXALT-MLT) 5 MG ODT Take 1-2 tablets (5-10 mg) by mouth at onset of headache for migraine May repeat in 2 hours. Max 6 tablets/24 hours. 9 tablet 11     valACYclovir (VALTREX) 500 MG tablet TAKE 1 TABLET BY MOUTH DAILY. INCREASE TO 4 TABLET BY MOUTH 2 TIMES DAILY AT ONSET OF OUTBREAK 100 tablet 2       Weight Loss Medication History Reviewed With Patient 4/21/2021   If you are not taking a weight loss medication that was prescribed to you, please indicate why: Other   Are you having any side effects from the weight loss medication that we have prescribed you? No       PHYSICAL EXAM:  Objective    Ht 1.626 m (5' 4\")   Wt 133.4 kg (294 lb)   BMI 50.46 kg/m    Vitals - Patient Reported  Pain Score: No Pain (0)        Physical Exam   GENERAL: Healthy, alert and no distress  EYES: Eyes grossly normal to inspection.  No discharge or erythema, or obvious scleral/conjunctival " abnormalities.  RESP: No audible wheeze, cough, or visible cyanosis.  No visible retractions or increased work of breathing.    SKIN: Visible skin clear. No significant rash, abnormal pigmentation or lesions.  NEURO: Cranial nerves grossly intact.  Mentation and speech appropriate for age.  PSYCH: Mentation appears normal, affect normal/bright, judgement and insight intact, normal speech and appearance well-groomed.      Sincerely,    Layla Cyr PA-C

## 2021-04-22 NOTE — ASSESSMENT & PLAN NOTE
Hx of starting 24 week plan. Was supposed to graduate May 2020 but stopped visits with [facility] March 2020  Starting weight 256 lbs  Weight last visit Jan 2020 267 lbs  Weight today 294 lbs  Started depo shot for irregular periods and gained more weight. Stopped Nov 2020  Working from home during pandemic  Lap band 2014 Dr Deras  Removed in 2017 due to left sided abdominal pain which resolved after band removal  Hx of trying topiramate but stopped  Has migraines but less often  No DM. A1C 4.9  No HTN dx or treatment or CAD. 139/80 at our last clinic visit. No hx of anxiety  Hx of kidney stones during pregnancy 20 years ago  No hx of glaucoma  Has never tried GLP1, unsure about coverage. Her insurance didn't cover   No hx of pancreatitis or thyroid cancer  Isn't interested in bupriopion, hasn't tried it.  Eating out once weekly, but usually cooks at home. Lives with  and 23 yr old son.  Exercise: 30 min treadmill walking most days    PLAN:  Labs any [facility] lab.  To find a lab location near you, please call [phone].  Consider Guerita, will do PA and see if covered by insurance  Follow up [facility] pharmacist in 1 month phone  Follow up Layla in 2-3 months  Follow up Gay DIAZ soon  [facility]  visits 3 for $99  Can consider phentermine 15mg at future visit if needed.  Will need to monitor blood pressure closer.   Check BP at local [facility] pharmacy soon to get baseline   E-store info send via [employer]

## 2021-04-22 NOTE — Clinical Note
Hi Team, I saw this pt today who has hx of 24 week plan but never really was engaged or lost weight. Never completed program. She is back for [facility].      Call center: can you schedule   Follow up [facility] pharmacist in 1 month phone  Follow up Layla in 2-3 months  Follow up Gay DIAZ soon  [facility]  visits 3 for $99 (she said she wanted to do this and I told her to cost so maybe confirm she with her that she understands she will have 3 HC visits and be charged $99.    ANGELINE Montelongo that she is likely starting 3 pack so you can follow up to see if she scheduled her first visit.    Ruby, can you do PA for Saxenda please?    Gay, she saw you in the past and I asked her to see you soon.  She used some bariatrix meal replacements in the past and is going to check out the estore.    ANGELINE Huitron she is going to see you in 1 mo for Saxenda follow up or discuss of next steps if it is not covered.  Possible phentermine we discussed. See my note for other med details.    Thanks!

## 2021-04-22 NOTE — TELEPHONE ENCOUNTER
Prior Authorization Retail Medication Request    Medication/Dose: Saxenda  ICD code (if different than what is on RX):  Morbid obesity (H) [E66.01]  - Primary     Previously Tried and Failed:  Weight loss surgery, history of diet and exercise, Topiramate, physician guided weight loss program, health  sessions  Rationale:  She is a 50 year old female who I am continuing to see for treatment of obesity related to: GERD (Reflux), Weight Bearing Joint Pain.     Starting weight 256 lbs  Weight last visit Jan 2020 267 lbs  Weight today 294 lbs  Started depo shot for irregular periods and gained more weight. Stopped Nov 2020  Working from home during pandemic  Lap band 2014 Dr Deras  Removed in 2017 due to left sided abdominal pain which resolved after band removal  Hx of trying topiramate but stopped  Has migraines but less often  No DM. A1C 4.9  No HTN dx or treatment or CAD. 139/80 at our last clinic visit. No hx of anxiety  Hx of kidney stones during pregnancy 20 years ago  No hx of glaucoma  Has never tried GLP1, unsure about coverage. Her insurance didn't cover   No hx of pancreatitis or thyroid cancer  Isn't interested in bupriopion, hasn't tried it.  Eating out once weekly, but usually cooks at home. Lives with  and 23 yr old son.  Exercise: 30 min treadmill walking most days    Insurance Name:    Insurance ID:        Pharmacy Information (if different than what is on RX)  Name:  [id number] [place], [place] - [address] AT [address] [facility]  Phone: [phone]

## 2021-04-22 NOTE — PATIENT INSTRUCTIONS
PLAN:  Labs any  lab.  To find a lab location near you, please call [phone].  Consider Saxenda, will do PA and see if covered by insurance  Follow up MTM pharmacist in 1 month phone  Follow up Layla in 2-3 months  Follow up Gay DIAZ soon  [facility]  visits 3 for $99  Can consider phentermine 15mg at future visit if needed.  Will need to monitor blood pressure closer.   Check BP at local  pharmacy soon to get baseline   E-store info send via Tourist[employer]    MEDICATION STARTED AT THIS APPOINTMENT    We are starting a GLP-1 (Glucagon-like Peptide-1) medication called Saxenda. One of the ways it works is by slowing down the rate that food leaves your stomach. You feel alcaraz and will eat less. It also helps regulate hormones that can help improve your blood sugars.    If you are a patient that checks blood sugars, continue to check as instructed by your doctor. Low blood sugars are rare but can happen if patients are on insulin or other oral agents. If you notice consistent low sugars or high sugars, your medication may need to be adjusted after your appointment. If this is the case, please call RN and provide her your blood sugar record from the last 3-4 days. The RN will get in touch with the doctor and call you [email or web address] message with recommendations. We tolerate high sugars for a bit, so if sugars are running 180-200, this is ok. As weight starts dropping the blood sugars should too. If readings are consistently over 200 for 1-2 weeks, then you should call the doctor/nurse.    Dosing for this medication:   Week 1- Inject 0.6 mg daily  Week 2- Inject 1.2 mg daily  Week 3- Inject 1.8 mg daily  Week 4- Inject 2.4 mg daily  Week 5 and thereafter- Inject 3.0 mg daily    Side effects of GLP- Medications include: The most common side effects are all GI related and consist of: nausea, constipation, diarrhea, burping, or gassiness. Patients are advised to eat slowly and less, and nausea typically passes  if people can stick it out.     The risk of pancreatitis (inflammation of the pancreas) has been associated with this type of medication, but is very rare.  If you have had pancreatitis in the past, this medication may not be for you. Please let us know about any past history of pancreas problems.    Symptoms of pancreatitis include: Pain in your upper stomach area which may travel to your back and be worse after eating. Your stomach area may be tender to the touch.  You may have vomiting or nausea and/or have a fever. If you should develop any of these symptoms, stop the medication and contact your primary care doctor. They will do a blood test to check for pancreatitis.         There is a small chance you may have some low blood sugar after taking the medication.   The signs of low blood sugar are:  o Weakness  o Shaky   o Hungry  o Sweating  o Confusion      See below for ways to treat low blood sugar without adding in lots of extra calories.      Treating Low Blood Sugar    If you have symptoms of low blood sugar (sweating, shaking, dizzy, confused) eat 15 grams of carbs and wait 15 minutes:      Glucose Tabs are best for sugars under 70 -  Dex4 or BD Glucose tablets are good, you will need to take 3-4 of these to equal 15 grams.       One small box of raisins    4 oz fruit juice box or   cup fruit juice    1 small apple    1 small banana      cup canned fruit in water      English muffin or a slice of bread with jelly     1 low fat frozen waffle with sugar-free syrup      cup cottage cheese with   cup frozen or fresh blueberries    1 cup skim or low-fat milk      cup whole grain cereal    4-6 crackers such as Triscuits      This medication is usually not covered by insurance and can be quite expensive. Sometimes a prior authorization is required, which may take up to 1-2 weeks for an insurance company to make a decision if they will cover the medication. Please be patient, you will be notified after a decision  has been made.    Contact the nurse via [employer] or call [phone] if you have any questions or concerns. (Do not stop taking it if you don't think it's working. For some people it works without them knowing it.)     In order to get refills of this or any medication we prescribe you must be seen in the medical weight mgmt clinic every 2-4 months.

## 2021-04-26 DIAGNOSIS — E66.01 MORBID OBESITY (H): ICD-10-CM

## 2021-04-26 LAB
ALBUMIN SERPL-MCNC: 3.9 G/DL (ref 3.4–5)
ALP SERPL-CCNC: 74 U/L (ref 40–150)
ALT SERPL W P-5'-P-CCNC: 35 U/L (ref 0–50)
ANION GAP SERPL CALCULATED.3IONS-SCNC: 5 MMOL/L (ref 3–14)
AST SERPL W P-5'-P-CCNC: 16 U/L (ref 0–45)
BILIRUB SERPL-MCNC: 0.5 MG/DL (ref 0.2–1.3)
BUN SERPL-MCNC: 9 MG/DL (ref 7–30)
CALCIUM SERPL-MCNC: 10 MG/DL (ref 8.5–10.1)
CHLORIDE SERPL-SCNC: 106 MMOL/L (ref 94–109)
CHOLEST SERPL-MCNC: 184 MG/DL
CO2 SERPL-SCNC: 27 MMOL/L (ref 20–32)
CREAT SERPL-MCNC: 0.83 MG/DL (ref 0.52–1.04)
DEPRECATED CALCIDIOL+CALCIFEROL SERPL-MC: 32 UG/L (ref 20–75)
ERYTHROCYTE [DISTWIDTH] IN BLOOD BY AUTOMATED COUNT: 13.8 % (ref 10–15)
GFR SERPL CREATININE-BSD FRML MDRD: 82 ML/MIN/{1.73_M2}
GLUCOSE SERPL-MCNC: 95 MG/DL (ref 70–99)
HBA1C MFR BLD: 5 % (ref 0–5.6)
HCT VFR BLD AUTO: 47 % (ref 35–47)
HDLC SERPL-MCNC: 52 MG/DL
HGB BLD-MCNC: 15.4 G/DL (ref 11.7–15.7)
LDLC SERPL CALC-MCNC: 95 MG/DL
MCH RBC QN AUTO: 29.8 PG (ref 26.5–33)
MCHC RBC AUTO-ENTMCNC: 32.8 G/DL (ref 31.5–36.5)
MCV RBC AUTO: 91 FL (ref 78–100)
NONHDLC SERPL-MCNC: 132 MG/DL
PLATELET # BLD AUTO: 239 10E9/L (ref 150–450)
POTASSIUM SERPL-SCNC: 4.3 MMOL/L (ref 3.4–5.3)
PROT SERPL-MCNC: 7.1 G/DL (ref 6.8–8.8)
PTH-INTACT SERPL-MCNC: 81 PG/ML (ref 18–80)
RBC # BLD AUTO: 5.17 10E12/L (ref 3.8–5.2)
SODIUM SERPL-SCNC: 138 MMOL/L (ref 133–144)
TRIGL SERPL-MCNC: 187 MG/DL
TSH SERPL DL<=0.005 MIU/L-ACNC: 0.98 MU/L (ref 0.4–4)
WBC # BLD AUTO: 7.1 10E9/L (ref 4–11)

## 2021-04-26 PROCEDURE — 80053 COMPREHEN METABOLIC PANEL: CPT | Performed by: PHYSICIAN ASSISTANT

## 2021-04-26 PROCEDURE — 36415 COLL VENOUS BLD VENIPUNCTURE: CPT | Performed by: PHYSICIAN ASSISTANT

## 2021-04-26 PROCEDURE — 80061 LIPID PANEL: CPT | Performed by: PHYSICIAN ASSISTANT

## 2021-04-26 PROCEDURE — 82306 VITAMIN D 25 HYDROXY: CPT | Performed by: PHYSICIAN ASSISTANT

## 2021-04-26 PROCEDURE — 85027 COMPLETE CBC AUTOMATED: CPT | Performed by: PHYSICIAN ASSISTANT

## 2021-04-26 PROCEDURE — 83970 ASSAY OF PARATHORMONE: CPT | Performed by: PHYSICIAN ASSISTANT

## 2021-04-26 PROCEDURE — 84443 ASSAY THYROID STIM HORMONE: CPT | Performed by: PHYSICIAN ASSISTANT

## 2021-04-26 PROCEDURE — 83036 HEMOGLOBIN GLYCOSYLATED A1C: CPT | Performed by: PHYSICIAN ASSISTANT

## 2021-04-26 NOTE — TELEPHONE ENCOUNTER
Central Prior Authorization Team   Phone: [phone]      PA Initiation    Medication: Saxenda-PA initiated  Insurance Company: 5 CUPS and some sugar - Phone [phone] Fax [phone]  Pharmacy Filling the Rx: [id number] - [place], [place] - [address]  Filling Pharmacy Phone: [phone]  Filling Pharmacy Fax:    Start Date: 4/26/2021

## 2021-04-29 ENCOUNTER — VIRTUAL VISIT (OUTPATIENT)
Dept: ENDOCRINOLOGY | Facility: CLINIC | Age: 51
End: 2021-04-29
Payer: COMMERCIAL

## 2021-04-29 DIAGNOSIS — Z71.3 NUTRITIONAL COUNSELING: Primary | ICD-10-CM

## 2021-04-29 DIAGNOSIS — E66.9 OBESITY: ICD-10-CM

## 2021-04-29 PROCEDURE — 97803 MED NUTRITION INDIV SUBSEQ: CPT | Mod: GT | Performed by: DIETITIAN, REGISTERED

## 2021-04-29 NOTE — LETTER
"4/29/2021       RE: Elaine Awad  [address] [address]     Dear Colleague,    Thank you for referring your patient, Elaine Awad, to the [facility] [place] at [facility]. Please see a copy of my visit note below.    Elaine Awad is a 50 year old female who is being evaluated via a billable video visit.      The patient has been notified of following:     \"This video visit will be conducted via a call between you and your physician/provider. We have found that certain health care needs can be provided without the need for an in-person physical exam.  This service lets us provide the care you need with a video conversation.  If a prescription is necessary we can send it directly to your pharmacy.  If lab work is needed we can place an order for that and you can then stop by our lab to have the test done at a later time.    Video visits are billed at different rates depending on your insurance coverage.  Please reach out to your insurance provider with any questions.    If during the course of the call the physician/provider feels a video visit is not appropriate, you will not be charged for this service.\"    Patient has given verbal consent for Video visit? Yes  How would you like to obtain your AVS? MyChart  If you are dropped from the video visit, the video invite should be resent to: Send to e-mail at: [email or web address]  Will anyone else be joining your video visit? No        Video-Visit Details    Type of service:  Video Visit    Video Start Time: 7:09 AM  Video End Time: 7:25 AM    Originating Location (pt. Location): Home    Distant Location (provider location):  [facility] [place]     Platform used for Video Visit: StyleShare    During this virtual visit the patient is located in [place], patient verifies this as the location during the entirety of this visit.     Weight Management " "Nutrition Consultation    Reason for visit: Nutrition reassessment    Elaine Awad is a 48 year old female presenting today for follow-up nutrition assessment consult. Pt is referred by HMUBERTO Burns.    Patient with Co-morbidities of obesity including:  Type II DM no  Renal Failure no  Sleep apnea no  Hypertension no   Dyslipidemia no  Joint pain yes  Back pain no  GERD yes    H/o Lap band in 2014, removed in 2017.      ANTHROPOMETRICS:   Initial Weight 7/29/19: 256.5 lbs with BMI of 44.39 kg/m   Recent Weight (10/30/19): 262.6 lbs  Current weight: 294 lbs (+27 lbs in past year, +32 lbs from initial weight)     NUTRITION HISTORY:   Last seen by wt mgmt RD 1/2020, info obtained at that time:  - Doesn't like to cook, likes quick and easy meals.  - Used to cook more when children lived at home.     - Did not like the smoothies. Felt bars were ok.    - Cravings: chocolate, pretzels.   - Eating Habits: \"Eats whatever's around.\" Invests little time in to meal prep. Easily tempted by snack foods when they are around. Frequently skipping lunch when not at home.     Pt states today, rapid weight gain in the past year after starting new med with OBGYN. Now focusing on increased exercise and movement - walking the dogs daily. Working from home now. Lunch is bigger meal, having that with  before he goes to work. Having dinner on her own. Doesn't like to cook or grocery shop.  Did Hello Fresh recently - liked the recipes, but felt like it took too much time to prepare and too much food.      Recent Diet Recall:  Breakfast: fruit and yogurt  Lunch: pork burger   Dinner: Lean cuisine; cereal   Snacks: granola bar  Beverages: Water (48 oz), coffee in am  Dining Out: Once week - sit-down restaurants (pizza, hamburger)     Progress Toward Previous Goals:  1. Aim for 4868-6007 calories per day - not met, does not like keeping a food journal   - List food/meals/drinks in food journal to help with mindfulness  - Example " meal plan              - Breakfast: Protein bar              - Lunch: Open faced sandwich (swiss + roast beef/chicken + mustard + 1 pc whole wheat bread)              - Dinner: Lean cuisine              - Snack: non-starchy vegetable, or small pc of fruit (or 1/2 cup)  - Focus on lean protein and non-starchy vegetables and small about of starches for meals you cook yourself: follow the plate method  - Meals and snacks should be consumed slowly 20-30 minutes and chew to apple sauce consistency.  - Check out this website for recipe ideas using the products: https://www.[employer]/   2. Continue taking multivitamin, and iron (set a reminder on phone to take in the afternoon)   3. Beverage goal - Continue calorie containing beverages. - Met, continues  4. Activity goal - Increase, aiming to go to gym for 2 days per week for at least 30 mins. - Improving, not going to the gym, focusing on increasing walking routine.      ADDITIONAL INFORMATION:    - working from home  - Pt with with h/o anemia       PHYSICAL ACTIVITY:  Daily dog walking (~1 mile, 20 mins walk).      MEDICATIONS FOR WEIGHT LOSS:  Qsymia - Has not started, plans to start soon.     NUTRITION DIAGNOSIS:   Obesity r/t long history of self-monitoring deficit and excessive energy intake aeb BMI >30.     INTERVENTION:  Nutrition Prescription:  Recommended energy/nutrient modification  Volumetrics/The Plate Method     Implementation:  Assessed learning needs and learning preferences  Nutrition Education: Reviewed previous goals, tools, Volumetric eating, use of meal replacements, mindfulness and meal planning.  - Co-developed goals with pt to focus on this month.  - Provided pt with list of goals and RD contact info via [employer].       GOALS:  1. Plan dinner ahead of time to create a balanced meal.    - Use the Plate Method, or meal replacement  2. Increase activity as able. Take a 30 mins walk 5 days per week    [employer] EnewBrandAnalyticsstore:  You may  purchase meal replacement products online at our e-store. Visit e-store at [email or web address]/store  - The one week starter kits is a great way to sample a variety of products.  - For recipes and ideas on how to use products, visit - [employer]    Meal Replacement Shake Options:   *Protein Shake Criteria: no more than 210 Calories, at least 20 grams of protein, and less than 10 grams of sugar   [facility] smoothie (160 Calories, 20 g protein)   Premier Protein (160 Calories, 30 g protein)  Slim Fast Advanced Nutrition (180 Calories, 20 g protein)  Muscle Milk, lactose-free, 17 oz bottle (210 Calories, 30 g protein)  Integrated Supplements, no artificial sugars (110 Calories, 20 g protein)  Genepro, unflavored protein powder (60 Calories, 30 g protein)  Boost/Ensure Max (160 calories, 30 gm protein)   [employer] Core Power (170 calories, 26 gm protein)    Meal Replacement Bar Options:  [facility] Protein Shake (160 Calories, 15 g protein)  Quest Protein Bars (190 Calories, 20 g protein)  Built Bar (170 Calories, 15-20 g protein)  One Protein Bar (210 calories, 20 g protein)  [place] Signature Protein Bar (Costco) (190 Calories, 21 g protein)  Pure Protein Bars (180 Calories, 21 g protein)    Frozen Meal Replacements  Healthy Choice  Lean Cuisine  Atkins Meals  Smart Ones    The Plate Method  [email or web address]    Protein Sources for Weight Loss  [email or web address]     Carbohydrates  [email or web address]     Mindful Eating  [email or web address]     Summary of Volumetrics Eating Plan  [email or web address]     Expected Engagement: fair-good  Follow-Up Plans: Mindfulness, meal planning                FOLLOW UP  1 month    TIME SPENT WITH PATIENT: 16 Minutes      Gay Peoples RD, LD

## 2021-04-29 NOTE — PROGRESS NOTES
"Elaine Awad is a 50 year old female who is being evaluated via a billable video visit.      The patient has been notified of following:     \"This video visit will be conducted via a call between you and your physician/provider. We have found that certain health care needs can be provided without the need for an in-person physical exam.  This service lets us provide the care you need with a video conversation.  If a prescription is necessary we can send it directly to your pharmacy.  If lab work is needed we can place an order for that and you can then stop by our lab to have the test done at a later time.    Video visits are billed at different rates depending on your insurance coverage.  Please reach out to your insurance provider with any questions.    If during the course of the call the physician/provider feels a video visit is not appropriate, you will not be charged for this service.\"    Patient has given verbal consent for Video visit? Yes  How would you like to obtain your AVS? MyChart  If you are dropped from the video visit, the video invite should be resent to: Send to e-mail at: [email or web address]  Will anyone else be joining your video visit? No        Video-Visit Details    Type of service:  Video Visit    Video Start Time: 7:09 AM  Video End Time: 7:25 AM    Originating Location (pt. Location): Home    Distant Location (provider location):  [facility] [place]     Platform used for Video Visit: LensAR    During this virtual visit the patient is located in [place], patient verifies this as the location during the entirety of this visit.     Weight Management Nutrition Consultation    Reason for visit: Nutrition reassessment    Elaine Awad is a 48 year old female presenting today for follow-up nutrition assessment consult. Pt is referred by HUMBERTO Burns.    Patient with Co-morbidities of obesity including:  Type II DM no  Renal Failure no  Sleep apnea " "no  Hypertension no   Dyslipidemia no  Joint pain yes  Back pain no  GERD yes    H/o Lap band in 2014, removed in 2017.      ANTHROPOMETRICS:   Initial Weight 7/29/19: 256.5 lbs with BMI of 44.39 kg/m   Recent Weight (10/30/19): 262.6 lbs  Current weight: 294 lbs (+27 lbs in past year, +32 lbs from initial weight)     NUTRITION HISTORY:   Last seen by wt giuliana RD 1/2020, info obtained at that time:  - Doesn't like to cook, likes quick and easy meals.  - Used to cook more when children lived at home.     - Did not like the smoothies. Felt bars were ok.    - Cravings: chocolate, pretzels.   - Eating Habits: \"Eats whatever's around.\" Invests little time in to meal prep. Easily tempted by snack foods when they are around. Frequently skipping lunch when not at home.     Pt states today, rapid weight gain in the past year after starting new med with OBGYN. Now focusing on increased exercise and movement - walking the dogs daily. Working from home now. Lunch is bigger meal, having that with  before he goes to work. Having dinner on her own. Doesn't like to cook or grocery shop.  Did Hello Fresh recently - liked the recipes, but felt like it took too much time to prepare and too much food.      Recent Diet Recall:  Breakfast: fruit and yogurt  Lunch: pork burger   Dinner: Lean cuisine; cereal   Snacks: granola bar  Beverages: Water (48 oz), coffee in am  Dining Out: Once week - sit-down restaurants (pizza, hamburger)     Progress Toward Previous Goals:  1. Aim for 7915-3111 calories per day - not met, does not like keeping a food journal   - List food/meals/drinks in food journal to help with mindfulness  - Example meal plan              - Breakfast: Protein bar              - Lunch: Open faced sandwich (swiss + roast beef/chicken + mustard + 1 pc whole wheat bread)              - Dinner: Lean cuisine              - Snack: non-starchy vegetable, or small pc of fruit (or 1/2 cup)  - Focus on lean protein and " non-starchy vegetables and small about of starches for meals you cook yourself: follow the plate method  - Meals and snacks should be consumed slowly 20-30 minutes and chew to apple sauce consistency.  - Check out this website for recipe ideas using the products: https://www.sailsquare/   2. Continue taking multivitamin, and iron (set a reminder on phone to take in the afternoon)   3. Beverage goal - Continue calorie containing beverages. - Met, continues  4. Activity goal - Increase, aiming to go to gym for 2 days per week for at least 30 mins. - Improving, not going to the gym, focusing on increasing walking routine.      ADDITIONAL INFORMATION:    - working from home  - Pt with with h/o anemia       PHYSICAL ACTIVITY:  Daily dog walking (~1 mile, 20 mins walk).      MEDICATIONS FOR WEIGHT LOSS:  Qsymia - Has not started, plans to start soon.     NUTRITION DIAGNOSIS:   Obesity r/t long history of self-monitoring deficit and excessive energy intake aeb BMI >30.     INTERVENTION:  Nutrition Prescription:  Recommended energy/nutrient modification  Volumetrics/The Plate Method     Implementation:  Assessed learning needs and learning preferences  Nutrition Education: Reviewed previous goals, tools, Volumetric eating, use of meal replacements, mindfulness and meal planning.  - Co-developed goals with pt to focus on this month.  - Provided pt with list of goals and RD contact info via [employer].       GOALS:  1. Plan dinner ahead of time to create a balanced meal.    - Use the Plate Method, or meal replacement  2. Increase activity as able. Take a 30 mins walk 5 days per week    [facility] E-store:  You may purchase meal replacement products online at our e-store. Visit e-store at [email or web address]-[email or web address]/store  - The one week starter kits is a great way to sample a variety of products.  - For recipes and ideas on how to use products, visit - sailsquare    Meal Replacement Shake Options:   *Protein  Shake Criteria: no more than 210 Calories, at least 20 grams of protein, and less than 10 grams of sugar   [facility] smoothie (160 Calories, 20 g protein)   Premier Protein (160 Calories, 30 g protein)  Slim Fast Advanced Nutrition (180 Calories, 20 g protein)  Muscle Milk, lactose-free, 17 oz bottle (210 Calories, 30 g protein)  Integrated Supplements, no artificial sugars (110 Calories, 20 g protein)  Genepro, unflavored protein powder (60 Calories, 30 g protein)  Boost/Ensure Max (160 calories, 30 gm protein)   [employer] Core Power (170 calories, 26 gm protein)    Meal Replacement Bar Options:  [facility] Protein Shake (160 Calories, 15 g protein)  Quest Protein Bars (190 Calories, 20 g protein)  Built Bar (170 Calories, 15-20 g protein)  One Protein Bar (210 calories, 20 g protein)  [place] Signature Protein Bar ([employer]co) (190 Calories, 21 g protein)  Pure Protein Bars (180 Calories, 21 g protein)    Frozen Meal Replacements  Healthy Choice  Lean Cuisine  Atkins Meals  Smart Ones    The Plate Method  [email or web address]://[email or web address]    Protein Sources for Weight Loss  [email or web address]     Carbohydrates  [email or web address]     Mindful Eating  [email or web address]     Summary of Volumetrics Eating Plan  [email or web address]     Expected Engagement: fair-good  Follow-Up Plans: Mindfulness, meal planning                FOLLOW UP  1 month    TIME SPENT WITH PATIENT: 16 Minutes      Gay Peoples RD, LD

## 2021-04-29 NOTE — PATIENT INSTRUCTIONS
Jean Mckeon,    Follow-up with RD in one month.     Thank you,    Gay Peoples, EMILY, LD  If you would like to schedule or reschedule an appointment with the RD, please call [phone]    Nutrition Goals  1. Plan dinner ahead of time to create a balanced meal.    - Use the Plate Method, or meal replacement  2. Increase activity as able. Take a 30 mins walk 5 days per week    [employer] E-store:  You may purchase meal replacement products online at our e-store. Visit e-store at https://[employer]/store  - The one week starter kits is a great way to sample a variety of products.  - For recipes and ideas on how to use products, visit - Packetzoom    Meal Replacement Shake Options:   *Protein Shake Criteria: no more than 210 Calories, at least 20 grams of protein, and less than 10 grams of sugar   [facility] smoothie (160 Calories, 20 g protein)   Premier Protein (160 Calories, 30 g protein)  Slim Fast Advanced Nutrition (180 Calories, 20 g protein)  Muscle Milk, lactose-free, 17 oz bottle (210 Calories, 30 g protein)  Integrated Supplements, no artificial sugars (110 Calories, 20 g protein)  Genepro, unflavored protein powder (60 Calories, 30 g protein)  Boost/Ensure Max (160 calories, 30 gm protein)   [facility] Core Power (170 calories, 26 gm protein)    Meal Replacement Bar Options:  [facility] Protein Shake (160 Calories, 15 g protein)  Quest Protein Bars (190 Calories, 20 g protein)  Built Bar (170 Calories, 15-20 g protein)  One Protein Bar (210 calories, 20 g protein)  [place] Signature Protein Bar ([employer]co) (190 Calories, 21 g protein)  Pure Protein Bars (180 Calories, 21 g protein)    Frozen Meal Replacements  Healthy Choice  Lean Cuisine  Atkins Meals  Smart Ones    The Plate Method  [email or web address]    Protein Sources for Weight Loss  [email or web address]     Carbohydrates  [email or web address]     Mindful Eating  [email or web address]      Summary of Volumetrics Eating Plan  [email or web address]       Interested in working with a health ?  Health coaches work with you to improve your overall health and wellbeing.  They look at the whole person, and may involve discussion of different areas of life, including, but not limited to the four pillars of health (sleep, exercise, nutrition, and stress management). Discuss with your care team if you would like to start working a health .    Health Coaching-3 Pack:    $99 for three health coaching visits    Visits may be done in person or via phone    Coaching is a partnership between the  and the client; Coaches do not prescribe or diagnose    Coaching helps inspire the client to reach his/her personal goals      Virtual Support Groups are Available             Healthy Lifestyle Support Group      All are welcome!     Facilitator: Jayda Tom, Certified Health     - Meets once a month on a Friday from 12:30pm to 1:30pm.  - Due to Covid-19 she is doing this Support Group virtually using Microsoft Teams.  - 60 minutes of small group guided discussion, support and resources.  - Please email Jayda directly at [email or web address] to receive monthly invitations.  - If you opt to participate in individual health coaching sessions or for general questions, contact Jayda via email.  - Jayda will send out invites for each session, so the phone number and the conference ID may change for each session.     2020 Meetings      December 18 - Open Forum      2021 Meetings      January 29 - How to Stay Active and Healthy during the Winter Months   February 26  - Reading Food Labels: What do I Need to Know?, Guest Speaker: Gay Peoples RD   March 19  - Finding Health, Happiness and Confidence at Every Size; Guest Speaker, Health Psychologist Fellow  April 30 -  Healthy Eating on a Budget, Guest Speaker: Lacie Reaves RD   May 21 - Open Forum

## 2021-04-30 ENCOUNTER — MYC MEDICAL ADVICE (OUTPATIENT)
Dept: ENDOCRINOLOGY | Facility: CLINIC | Age: 51
End: 2021-04-30

## 2021-04-30 PROCEDURE — 99207 PR MEDICAL WEIGHT MANAGEMENT MAINTENANCE: CPT | Performed by: PHYSICIAN ASSISTANT

## 2021-05-03 NOTE — TELEPHONE ENCOUNTER
I spoke to Chayo at [facility]. She states this was denied. She will refax the denial. Will update when received.

## 2021-05-04 NOTE — TELEPHONE ENCOUNTER
PRIOR AUTHORIZATION DENIED    Medication: Saxenda-PA denied    Denial Date: 5/4/2021    Denial Rational:       Appeal Information:

## 2021-05-05 DIAGNOSIS — E66.01 MORBID OBESITY (H): Primary | ICD-10-CM

## 2021-05-05 NOTE — TELEPHONE ENCOUNTER
PA for Saxenda denied. Per Layla Cyr, ROLANDO, will try Phentermien 15mg. Patient would like to start with lower dose, in case there are any side effects. OK per Layla Cyr. Routing to provider for sign off.

## 2021-05-06 ENCOUNTER — TELEPHONE (OUTPATIENT)
Dept: SURGERY | Facility: CLINIC | Age: 51
End: 2021-05-06

## 2021-05-06 NOTE — TELEPHONE ENCOUNTER
Called back [employer] and spoke with representative in regards to Saxenda denial. Advised that appeal is not being pursued, alternate medication has been prescribed. PA closed.

## 2021-05-06 NOTE — TELEPHONE ENCOUNTER
Venecia from [employer] called and was wondering if there was anything they could do to help with the PA for Saxenda being denied.     Number for them is [phone] and reference number is [id number]

## 2021-05-07 ENCOUNTER — VIRTUAL VISIT (OUTPATIENT)
Dept: ENDOCRINOLOGY | Facility: CLINIC | Age: 51
End: 2021-05-07
Payer: COMMERCIAL

## 2021-05-07 DIAGNOSIS — E66.9 OBESITY: Primary | ICD-10-CM

## 2021-05-07 NOTE — PROGRESS NOTES
LORENZO GUZMAN     Progress Notes    3 pack Weight Management Health Coaching Note    Elaine Awad          MRN: [id number]  : 1970  KEAGAN: May 7, 2021    Health  Visit #: 1/3 (first 3 pack)    Kiersten video visit    ASSESSMENT:  Initial Weight:  294 lb     (Kopacz on ; saw RD Shelton on )    Current Weight (lbs) NA      PREVIOUS GOAL(S) REVIEW: PER RD VISIT, Shelton on : DID NOT REVIEW      1. Plan dinner ahead of time to create a balanced meal.               - Use the Plate Method, or meal replacement  2. Increase activity as able. Take a 30 mins walk 5 days per week     [facility] [employer] E-store:  You may purchase meal replacement products online at our e-store. Visit e-store at [email or web address]  - The one week starter kits is a great way to sample a variety of products.  - For recipes and ideas on how to use products, visit - Intellistream     Meal Replacement Shake Options:   *Protein Shake Criteria: no more than 210 Calories, at least 20 grams of protein, and less than 10 grams of sugar   [facility] smoothie (160 Calories, 20 g protein)   Premier Protein (160 Calories, 30 g protein)  Slim Fast Advanced Nutrition (180 Calories, 20 g protein)  Muscle Milk, lactose-free, 17 oz bottle (210 Calories, 30 g protein)  Integrated Supplements, no artificial sugars (110 Calories, 20 g protein)  Genepro, unflavored protein powder (60 Calories, 30 g protein)  Boost/Ensure Max (160 calories, 30 gm protein)   [facility] Core Power (170 calories, 26 gm protein)     Meal Replacement Bar Options:  [facility] Protein Shake (160 Calories, 15 g protein)  Quest Protein Bars (190 Calories, 20 g protein)  Built Bar (170 Calories, 15-20 g protein)  One Protein Bar (210 calories, 20 g protein)  Landa Signature Protein Bar ([employer]) (190 Calories, 21 g protein)  Pure Protein Bars (180 Calories, 21 g protein)     Frozen Meal Replacements  Healthy Choice  Lean Cuisine  Atkins Meals  Smart  Ones     The Plate Method  [email or web address]     Protein Sources for Weight Loss  [email or web address]      Carbohydrates  [email or web address]      Mindful Eating  [email or web address]      Summary of Volumetrics Eating Plan  [email or web address]                PILLAR(S) DISCUSSED IN THIS VISIT:       Explained the role of a health ; and how the 3 pack works.    MY INTENTIONS: Post on sticky notes    1.) Health: I want health (healthy weight, less pain/ease of movement and happy)    I will find 3 new dinner ideas  I will make having a healthy dinner my top  priority.    Resources:   Pintrest  [email or web address].[email or web address] website or [facility]  Freshn Lean  Healthy for Life  Freshly  See above goals/resources from RD     2.) Connection: I want connection with  myself :  I will get out of the house at least 2 times per week; ashly it on my calandar.       NEXT Health  Visit with Jayda is May 28, 8am, KINZA GOMEZ      FOLLOW-UP:  To schedule your next visit, please call [phone].         SIGNATURE:  Jayda Tom, Certified Health  on 5/7/2021 at 7:36 AM

## 2021-05-07 NOTE — LETTER
2021       RE: Elaine Awad  [address] [address] [address]     Dear Colleague,    Thank you for referring your patient, Elaine Awad, to the [facility]NA[facility] [place] at [facility]. Please see a copy of my visit note below.    LORENZO GUZMAN     Progress Notes    3 pack Weight Management Health Coaching Note    Elaine Awad          MRN: [id number]  : 1970  KEAGAN: May 7, 2021    Health  Visit #: 1/3 (first 3 pack)    well video visit    ASSESSMENT:  Initial Weight:  294 lb     (Klarissa on ; saw RD Shelton on )    Current Weight (lbs) NA      PREVIOUS GOAL(S) REVIEW: PER RD VISITShelton on : DID NOT REVIEW      1. Plan dinner ahead of time to create a balanced meal.               - Use the Plate Method, or meal replacement  2. Increase activity as able. Take a 30 mins walk 5 days per week     [facility] E-store:  You may purchase meal replacement products online at our e-store. Visit e-store at [email or web address]  - The one week starter kits is a great way to sample a variety of products.  - For recipes and ideas on how to use products, visit - Mingxieku     Meal Replacement Shake Options:   *Protein Shake Criteria: no more than 210 Calories, at least 20 grams of protein, and less than 10 grams of sugar   [facility] smoothie (160 Calories, 20 g protein)   Premier Protein (160 Calories, 30 g protein)  Slim Fast Advanced Nutrition (180 Calories, 20 g protein)  Muscle Milk, lactose-free, 17 oz bottle (210 Calories, 30 g protein)  Integrated Supplements, no artificial sugars (110 Calories, 20 g protein)  Genepro, unflavored protein powder (60 Calories, 30 g protein)  Boost/Ensure Max (160 calories, 30 gm protein)   [facility] Core Power (170 calories, 26 gm protein)     Meal Replacement Bar Options:  [facility] Protein Shake (160 Calories, 15 g protein)  Quest Protein Bars (190  Calories, 20 g protein)  Built Bar (170 Calories, 15-20 g protein)  One Protein Bar (210 calories, 20 g protein)  [place] Signature Protein Bar ([employer]) (190 Calories, 21 g protein)  Pure Protein Bars (180 Calories, 21 g protein)     Frozen Meal Replacements  Healthy Choice  Lean Cuisine  Atkins Meals  Smart Ones     The Plate Method  [email or web address]     Protein Sources for Weight Loss  [email or web address]      Carbohydrates  [email or web address]      Mindful Eating  [email or web address]      Summary of Volumetrics Eating Plan  [email or web address]                PILLAR(S) DISCUSSED IN THIS VISIT:       Explained the role of a health ; and how the 3 pack works.    MY INTENTIONS: Post on sticky notes    1.) Health: I want health (healthy weight, less pain/ease of movement and happy)    I will find 3 new dinner ideas  I will make having a healthy dinner my top  priority.    Resources:   Pintrest  cleanfoodcrush.com website or facebook group  Freshn Lean  Healthy for Life  Freshly  See above goals/resources from RD     2.) Connection: I want connection with  myself :  I will get out of the house at least 2 times per week; ashly it on my calandar.     NEXT Health  Visit with Jayda is May 28, 8am, AMWELL PATRICIA    FOLLOW-UP:  To schedule your next visit, please call [phone].         SIGNATURE:  Jayda Tom, Certified Health  on 5/7/2021 at 7:36 AM

## 2021-05-19 ENCOUNTER — VIRTUAL VISIT (OUTPATIENT)
Dept: PHARMACY | Facility: CLINIC | Age: 51
End: 2021-05-19
Payer: COMMERCIAL

## 2021-05-19 DIAGNOSIS — J30.2 SEASONAL ALLERGIES: ICD-10-CM

## 2021-05-19 DIAGNOSIS — Z78.9 TAKES DIETARY SUPPLEMENTS: ICD-10-CM

## 2021-05-19 DIAGNOSIS — K21.9 GASTROESOPHAGEAL REFLUX DISEASE WITHOUT ESOPHAGITIS: ICD-10-CM

## 2021-05-19 DIAGNOSIS — E66.01 MORBID OBESITY (H): Primary | ICD-10-CM

## 2021-05-19 DIAGNOSIS — G43.711 INTRACTABLE CHRONIC MIGRAINE WITHOUT AURA AND WITH STATUS MIGRAINOSUS: ICD-10-CM

## 2021-05-19 DIAGNOSIS — R21 RASH: ICD-10-CM

## 2021-05-19 DIAGNOSIS — B00.1 RECURRENT COLD SORES: ICD-10-CM

## 2021-05-19 PROCEDURE — 99607 MTMS BY PHARM ADDL 15 MIN: CPT | Performed by: PHARMACIST

## 2021-05-19 PROCEDURE — 99605 MTMS BY PHARM NP 15 MIN: CPT | Performed by: PHARMACIST

## 2021-05-19 RX ORDER — MULTIPLE VITAMINS W/ MINERALS TAB 9MG-400MCG
1 TAB ORAL DAILY
COMMUNITY
End: 2023-01-23

## 2021-05-19 RX ORDER — PETROLATUM,WHITE/LANOLIN
1 OINTMENT (GRAM) TOPICAL DAILY
COMMUNITY
End: 2023-01-23

## 2021-05-19 NOTE — PATIENT INSTRUCTIONS
Recommendations from today's MTM visit:                                                    MTM (medication therapy management) is a service provided by a clinical pharmacist designed to help you get the most of out of your medicines.   Today we reviewed what your medicines are for, how to know if they are working, that your medicines are safe and how to make your medicine regimen as easy as possible.        1. Go to [facility] or [facility] to get blood pressure checked, then message pharmacist to let know.   -If blood pressure is >140/90 mmHg, hold phentermine and repeat blood pressure in 1 week.   -If blood pressure <140/90 mmHg, can continue phentermine for now.     2. Follow up with eye doctor if left eye redness worsens/continues.     Follow-up: Return in about 3 months (around 8/19/2021) for Medication Therapy Management Pharmacist Visit, Call [phone] to schedule.    It was great to speak with you today.  I value your experience and would be very thankful for your time with providing feedback on our clinic survey. You may receive a survey via email or text message in the next few days.     My Clinical Pharmacist's contact information:                                                      Please feel free to contact me with any questions or concerns you have.      Lauren Bloch, PharmD  Medication Therapy Management Pharmacist   [facility] [place]

## 2021-05-19 NOTE — Clinical Note
Reports BP high since starting phentermine but using wrist cuff so told her to get BP taken by brachial BP cuff either [facility] or clinic and then let us know to determine status. She is losing weight but reports not noticing difference in how she is eating. Elana CHAIDEZ

## 2021-05-19 NOTE — PROGRESS NOTES
Medication Therapy Management (MTM) Encounter    ASSESSMENT:                            Medication Adherence/Access: No issues identified    Obesity: Weight loss progressing. However, may benefit from blood pressure repeat due to higher blood pressures at home from wrist monitor. Discussed wrist monitor may not be accurate so would want to get repeat with blood pressure around upper arm.     Supplements: Stable.     Allergic Rhinitis: Unclear if current issue related to eye is allergy versus alternative issues. Discussed with patient if redness in left eye continues/worsens to follow up with eye doctor.     Rash: Stable.     GERD: Stable.     Migraines: Stable.     Cold Sores: Stable.     PLAN:                            1. Go to [facility] or [facility] to get blood pressure checked, then message pharmacist to let know.   -If blood pressure is >140/90 mmHg, hold phentermine and repeat blood pressure in 1 week.   -If blood pressure <140/90 mmHg, can continue phentermine for now.     2. Follow up with eye doctor if left eye redness worsens/continues.     Follow-up:3 months or as needed  Layla Cyr PA-C in 1 month - already scheduled     SUBJECTIVE/OBJECTIVE:                          Elaine Awad is a 50 year old female called for an initial visit. She was referred to me from Layla Cyr PA-C.      Reason for visit: blood pressure elevated?.    Allergies/ADRs: Reviewed in chart  Tobacco: She reports that she quit smoking about 23 years ago. Her smoking use included cigarettes. She has a 5.00 pack-year smoking history. She has never used smokeless tobacco.  Alcohol: Less than 1 beverages / week -wine cooler or antoni if anything   Caffeine: 1-2 cups/day of coffee  Activity: walking dog everyday  Past Medical History: Reviewed in chart.  Occupation: Public Health Nurse    Medication Adherence/Access: Patient takes medications directly from bottles. Flip bottle over after she has taken to keep  "track. Then flips all back up at night.   Patient takes medications 1-2  time(s) per day, depending on days when she takes her PRN meds.   Per patient, misses medication 0 times per week.     Obesity:   Phentermine 8 mg once daily in AM.     Followed by Layla Cyr PA-C , seen 4/22/21 for Return Medical Weight Management, tried to get Saxenda but not covered so was switched to low dose phentermine. Patient is experiencing the follow side effects: blood pressure?. Reports she has been taking phentermine for 1 week, started checking wrist blood pressure after this. blood pressure was 150/80 mmHg, 140/84 mmHg. She reports she is unsure if she is noticing benefits, but scale is going down. Working with Jayda Tom, Health .   Weight History: Lap band 2014, removed 2017 due to abdominal pain and resolved when band removed. Completed 24 week program, reports that she wasn't as involved as she could have been.   Diet/Eating Habits: Patient reports eating at least 2 meals per day. Drinks water all day long, 64+ oz daily.  Following with dietitianGay.   Exercise/Activity: Patient reports walking dog most days if nice out.   Failed medications: topiramate: eye issues?  Current weight today: 289.6 lb     Wt Readings from Last 4 Encounters:   04/22/21 294 lb (133.4 kg)   03/03/20 261 lb 9.6 oz (118.7 kg)   01/30/20 267 lb 6.4 oz (121.3 kg)   01/16/20 266 lb 14.4 oz (121.1 kg)     Estimated body mass index is 50.46 kg/m  as calculated from the following:    Height as of 4/22/21: 5' 4\" (1.626 m).    Weight as of 4/22/21: 294 lb (133.4 kg).    BP Readings from Last 3 Encounters:   03/03/20 124/76   01/30/20 139/80   01/16/20 (!) 146/93     Supplements:   Glucosamine 1 capsule daily   Multivitamin 1 tablet daily     Takes at night. She has awareness of her ankles since being on her feet all day now, not pain but can be bothersome. She does find that the glucosamine has been helpful so long as she takes for a while. " Taking multivitamin for general health.     Hemoglobin   Date Value Ref Range Status   04/26/2021 15.4 11.7 - 15.7 g/dL Final     Allergic Rhinitis:   Fexofenadine 180mg once daily.   Patanol eye drops BID PRN     Primary symptoms are itchy/watery eyes, sneezing. Patient feels that current therapy is somewhat effective. She does have a sensitivity to crab apple trees. If allergies are bad enough she will have red eyes and even eye swelling at one point. She does have redness in left eye more than right eye. Left eye has been red since Sunday, not itching or burning and no crusts. She is unsure if allergies versus other aspects. No blurred vision. No change of vision in general. No swollen eye(s).      Rash:   Nystatin PRN    Will use under breasts if needed.     GERD:   Prilosec (omeprazole) 20 mg once daily.     Pt reports no current symptoms.  Patient feels that current regimen is effective.    Migraines:   Rizatriptan 5-10 mg as needed    Reports getting migraine 1 migraine per month, but will last for 3+ days. Sometimes nausea and light sensitivity. A lot of times they are premenstrual migraine, sometimes humidity but mostly hormonal. She does find rizatriptan very helpful. She is then able to function throughout the day, doesn't take away migraine but makes manageable. Sumatriptan had side effect of drowsiness. Rarely will use magnesium. Historically, previously on depo provera and she was not getting a period as she continued to use, and didn't get migraine when she was on this. Stopped depot because of weight gain.     Cold Sores:   Valacyclovir 500 mg PRN for outbreak.     Hasn't used for a while.   ----------------      I spent 32 minutes with this patient today. A copy of the visit note was provided to the patient's referring provider.    The patient was sent via [employer] a summary of these recommendations.     Lauren Bloch, PharmD  Medication Therapy Management Pharmacist   [facility]  Management Center    Telemedicine Visit Details  Type of service:  Telephone visit  Start Time: 11:33 AM  End Time: 12:05 PM  Originating Location (patient location): Home  Distant Location (provider location):  [facility] MANAGEMENT C[facility]      Medication Therapy Recommendations  Morbid obesity (H)    Current Medication: phentermine (LOMAIRA) 8 MG tablet   Rationale: Medication requires monitoring - Needs additional monitoring   Recommendation: Self-Monitoring - Get blood pressure checked   Status: Patient Agreed - Adherence/Education

## 2021-06-12 ENCOUNTER — ALLIED HEALTH/NURSE VISIT (OUTPATIENT)
Dept: FAMILY MEDICINE | Facility: CLINIC | Age: 51
End: 2021-06-12
Payer: COMMERCIAL

## 2021-06-12 VITALS — SYSTOLIC BLOOD PRESSURE: 150 MMHG | DIASTOLIC BLOOD PRESSURE: 86 MMHG | HEART RATE: 90 BPM

## 2021-06-12 DIAGNOSIS — Z01.30 BLOOD PRESSURE CHECK: Primary | ICD-10-CM

## 2021-06-12 PROCEDURE — 99207 PR NO CHARGE NURSE ONLY: CPT | Performed by: FAMILY MEDICINE

## 2021-06-12 NOTE — Clinical Note
Routing message to MTM for review because BP checked at pharmacy is above goal. Recommended patient to follow-up with MTM.  MTM please close this encounter.

## 2021-06-12 NOTE — PROGRESS NOTES
Elaine Awad was evaluated at [facility] on June 12, 2021 at which time her blood pressure was:    BP Readings from Last 3 Encounters:   06/12/21 (!) 150/86   03/03/20 124/76   01/30/20 139/80     Pulse Readings from Last 3 Encounters:   06/12/21 90   03/03/20 107   01/30/20 80       Reviewed lifestyle modifications for blood pressure control and reduction: including making healthy food choices, managing weight, getting regular exercise, smoking cessation, reducing alcohol consumption, monitoring blood pressure regularly.     Symptoms: None    BP Goal:< 140/90 mmHg    BP Assessment:  BP too high    Potential Reasons for BP too high: Unknown/Other: Possible Phentermine start up    BP Follow-Up Plan: Referral to MTM    Recommendation to Provider:  Discontinue Phentermine and have BP rechecked    Note completed by: Rojas Will RPh.  [facility]  [phone]

## 2021-06-14 NOTE — PROGRESS NOTES
Patient was mychart messaged to hold phentermine for now then to return to [facility] to get blood pressure rechecked to ensure returns to normal limits. Awaiting to hear back from patient.     Lauren Bloch, PharmD  Medication Therapy Management Pharmacist   [facility] [place]

## 2021-06-24 ENCOUNTER — VIRTUAL VISIT (OUTPATIENT)
Dept: ENDOCRINOLOGY | Facility: CLINIC | Age: 51
End: 2021-06-24
Payer: COMMERCIAL

## 2021-06-24 VITALS — BODY MASS INDEX: 49.17 KG/M2 | WEIGHT: 288 LBS | HEIGHT: 64 IN

## 2021-06-24 DIAGNOSIS — E66.01 MORBID OBESITY (H): Primary | ICD-10-CM

## 2021-06-24 PROCEDURE — 99213 OFFICE O/P EST LOW 20 MIN: CPT | Mod: 95 | Performed by: PHYSICIAN ASSISTANT

## 2021-06-24 RX ORDER — TOPIRAMATE 25 MG/1
TABLET, FILM COATED ORAL
Qty: 90 TABLET | Refills: 1 | Status: SHIPPED | OUTPATIENT
Start: 2021-06-24 | End: 2021-06-30

## 2021-06-24 ASSESSMENT — PAIN SCALES - GENERAL: PAINLEVEL: NO PAIN (0)

## 2021-06-24 ASSESSMENT — MIFFLIN-ST. JEOR: SCORE: 1911.36

## 2021-06-24 NOTE — NURSING NOTE
"Chief Complaint   Patient presents with     Follow Up     [facility] follow up       Vitals:    06/24/21 0819   Weight: 130.6 kg (288 lb)   Height: 1.626 m (5' 4\")       Body mass index is 49.44 kg/m .                            "

## 2021-06-24 NOTE — PROGRESS NOTES
Linda is a 50 year old who is being evaluated via a billable video visit.      How would you like to obtain your AVS? MyChart  If the video visit is dropped, the invitation should be resent by: Text to cell phone: [phone]  Will anyone else be joining your video visit? No    During this virtual visit the patient is located in [place], patient verifies this as the location during the entirety of this visit.       Video Start Time: 8:53 AM    Video-Visit Details    Type of service:  Video Visit    Video End Time:9:02 AM    Originating Location (pt. Location): Home    Distant Location (provider location):  [facility] WEIGHT MANAGEMENT CLIN[facility] [place]     Platform used for Video Visit: WealthyLife     [facility] Weight Management Note     Elaine Awad  MRN:  [id number]  :  1970  KEAGAN:  2021    Dear Fernanda Rodriguez MD,    I had the pleasure of seeing your patient Elaine Awad. She is a 50 year old female who I am continuing to see for treatment of obesity related to:       2019   I have the following health issues associated with obesity: GERD (Reflux), Weight Bearing Joint Pain   I have the following symptoms associated with obesity: Knee Pain, GERD (Reflux)      15 minutes spent on the date of the encounter doing chart review, history and exam, documentation and further activities per the note    INTERVAL HISTORY:   Hx of starting 24 week plan. Was supposed to graduate May 2020 but stopped visits with  2020  Starting weight 256 lbs  Weight last visit 2020 267 lbs.   Weight today 294 lbs   Last visit started phentermine and checked high /80's on lomaira so stopped it.  Hx of trying topiramate (Flashes of light in left eye, saw retinal specialist and no issues identified)  Gained 21 lbs since last visit.    PLAN:  Restart topiramate, flashing lights in eyes occasionally still happens without topiramate and evaluated by retinal specialist already  Hold phentermine for now  "due to elevated BP. Encourage follow up with PCP for blood pressure check and possible treatment if needed    Follow up with [facility] pharmacist Lauren Bloch in 1 month(s)  Follow up with registered dietitian in 1 month(s)  Follow up with me (Layla Cyr PA-C) in 3 month(s)  Call [phone] to schedule    CURRENT WEIGHT:   288 lbs 0 oz    Initial Weight (lbs): 239.4 lbs  Last Visits Weight: 121.3 kg (267 lb 6.4 oz)  Cumulative weight loss (lbs): -48.6  Weight Loss Percentage: -20.3%    Changes and Difficulties 6/23/2021   I have made the following changes to my diet since my last visit: Increase water, inctease protein   With regards to my diet, I am still struggling with: .   I have made the following changes to my activity/exercise since my last visit: Water walking 3x per week   With regards to my activity/exercise, I am still struggling with: Pain, ankle       VITALS:  Ht 1.626 m (5' 4\")   Wt 130.6 kg (288 lb)   BMI 49.44 kg/m      MEDICATIONS:   Current Outpatient Medications   Medication Sig Dispense Refill     fexofenadine (ALLEGRA) 180 MG tablet Take 180 mg by mouth daily       glucosamine sulfate 1000 MG CAPS Take 1 capsule by mouth daily       multivitamin w/minerals (THERA-VIT-M) tablet Take 1 tablet by mouth daily       nystatin (NYSTOP) 678373 UNIT/GM external powder APPLY TO THE AFFECTED AREA UNDER BREASTS THREE TIMES DAILY AS NEEDED. 60 g 4     olopatadine (PATANOL) 0.1 % ophthalmic solution Place 1 drop into both eyes 2 times daily 5 mL 1     omeprazole (PRILOSEC) 20 MG DR capsule TAKE 1 CAPSULE BY MOUTH EVERY DAY 90 capsule 1     rizatriptan (MAXALT-MLT) 5 MG ODT Take 1-2 tablets (5-10 mg) by mouth at onset of headache for migraine May repeat in 2 hours. Max 6 tablets/24 hours. 9 tablet 11     topiramate (TOPAMAX) 25 MG tablet 25mg at bedtime for week 1, 50mg at bedtime for 1 week, and 75mg at bedtime thereafter 90 tablet 1     valACYclovir (VALTREX) 500 MG tablet TAKE 1 TABLET BY MOUTH DAILY. " "INCREASE TO 4 TABLET BY MOUTH 2 TIMES DAILY AT ONSET OF OUTBREAK 100 tablet 2       Weight Loss Medication History Reviewed With Patient 6/23/2021   Which weight loss medications are you currently taking on a regular basis?  None   If you are not taking a weight loss medication that was prescribed to you, please indicate why: It increases my blood pressure   Are you having any side effects from the weight loss medication that we have prescribed you? No     PHYSICAL EXAM:  Objective    Ht 1.626 m (5' 4\")   Wt 130.6 kg (288 lb)   BMI 49.44 kg/m    Vitals - Patient Reported  Pain Score: No Pain (0)    Physical Exam   GENERAL: Healthy, alert and no distress  EYES: Eyes grossly normal to inspection.  No discharge or erythema, or obvious scleral/conjunctival abnormalities.  RESP: No audible wheeze, cough, or visible cyanosis.  No visible retractions or increased work of breathing.    SKIN: Visible skin clear. No significant rash, abnormal pigmentation or lesions.  NEURO: Cranial nerves grossly intact.  Mentation and speech appropriate for age.  PSYCH: Mentation appears normal, affect normal/bright, judgement and insight intact, normal speech and appearance well-groomed.      Sincerely,    Layla Cyr PA-C      "

## 2021-06-24 NOTE — PATIENT INSTRUCTIONS
MEDICATION STARTED AT THIS APPOINTMENT  We are starting topiramate at bedtime.  Start one tab, 25 mg, for a week. Go up to 50 mg (2 tabs) for the next week. At the third week, take   3 tabs (75 mg).  Stay at 3 tabs until you are seen again. Contact the nurse via Pay4later or call [phone] if you have any questions or concerns. (Do not stop taking it if you don't think it's working. For some people it works even though they do not feel much different.)    Topiramate (Topamax) is a medication that is used most often to treat migraine headaches or for seizures. It has also been found to help with weight loss. Although it's not currently FDA approved for weight loss, it has been used safely for a number of years to help people who are carrying extra weight.     Just how topiramate helps with weight loss has not been exactly determined. However it seems to work on areas of the brain to quiet down signals related to eating.      Topiramate may make you:    >feel less interest in eating in between meals   >think less about food and eating   >find it easier to push the plate away   >find giving up pop easier    >have an easier time eating less    For some of our patients, the pills work right away. They feel and think quite differently about food. Other patients don't feel much of a change but find in fact they have lost weight! Like all weight loss medications, topiramate works best when you help it work.  This means:    1) Have less tempting high calorie (fattening) food around the house or office    2) Have lower calorie food (fruits, vegetables,low fat meats and dairy) for snacks    3) Eat out only one time or less each week.   4) Eat your meals at a table with the TV or computer off.    Side-effects. Topiramate is generally well tolerated. The main side-effects we see are:   Tingling in hands,feet, or face (usually not very troublesome)   Mental confusion and word finding trouble (about 10% of patients have this.)      Feeling sleepy or a bit dopey- this goes away very soon after starting.    One of the dangers of topiramate is the possibility of birth defects--if you get pregnant when you are on it, there is the risk that your baby will be born with a cleft lip or palate.  If you are on topiramate and of child bearing age, you need to be on a reliable form of birth control or refrain from sexual intercourse.     Please refer to the pharmacy insert for more information on side-effects. Since many pharmacists are not familiar with the use of topiramate in weight loss, calling the clinic will get you the most accurate information on the use of this medication for weight loss.     In order to get refills of this or any medication we prescribe you must be seen in the medical weight mgmt clinic every 2-3 months.

## 2021-06-24 NOTE — LETTER
2021       RE: Elaine Awad  [address] [facility] [address]     Dear Colleague,    Thank you for referring your patient, Elaine Awad, to the [facility] [place] at [facility]. Please see a copy of my visit note below.    Linda is a 50 year old who is being evaluated via a billable video visit.      How would you like to obtain your AVS? MyChart  If the video visit is dropped, the invitation should be resent by: Text to cell phone: [phone]  Will anyone else be joining your video visit? No    During this virtual visit the patient is located in [place], patient verifies this as the location during the entirety of this visit.       Video Start Time: 8:53 AM    Video-Visit Details    Type of service:  Video Visit    Video End Time:9:02 AM    Originating Location (pt. Location): Home    Distant Location (provider location):  [facility] [place]     Platform used for Video Visit: YouFastUnlock     [facility] Medical Weight Management Note     Elaine Awad  MRN:  [id number]  :  1970  KEAGAN:  2021    Dear Fernanda Rodriguez MD,    I had the pleasure of seeing your patient Elaine Awad. She is a 50 year old female who I am continuing to see for treatment of obesity related to:       2019   I have the following health issues associated with obesity: GERD (Reflux), Weight Bearing Joint Pain   I have the following symptoms associated with obesity: Knee Pain, GERD (Reflux)      15 minutes spent on the date of the encounter doing chart review, history and exam, documentation and further activities per the note    INTERVAL HISTORY:   Hx of starting 24 week plan. Was supposed to graduate May 2020 but stopped visits with  2020  Starting weight 256 lbs  Weight last visit 2020 267 lbs.   Weight today 294 lbs   Last visit started phentermine and checked high /80's on lomaira so  "stopped it.  Hx of trying topiramate (Flashes of light in left eye, saw retinal specialist and no issues identified)  Gained 21 lbs since last visit.    PLAN:  Restart topiramate, flashing lights in eyes occasionally still happens without topiramate and evaluated by retinal specialist already  Hold phentermine for now due to elevated BP. Encourage follow up with PCP for blood pressure check and possible treatment if needed    Follow up with [facility] pharmacist Lauren Bloch in 1 month(s)  Follow up with registered dietitian in 1 month(s)  Follow up with me (Layla Cyr PA-C) in 3 month(s)  Call [phone] to schedule    CURRENT WEIGHT:   288 lbs 0 oz    Initial Weight (lbs): 239.4 lbs  Last Visits Weight: 121.3 kg (267 lb 6.4 oz)  Cumulative weight loss (lbs): -48.6  Weight Loss Percentage: -20.3%    Changes and Difficulties 6/23/2021   I have made the following changes to my diet since my last visit: Increase water, inctease protein   With regards to my diet, I am still struggling with: .   I have made the following changes to my activity/exercise since my last visit: Water walking 3x per week   With regards to my activity/exercise, I am still struggling with: Pain, ankle       VITALS:  Ht 1.626 m (5' 4\")   Wt 130.6 kg (288 lb)   BMI 49.44 kg/m      MEDICATIONS:   Current Outpatient Medications   Medication Sig Dispense Refill     fexofenadine (ALLEGRA) 180 MG tablet Take 180 mg by mouth daily       glucosamine sulfate 1000 MG CAPS Take 1 capsule by mouth daily       multivitamin w/minerals (THERA-VIT-M) tablet Take 1 tablet by mouth daily       nystatin (NYSTOP) 987594 UNIT/GM external powder APPLY TO THE AFFECTED AREA UNDER BREASTS THREE TIMES DAILY AS NEEDED. 60 g 4     olopatadine (PATANOL) 0.1 % ophthalmic solution Place 1 drop into both eyes 2 times daily 5 mL 1     omeprazole (PRILOSEC) 20 MG DR capsule TAKE 1 CAPSULE BY MOUTH EVERY DAY 90 capsule 1     rizatriptan (MAXALT-MLT) 5 MG ODT Take 1-2 tablets " "(5-10 mg) by mouth at onset of headache for migraine May repeat in 2 hours. Max 6 tablets/24 hours. 9 tablet 11     topiramate (TOPAMAX) 25 MG tablet 25mg at bedtime for week 1, 50mg at bedtime for 1 week, and 75mg at bedtime thereafter 90 tablet 1     valACYclovir (VALTREX) 500 MG tablet TAKE 1 TABLET BY MOUTH DAILY. INCREASE TO 4 TABLET BY MOUTH 2 TIMES DAILY AT ONSET OF OUTBREAK 100 tablet 2       Weight Loss Medication History Reviewed With Patient 6/23/2021   Which weight loss medications are you currently taking on a regular basis?  None   If you are not taking a weight loss medication that was prescribed to you, please indicate why: It increases my blood pressure   Are you having any side effects from the weight loss medication that we have prescribed you? No     PHYSICAL EXAM:  Objective    Ht 1.626 m (5' 4\")   Wt 130.6 kg (288 lb)   BMI 49.44 kg/m    Vitals - Patient Reported  Pain Score: No Pain (0)    Physical Exam   GENERAL: Healthy, alert and no distress  EYES: Eyes grossly normal to inspection.  No discharge or erythema, or obvious scleral/conjunctival abnormalities.  RESP: No audible wheeze, cough, or visible cyanosis.  No visible retractions or increased work of breathing.    SKIN: Visible skin clear. No significant rash, abnormal pigmentation or lesions.  NEURO: Cranial nerves grossly intact.  Mentation and speech appropriate for age.  PSYCH: Mentation appears normal, affect normal/bright, judgement and insight intact, normal speech and appearance well-groomed.      Sincerely,    Layla Cyr PA-C      "

## 2021-06-30 ENCOUNTER — ANCILLARY PROCEDURE (OUTPATIENT)
Dept: GENERAL RADIOLOGY | Facility: CLINIC | Age: 51
End: 2021-06-30
Attending: FAMILY MEDICINE
Payer: COMMERCIAL

## 2021-06-30 ENCOUNTER — OFFICE VISIT (OUTPATIENT)
Dept: FAMILY MEDICINE | Facility: CLINIC | Age: 51
End: 2021-06-30
Payer: COMMERCIAL

## 2021-06-30 VITALS
HEIGHT: 64 IN | OXYGEN SATURATION: 96 % | TEMPERATURE: 97.4 F | WEIGHT: 289 LBS | HEART RATE: 78 BPM | SYSTOLIC BLOOD PRESSURE: 160 MMHG | DIASTOLIC BLOOD PRESSURE: 90 MMHG | BODY MASS INDEX: 49.34 KG/M2

## 2021-06-30 DIAGNOSIS — I10 HYPERTENSION GOAL BP (BLOOD PRESSURE) < 140/90: ICD-10-CM

## 2021-06-30 DIAGNOSIS — G89.29 CHRONIC PAIN OF RIGHT ANKLE: Primary | ICD-10-CM

## 2021-06-30 DIAGNOSIS — G89.29 CHRONIC PAIN OF RIGHT ANKLE: ICD-10-CM

## 2021-06-30 DIAGNOSIS — M25.571 CHRONIC PAIN OF RIGHT ANKLE: ICD-10-CM

## 2021-06-30 DIAGNOSIS — Z11.59 NEED FOR HEPATITIS C SCREENING TEST: ICD-10-CM

## 2021-06-30 DIAGNOSIS — M25.571 CHRONIC PAIN OF RIGHT ANKLE: Primary | ICD-10-CM

## 2021-06-30 DIAGNOSIS — E66.01 MORBID OBESITY (H): ICD-10-CM

## 2021-06-30 PROCEDURE — 73610 X-RAY EXAM OF ANKLE: CPT | Mod: RT | Performed by: RADIOLOGY

## 2021-06-30 PROCEDURE — 99214 OFFICE O/P EST MOD 30 MIN: CPT | Performed by: FAMILY MEDICINE

## 2021-06-30 RX ORDER — LISINOPRIL 10 MG/1
10 TABLET ORAL DAILY
Qty: 30 TABLET | Refills: 0 | Status: SHIPPED | OUTPATIENT
Start: 2021-06-30 | End: 2021-07-22 | Stop reason: SINTOL

## 2021-06-30 ASSESSMENT — MIFFLIN-ST. JEOR: SCORE: 1915.9

## 2021-06-30 NOTE — PATIENT INSTRUCTIONS
Did you know you can lower your blood pressure with your daily habits?    *Losing 20 pounds of weight lowers blood pressure 5 to 20 points.  *Eating a low-fat diet rich in fruits, vegetables and low-fat dairy lowers blood pressure 8 to 14 points.  *Eating a low-salt diet lowers blood pressure 2 to 8 points.  *Exercising regularly lowers blood pressure 4 to 9 points.  *Reducing alcohol use lowers blood pressure 2 to 4 points.

## 2021-06-30 NOTE — RESULT ENCOUNTER NOTE
Please call patient:    Your xray did show a heel spur, which can be associated with plantar fasciitis. I recommend stretching the bottom of your foot regularly. You can use a tennis ball or water bottle to do this. Wear supportive shoes like tennis shoes.     You can see our Sports Medicine or podiatry specialist if your symptoms persist; I have placed a referral so you will receive outreach.     Call or return to clinic as needed if these symptoms worsen or fail to improve as anticipated.     Fernanda Rodriguez MD

## 2021-06-30 NOTE — PROGRESS NOTES
"    Assessment & Plan     Chronic pain of right ankle  -Differential diagnoses would include: plantar fasciitis, medial ankle sprain   - XR Ankle Right G/E 3 Views; Future  - Orthopedic  Referral; Future  -see result note    Hypertension goal BP (blood pressure) < 140/90  -new diagnosis   -Discussed risks and benefits of this medication.   - lisinopril (ZESTRIL) 10 MG tablet; Take 1 tablet (10 mg) by mouth daily  - Basic metabolic panel; Future  -see AVS for follow-up     Morbid obesity (H)  -follow-up with bariatrics   - Basic metabolic panel; Future    Need for hepatitis C screening test  - Hepatitis C Screen Reflex to HCV RNA Quant and Genotype; Future    Ordering of each unique test  Prescription drug management         BMI:   Estimated body mass index is 49.61 kg/m  as calculated from the following:    Height as of this encounter: 1.626 m (5' 4\").    Weight as of this encounter: 131.1 kg (289 lb).   Weight management plan: Patient referred to endocrine and/or weight management specialty    See Patient Instructions    Return in about 2 weeks (around 7/14/2021) for free pharmacy blood pressure check (walk-in).    Fernanda Rodriguez MD  [facility] ARCADIO Mckeon is a 50 year old who presents for the following health issues     HPI     Musculoskeletal problem/pain  Onset/Duration: 2 months  Description  Location: ankle - right  Joint Swelling: no  Redness: no  Pain: YES  Warmth: no  Intensity:  mild, moderate  Progression of Symptoms:  same  Accompanying signs and symptoms:   Fevers: no  Numbness/tingling/weakness: no  History  Trauma to the area: no  Recent illness:  no  Previous similar problem: no  Previous evaluation:  no  Precipitating or alleviating factors:  Aggravating factors include: walking, climbing stairs and relaxing  Therapies tried and outcome: acetaminophen, Ibuprofen and NSAID - aleve        Review of Systems   CONSTITUTIONAL: POSITIVE for weight gain  RESP: " "NEGATIVE for significant cough or SOB  MUSCULOSKELETAL: see HPI   NEURO: NEGATIVE for weakness, dizziness or paresthesias      Objective    BP (!) 160/90   Pulse 78   Temp 97.4  F (36.3  C) (Oral)   Ht 1.626 m (5' 4\")   Wt 131.1 kg (289 lb)   LMP 06/10/2021   SpO2 96%   Breastfeeding No   BMI 49.61 kg/m    Body mass index is 49.61 kg/m .  Physical Exam   GENERAL: alert, no distress and obese  MS: extremities normal- no gross deformities noted  NEURO: Normal strength and tone, mentation intact and speech normal  PSYCH: mentation appears normal, affect normal/bright    No results found for this or any previous visit (from the past 24 hour(s)).            "

## 2021-07-08 ENCOUNTER — VIRTUAL VISIT (OUTPATIENT)
Dept: ENDOCRINOLOGY | Facility: CLINIC | Age: 51
End: 2021-07-08
Payer: COMMERCIAL

## 2021-07-08 DIAGNOSIS — E66.9 OBESITY: Primary | ICD-10-CM

## 2021-07-08 PROCEDURE — 99207 PR NO CHARGE LOS: CPT

## 2021-07-08 NOTE — PROGRESS NOTES
"3 pack Weight Management Health Coaching Note     Elaine Awad          MRN: [id number]  : 1970  KEAGAN: May 7, 2021     Health  Visit #: 2/3 (first 3 pack)     Kiersten video visit     ASSESSMENT:  Initial Weight:  294 lb    (Klarissa on ; saw EMILY Peoples on )     Current Weight (lbs) 288.1 lb        PREVIOUS GOAL REVIEW:     MY INTENTIONS: Post on sticky notes     1.) Health: I want health (healthy weight, less pain/ease of movement and happy)     I will find 3 new dinner ideas  I will make having a healthy dinner my top  priority. IN PROGRESS (does \"HELLO FRESH\"  4 meals per month).  Does not love cooking.     Resources:   P[employer]rest  [email or web address] website or facebook group  Freshn Lean  Healthy for Life  Freshly  See above goals/resources from RD      2.) Connection: I want connection with  myself :  I will get out of the house at least 2 times per week; ashly it on my calandar. MEETING with the water exercise      PILLARS DISCUSSED:    Happy that she lost some weight.    Having trouble finding a medication that will help me due to blood pressure.     Started topiramate last night.    Exercise: Water walking 3 times per week, on a lazy river per, so weather dependent since it has been cold.    Wants to go back to work in person; working from home is making it hard to keep on a schedule.      GOALS:  1.) TRY A NEW DINNER RECIPE/ OR MEAL BOX IDEA:  [place]   Freshn Lean  Healthy for Life  Freshly  [employer] Meals  Sahra Box    2.) I will continue water walking 3-4 times per week.    3.) I will check into going to the library for a couple hours per week.    4.) Make an apt with the RD: To schedule your next visit, please call [phone].       NEXT HEALTH  VISIT WITH LORENZO: PHONE AUG 5, 9am    3/3 Health  3 pack. You may purchase another 3 pack after 3rd session on Aug 5th.             FOLLOW-UP:  To schedule your next visit, please call [phone].             "

## 2021-07-08 NOTE — LETTER
"2021       RE: Elaine Awad  [address] [facility] [address]     Dear Colleague,    Thank you for referring your patient, Elaine Awad, to the [facility]NA[facility] [place] at [facility]. Please see a copy of my visit note below.    3 pack Weight Management Health Coaching Note     Elaine Awad          MRN: [id number]  : 1970  KEAGAN: May 7, 2021     Health  Visit #: 2/3 (first 3 pack)     Kiersten video visit     ASSESSMENT:  Initial Weight:  294 lb    (Klarissa on ; saw EMILY Peoples on )     Current Weight (lbs) 288.1 lb        PREVIOUS GOAL REVIEW:     MY INTENTIONS: Post on sticky notes     1.) Health: I want health (healthy weight, less pain/ease of movement and happy)     I will find 3 new dinner ideas  I will make having a healthy dinner my top  priority. IN PROGRESS (does \"HELLO FRESH\"  4 meals per month).  Does not love cooking.     Resources:   P[employer]rest  [email or web address] website or facebook group  [employer]  Healthy for Life  Freshly  See above goals/resources from RD      2.) Connection: I want connection with  myself :  I will get out of the house at least 2 times per week; ashly it on my calandar. MEETING with the water exercise      PILLARS DISCUSSED:    Happy that she lost some weight.    Having trouble finding a medication that will help me due to blood pressure.     Started topiramate last night.    Exercise: Water walking 3 times per week, on a lazy river per, so weather dependent since it has been cold.    Wants to go back to work in person; working from home is making it hard to keep on a schedule.      GOALS:  1.) TRY A NEW DINNER RECIPE/ OR MEAL BOX IDEA:  Sina   Freshn [employer] Roads for Life  Freshly  Origin Meals  Sahra Box    2.) I will continue water walking 3-4 times per week.    3.) I will check into going to the library for a couple hours per week.    4.) Make an apt " with the RD: To schedule your next visit, please call [phone].       NEXT HEALTH  VISIT WITH LORENZO: PHONE AUG 5, 9am    3/3 Health  3 pack. You may purchase another 3 pack after 3rd session on Aug 5th.             FOLLOW-UP:  To schedule your next visit, please call [phone].      Again, thank you for allowing me to participate in the care of your patient.      Sincerely,    Lorenzo Tom

## 2021-07-20 ENCOUNTER — OFFICE VISIT (OUTPATIENT)
Dept: PODIATRY | Facility: CLINIC | Age: 51
End: 2021-07-20
Attending: FAMILY MEDICINE
Payer: COMMERCIAL

## 2021-07-20 VITALS
SYSTOLIC BLOOD PRESSURE: 159 MMHG | WEIGHT: 288 LBS | BODY MASS INDEX: 49.44 KG/M2 | HEART RATE: 96 BPM | DIASTOLIC BLOOD PRESSURE: 81 MMHG

## 2021-07-20 DIAGNOSIS — M21.6X2 PRONATION OF BOTH FEET: Primary | ICD-10-CM

## 2021-07-20 DIAGNOSIS — M21.6X1 PRONATION OF BOTH FEET: Primary | ICD-10-CM

## 2021-07-20 DIAGNOSIS — M77.8 CAPSULITIS OF FOOT, RIGHT: ICD-10-CM

## 2021-07-20 PROCEDURE — 99203 OFFICE O/P NEW LOW 30 MIN: CPT | Performed by: PODIATRIST

## 2021-07-20 NOTE — PATIENT INSTRUCTIONS
We wish you continued good healing. If you have any questions or concerns, please do not hesitate to contact us at [phone]    EpicTopict (secure e-mail communication and access to your chart) to send a message or to make an appointment.    Please remember to call and schedule a follow up appointment if one was recommended at your earliest convenience.     +++OF MARCH 2020+++ LOCATION AND HOURS HAVE CHANGED    PLEASE CALL CLINICS TO VERIFY DAYS AND TIMES  PODIATRY CLINIC HOURS  TELEPHONE NUMBER    Dr. Chauncey TELLEZPYOBANI [facility]        Clinics:  [facility]   Tuesday 1PM-6PM  [place]  Wednesday 745AM-330PM  [place]  Thursday/Friday 745AM-230PM  [place] [facility] APPOINTMENTS  [phone]    [place] APPOINTMENTS  [phone]          If you need a medication refill, please contact us you may need lab work and/or a follow up visit prior to your refill (i.e. Antifungal medications).    If MRI needed please call Imaging at [phone] or [phone]    HOW DO I GET MY KNEE SCOOTER? Knee scooters can be picked up at ANY Medical Supply stores with your knee scooter Prescription.  OR    Bring your signed prescription to an [facility] showroom.

## 2021-07-20 NOTE — LETTER
7/20/2021         RE: Elaine Awad  [address] [facility] [address]        Dear Colleague,    Thank you for referring your patient, Elaine Awad, to the [facility] [place]. Please see a copy of my visit note below.    S: Patient seen today in consult from Dr. Rodriguez and complains of right foot pain.  Points to dorsal medial tarsal joints..  Has had this for 3 months.  Describes it as a burning pain.  Aggrevated by activity and relieved by rest.  Patient denies ecchymosis edema numbness weakness or increased deformity.  Patient wearing slippers around the house.  She is mostly sitting at her work.    ROS:  A 10-point review of systems was performed and is positive for that noted in the HPI and as seen below.  All other areas are negative.        Allergies   Allergen Reactions     Doxycycline Rash     Ampicillin Swelling              Current Outpatient Medications   Medication Sig Dispense Refill     fexofenadine (ALLEGRA) 180 MG tablet Take 180 mg by mouth daily       glucosamine sulfate 1000 MG CAPS Take 1 capsule by mouth daily       lisinopril (ZESTRIL) 10 MG tablet Take 1 tablet (10 mg) by mouth daily 30 tablet 0     multivitamin w/minerals (THERA-VIT-M) tablet Take 1 tablet by mouth daily       nystatin (NYSTOP) 026512 UNIT/GM external powder APPLY TO THE AFFECTED AREA UNDER BREASTS THREE TIMES DAILY AS NEEDED. 60 g 4     omeprazole (PRILOSEC) 20 MG DR capsule TAKE 1 CAPSULE BY MOUTH EVERY DAY 90 capsule 1     rizatriptan (MAXALT-MLT) 5 MG ODT Take 1-2 tablets (5-10 mg) by mouth at onset of headache for migraine May repeat in 2 hours. Max 6 tablets/24 hours. 9 tablet 11     valACYclovir (VALTREX) 500 MG tablet TAKE 1 TABLET BY MOUTH DAILY. INCREASE TO 4 TABLET BY MOUTH 2 TIMES DAILY AT ONSET OF OUTBREAK 100 tablet 2       Patient Active Problem List   Diagnosis     GERD (gastroesophageal reflux disease)     CARDIOVASCULAR SCREENING; LDL GOAL LESS THAN 160     Migraines      Yeast infection of the vagina, recurrent      Recurrent cold sores     Morbid obesity (H)     Primary osteoarthritis of right knee     Intertrigo     B12 deficiency     Menorrhagia     History of anemia     Hypertension goal BP (blood pressure) < 140/90       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     GERD (gastroesophageal reflux disease)      Hypertension goal BP (blood pressure) < 140/90      Iron deficiency anemia      Menorrhagia      Migraine      Morbid obesity (H)      Nonallergic rhinitis 4/17/2019     Recurrent cold sores      Vulvar dermatitis 10/14/2010     Yeast infection of the vagina, recurrent         Past Surgical History:   Procedure Laterality Date     C TREAT ECTOPIC PREG,RMV TUBE/OVARY      LT     COLPOSCOPY,BX CERVIX/ENDOCERV CURR  7/1994     ESOPHAGOSCOPY, GASTROSCOPY, DUODENOSCOPY (EGD), COMBINED  1/2/2014    Procedure: COMBINED ESOPHAGOSCOPY, GASTROSCOPY, DUODENOSCOPY (EGD);;  Surgeon: Eden Stacy MD;  Location:  GI     ESOPHAGOSCOPY, GASTROSCOPY, DUODENOSCOPY (EGD), COMBINED N/A 1/19/2017    Procedure: COMBINED ESOPHAGOSCOPY, GASTROSCOPY, DUODENOSCOPY (EGD);  Surgeon: Ciro Deras MD;  [facility]     LAPAROSCOPIC GASTRIC ADJUSTABLE BANDING  4/28/2014    Procedure: LAPAROSCOPIC GASTRIC ADJUSTABLE BANDING;  Surgeon: Ciro Deras MD;  [facility]     LAPAROSCOPIC HERNIORRHAPHY HIATAL  4/28/2014    Procedure: LAPAROSCOPIC HERNIORRHAPHY HIATAL;  Surgeon: Ciro Deras MD;  [facility]     LAPAROSCOPIC REMOVAL GASTRIC ADJUSTABLE BAND N/A 1/19/2017    Procedure: LAPAROSCOPIC REMOVAL GASTRIC ADJUSTABLE BAND;  Surgeon: Ciro Deras MD;  Location: [facility]     LITHOTRIPSY         Family History   Problem Relation Age of Onset     Cancer Father 72        d. pancreatic, melanoma      Colon Polyps  Father      Diabetes Mother      C.A.D. Mother      Breast Cancer Maternal Grandmother      Heart Disease Maternal Grandfather         CHF     Heart Disease Paternal Grandmother         CHF     Colon Polyps Paternal Grandmother      Respiratory Paternal Grandfather         TB     Obesity Brother      Glaucoma No family hx of      Macular Degeneration No family hx of        Social History     Tobacco Use     Smoking status: Former Smoker     Packs/day: 0.50     Years: 10.00     Pack years: 5.00     Types: Cigarettes     Quit date: 1997     Years since quittin.6     Smokeless tobacco: Never Used   Substance Use Topics     Alcohol use: Yes     Alcohol/week: 0.0 - 0.8 standard drinks     Comment: occasional         Exam:  Vitals: BP (!) 159/81   Pulse 96   Wt 130.6 kg (288 lb)   BMI 49.44 kg/m    BMI: Body mass index is 49.44 kg/m .  Height: Data Unavailable    Constitutional/ general:  Pt is in no apparent distress, appears well-nourished.  Cooperative with history and physical exam.     Psych:  The patient answered questions appropriately.  Normal affect.  Seems to have reasonable expectations, in terms of treatment.     Eyes:  Visual scanning/ tracking without deficit.     Ears:  Response to auditory stimuli is normal.    Auricles in proper alignment.     Lymphatic:  Popliteal lymph nodes not enlarged.     Lungs:  Non labored breathing, non labored speech. No cough.  No audible wheezing. Even, quiet breathing.       Vascular:  Pedal pulses are palpable bilaterally for both the DP and PT arteries.  CFT < 3 sec.  No edema.  No varicosities.  Pedal hair growth noted.     Neuro:  Alert and oriented x 3. Coordinated gait.  Light touch sensation is intact to the L4, L5, S1 distributions. No obvious deficits.  No evidence of neurological-based weakness, spasticity, or contracture in the lower extremities.    Derm: Normal texture and turgor.  No erythema, ecchymosis, or cyanosis.  No open lesions.      Musculoskeletal:    Lower extremity muscle strength is normal.  Patient is ambulatory without an assistive device or brace.  No gross deformities.  Normal ROM all fore foot and rearfoot joints.  No equinus.  With weightbearing patient has bilateral pronation.  Right ankle no pain with palpation or stressing of posterior tibial tendon, tibialis anterior tendon,  Any other extensor tendons.  No calcaneal compression pain.  No pain with range of motion of subtalar joint ankle joint or midtarsal joint.  Pain on palpation of the dorsal medial talonavicular joint and naviculocuneiform joint.  No masses.  No edema or crepitus here.  No pain anywhere in the tarsometatarsal joint.    Radiographic:  X-rays normal.    A:  Pronation and dorsal medial tarsal joint capsulitis    P:  X-rays from past personally reviewed.  Discussed the cause of this with the patient.  Explained to patient how pronation causes increase pressure in these joints.  She is wearing a very flimsy shoe today.  Discussed good stiff shoes at all times both inside and outside and made suggestions.  She will try an over-the-counter arch support.  We also discussed orthotics.  Dispensed ankle brace to be worn with good shoes at all times.  Ice bid.  Minimize activities until healed.  Explained must have good support for feet at all times.   RETURN TO CLINIC PRN.  Thank you for allowing me participate in the care of this patient.        Chauncey Garnica DPM, FACFAS               Again, thank you for allowing me to participate in the care of your patient.        Sincerely,        Chauncey Garnica DPM

## 2021-07-21 NOTE — PROGRESS NOTES
S: Patient seen today in consult from Dr. Rodriguez and complains of right foot pain.  Points to dorsal medial tarsal joints..  Has had this for 3 months.  Describes it as a burning pain.  Aggrevated by activity and relieved by rest.  Patient denies ecchymosis edema numbness weakness or increased deformity.  Patient wearing slippers around the house.  She is mostly sitting at her work.    ROS:  A 10-point review of systems was performed and is positive for that noted in the HPI and as seen below.  All other areas are negative.        Allergies   Allergen Reactions     Doxycycline Rash     Ampicillin Swelling              Current Outpatient Medications   Medication Sig Dispense Refill     fexofenadine (ALLEGRA) 180 MG tablet Take 180 mg by mouth daily       glucosamine sulfate 1000 MG CAPS Take 1 capsule by mouth daily       lisinopril (ZESTRIL) 10 MG tablet Take 1 tablet (10 mg) by mouth daily 30 tablet 0     multivitamin w/minerals (THERA-VIT-M) tablet Take 1 tablet by mouth daily       nystatin (NYSTOP) 348037 UNIT/GM external powder APPLY TO THE AFFECTED AREA UNDER BREASTS THREE TIMES DAILY AS NEEDED. 60 g 4     omeprazole (PRILOSEC) 20 MG DR capsule TAKE 1 CAPSULE BY MOUTH EVERY DAY 90 capsule 1     rizatriptan (MAXALT-MLT) 5 MG ODT Take 1-2 tablets (5-10 mg) by mouth at onset of headache for migraine May repeat in 2 hours. Max 6 tablets/24 hours. 9 tablet 11     valACYclovir (VALTREX) 500 MG tablet TAKE 1 TABLET BY MOUTH DAILY. INCREASE TO 4 TABLET BY MOUTH 2 TIMES DAILY AT ONSET OF OUTBREAK 100 tablet 2       Patient Active Problem List   Diagnosis     GERD (gastroesophageal reflux disease)     CARDIOVASCULAR SCREENING; LDL GOAL LESS THAN 160     Migraines     Yeast infection of the vagina, recurrent      Recurrent cold sores     Morbid obesity (H)     Primary osteoarthritis of right knee     Intertrigo     B12 deficiency     Menorrhagia     History of anemia     Hypertension goal BP (blood pressure) < 140/90        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     GERD (gastroesophageal reflux disease)      Hypertension goal BP (blood pressure) < 140/90      Iron deficiency anemia      Menorrhagia      Migraine      Morbid obesity (H)      Nonallergic rhinitis 4/17/2019     Recurrent cold sores      Vulvar dermatitis 10/14/2010     Yeast infection of the vagina, recurrent         Past Surgical History:   Procedure Laterality Date     C TREAT ECTOPIC PREG,RMV TUBE/OVARY      LT     COLPOSCOPY,BX CERVIX/ENDOCERV CURR  7/1994     ESOPHAGOSCOPY, GASTROSCOPY, DUODENOSCOPY (EGD), COMBINED  1/2/2014    Procedure: COMBINED ESOPHAGOSCOPY, GASTROSCOPY, DUODENOSCOPY (EGD);;  Surgeon: Eden Stacy MD;  [facility]     ESOPHAGOSCOPY, GASTROSCOPY, DUODENOSCOPY (EGD), COMBINED N/A 1/19/2017    Procedure: COMBINED ESOPHAGOSCOPY, GASTROSCOPY, DUODENOSCOPY (EGD);  Surgeon: Ciro Deras MD;  [facility]     LAPAROSCOPIC GASTRIC ADJUSTABLE BANDING  4/28/2014    Procedure: LAPAROSCOPIC GASTRIC ADJUSTABLE BANDING;  Surgeon: Ciro Deras MD;  [facility]     LAPAROSCOPIC HERNIORRHAPHY HIATAL  4/28/2014    Procedure: LAPAROSCOPIC HERNIORRHAPHY HIATAL;  Surgeon: Ciro Deras MD;  [facility]     LAPAROSCOPIC REMOVAL GASTRIC ADJUSTABLE BAND N/A 1/19/2017    Procedure: LAPAROSCOPIC REMOVAL GASTRIC ADJUSTABLE BAND;  Surgeon: Ciro Deras MD;  [facility]     LITHOTRIPSY         Family History   Problem Relation Age of Onset     Cancer Father 72        d. pancreatic, melanoma      Colon Polyps Father      Diabetes Mother      C.A.D. Mother      Breast Cancer Maternal Grandmother      Heart Disease Maternal Grandfather         CHF     Heart Disease Paternal Grandmother         CHF     Colon Polyps Paternal Grandmother      Respiratory Paternal  Grandfather         TB     Obesity Brother      Glaucoma No family hx of      Macular Degeneration No family hx of        Social History     Tobacco Use     Smoking status: Former Smoker     Packs/day: 0.50     Years: 10.00     Pack years: 5.00     Types: Cigarettes     Quit date: 1997     Years since quittin.6     Smokeless tobacco: Never Used   Substance Use Topics     Alcohol use: Yes     Alcohol/week: 0.0 - 0.8 standard drinks     Comment: occasional         Exam:  Vitals: BP (!) 159/81   Pulse 96   Wt 130.6 kg (288 lb)   BMI 49.44 kg/m    BMI: Body mass index is 49.44 kg/m .  Height: Data Unavailable    Constitutional/ general:  Pt is in no apparent distress, appears well-nourished.  Cooperative with history and physical exam.     Psych:  The patient answered questions appropriately.  Normal affect.  Seems to have reasonable expectations, in terms of treatment.     Eyes:  Visual scanning/ tracking without deficit.     Ears:  Response to auditory stimuli is normal.    Auricles in proper alignment.     Lymphatic:  Popliteal lymph nodes not enlarged.     Lungs:  Non labored breathing, non labored speech. No cough.  No audible wheezing. Even, quiet breathing.       Vascular:  Pedal pulses are palpable bilaterally for both the DP and PT arteries.  CFT < 3 sec.  No edema.  No varicosities.  Pedal hair growth noted.     Neuro:  Alert and oriented x 3. Coordinated gait.  Light touch sensation is intact to the L4, L5, S1 distributions. No obvious deficits.  No evidence of neurological-based weakness, spasticity, or contracture in the lower extremities.    Derm: Normal texture and turgor.  No erythema, ecchymosis, or cyanosis.  No open lesions.     Musculoskeletal:    Lower extremity muscle strength is normal.  Patient is ambulatory without an assistive device or brace.  No gross deformities.  Normal ROM all fore foot and rearfoot joints.  No equinus.  With weightbearing patient has bilateral pronation.   Right ankle no pain with palpation or stressing of posterior tibial tendon, tibialis anterior tendon,  Any other extensor tendons.  No calcaneal compression pain.  No pain with range of motion of subtalar joint ankle joint or midtarsal joint.  Pain on palpation of the dorsal medial talonavicular joint and naviculocuneiform joint.  No masses.  No edema or crepitus here.  No pain anywhere in the tarsometatarsal joint.    Radiographic:  X-rays normal.    A:  Pronation and dorsal medial tarsal joint capsulitis    P:  X-rays from past personally reviewed.  Discussed the cause of this with the patient.  Explained to patient how pronation causes increase pressure in these joints.  She is wearing a very flimsy shoe today.  Discussed good stiff shoes at all times both inside and outside and made suggestions.  She will try an over-the-counter arch support.  We also discussed orthotics.  Dispensed ankle brace to be worn with good shoes at all times.  Ice bid.  Minimize activities until healed.  Explained must have good support for feet at all times.   RETURN TO CLINIC PRN.  Thank you for allowing me participate in the care of this patient.        Chauncey Garnica, DAMEON, FACFAS

## 2021-07-22 ENCOUNTER — MYC MEDICAL ADVICE (OUTPATIENT)
Dept: FAMILY MEDICINE | Facility: CLINIC | Age: 51
End: 2021-07-22

## 2021-07-22 DIAGNOSIS — I10 HYPERTENSION GOAL BP (BLOOD PRESSURE) < 140/90: ICD-10-CM

## 2021-07-22 DIAGNOSIS — I10 HYPERTENSION GOAL BP (BLOOD PRESSURE) < 140/90: Primary | ICD-10-CM

## 2021-07-22 RX ORDER — LOSARTAN POTASSIUM 50 MG/1
50 TABLET ORAL DAILY
Qty: 30 TABLET | Refills: 0 | Status: SHIPPED | OUTPATIENT
Start: 2021-07-22 | End: 2021-08-21

## 2021-07-22 RX ORDER — LISINOPRIL 10 MG/1
TABLET ORAL
Start: 2021-07-22

## 2021-07-22 RX ORDER — LISINOPRIL 20 MG/1
20 TABLET ORAL DAILY
Qty: 90 TABLET | Refills: 0 | Status: SHIPPED | OUTPATIENT
Start: 2021-07-22 | End: 2021-07-22

## 2021-07-22 NOTE — TELEPHONE ENCOUNTER
Routing refill request to provider for review/approval because:  BP  BP Readings from Last 3 Encounters:   07/20/21 (!) 159/81   06/30/21 (!) 160/90   06/12/21 (!) 150/86              Pending Prescriptions:                       Disp   Refills    lisinopril (ZESTRIL) 10 MG tablet [Pharmac*30 tab*0        Sig: TAKE 1 TABLET(10 MG) BY MOUTH DAILY        Gwyn Rodriguez RN

## 2021-07-22 NOTE — TELEPHONE ENCOUNTER
Please call patient: I recommend increasing the dose of your medication to 20 mg daily and scheduling a lab appointment in 1 to 4 weeks. See you at your physical as scheduled.     Fernanda Rodriguez MD    Orders are placed

## 2021-07-22 NOTE — TELEPHONE ENCOUNTER
Change in plan based on MyChart message - I responded by [employer] instead.     Fernanda Rodriguez MD

## 2021-07-26 NOTE — TELEPHONE ENCOUNTER
Document home blood pressure measurement or schedule ancillary blood pressure appointment then provide 90 days  Fernanda Rodriguez MD

## 2021-07-28 RX ORDER — LOSARTAN POTASSIUM 50 MG/1
TABLET ORAL
Qty: 90 TABLET | Refills: 0 | OUTPATIENT
Start: 2021-07-28

## 2021-07-28 NOTE — TELEPHONE ENCOUNTER
"Spoke with patient. She did not request the 90 day supply of cozaar. She would like to hold of on larger supply until she  Knows the medication is working. She reports Monday her bp was 156/95 and yesterday was 115/65. She is feeling \"blah\" since changing from lisinopril. She has appointment 7/31/21 for labs and bp check. She would like to see how everything looks before continuing prescription.     Marge Martin RN  "

## 2021-07-31 ENCOUNTER — ALLIED HEALTH/NURSE VISIT (OUTPATIENT)
Dept: FAMILY MEDICINE | Facility: CLINIC | Age: 51
End: 2021-07-31

## 2021-07-31 ENCOUNTER — LAB (OUTPATIENT)
Dept: LAB | Facility: CLINIC | Age: 51
End: 2021-07-31
Payer: COMMERCIAL

## 2021-07-31 VITALS — SYSTOLIC BLOOD PRESSURE: 134 MMHG | HEART RATE: 80 BPM | DIASTOLIC BLOOD PRESSURE: 84 MMHG

## 2021-07-31 DIAGNOSIS — Z01.30 BLOOD PRESSURE CHECK: Primary | ICD-10-CM

## 2021-07-31 DIAGNOSIS — Z11.59 NEED FOR HEPATITIS C SCREENING TEST: ICD-10-CM

## 2021-07-31 DIAGNOSIS — N92.0 MENORRHAGIA WITH REGULAR CYCLE: ICD-10-CM

## 2021-07-31 DIAGNOSIS — Z86.2 HISTORY OF ANEMIA: ICD-10-CM

## 2021-07-31 DIAGNOSIS — I10 HYPERTENSION GOAL BP (BLOOD PRESSURE) < 140/90: ICD-10-CM

## 2021-07-31 LAB — HGB BLD-MCNC: 14.8 G/DL (ref 11.7–15.7)

## 2021-07-31 PROCEDURE — 80048 BASIC METABOLIC PNL TOTAL CA: CPT

## 2021-07-31 PROCEDURE — 85018 HEMOGLOBIN: CPT

## 2021-07-31 PROCEDURE — 36415 COLL VENOUS BLD VENIPUNCTURE: CPT

## 2021-07-31 PROCEDURE — 86803 HEPATITIS C AB TEST: CPT

## 2021-07-31 PROCEDURE — 99207 PR NO CHARGE NURSE ONLY: CPT | Performed by: FAMILY MEDICINE

## 2021-07-31 NOTE — PROGRESS NOTES
Elaine Awad was evaluated at [facility] on July 31, 2021 at which time her blood pressure was:    BP Readings from Last 3 Encounters:   07/31/21 134/84   07/20/21 (!) 159/81   06/30/21 (!) 160/90     Pulse Readings from Last 3 Encounters:   07/31/21 80   07/20/21 96   06/30/21 78       Reviewed lifestyle modifications for blood pressure control and reduction: including making healthy food choices, managing weight, getting regular exercise, smoking cessation, reducing alcohol consumption, monitoring blood pressure regularly.     Symptoms: None    BP Goal:< 140/90 mmHg    BP Assessment:  BP at goal    Potential Reasons for BP too high: NA - Not applicable    BP Follow-Up Plan: Recheck BP in 6 months at pharmacy    Recommendation to Provider: None    Note completed by: Rojas Will RPh.  [facility]  [phone]

## 2021-08-02 LAB
ANION GAP SERPL CALCULATED.3IONS-SCNC: 4 MMOL/L (ref 3–14)
BUN SERPL-MCNC: 11 MG/DL (ref 7–30)
CALCIUM SERPL-MCNC: 9.6 MG/DL (ref 8.5–10.1)
CHLORIDE BLD-SCNC: 109 MMOL/L (ref 94–109)
CO2 SERPL-SCNC: 24 MMOL/L (ref 20–32)
CREAT SERPL-MCNC: 0.82 MG/DL (ref 0.52–1.04)
GFR SERPL CREATININE-BSD FRML MDRD: 84 ML/MIN/1.73M2
GLUCOSE BLD-MCNC: 96 MG/DL (ref 70–99)
HCV AB SERPL QL IA: NONREACTIVE
POTASSIUM BLD-SCNC: 4.1 MMOL/L (ref 3.4–5.3)
SODIUM SERPL-SCNC: 137 MMOL/L (ref 133–144)

## 2021-08-02 NOTE — RESULT ENCOUNTER NOTE
Your results are normal.  Your final test results are pending.  Please check your chart again within 2 - 3 days. You will receive further instruction when your full test result panel is complete.     Fernanda Rodriguez MD

## 2021-08-04 NOTE — PROGRESS NOTES
JAYDA GUZMAN     Progress Notes    Return Weight Management Health Coaching Note    Elaine Awad          MRN: [id number]  : 1970  [place]: 2021    Health  Visit #: 3/3  3 pack    ASSESSMENT:  Initial Weight:  294 lb    Current Weight (lbs):284      PREVIOUS GOAL(S) REVIEW:  GOALS: DID NOT DISCUSS; PT was frustrated with her effort not making meal prep a priority  1.) TRY A NEW DINNER RECIPE/ OR MEAL BOX IDEA:  [employer]   Freshn Lean  Healthy for Life  Freshly  Origin Meals  Leora Box     2.) I will continue water walking 3-4 times per week.     3.) I will check into going to the library for a couple hours per week.     4.) Make an apt with the RD: To schedule your next visit, please call [phone].      PILLAR(S) DISCUSSED IN THIS VISIT:       Pt says she gained and loses the same 5 pounds.    We discussed her thoughts around this. Wants to take the tough love approach, she says. Wants to make food prep a priority on the weekends since week days are busy. Decided to make family lunch the main meal and have small dinner like blade arreguinke    Pt will book with RD next. This is her 3/3 hc visits and will not book anymore now but see the RD first.     Going on vacation so will focus on portion sizes        GOALS:      1.) I will make family lunch my main meal.    2.) I will plan a healthy small dinner for me; non cook options like protein smoothie, salad bar, leora box    3.) I will call and make an apt with the [phone].    4.) I will prioritize meal planning on the weekends.       FOLLOW-UP:  To schedule your next visit, please call [phone].      TIME:       SIGNATURE:  Jayda Guzman, Certified Health  on 2021 at 12:18 PM

## 2021-08-05 ENCOUNTER — VIRTUAL VISIT (OUTPATIENT)
Dept: ENDOCRINOLOGY | Facility: CLINIC | Age: 51
End: 2021-08-05
Payer: COMMERCIAL

## 2021-08-05 DIAGNOSIS — E66.9 OBESITY: Primary | ICD-10-CM

## 2021-08-05 PROCEDURE — 99207 PR NO CHARGE LOS: CPT

## 2021-08-05 NOTE — LETTER
2021       RE: Elaine Awad  [address] [facility] [address]     Dear Colleague,    Thank you for referring your patient, Elaine Awad, to the [facility] [place] at [facility]. Please see a copy of my visit note below.    LORENZO GUZMAN     Progress Notes    Return Weight Management Health Coaching Note    Elaine Awad          MRN: [id number]  : 1970  [place]: 2021    Health  Visit #: 3/3  3 pack    ASSESSMENT:  Initial Weight:  294 lb    Current Weight (lbs):284      PREVIOUS GOAL(S) REVIEW:  GOALS: DID NOT DISCUSS; PT was frustrated with her effort not making meal prep a priority  1.) TRY A NEW DINNER RECIPE/ OR MEAL BOX IDEA:  [employer]   Freshn Lean  Healthy for Life  Freshly  [employer] Meals  Leora Box     2.) I will continue water walking 3-4 times per week.     3.) I will check into going to the [employer] for a couple hours per week.     4.) Make an apt with the RD: To schedule your next visit, please call [phone].      PILLAR(S) DISCUSSED IN THIS VISIT:       Pt says she gained and loses the same 5 pounds.    We discussed her thoughts around this. Wants to take the tough love approach, she says. Wants to make food prep a priority on the weekends since week days are busy. Decided to make family lunch the main meal and have small dinner like protien shake    Pt will book with RD next. This is her 3/3 hc visits and will not book anymore now but see the RD first.     Going on vacation so will focus on portion sizes        GOALS:      1.) I will make family lunch my main meal.    2.) I will plan a healthy small dinner for me; non cook options like protein smoothie, salad bar, leora box    3.) I will call and make an apt with the [phone].    4.) I will prioritize meal planning on the weekends.       FOLLOW-UP:  To schedule your next visit, please call  [phone].      TIME:       SIGNATURE:  Jayda Tom, Certified Health  on 8/4/2021 at 12:18 PM

## 2021-08-07 NOTE — TELEPHONE ENCOUNTER
"Pending Prescriptions:                       Disp   Refills    omeprazole (PRILOSEC) 20 MG DR capsule [P*90 cap*2            Sig: TAKE 1 CAPSULE BY MOUTH EVERY DAY    RN refilled medication per [facility] Refill Protocol.     Olena De Souza RN    Requested Prescriptions   Pending Prescriptions Disp Refills     omeprazole (PRILOSEC) 20 MG DR capsule [Pharmacy Med Name: OMEPRAZOLE 20MG CAPSULES] 90 capsule 2     Sig: TAKE 1 CAPSULE BY MOUTH EVERY DAY       PPI Protocol Passed - 4/24/2020  6:53 AM        Passed - Not on Clopidogrel (unless Pantoprazole ordered)        Passed - No diagnosis of osteoporosis on record        Passed - Recent (12 mo) or future (30 days) visit within the authorizing provider's specialty     Patient has had an office visit with the authorizing provider or a provider within the authorizing providers department within the previous 12 mos or has a future within next 30 days. See \"Patient Info\" tab in inbasket, or \"Choose Columns\" in Meds & Orders section of the refill encounter.              Passed - Medication is active on med list        Passed - Patient is age 18 or older        Passed - No active pregnacy on record        Passed - No positive pregnancy test in past 12 months             " English

## 2021-08-19 DIAGNOSIS — I10 HYPERTENSION GOAL BP (BLOOD PRESSURE) < 140/90: ICD-10-CM

## 2021-08-21 RX ORDER — LOSARTAN POTASSIUM 50 MG/1
TABLET ORAL
Qty: 90 TABLET | Refills: 0 | Status: SHIPPED | OUTPATIENT
Start: 2021-08-21 | End: 2021-09-08

## 2021-09-03 ASSESSMENT — ENCOUNTER SYMPTOMS
NERVOUS/ANXIOUS: 0
FREQUENCY: 0
ABDOMINAL PAIN: 0
CHILLS: 0
COUGH: 0
DIARRHEA: 0
ARTHRALGIAS: 1
HEMATURIA: 0
BREAST MASS: 0
FEVER: 0
HEARTBURN: 0
DYSURIA: 0
HEADACHES: 0
MYALGIAS: 0
PALPITATIONS: 0
NAUSEA: 0
EYE PAIN: 0
SORE THROAT: 0
SHORTNESS OF BREATH: 0
WEAKNESS: 0
CONSTIPATION: 0
DIZZINESS: 0
PARESTHESIAS: 0
JOINT SWELLING: 0
HEMATOCHEZIA: 0

## 2021-09-08 ENCOUNTER — OFFICE VISIT (OUTPATIENT)
Dept: FAMILY MEDICINE | Facility: CLINIC | Age: 51
End: 2021-09-08
Payer: COMMERCIAL

## 2021-09-08 VITALS
SYSTOLIC BLOOD PRESSURE: 128 MMHG | HEART RATE: 90 BPM | TEMPERATURE: 97.8 F | HEIGHT: 64 IN | DIASTOLIC BLOOD PRESSURE: 80 MMHG | BODY MASS INDEX: 48.32 KG/M2 | WEIGHT: 283 LBS | OXYGEN SATURATION: 97 %

## 2021-09-08 DIAGNOSIS — Z00.00 ROUTINE GENERAL MEDICAL EXAMINATION AT A HEALTH CARE FACILITY: Primary | ICD-10-CM

## 2021-09-08 DIAGNOSIS — I10 HYPERTENSION GOAL BP (BLOOD PRESSURE) < 140/90: ICD-10-CM

## 2021-09-08 DIAGNOSIS — G43.909 MIGRAINE WITHOUT STATUS MIGRAINOSUS, NOT INTRACTABLE, UNSPECIFIED MIGRAINE TYPE: ICD-10-CM

## 2021-09-08 DIAGNOSIS — Z12.31 VISIT FOR SCREENING MAMMOGRAM: ICD-10-CM

## 2021-09-08 PROCEDURE — 99213 OFFICE O/P EST LOW 20 MIN: CPT | Mod: 25 | Performed by: FAMILY MEDICINE

## 2021-09-08 PROCEDURE — 90682 RIV4 VACC RECOMBINANT DNA IM: CPT | Performed by: FAMILY MEDICINE

## 2021-09-08 PROCEDURE — 99396 PREV VISIT EST AGE 40-64: CPT | Mod: 25 | Performed by: FAMILY MEDICINE

## 2021-09-08 PROCEDURE — 90472 IMMUNIZATION ADMIN EACH ADD: CPT | Performed by: FAMILY MEDICINE

## 2021-09-08 PROCEDURE — 90715 TDAP VACCINE 7 YRS/> IM: CPT | Performed by: FAMILY MEDICINE

## 2021-09-08 PROCEDURE — 90471 IMMUNIZATION ADMIN: CPT | Performed by: FAMILY MEDICINE

## 2021-09-08 RX ORDER — LOSARTAN POTASSIUM 50 MG/1
50 TABLET ORAL DAILY
Qty: 90 TABLET | Refills: 3 | Status: SHIPPED | OUTPATIENT
Start: 2021-09-08 | End: 2022-08-22

## 2021-09-08 RX ORDER — RIZATRIPTAN BENZOATE 5 MG/1
5-10 TABLET, ORALLY DISINTEGRATING ORAL
Qty: 9 TABLET | Refills: 11 | Status: SHIPPED | OUTPATIENT
Start: 2021-09-08 | End: 2022-09-15

## 2021-09-08 ASSESSMENT — ENCOUNTER SYMPTOMS
EYE PAIN: 0
DYSURIA: 0
JOINT SWELLING: 0
HEMATURIA: 0
HEARTBURN: 0
SHORTNESS OF BREATH: 0
DIARRHEA: 0
NAUSEA: 0
PARESTHESIAS: 0
HEMATOCHEZIA: 0
CONSTIPATION: 0
ARTHRALGIAS: 1
FEVER: 0
ABDOMINAL PAIN: 0
HEADACHES: 0
BREAST MASS: 0
NERVOUS/ANXIOUS: 0
FREQUENCY: 0
DIZZINESS: 0
PALPITATIONS: 0
COUGH: 0
MYALGIAS: 0
CHILLS: 0
SORE THROAT: 0
WEAKNESS: 0

## 2021-09-08 ASSESSMENT — MIFFLIN-ST. JEOR: SCORE: 1888.68

## 2021-09-08 NOTE — PROGRESS NOTES
SUBJECTIVE:   CC: Elaine Awad is an 50 year old woman  who presents for preventive health visit.     Patient has been advised of split billing requirements and indicates understanding: Yes     Migraines are well controlled and infrequent without medication side effects. Hypertension well controlled on current medications without side effects, chest pain, or dyspnea. She is also seen by the bariatric clinic.      Healthy Habits:     Getting at least 3 servings of Calcium per day:  Yes    Bi-annual eye exam:  Yes    Dental care twice a year:  Yes    Sleep apnea or symptoms of sleep apnea:  None    Diet:  Regular (no restrictions)    Frequency of exercise:  2-3 days/week    Duration of exercise:  15-30 minutes    Taking medications regularly:  Yes    Medication side effects:  Not applicable    PHQ-2 Total Score: 0    Additional concerns today:  No    Today's PHQ-2 Score:   PHQ-2 (  [employer]) 9/3/2021   Q1: Little interest or pleasure in doing things 0   Q2: Feeling down, depressed or hopeless 0   PHQ-2 Score 0   Q1: Little interest or pleasure in doing things Not at all   Q2: Feeling down, depressed or hopeless Not at all   PHQ-2 Score 0       Abuse: Current or Past (Physical, Sexual or Emotional) - No  Do you feel safe in your environment? Yes    Have you ever done Advance Care Planning? (For example, a Health Directive, POLST, or a discussion with a medical provider or your loved ones about your wishes): No, advance care planning information given to patient to review.  Patient declined advance care planning discussion at this time.    Social History     Tobacco Use     Smoking status: Former Smoker     Packs/day: 0.50     Years: 10.00     Pack years: 5.00     Types: Cigarettes     Quit date: 1997     Years since quittin.7     Smokeless tobacco: Never Used   Substance Use Topics     Alcohol use: Yes     Alcohol/week: 0.0 - 0.8 standard drinks     Comment: occasional     If you drink alcohol  do you typically have >3 drinks per day or >7 drinks per week? No    Alcohol Use 9/8/2021   Prescreen: >3 drinks/day or >7 drinks/week? -   Prescreen: >3 drinks/day or >7 drinks/week? No       Reviewed orders with patient.  Reviewed health maintenance and updated orders accordingly - Yes  Patient Active Problem List   Diagnosis     GERD (gastroesophageal reflux disease)     CARDIOVASCULAR SCREENING; LDL GOAL LESS THAN 160     Migraines     Yeast infection of the vagina, recurrent      Recurrent cold sores     Morbid obesity (H)     Primary osteoarthritis of right knee     Intertrigo     B12 deficiency     Menorrhagia     History of anemia     Hypertension goal BP (blood pressure) < 140/90     Past Surgical History:   Procedure Laterality Date     C TREAT ECTOPIC PREG,RMV TUBE/OVARY      LT     COLPOSCOPY,BX CERVIX/ENDOCERV CURR  7/1994     ESOPHAGOSCOPY, GASTROSCOPY, DUODENOSCOPY (EGD), COMBINED  1/2/2014    Procedure: COMBINED ESOPHAGOSCOPY, GASTROSCOPY, DUODENOSCOPY (EGD);;  Surgeon: Eden Stacy MD;  Location:  GI     ESOPHAGOSCOPY, GASTROSCOPY, DUODENOSCOPY (EGD), COMBINED N/A 1/19/2017    Procedure: COMBINED ESOPHAGOSCOPY, GASTROSCOPY, DUODENOSCOPY (EGD);  Surgeon: Ciro Deras MD;  Location:  OR     LAPAROSCOPIC GASTRIC ADJUSTABLE BANDING  4/28/2014    Procedure: LAPAROSCOPIC GASTRIC ADJUSTABLE BANDING;  Surgeon: Ciro Deras MD;  Location:  OR     LAPAROSCOPIC HERNIORRHAPHY HIATAL  4/28/2014    Procedure: LAPAROSCOPIC HERNIORRHAPHY HIATAL;  Surgeon: Ciro Deras MD;  Location:  OR     LAPAROSCOPIC REMOVAL GASTRIC ADJUSTABLE BAND N/A 1/19/2017    Procedure: LAPAROSCOPIC REMOVAL GASTRIC ADJUSTABLE BAND;  Surgeon: Ciro Deras MD;  Location:  OR     LITHOTRIPSY         Social History     Tobacco Use     Smoking status: Former Smoker     Packs/day: 0.50     Years: 10.00     Pack years: 5.00     Types: Cigarettes     Quit date: 12/1/1997     Years since  quittin.7     Smokeless tobacco: Never Used   Substance Use Topics     Alcohol use: Yes     Alcohol/week: 0.0 - 0.8 standard drinks     Comment: occasional     Family History   Problem Relation Age of Onset     Cancer Father 72        d. pancreatic, melanoma      Colon Polyps Father      Diabetes Mother      C.A.D. Mother      Breast Cancer Maternal Grandmother      Heart Disease Maternal Grandfather         CHF     Heart Disease Paternal Grandmother         CHF     Colon Polyps Paternal Grandmother      Respiratory Paternal Grandfather         TB     Obesity Brother      Glaucoma No family hx of      Macular Degeneration No family hx of          Current Outpatient Medications   Medication Sig Dispense Refill     fexofenadine (ALLEGRA) 180 MG tablet Take 180 mg by mouth daily       glucosamine sulfate 1000 MG CAPS Take 1 capsule by mouth daily       losartan (COZAAR) 50 MG tablet Take 1 tablet (50 mg) by mouth daily 90 tablet 3     multivitamin w/minerals (THERA-VIT-M) tablet Take 1 tablet by mouth daily       nystatin (NYSTOP) 719097 UNIT/GM external powder APPLY TO THE AFFECTED AREA UNDER BREASTS THREE TIMES DAILY AS NEEDED. 60 g 4     omeprazole (PRILOSEC) 20 MG DR capsule TAKE 1 CAPSULE BY MOUTH EVERY DAY 90 capsule 1     rizatriptan (MAXALT-MLT) 5 MG ODT Take 1-2 tablets (5-10 mg) by mouth at onset of headache for migraine May repeat in 2 hours. Max 6 tablets/24 hours. 9 tablet 11     valACYclovir (VALTREX) 500 MG tablet TAKE 1 TABLET BY MOUTH DAILY. INCREASE TO 4 TABLET BY MOUTH 2 TIMES DAILY AT ONSET OF OUTBREAK 100 tablet 2     Allergies   Allergen Reactions     Doxycycline Rash     Ampicillin Swelling              Breast Cancer Screening:    FHS-7:   Breast CA Risk Assessment (FHS-7) 9/3/2021   Did any of your first-degree relatives have breast or ovarian cancer? No   Did any of your relatives have bilateral breast cancer? No   Did any man in your family have breast cancer? No   Did any woman in your  family have breast and ovarian cancer? No   Did any woman in your family have breast cancer before age 50 y? No   Do you have 2 or more relatives with breast and/or ovarian cancer? No   Do you have 2 or more relatives with breast and/or bowel cancer? No        Mammogram Screening: Recommended annual mammography  Pertinent mammograms are reviewed under the imaging tab.    History of abnormal Pap smear: NO - age 30-65 PAP every 5 years with negative HPV co-testing recommended  PAP / HPV Latest Ref Rng & Units 10/24/2018 7/17/2014 6/20/2011   PAP (Historical) - OTHER-NIL, See Result NIL NIL   HPV16 NEG:Negative Negative - -   HPV18 NEG:Negative Negative - -   HRHPV NEG:Negative Negative - -     Reviewed and updated as needed this visit by clinical staff  Tobacco  Allergies  Meds  Problems  Med Hx  Surg Hx  Fam Hx  Soc Hx          Reviewed and updated as needed this visit by Provider  Tobacco  Allergies  Meds  Problems  Med Hx  Surg Hx  Fam Hx         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   HENT: Negative for congestion, ear pain, hearing loss and sore throat.    Eyes: Negative for pain and visual disturbance.   Respiratory: Negative for cough and shortness of breath.    Cardiovascular: Negative for chest pain, palpitations and peripheral edema.   Gastrointestinal: Negative for abdominal pain, constipation, diarrhea, heartburn, hematochezia and nausea.   Breasts:   "Negative for tenderness, breast mass and discharge.   Genitourinary: Negative for dysuria, frequency, genital sores, hematuria, pelvic pain, urgency, vaginal bleeding and vaginal discharge.   Musculoskeletal: Positive for arthralgias. Negative for joint swelling and myalgias.   Skin: Negative for rash.   Neurological: Negative for dizziness, weakness, headaches and paresthesias.   Psychiatric/Behavioral: Negative for mood changes. The patient is not nervous/anxious.           OBJECTIVE:   /80   Pulse 90   Temp 97.8  F (36.6  C) (Oral)   Ht 1.626 m (5' 4\")   Wt 128.4 kg (283 lb)   LMP 08/28/2021   SpO2 97%   Breastfeeding No   BMI 48.58 kg/m    Physical Exam  GENERAL: alert, no distress and obese  EYES: Eyes grossly normal to inspection, PERRL and conjunctivae and sclerae normal  HENT: ear canals and TM's normal, nose and mouth without ulcers or lesions  NECK: no adenopathy, no asymmetry, masses, or scars and thyroid normal to palpation  RESP: lungs clear to auscultation - no rales, rhonchi or wheezes  CV: regular rate and rhythm, normal S1 S2, no S3 or S4, no murmur, click or rub, no peripheral edema    ABDOMEN: soft, nontender, no hepatosplenomegaly, no masses and bowel sounds normal  MS: no gross musculoskeletal defects noted, no edema  SKIN: no suspicious lesions or rashes  NEURO: Normal strength and tone, mentation intact and speech normal  PSYCH: mentation appears normal, affect normal/bright    Diagnostic Test Results:  Labs reviewed in Epic    ASSESSMENT/PLAN:   (Z00.00) Routine general medical examination at a health care facility  (primary encounter diagnosis)    (G43.909) Migraine without status migrainosus, not intractable, unspecified migraine type  Comment: Well controlled with medications without side effects.   Plan: rizatriptan (MAXALT-MLT) 5 MG ODT          (I10) Hypertension goal BP (blood pressure) < 140/90  Comment: Well controlled with medications without side effects.   Plan: " "losartan (COZAAR) 50 MG tablet          (Z12.31) Visit for screening mammogram  Plan: *MA Screening Digital Bilateral            COUNSELING:  Reviewed preventive health counseling, as reflected in patient instructions  Special attention given to:        Regular exercise       Healthy diet/nutrition       Colon cancer screening       Consider Hep C screening for all patients one time for ages 18-79 years       HIV screeninx in teen years, 1x in adult years, and at intervals if high risk       The 10-year ASCVD risk score (Arianna SMITH Jr., et al., 2013) is: 1.6%    Values used to calculate the score:      Age: 50 years      Sex: Female      Is Non- : No      Diabetic: No      Tobacco smoker: No      Systolic Blood Pressure: 128 mmHg      Is BP treated: Yes      HDL Cholesterol: 52 mg/dL      Total Cholesterol: 184 mg/dL    Estimated body mass index is 48.58 kg/m  as calculated from the following:    Height as of this encounter: 1.626 m (5' 4\").    Weight as of this encounter: 128.4 kg (283 lb).    Weight management plan: Patient referred to endocrine and/or weight management specialty    She reports that she quit smoking about 23 years ago. Her smoking use included cigarettes. She has a 5.00 pack-year smoking history. She has never used smokeless tobacco.      Counseling Resources:  ATP IV Guidelines  Pooled Cohorts Equation Calculator  Breast Cancer Risk Calculator  BRCA-Related Cancer Risk Assessment: FHS-7 Tool  FRAX Risk Assessment  ICSI Preventive Guidelines  Dietary Guidelines for Americans,   USDA's MyPlate  ASA Prophylaxis  Lung CA Screening    Fernanda Rodriguez MD  [facility]  "

## 2021-09-08 NOTE — PROGRESS NOTES
Linda is a 50 year old who is being evaluated via a billable video visit.      How would you like to obtain your AVS? MyChart  If the video visit is dropped, the invitation should be resent by: Text to cell phone: [phone]  Will anyone else be joining your video visit? No    Video Start Time: 845  Video-Visit Details    Type of service:  Video Visit    Video End Time:855    Originating Location (pt. Location): Home    Distant Location (provider location):  [facility] [place]     Platform used for Video Visit: Fest[employer]         During this virtual visit the patient is located in [place], patient verifies this as the location during the entirety of this visit.     Duran Vanessa, EMT  [facility]    10 minutes spent on the date of the encounter doing chart review, history and exam, documentation and further activities per the note        Return Medical Weight Management Note     Elaine Awad  MRN:  [id number]  :  1970  [place]:  2021    Dear Fernanda Rodriguez MD,    I had the pleasure of seeing your patient Elaine Awad. She is a 50 year old female who I am continuing to see for treatment of obesity related to:       2019   I have the following health issues associated with obesity: GERD (Reflux), Weight Bearing Joint Pain   I have the following symptoms associated with obesity: Knee Pain, GERD (Reflux)     INTERVAL HISTORY:   Hx of starting 24 week plan. Was supposed to graduate May 2020 but stopped visits with  2020  Starting weight 256 lbs     Last visit with me 21 and we restarted topiramate and held phentermine due to high BP   Topiramate 75mg caused some issues with difficulty concentrating.  281.9 at home this am. Weight down 7-8 lbs from last visit.  She followed up with PCP regarding high BP and she is is taking losartan now.  Last check 118/65 at home today  She would like to retry low dose of phentermine and monitor blood pressure.   BMP 21  "normal    PLAN:   Continue topiramate 50mg daily  Restart lomaira 8mg and check BP regularly at home  Send [employer] message in 1 month to follow up topiramate 50mg and phentermine 8mg restart  Follow up video 3 months Layla DIAZ next week already scheduled    CURRENT WEIGHT:   283 lbs 0 oz    Initial Weight (lbs): 239.4 lbs  Last Visits Weight: 130.6 kg (288 lb)  Cumulative weight loss (lbs): -43.6  Weight Loss Percentage: -18.21%    Changes and Difficulties 9/8/2021   I have made the following changes to my diet since my last visit: embraced having big meal at lunch   With regards to my diet, I am still struggling with: I am not  sure that I am getting enough protein, still tend to want to eat more quick, convivence foods   I have made the following changes to my activity/exercise since my last visit: had been doing water exercise, that ended, now just found a new program   With regards to my activity/exercise, I am still struggling with: foot pain       VITALS:  Ht 1.626 m (5' 4\")   Wt 128.4 kg (283 lb)   LMP 08/28/2021   BMI 48.58 kg/m      MEDICATIONS:   Current Outpatient Medications   Medication Sig Dispense Refill     fexofenadine (ALLEGRA) 180 MG tablet Take 180 mg by mouth daily       glucosamine sulfate 1000 MG CAPS Take 1 capsule by mouth daily       losartan (COZAAR) 50 MG tablet Take 1 tablet (50 mg) by mouth daily 90 tablet 3     multivitamin w/minerals (THERA-VIT-M) tablet Take 1 tablet by mouth daily       nystatin (NYSTOP) 627772 UNIT/GM external powder APPLY TO THE AFFECTED AREA UNDER BREASTS THREE TIMES DAILY AS NEEDED. 60 g 4     omeprazole (PRILOSEC) 20 MG DR capsule TAKE 1 CAPSULE BY MOUTH EVERY DAY 90 capsule 1     rizatriptan (MAXALT-MLT) 5 MG ODT Take 1-2 tablets (5-10 mg) by mouth at onset of headache for migraine May repeat in 2 hours. Max 6 tablets/24 hours. 9 tablet 11     valACYclovir (VALTREX) 500 MG tablet TAKE 1 TABLET BY MOUTH DAILY. INCREASE TO 4 TABLET BY MOUTH 2 TIMES DAILY AT " "ONSET OF OUTBREAK 100 tablet 2       Weight Loss Medication History Reviewed With Patient 9/8/2021   Which weight loss medications are you currently taking on a regular basis?  Topamax (topiramate)   If you are not taking a weight loss medication that was prescribed to you, please indicate why: Other   Are you having any side effects from the weight loss medication that we have prescribed you? Yes   If you are having side effects please describe: I had been taking 75 mg, I had difficulty concentrating, I decreased dose on 8/30/21, this seems to be  a little better         Lab on 07/31/2021   Component Date Value Ref Range Status     Hemoglobin 07/31/2021 14.8  11.7 - 15.7 g/dL Final     Hepatitis C Antibody 07/31/2021 Nonreactive  Nonreactive Final     Sodium 07/31/2021 137  133 - 144 mmol/L Final     Potassium 07/31/2021 4.1  3.4 - 5.3 mmol/L Final     Chloride 07/31/2021 109  94 - 109 mmol/L Final     Carbon Dioxide (CO2) 07/31/2021 24  20 - 32 mmol/L Final     Anion Gap 07/31/2021 4  3 - 14 mmol/L Final     Urea Nitrogen 07/31/2021 11  7 - 30 mg/dL Final     Creatinine 07/31/2021 0.82  0.52 - 1.04 mg/dL Final     Calcium 07/31/2021 9.6  8.5 - 10.1 mg/dL Final     Glucose 07/31/2021 96  70 - 99 mg/dL Final     GFR Estimate 07/31/2021 84  >60 mL/min/1.73m2 Final    As of July 11, 2021, eGFR is calculated by the CKD-EPI creatinine equation, without race adjustment. eGFR can be influenced by muscle mass, exercise, and diet. The reported eGFR is an estimation only and is only applicable if the renal function is stable.       PHYSICAL EXAM:  Objective    Ht 1.626 m (5' 4\")   Wt 128.4 kg (283 lb)   LMP 08/28/2021   BMI 48.58 kg/m           Vitals:  No vitals were obtained today due to virtual visit.    Physical Exam   GENERAL: Healthy, alert and no distress  EYES: Eyes grossly normal to inspection.  No discharge or erythema, or obvious scleral/conjunctival abnormalities.  RESP: No audible wheeze, cough, or visible " cyanosis.  No visible retractions or increased work of breathing.    SKIN: Visible skin clear. No significant rash, abnormal pigmentation or lesions.  NEURO: Cranial nerves grossly intact.  Mentation and speech appropriate for age.  PSYCH: Mentation appears normal, affect normal/bright, judgement and insight intact, normal speech and appearance well-groomed.      Sincerely,    Layla Cyr PA-C

## 2021-09-08 NOTE — PATIENT INSTRUCTIONS
Preventive Health Recommendations  Female Ages 50 - 64    Yearly exam: See your health care provider every year in order to  o Review health changes.   o Discuss preventive care.    o Review your medicines if your doctor has prescribed any.      Get a Pap test every three years (unless you have an abnormal result and your provider advises testing more often).    If you get Pap tests with HPV test, you only need to test every 5 years, unless you have an abnormal result.     You do not need a Pap test if your uterus was removed (hysterectomy) and you have not had cancer.    You should be tested each year for STDs (sexually transmitted diseases) if you're at risk.     Have a mammogram every 1 to 2 years.    Have a colonoscopy at age 50, or have a yearly FIT test (stool test). These exams screen for colon cancer.      Have a cholesterol test every 5 years, or more often if advised.    Have a diabetes test (fasting glucose) every three years. If you are at risk for diabetes, you should have this test more often.     If you are at risk for osteoporosis (brittle bone disease), think about having a bone density scan (DEXA).    Shots: Get a flu shot each year. Get a tetanus shot every 10 years.    Nutrition:     Eat at least 5 servings of fruits and vegetables each day.    Eat whole-grain bread, whole-wheat pasta and brown rice instead of white grains and rice.    Get adequate Calcium and Vitamin D.     Lifestyle    Exercise at least 150 minutes a week (30 minutes a day, 5 days a week). This will help you control your weight and prevent disease.    Limit alcohol to one drink per day.    No smoking.     Wear sunscreen to prevent skin cancer.     See your dentist every six months for an exam and cleaning.    See your eye doctor every 1 to 2 years.      Get the shingles vaccine, called Shingrix (given as 2 shots, 2 to 6 months apart), even if you have already had the Zostavax vaccine. Discuss getting the Shingix vaccine from  your pharmacist, or schedule an ancillary shot visit here. Some insurances do not cover the cost of these vaccines.

## 2021-09-09 ENCOUNTER — VIRTUAL VISIT (OUTPATIENT)
Dept: ENDOCRINOLOGY | Facility: CLINIC | Age: 51
End: 2021-09-09
Payer: COMMERCIAL

## 2021-09-09 VITALS — HEIGHT: 64 IN | WEIGHT: 283 LBS | BODY MASS INDEX: 48.32 KG/M2

## 2021-09-09 DIAGNOSIS — E66.01 MORBID OBESITY, UNSPECIFIED OBESITY TYPE (H): ICD-10-CM

## 2021-09-09 DIAGNOSIS — E66.01 CLASS 3 SEVERE OBESITY DUE TO EXCESS CALORIES WITH SERIOUS COMORBIDITY AND BODY MASS INDEX (BMI) OF 45.0 TO 49.9 IN ADULT (H): Primary | ICD-10-CM

## 2021-09-09 DIAGNOSIS — E66.01 MORBID OBESITY (H): ICD-10-CM

## 2021-09-09 DIAGNOSIS — E66.813 CLASS 3 SEVERE OBESITY DUE TO EXCESS CALORIES WITH SERIOUS COMORBIDITY AND BODY MASS INDEX (BMI) OF 45.0 TO 49.9 IN ADULT (H): Primary | ICD-10-CM

## 2021-09-09 PROCEDURE — 99212 OFFICE O/P EST SF 10 MIN: CPT | Mod: 95 | Performed by: PHYSICIAN ASSISTANT

## 2021-09-09 RX ORDER — TOPIRAMATE 25 MG/1
50 TABLET, FILM COATED ORAL DAILY
Qty: 60 TABLET | Refills: 3 | Status: SHIPPED | OUTPATIENT
Start: 2021-09-09 | End: 2021-11-17

## 2021-09-09 ASSESSMENT — MIFFLIN-ST. JEOR: SCORE: 1888.68

## 2021-09-09 NOTE — PATIENT INSTRUCTIONS
PLAN:   Continue topiramate 50mg daily  Restart lomaira 8mg and check BP regularly at home  Send [employer] message in 1 month to follow up topiramate 50mg and phentermine 8mg restart  Follow up video 3 months Layla DIAZ next week already scheduled

## 2021-09-09 NOTE — NURSING NOTE
"Chief Complaint   Patient presents with     Follow Up     Return medical weight management.       Vitals:    09/09/21 0818   Weight: 283 lb   Height: 5' 4\"       Body mass index is 48.58 kg/m .      Duran Vanessa, EMT  [facility]                      "

## 2021-09-13 RX ORDER — TOPIRAMATE 25 MG/1
TABLET, FILM COATED ORAL
Qty: 180 TABLET | OUTPATIENT
Start: 2021-09-13

## 2021-09-13 NOTE — TELEPHONE ENCOUNTER
TOPIRAMATE 25MG TABLETS      Last Written Prescription Date:  9-9-21  Last Fill Quantity: 60,   # refills: 3  Last Office Visit : 9-9-21  Future Office visit:  12-9-21    Routing refill request to provider for review/approval because:  Requesting 90 day:      Current rx - 30 day:3 RF

## 2021-09-17 NOTE — PROGRESS NOTES
"Elaine Awad is a 50 year old female who is being evaluated via a billable video visit.      The patient has been notified of following:     \"This video visit will be conducted via a call between you and your physician/provider. We have found that certain health care needs can be provided without the need for an in-person physical exam.  This service lets us provide the care you need with a video conversation.  If a prescription is necessary we can send it directly to your pharmacy.  If lab work is needed we can place an order for that and you can then stop by our lab to have the test done at a later time.    Video visits are billed at different rates depending on your insurance coverage.  Please reach out to your insurance provider with any questions.    If during the course of the call the physician/provider feels a video visit is not appropriate, you will not be charged for this service.\"    Patient has given verbal consent for Video visit? Yes  How would you like to obtain your AVS? MyChart  If you are dropped from the video visit, the video invite should be resent to: Send to e-mail at: [email or web address]  Will anyone else be joining your video visit? No        Video-Visit Details    Type of service:  Video Visit    Video Start Time: 1:31 PM  Video End Time: 1:41 PM    Originating Location (pt. Location): Home    Distant Location (provider location):  [facility] [place]     Platform used for Video Visit: VitalMedix    During this virtual visit the patient is located in [place], patient verifies this as the location during the entirety of this visit.     Return Weight Management Nutrition Consultation    Reason for visit: Nutrition reassessment    Elaine Awad is a 48 year old female presenting today for follow-up nutrition assessment consult. Pt is referred by HUMBERTO Burns.    Patient with Co-morbidities of obesity including:  Type II DM no  Renal Failure no  Sleep apnea " "no  Hypertension no   Dyslipidemia no  Joint pain yes  Back pain no  GERD yes    H/o Lap band in 2014, removed in 2017.      ANTHROPOMETRICS:   Initial Weight 7/29/19: 256.5 lbs with BMI of 44.39 kg/m   Recent Weight (10/30/19): 262.6 lbs    Current weight: 281.7 lbs     NUTRITION HISTORY:   Last seen by wt giuliana RD 1/2020, info obtained at that time:  - Doesn't like to cook, likes quick and easy meals.  - Used to cook more when children lived at home.     - Did not like the smoothies. Felt bars were ok.    - Cravings: chocolate, pretzels.   - Eating Habits: \"Eats whatever's around.\" Invests little time in to meal prep. Easily tempted by snack foods when they are around. Frequently skipping lunch when not at home.     4/29/2021Pt states today, rapid weight gain in the past year after starting new med with OBGYN. Now focusing on increased exercise and movement - walking the dogs daily. Working from home now. Lunch is bigger meal, having that with  before he goes to work. Having dinner on her own. Doesn't like to cook or grocery shop.  Did Hello Fresh recently - liked the recipes, but felt like it took too much time to prepare and too much food.      Recent Diet Recall:  Breakfast: fruit and yogurt  Lunch: pork burger   Dinner: Lean cuisine; cereal   Snacks: granola bar  Beverages: Water (48 oz), coffee in am  Dining Out: Once week - sit-down restaurants (pizza, hamburger)    9/20/21:  Breakfast-  SlimFast protein shake  Lunch- HelloFresh meal  Dinner-Lean Cuisine, Rice Crispies with bananas/berries  Snacks-does not usually snack during the day  Hydration-drinks water throughout the day     Pt states she feels like she lacks in protein throughout the day.      Progress Toward Previous Goals:  1. Plan dinner ahead of time to create a balanced meal. -improving, using meal replacements for breakfast   - Use the Plate Method, or meal replacement  2. Increase activity as able. Take a 30 mins walk 5 days per week " -improving, pt taking 3 water aerobics classes per week    ADDITIONAL INFORMATION:    - working from home  - Pt with with h/o anemia       PHYSICAL ACTIVITY:  Water aerobics class usually 3 times per week     MEDICATIONS FOR WEIGHT LOSS:  Lomaira, Topamax.       NUTRITION DIAGNOSIS:   Obesity r/t long history of self-monitoring deficit and excessive energy intake aeb BMI >30.     INTERVENTION:  Nutrition Prescription:  Recommended energy/nutrient modification  Volumetrics/The Plate Method     Implementation:  Assessed learning needs and learning preferences  Nutrition Education: Reviewed previous goals, tools, Volumetric eating, use of meal replacements, mindfulness and meal planning.  - Co-developed goals with pt to focus on this month.  - Provided pt with list of goals and RD contact info via [employer].       GOALS:  1. Increase protein throughout the day (at least 60 g)  2. Continue to increase physical activity throughout the week.      [employer] E-store:  You may purchase meal replacement products online at our e-store. Visit e-store at [email or web address]  - The one week starter kits is a great way to sample a variety of products.  - For recipes and ideas on how to use products, visit - Ortho-tag    Meal Replacement Shake Options:   *Protein Shake Criteria: no more than 210 Calories, at least 20 grams of protein, and less than 10 grams of sugar   [facility] smoothie (160 Calories, 20 g protein)   Premier Protein (160 Calories, 30 g protein)  Slim Fast Advanced Nutrition (180 Calories, 20 g protein)  Muscle Milk, lactose-free, 17 oz bottle (210 Calories, 30 g protein)  Integrated Supplements, no artificial sugars (110 Calories, 20 g protein)  Genepro, unflavored protein powder (60 Calories, 30 g protein)  Boost/Ensure Max (160 calories, 30 gm protein)   [employer] Core Power (170 calories, 26 gm protein)    Meal Replacement Bar Options:  [facility] Protein Shake (160 Calories, 15 g  protein)  Quest Protein Bars (190 Calories, 20 g protein)  Built Bar (170 Calories, 15-20 g protein)  One Protein Bar (210 calories, 20 g protein)  [place] Signature Protein Bar (Costco) (190 Calories, 21 g protein)  Pure Protein Bars (180 Calories, 21 g protein)    Frozen Meal Replacements  Healthy Choice  Lean Cuisine  Atkins Meals  Smart Ones    FOLLOW UP  1-3 months, PRN    TIME SPENT WITH PATIENT: 10 Minutes      Gay Peoples RD, LD

## 2021-09-20 ENCOUNTER — VIRTUAL VISIT (OUTPATIENT)
Dept: ENDOCRINOLOGY | Facility: CLINIC | Age: 51
End: 2021-09-20
Payer: COMMERCIAL

## 2021-09-20 DIAGNOSIS — E66.9 OBESITY: ICD-10-CM

## 2021-09-20 DIAGNOSIS — Z71.3 NUTRITIONAL COUNSELING: Primary | ICD-10-CM

## 2021-09-20 PROCEDURE — 97803 MED NUTRITION INDIV SUBSEQ: CPT | Mod: GT | Performed by: DIETITIAN, REGISTERED

## 2021-09-20 NOTE — LETTER
"9/20/2021       RE: Elaine Awad  [id number] [address] [id number]     Dear Colleague,    Thank you for referring your patient, Elaine Awad, to the [facility] [place] at [facility]. Please see a copy of my visit note below.    Elaine Awad is a 50 year old female who is being evaluated via a billable video visit.      The patient has been notified of following:     \"This video visit will be conducted via a call between you and your physician/provider. We have found that certain health care needs can be provided without the need for an in-person physical exam.  This service lets us provide the care you need with a video conversation.  If a prescription is necessary we can send it directly to your pharmacy.  If lab work is needed we can place an order for that and you can then stop by our lab to have the test done at a later time.    Video visits are billed at different rates depending on your insurance coverage.  Please reach out to your insurance provider with any questions.    If during the course of the call the physician/provider feels a video visit is not appropriate, you will not be charged for this service.\"    Patient has given verbal consent for Video visit? Yes  How would you like to obtain your AVS? MyChart  If you are dropped from the video visit, the video invite should be resent to: Send to e-mail at: [email or web address]  Will anyone else be joining your video visit? No      Video-Visit Details    Type of service:  Video Visit    Video Start Time: 1:31 PM  Video End Time: 1:41 PM    Originating Location (pt. Location): Home    Distant Location (provider location):  [facility] [place]     Platform used for Video Visit: Nutonian    During this virtual visit the patient is located in [place], patient verifies this as the location during the entirety of this visit.     Return Weight " "Management Nutrition Consultation    Reason for visit: Nutrition reassessment    Elaine Awad is a 48 year old female presenting today for follow-up nutrition assessment consult. Pt is referred by HUMBERTO Burns.    Patient with Co-morbidities of obesity including:  Type II DM no  Renal Failure no  Sleep apnea no  Hypertension no   Dyslipidemia no  Joint pain yes  Back pain no  GERD yes    H/o Lap band in 2014, removed in 2017.      ANTHROPOMETRICS:   Initial Weight 7/29/19: 256.5 lbs with BMI of 44.39 kg/m   Recent Weight (10/30/19): 262.6 lbs    Current weight: 281.7 lbs     NUTRITION HISTORY:   Last seen by wt giuliana RD 1/2020, info obtained at that time:  - Doesn't like to cook, likes quick and easy meals.  - Used to cook more when children lived at home.     - Did not like the smoothies. Felt bars were ok.    - Cravings: chocolate, pretzels.   - Eating Habits: \"Eats whatever's around.\" Invests little time in to meal prep. Easily tempted by snack foods when they are around. Frequently skipping lunch when not at home.     4/29/2021Pt states today, rapid weight gain in the past year after starting new med with OBGYN. Now focusing on increased exercise and movement - walking the dogs daily. Working from home now. Lunch is bigger meal, having that with  before he goes to work. Having dinner on her own. Doesn't like to cook or grocery shop.  Did Hello Fresh recently - liked the recipes, but felt like it took too much time to prepare and too much food.      Recent Diet Recall:  Breakfast: fruit and yogurt  Lunch: pork burger   Dinner: Lean cuisine; cereal   Snacks: granola bar  Beverages: Water (48 oz), coffee in am  Dining Out: Once week - sit-down restaurants (pizza, hamburger)    9/20/21:  Breakfast-  SlimFast protein shake  Lunch- HelloFresh meal  Dinner-Lean Cuisine, Rice Crispies with bananas/berries  Snacks-does not usually snack during the day  Hydration-drinks water throughout the day     Pt states " she feels like she lacks in protein throughout the day.      Progress Toward Previous Goals:  1. Plan dinner ahead of time to create a balanced meal. -improving, using meal replacements for breakfast   - Use the Plate Method, or meal replacement  2. Increase activity as able. Take a 30 mins walk 5 days per week -improving, pt taking 3 water aerobics classes per week    ADDITIONAL INFORMATION:    - working from home  - Pt with with h/o anemia       PHYSICAL ACTIVITY:  Water aerobics class usually 3 times per week     MEDICATIONS FOR WEIGHT LOSS:  Lomaira, Topamax.     NUTRITION DIAGNOSIS:   Obesity r/t long history of self-monitoring deficit and excessive energy intake aeb BMI >30.     INTERVENTION:  Nutrition Prescription:  Recommended energy/nutrient modification  Volumetrics/The Plate Method     Implementation:  Assessed learning needs and learning preferences  Nutrition Education: Reviewed previous goals, tools, Volumetric eating, use of meal replacements, mindfulness and meal planning.  - Co-developed goals with pt to focus on this month.  - Provided pt with list of goals and RD contact info via Sc[employer].       GOALS:  1. Increase protein throughout the day (at least 60 g)  2. Continue to increase physical activity throughout the week.     [facility] E-store:  You may purchase meal replacement products online at our e-store. Visit e-store at [email or web address]/store  - The one week starter kits is a great way to sample a variety of products.  - For recipes and ideas on how to use products, visit - Bindo    Meal Replacement Shake Options:   *Protein Shake Criteria: no more than 210 Calories, at least 20 grams of protein, and less than 10 grams of sugar   [facility] smoothie (160 Calories, 20 g protein)   Premier Protein (160 Calories, 30 g protein)  Slim Fast Advanced Nutrition (180 Calories, 20 g protein)  Muscle Milk, lactose-free, 17 oz bottle (210 Calories, 30 g protein)  Integrated  Supplements, no artificial sugars (110 Calories, 20 g protein)  Genepro, unflavored protein powder (60 Calories, 30 g protein)  Boost/Ensure Max (160 calories, 30 gm protein)   [employer] Core Power (170 calories, 26 gm protein)    Meal Replacement Bar Options:  [facility] Protein Shake (160 Calories, 15 g protein)  Quest Protein Bars (190 Calories, 20 g protein)  Built Bar (170 Calories, 15-20 g protein)  One Protein Bar (210 calories, 20 g protein)  [place] Signature Protein Bar (Costco) (190 Calories, 21 g protein)  Pure Protein Bars (180 Calories, 21 g protein)    Frozen Meal Replacements  Healthy Choice  Lean Cuisine  Atkins Meals  Smart Ones    FOLLOW UP  1-3 months, PRN    TIME SPENT WITH PATIENT: 10 Minutes      Gay Peoples RD, LD    Attestation signed by Gay Peoples RD at 9/20/2021  2:50 PM:  I have read and agree to the following assessment and recommendations.     Gay Peoples RD, LD  Clinical Dietitian   Comprehensive Weight Management Program  [facility] [place]

## 2021-09-20 NOTE — PATIENT INSTRUCTIONS
Jean Mckeon,    Follow-up with RD in 1-3 months    Thank you,    Marilee Monteiro, EMILY, LD  If you would like to schedule or reschedule an appointment with the RD, please call [phone]    Nutrition Goals  1. Increase protein throughout the day (at least 60 g)  2. Continue to increase physical activity throughout the week.     [facility] E-store:  You may purchase meal replacement products online at our e-store. Visit e-store at [email or web address]  - The one week starter kits is a great way to sample a variety of products.  - For recipes and ideas on how to use products, visit - Foxwordy    Meal Replacement Shake Options:   *Protein Shake Criteria: no more than 210 Calories, at least 20 grams of protein, and less than 10 grams of sugar   [facility] smoothie (160 Calories, 20 g protein)   Premier Protein (160 Calories, 30 g protein)  Slim Fast Advanced Nutrition (180 Calories, 20 g protein)  Muscle Milk, lactose-free, 17 oz bottle (210 Calories, 30 g protein)  Integrated Supplements, no artificial sugars (110 Calories, 20 g protein)  Genepro, unflavored protein powder (60 Calories, 30 g protein)  Boost/Ensure Max (160 calories, 30 gm protein)   [employer] Core Power (170 calories, 26 gm protein)    Meal Replacement Bar Options:  [facility] Protein Shake (160 Calories, 15 g protein)  Quest Protein Bars (190 Calories, 20 g protein)  Built Bar (170 Calories, 15-20 g protein)  One Protein Bar (210 calories, 20 g protein)  [place] Signature Protein Bar (Costco) (190 Calories, 21 g protein)  Pure Protein Bars (180 Calories, 21 g protein)    Frozen Meal Replacements  Healthy Choice  Lean Cuisine  Atkins Meals  Smart Ones      Interested in working with a health ? Health coaches work with you to improve your overall health and wellbeing. They look at the whole person, and may involve discussion of different areas of life, including, but not limited to the four pillars of health (sleep,  exercise, nutrition, and stress management). Discuss with your care team if you would like to start working a health .    Health Coaching-3 Pack:    $99 for three health coaching visits    Visits may be done in person or via phone    Coaching is a partnership between the  and the client; Coaches do not prescribe or diagnose    Coaching helps inspire the client to reach his/her personal goals    COMPREHENSIVE WEIGHT MANAGEMENT PROGRAM  VIRTUAL SUPPORT GROUPS    For Support Group Information:    We offer support groups for patients who are working on weight loss and considering, preparing for or have had weight loss surgery. There is no cost for this opportunity.  You are invited to attend the?Virtual Support Groups?provided by any of the following locations:    [facility] via Microsoft Teams with Tarsha Blanton RN  2. [place] via [employer] with David Rooney, PhD, LP  3. [place] via [employer] with Jayda Martinez RN  4. [facility] via Microsoft Teams with Jayda Tom [facility]    The following Support Group information can also be found on our website:  [email or web address]    [facility] Weight Loss Surgery Support Group    [facility] Weight Loss Surgery Support Group  The support group is a patient-lead forum that meets monthly to share experiences, encouragement and education. It is open to those who have had weight loss surgery, are scheduled for surgery, and those who are considering surgery.  WHEN: This group meets on the 3rd Wednesday of each month from 5:00PM - 6:00PM virtually using Microsoft Teams.  FACILITATOR: Led by Tarsha Blanton, the program's Clinical Coordinator.  TO REGISTER: Please contact the clinic via [employer] or call the nurse line directly at [phone] to inform our staff that you would like an invite sent to you and to let us know the email you would like the invite sent to. Prior  "to the meeting, a link with directions on how to join the meeting will be sent to you.    2021 Meetings  August 18: \"Let's Talk\" a time for the group to share.  September 15: \"Let's Talk\" a time for the group to share.  October 20: \"Let's Talk\" a time for the group to share.  November 17: Sandra Soria RD, JESSICA \"Protein, Metabolism and Meal Planning\"  December 15: Patrick Camarena RD, JESSICA will speak, \"Recipe Modification\"    [facility] Support Groups    Connections: Bariatric Care Support Group?  This is open to all [facility] (and those external to this program) pre- and post- operative bariatric surgery patients as well as their support system.  WHEN: This group meets the 2nd Tuesday of each month from 6:30 PM - 8:00 PM virtually using Microsoft Teams.  FACILITATOR: Led by David Rooney, Ph.D who is a Licensed Psychologist with the [facility] Comprehensive Weight Management Program.  TO REGISTER: Please send an email to David Rooney, Ph.D., LP at?[email or web address]?if you would like an invitation to the group and to learn about using Microsoft Teams.    2021 Meetings  August 10: Open Forum  September 14: Guest Speaker: Jayda Martinez RN,CBN, [facility], [facility] Comprehensive Weight Management Program, \"Your Hospital Stay and Post-Operative Compliance\"  October 12: Open Forum  November 9: Guest Speaker: Aditi Kauffman RD, LD, [facility] Comprehensive Weight Management Program,\"Holiday Eating\".  December 14: Guest Speaker Cleopatra Holden MD, MPH, [facility], [facility], \"Body Contouring Surgery for Bariatric Surgery Patients\"    Connections: Post-Operative Bariatric Surgery Support Group  This is a support group for [facility] bariatric patients (and those external to [facility]) who have had bariatric surgery and are at least 3 months post-surgery.  WHEN: This support group meets the 4th Wednesday of the month from 11:00 AM " - 12:00 PM virtually using Microsoft Teams.  FACILITATOR: Led by Certified Bariatric Nurse, Jayda Martinez RN.  TO REGISTER: Please send an email to Jayda at [email or web address] if you would like an invitation to the group and to learn about using [employer].    [facility] Healthy Lifestyle Virtual Support Group    Healthy Lifestyle Virtual Support Group?  This is 60 minutes of small group guided discussion, support and resources. All are welcome who want a healthy lifestyle.  WHEN: This group meets monthly on a Friday from 12:30 PM - to 1:30 PM virtually using Microsoft Teams.  FACILITATOR: Led by National Board Certified Health , Jayda Tom, [facility].  TO REGISTER: Please send an email to Jayda [email or web address] to receive monthly invites to the group or if you have any questions about having a health . Prior to the meeting, a link with directions on how to join the meeting will be sent to you.    2021 Meetings  August 27: Open Forum  September 24: Sleep and the 7 Types of Rest  October 29: Open Forum  November 19: Gratitude  December 10: Open Forum

## 2021-10-15 DIAGNOSIS — K21.9 GASTROESOPHAGEAL REFLUX DISEASE WITHOUT ESOPHAGITIS: ICD-10-CM

## 2021-10-16 DIAGNOSIS — E66.01 MORBID OBESITY, UNSPECIFIED OBESITY TYPE (H): ICD-10-CM

## 2021-10-20 RX ORDER — TOPIRAMATE 25 MG/1
TABLET, FILM COATED ORAL
Qty: 180 TABLET | OUTPATIENT
Start: 2021-10-20

## 2021-11-16 DIAGNOSIS — E66.01 MORBID OBESITY, UNSPECIFIED OBESITY TYPE (H): ICD-10-CM

## 2021-11-17 RX ORDER — TOPIRAMATE 25 MG/1
50 TABLET, FILM COATED ORAL DAILY
Qty: 180 TABLET | Refills: 1 | Status: SHIPPED | OUTPATIENT
Start: 2021-11-17 | End: 2022-06-02

## 2021-11-17 NOTE — TELEPHONE ENCOUNTER
TOPIRAMATE 25MG TABLETS  Last Written Prescription Date:  9/9/2021  Last Fill Quantity: 60,   # refills: 3  Last Office Visit : 9/20/2021  Future Office visit:  12/9/2021    Routing refill request to provider for review/approval because:  Order from pharm and order in chart do not match.   Please send new updated order per Providers recommendations for Pt care.   Thank you       Elvira Cope RN  Central Triage Red Flags/Med Refills

## 2021-11-18 DIAGNOSIS — I10 HYPERTENSION GOAL BP (BLOOD PRESSURE) < 140/90: ICD-10-CM

## 2021-11-21 RX ORDER — LOSARTAN POTASSIUM 50 MG/1
TABLET ORAL
Qty: 90 TABLET | Refills: 3 | OUTPATIENT
Start: 2021-11-21

## 2021-11-24 ENCOUNTER — ANCILLARY PROCEDURE (OUTPATIENT)
Dept: MAMMOGRAPHY | Facility: CLINIC | Age: 51
End: 2021-11-24
Attending: FAMILY MEDICINE
Payer: COMMERCIAL

## 2021-11-24 DIAGNOSIS — Z12.31 VISIT FOR SCREENING MAMMOGRAM: ICD-10-CM

## 2021-11-24 PROCEDURE — 77063 BREAST TOMOSYNTHESIS BI: CPT | Mod: TC | Performed by: RADIOLOGY

## 2021-11-24 PROCEDURE — 77067 SCR MAMMO BI INCL CAD: CPT | Mod: TC | Performed by: RADIOLOGY

## 2021-12-08 VITALS — HEIGHT: 64 IN | BODY MASS INDEX: 46.61 KG/M2 | WEIGHT: 273 LBS

## 2021-12-08 ASSESSMENT — MIFFLIN-ST. JEOR: SCORE: 1843.32

## 2021-12-08 NOTE — PROGRESS NOTES
During this virtual visit the patient is located in [place], patient verifies this as the location during the entirety of this visit.     Linda is a 50 year old who is being evaluated via a billable video visit.      How would you like to obtain your AVS? MyChart  If the video visit is dropped, the invitation should be resent by: Text to cell phone:  [phone]  Will anyone else be joining your video visit? No    Video Start Time: 8:53 AM    Video-Visit Details    Type of service:  Video Visit    Video End Time:9:01 AM    Originating Location (pt. Location): Home    Distant Location (provider location):  [facility]NAG[facility]IN[facility] [place]     Platform used for Video Visit: Sourav Vanessa NREMT      10 minutes spent on the date of the encounter doing chart review, history and exam, documentation and further activities per the note    Return Medical Weight Management Note     Elaine Awad  MRN:  [id number]  :  1970  [place]:  2021    Dear Fernanda Rodriguez MD,    I had the pleasure of seeing your patient Elaine Awad. She is a 50 year old female who I am continuing to see for treatment of obesity related to:       2019   I have the following health issues associated with obesity: GERD (Reflux), Weight Bearing Joint Pain   I have the following symptoms associated with obesity: Knee Pain, GERD (Reflux)       Assessment & Plan   Problem List Items Addressed This Visit        Endocrine Diagnoses    Class 3 severe obesity due to excess calories with serious comorbidity and body mass index (BMI) of 45.0 to 49.9 in adult (H) - Primary     PLAN:  Continue topiramate 50mg daily  Continue lomaira 8mg daily  Watch online seminar if interested in learning about sleeve gastrectomy (Hx of lap band and removal, would need to check with insurance for coverage)  BMP at lab at your convenience   Please call [phone] to schedule a lab only appointment at any [facility]  lab.  Follow up EMILY and Layla in 3 months return MWM              Other    History of removal of laparoscopic gastric banding device             INTERVAL HISTORY:   Hx of starting 24 week plan. Was supposed to graduate May 2020 but stopped visits with  March 2020  Starting weight 256 lbs  Hx of lap band removed 2016       Last visit 9/9/21   Lost 10 lbs since last visit  Doing well on lomaira 8mg and topiramate 50 mg   /65 last check    CURRENT WEIGHT:   273 lbs 0 oz    Initial Weight (lbs): 239.4 lbs  Last Visits Weight: 128.4 kg (283 lb)  Cumulative weight loss (lbs): -33.6  Weight Loss Percentage: -14.04%    Changes and Difficulties 12/8/2021   I have made the following changes to my diet since my last visit: Continued   With regards to my diet, I am still struggling with: Protein   I have made the following changes to my activity/exercise since my last visit: Continued   With regards to my activity/exercise, I am still struggling with: Pain         MEDICATIONS:   Current Outpatient Medications   Medication Sig Dispense Refill     fexofenadine (ALLEGRA) 180 MG tablet Take 180 mg by mouth daily       glucosamine sulfate 1000 MG CAPS Take 1 capsule by mouth daily       losartan (COZAAR) 50 MG tablet Take 1 tablet (50 mg) by mouth daily 90 tablet 3     multivitamin w/minerals (THERA-VIT-M) tablet Take 1 tablet by mouth daily       nystatin (NYSTOP) 229996 UNIT/GM external powder APPLY TO THE AFFECTED AREA UNDER BREASTS THREE TIMES DAILY AS NEEDED. 60 g 4     omeprazole (PRILOSEC) 20 MG DR capsule TAKE 1 CAPSULE BY MOUTH EVERY DAY 90 capsule 2     phentermine (LOMAIRA) 8 MG tablet Take 1 tablet (8 mg) by mouth every morning (before breakfast) 30 tablet 3     rizatriptan (MAXALT-MLT) 5 MG ODT Take 1-2 tablets (5-10 mg) by mouth at onset of headache for migraine May repeat in 2 hours. Max 6 tablets/24 hours. 9 tablet 11     topiramate (TOPAMAX) 25 MG tablet Take 2 tablets (50 mg) by mouth daily 180 tablet 1      "valACYclovir (VALTREX) 500 MG tablet TAKE 1 TABLET BY MOUTH DAILY. INCREASE TO 4 TABLET BY MOUTH 2 TIMES DAILY AT ONSET OF OUTBREAK 100 tablet 2       Weight Loss Medication History Reviewed With Patient 12/8/2021   Which weight loss medications are you currently taking on a regular basis?  Phentermine, Topamax (topiramate)   If you are not taking a weight loss medication that was prescribed to you, please indicate why: -   Are you having any side effects from the weight loss medication that we have prescribed you? No   If you are having side effects please describe: -       Lab on 07/31/2021   Component Date Value Ref Range Status     Hemoglobin 07/31/2021 14.8  11.7 - 15.7 g/dL Final     Hepatitis C Antibody 07/31/2021 Nonreactive  Nonreactive Final     Sodium 07/31/2021 137  133 - 144 mmol/L Final     Potassium 07/31/2021 4.1  3.4 - 5.3 mmol/L Final     Chloride 07/31/2021 109  94 - 109 mmol/L Final     Carbon Dioxide (CO2) 07/31/2021 24  20 - 32 mmol/L Final     Anion Gap 07/31/2021 4  3 - 14 mmol/L Final     Urea Nitrogen 07/31/2021 11  7 - 30 mg/dL Final     Creatinine 07/31/2021 0.82  0.52 - 1.04 mg/dL Final     Calcium 07/31/2021 9.6  8.5 - 10.1 mg/dL Final     Glucose 07/31/2021 96  70 - 99 mg/dL Final     GFR Estimate 07/31/2021 84  >60 mL/min/1.73m2 Final    As of July 11, 2021, eGFR is calculated by the CKD-EPI creatinine equation, without race adjustment. eGFR can be influenced by muscle mass, exercise, and diet. The reported eGFR is an estimation only and is only applicable if the renal function is stable.       PHYSICAL EXAM:  Objective    Ht 1.626 m (5' 4\")   Wt 123.8 kg (273 lb)   BMI 46.86 kg/m             Vitals:  No vitals were obtained today due to virtual visit.    GENERAL: Healthy, alert and no distress  EYES: Eyes grossly normal to inspection.  No discharge or erythema, or obvious scleral/conjunctival abnormalities.  RESP: No audible wheeze, cough, or visible cyanosis.  No visible " retractions or increased work of breathing.    SKIN: Visible skin clear. No significant rash, abnormal pigmentation or lesions.  NEURO: Cranial nerves grossly intact.  Mentation and speech appropriate for age.  PSYCH: Mentation appears normal, affect normal/bright, judgement and insight intact, normal speech and appearance well-groomed.        Sincerely,    Layla Cyr PA-C

## 2021-12-08 NOTE — NURSING NOTE
Chief Complaint   Patient presents with     Follow Up     Return medical weight management.       Vitals:    12/08/21 1653   Weight: 273 lb       Body mass index is 46.86 kg/m .      Duran Vanessa, EMT  Surgery Clinic

## 2021-12-09 ENCOUNTER — VIRTUAL VISIT (OUTPATIENT)
Dept: ENDOCRINOLOGY | Facility: CLINIC | Age: 51
End: 2021-12-09
Payer: COMMERCIAL

## 2021-12-09 DIAGNOSIS — Z98.84 HISTORY OF REMOVAL OF LAPAROSCOPIC GASTRIC BANDING DEVICE: ICD-10-CM

## 2021-12-09 DIAGNOSIS — E66.813 CLASS 3 SEVERE OBESITY DUE TO EXCESS CALORIES WITH SERIOUS COMORBIDITY AND BODY MASS INDEX (BMI) OF 45.0 TO 49.9 IN ADULT (H): Primary | ICD-10-CM

## 2021-12-09 DIAGNOSIS — E66.01 CLASS 3 SEVERE OBESITY DUE TO EXCESS CALORIES WITH SERIOUS COMORBIDITY AND BODY MASS INDEX (BMI) OF 45.0 TO 49.9 IN ADULT (H): Primary | ICD-10-CM

## 2021-12-09 PROCEDURE — 99212 OFFICE O/P EST SF 10 MIN: CPT | Mod: 95 | Performed by: PHYSICIAN ASSISTANT

## 2021-12-09 NOTE — LETTER
2021       RE: Elaine Awad  [id number] [address] [address]     Dear Colleague,    Thank you for referring your patient, Elaine Awad, to the [facility] WEIGHT MANAGEMENT CLIN[facility] [place] at [facility]. Please see a copy of my visit note below.    During this virtual visit the patient is located in [place], patient verifies this as the location during the entirety of this visit.     Linda is a 50 year old who is being evaluated via a billable video visit.      How would you like to obtain your AVS? MyChart  If the video visit is dropped, the invitation should be resent by: Text to cell phone:  [phone]  Will anyone else be joining your video visit? No    Video Start Time: 8:53 AM    Video-Visit Details    Type of service:  Video Visit    Video End Time:9:01 AM    Originating Location (pt. Location): Home    Distant Location (provider location):  [facility] WEIGHT MANAGEMENT CLINIC [place]     Platform used for Video Visit: Sourav Vanessa NREMT      10 minutes spent on the date of the encounter doing chart review, history and exam, documentation and further activities per the note    Return Medical Weight Management Note     Elaine Awad  MRN:  [id number]  :  1970  [place]:  2021    Dear Fernanda Rodriguez MD,    I had the pleasure of seeing your patient Elaine Awad. She is a 50 year old female who I am continuing to see for treatment of obesity related to:       2019   I have the following health issues associated with obesity: GERD (Reflux), Weight Bearing Joint Pain   I have the following symptoms associated with obesity: Knee Pain, GERD (Reflux)       Assessment & Plan   Problem List Items Addressed This Visit        Endocrine Diagnoses    Class 3 severe obesity due to excess calories with serious comorbidity and body mass index (BMI) of 45.0 to 49.9 in adult (H) - Primary     PLAN:  Continue topiramate  50mg daily  Continue lomaira 8mg daily  Watch online seminar if interested in learning about sleeve gastrectomy (Hx of lap band and removal, would need to check with insurance for coverage)  BMP at lab at your convenience   Please call [phone] to schedule a lab only appointment at [facility] lab.  Follow up EMILY and Layla in 3 months return MWM              Other    History of removal of laparoscopic gastric banding device             INTERVAL HISTORY:   Hx of starting 24 week plan. Was supposed to graduate May 2020 but stopped visits with  March 2020  Starting weight 256 lbs  Hx of lap band removed 2016       Last visit 9/9/21   Lost 10 lbs since last visit  Doing well on lomaira 8mg and topiramate 50 mg   /65 last check    CURRENT WEIGHT:   273 lbs 0 oz    Initial Weight (lbs): 239.4 lbs  Last Visits Weight: 128.4 kg (283 lb)  Cumulative weight loss (lbs): -33.6  Weight Loss Percentage: -14.04%    Changes and Difficulties 12/8/2021   I have made the following changes to my diet since my last visit: Continued   With regards to my diet, I am still struggling with: Protein   I have made the following changes to my activity/exercise since my last visit: Continued   With regards to my activity/exercise, I am still struggling with: Pain         MEDICATIONS:   Current Outpatient Medications   Medication Sig Dispense Refill     fexofenadine (ALLEGRA) 180 MG tablet Take 180 mg by mouth daily       glucosamine sulfate 1000 MG CAPS Take 1 capsule by mouth daily       losartan (COZAAR) 50 MG tablet Take 1 tablet (50 mg) by mouth daily 90 tablet 3     multivitamin w/minerals (THERA-VIT-M) tablet Take 1 tablet by mouth daily       nystatin (NYSTOP) 477652 UNIT/GM external powder APPLY TO THE AFFECTED AREA UNDER BREASTS THREE TIMES DAILY AS NEEDED. 60 g 4     omeprazole (PRILOSEC) 20 MG DR capsule TAKE 1 CAPSULE BY MOUTH EVERY DAY 90 capsule 2     phentermine (LOMAIRA) 8 MG tablet Take 1 tablet (8 mg) by  "mouth every morning (before breakfast) 30 tablet 3     rizatriptan (MAXALT-MLT) 5 MG ODT Take 1-2 tablets (5-10 mg) by mouth at onset of headache for migraine May repeat in 2 hours. Max 6 tablets/24 hours. 9 tablet 11     topiramate (TOPAMAX) 25 MG tablet Take 2 tablets (50 mg) by mouth daily 180 tablet 1     valACYclovir (VALTREX) 500 MG tablet TAKE 1 TABLET BY MOUTH DAILY. INCREASE TO 4 TABLET BY MOUTH 2 TIMES DAILY AT ONSET OF OUTBREAK 100 tablet 2       Weight Loss Medication History Reviewed With Patient 12/8/2021   Which weight loss medications are you currently taking on a regular basis?  Phentermine, Topamax (topiramate)   If you are not taking a weight loss medication that was prescribed to you, please indicate why: -   Are you having any side effects from the weight loss medication that we have prescribed you? No   If you are having side effects please describe: -       Lab on 07/31/2021   Component Date Value Ref Range Status     Hemoglobin 07/31/2021 14.8  11.7 - 15.7 g/dL Final     Hepatitis C Antibody 07/31/2021 Nonreactive  Nonreactive Final     Sodium 07/31/2021 137  133 - 144 mmol/L Final     Potassium 07/31/2021 4.1  3.4 - 5.3 mmol/L Final     Chloride 07/31/2021 109  94 - 109 mmol/L Final     Carbon Dioxide (CO2) 07/31/2021 24  20 - 32 mmol/L Final     Anion Gap 07/31/2021 4  3 - 14 mmol/L Final     Urea Nitrogen 07/31/2021 11  7 - 30 mg/dL Final     Creatinine 07/31/2021 0.82  0.52 - 1.04 mg/dL Final     Calcium 07/31/2021 9.6  8.5 - 10.1 mg/dL Final     Glucose 07/31/2021 96  70 - 99 mg/dL Final     GFR Estimate 07/31/2021 84  >60 mL/min/1.73m2 Final    As of July 11, 2021, eGFR is calculated by the CKD-EPI creatinine equation, without race adjustment. eGFR can be influenced by muscle mass, exercise, and diet. The reported eGFR is an estimation only and is only applicable if the renal function is stable.       PHYSICAL EXAM:  Objective    Ht 1.626 m (5' 4\")   Wt 123.8 kg (273 lb)   BMI 46.86 " kg/m             Vitals:  No vitals were obtained today due to virtual visit.    GENERAL: Healthy, alert and no distress  EYES: Eyes grossly normal to inspection.  No discharge or erythema, or obvious scleral/conjunctival abnormalities.  RESP: No audible wheeze, cough, or visible cyanosis.  No visible retractions or increased work of breathing.    SKIN: Visible skin clear. No significant rash, abnormal pigmentation or lesions.  NEURO: Cranial nerves grossly intact.  Mentation and speech appropriate for age.  PSYCH: Mentation appears normal, affect normal/bright, judgement and insight intact, normal speech and appearance well-groomed.        Sincerely,    Layla Cyr PA-C

## 2021-12-09 NOTE — ASSESSMENT & PLAN NOTE
PLAN:  Continue topiramate 50mg daily  Continue lomaira 8mg daily  Watch online seminar if interested in learning about sleeve gastrectomy (Hx of lap band and removal, would need to check with insurance for coverage)  BMP at lab at your convenience   Please call [phone] to schedule a lab only appointment at any [facility] lab.  Follow up EMILY and Layla in 3 months return MW

## 2021-12-09 NOTE — PATIENT INSTRUCTIONS
"PLAN:  Continue topiramate 50mg daily  Continue lomaira 8mg daily  Watch online seminar if interested in learning about sleeve gastrectomy (Hx of lap band and removal, would need to check with insurance for coverage)  Follow up EMILY and Layla in 3 months return MWM      Please view our Weight Loss Surgery Seminar is at the following website.   [email or web address]  OR  [email or web address]    It is the seminar near the bottom of the page.  \"[facility] Weight Loss Surgery Video\"    It starts with Dr Deras speaking.  Please take the quiz after you view it.    When you take the quiz, it documents that you watched it.    Handouts for you to read are at the following links:    Making Your Decision, Understanding Weight Loss Surgery  [email or web address]    A Roadmap to Your Weight Loss Surgery  [email or web address]    Get Well Loop Information  [email or web address]    We can also send you via [employer]:  1. Insurance Coverage (What to Ask).  2. List of psychologists for the psychological evaluation.  3. Letters to give providers when requesting clearances.      For Release of Information to transfer info to or from [facility], go to this website for the forms:  [email or web address]    [email or web address]   Release of information form.      To complete the Authorization to Release Protected Health Information (PDF):  Name: Put in the surgeon's name.  (Dr Robert Berrios / Dr Ciro Deras / Dr Jovanni Martin)  Phone: [phone]   Fax: [phone].  Mailing Address: [address], [place], [address]  Clinic Address: [address] [facility] [address] [place], [place], [address] (do not use as mailing address)  Delivery Format: Select \"Fax\"  Purpose:  Select \" \"Continuing Care\" and \"Other\" Weight Loss Surgery " "Evaluation.    For Support Group Information:        COMPREHENSIVE WEIGHT MANAGEMENT PROGRAM  VIRTUAL SUPPORT GROUPS    We offer support groups for patients who are working on weight loss and considering, preparing for or have had weight loss surgery.   There is no cost for this opportunity.  You are invited to attend the?Virtual Support Groups?provided by any of the following locations:    1. [facility] via Microsoft Teams with Tarsha Blanton RN  2.   [place] via [employer] with David Rooney, PhD, LP  3.   [place] via [employer] with Jayda Martinez RN  4.   [facility] via [employer] Teams with Jayda Tom, [facility]    The following Support Group information can also be found on our website:  [email or web address]      [facility] Weight Loss Surgery Support Group    [facility] Weight Loss Surgery Support Group  The support group is a patient-lead forum that meets monthly to share experiences, encouragement and education. It is open to those who have had weight loss surgery, are scheduled for surgery, and those who are considering surgery.   WHEN: This group meets on the 3rd Wednesday of each month from 5:00PM - 6:00PM virtually using Microsoft Teams.   FACILITATOR: Led by Tarsha Blanton RD, JESSICA, RN, the program's Clinical Coordinator.   TO REGISTER: Please contact the clinic via [employer] or call the nurse line directly at [phone] to inform our staff that you would like an invite sent to you and to let us know the email you would like the invite sent to. Prior to the meeting, a link with directions on how to join the meeting will be sent to you.    2021 Meetings  August 18: \"Let's Talk\" a time for the group to share.  September 15: \"Let's Talk\" a time for the group to share.  October 20: \"Let's Talk\" a time for the group to share.  November 17: Guest Speaker: Sandra Soria RD, JESSICA \"Protein, Metabolism and Meal " "Planning\"  December 15: Guest Speaker: Patrick Camarena RD, LD will speak, \"Recipe Modification\"    [facility] Support Groups    Connections: Bariatric Care Support Group?  This is open to all [facility] (and those external to this program) pre- and post- operative bariatric surgery patients as well as their support system.   WHEN: This group meets the 2nd Tuesday of each month from 6:30 PM - 8:00 PM virtually using Microsoft Teams.   FACILITATOR: Led by David Rooney, Ph.D who is a Licensed Psychologist with the [facility] Comprehensive Weight Management Program.   TO REGISTER: Please send an email to David Rooney, Ph.D., LP at?[email or web address]?if you would like an invitation to the group and to learn about using Microsoft Teams.    2021 Meetings  August 10: Open Forum  September 14: Guest Speaker: Jayda Martinez RN,CBN, CIC, [facility] Weight Management [facility], \"Your Hospital Stay and Post-Operative Compliance\"  October 12: Open Forum  November 9: Guest Speaker: Aditi Kauffman RD,JESSICA, [facility] Comprehensive Weight Management Program,\"Holiday Eating\".  December 14: Guest Speaker Cleopatra Holden MD, MPH, [facility], [facility], \"Body Contouring Surgery for Bariatric Surgery Patients\"     Connections: Post-Operative Bariatric Surgery Support Group  This is a support group for [facility] bariatric patients (and those external to [facility]) who have had bariatric surgery and are at least 3 months post-surgery.  WHEN: This support group meets the 4th Wednesday of the month from 11:00 AM - 12:00 PM virtually using Microsoft Teams.   FACILITATOR: Led by Certified Bariatric Nurse, Jayda Martinez RN.   TO REGISTER: Please send an email to Jayda at [email or web address] if you would like an invitation to the group and to learn about using [employer].      [facility]" "Center Healthy Lifestyle Virtual Support Group    Healthy Lifestyle Virtual Support Group?  This is 60 minutes of small group guided discussion, support and resources. All are welcome who want a healthy lifestyle.  WHEN: This group meets monthly on a Friday from 12:30 PM - 1:30 PM virtually using Microsoft Teams.   FACILITATOR: Led by National Board Certified Health and , Jayda Tom [facility].   TO REGISTER: Please send an email to Jayda [email or web address] to receive monthly invites to the group or if you have any questions about having a health .  Prior to the meeting, a link with directions on how to join the meeting will be sent to you.    2021 Meetings  August 27: Open Forum  September 24: Sleep and the 7 Types of Rest  October 29: Open Forum  November 19: Gratitude     December 10: Open Forum    2022 Meetings  January 21: Dee Veliz MS, RN, CIC, CBN, \"Healthy Habits\"  February 25: Open Forum  March 18: \"Setting Limits and Boundaries\"  April 29: Noni Shipman RD, \"Meal Planning Made Easy\"  May 20: Open Forum                        "

## 2022-01-07 NOTE — PROGRESS NOTES
"Elaine Awad is a 51 year old female who is being evaluated via a billable video visit.      The patient has been notified of following:     \"This video visit will be conducted via a call between you and your physician/provider. We have found that certain health care needs can be provided without the need for an in-person physical exam.  This service lets us provide the care you need with a video conversation.  If a prescription is necessary we can send it directly to your pharmacy.  If lab work is needed we can place an order for that and you can then stop by our lab to have the test done at a later time.    Video visits are billed at different rates depending on your insurance coverage.  Please reach out to your insurance provider with any questions.    If during the course of the call the physician/provider feels a video visit is not appropriate, you will not be charged for this service.\"    Patient has given verbal consent for Video visit? Yes  How would you like to obtain your AVS? MyChart  If you are dropped from the video visit, the video invite should be resent to: Send to e-mail at: [email or web address]  Will anyone else be joining your video visit? No        Video-Visit Details    Type of service:  Video Visit    Video Start Time: 12:36 PM  Video End Time: 12:51 PM  Originating Location (pt. Location): Home    Distant Location (provider location):  [facility] WEIGHT MANAGEMENT CLINIC [place]     Platform used for Video Visit: [employer]    During this virtual visit the patient is located in [place], patient verifies this as the location during the entirety of this visit.     Return Weight Management Nutrition Consultation    Reason for visit: Nutrition reassessment    Elaine Awad is a 51 year old female presenting today for follow-up nutrition assessment consult. Pt is referred by HUMBERTO Burns.    Patient with Co-morbidities of obesity including:  Type II DM no  Renal Failure no  Sleep apnea " "no  Hypertension no   Dyslipidemia no  Joint pain yes  Back pain no  GERD yes    H/o Lap band in 2014, removed in 2017.      ANTHROPOMETRICS:   Initial Weight 7/29/19: 256.5 lbs with BMI of 44.39 kg/m     Last weight (9/20/21): 281.7 lbs     Current weight: 270 lbs (-12 lbs in past 3 months)    NUTRITION HISTORY:   Last seen by wt giuliana RD 1/2020, info obtained at that time:  - Doesn't like to cook, likes quick and easy meals.  - Used to cook more when children lived at home.     - Did not like the smoothies. Felt bars were ok.    - Cravings: chocolate, pretzels.   - Eating Habits: \"Eats whatever's around.\" Invests little time in to meal prep. Easily tempted by snack foods when they are around. Frequently skipping lunch when not at home.     4/29/2021: Pt states, rapid weight gain in the past year after starting new med with OBGYN. Now focusing on increased exercise and movement - walking the dogs daily. Working from home now. Lunch is bigger meal, having that with  before he goes to work. Having dinner on her own. Doesn't like to cook or grocery shop.  Did Hello Fresh recently - liked the recipes, but felt like it took too much time to prepare and too much food.     Sept 2021: Restarted medication for weight loss    1/10/22: She is focusing on eating less frequent (less snacking). Focusing on getting protein servings at meals. Usually not very hungry at dinner, eating something small and not planned out.      Recent Diet Recall:  Breakfast: fruit and yogurt  Lunch: biggest meal - soup and sandwich; pot roast  Dinner: sandwich (PB);   Beverages: Water (48 oz), coffee in am  Dining Out: Once week - sit-down restaurants (pizza, hamburger)    Progress Toward Previous Goals:  1. Increase protein throughout the day (at least 60 g) - Improving  2. Continue to increase physical activity throughout the week. - Not met. More challenging in winter months. She enjoys swimming, but does not leaving the gym after swimming " when so cold. Has a treadmill at home, but reports having ankle pain recently, and agitated by walking on the treadmil.     ADDITIONAL INFORMATION:    - working from home  - Pt with with h/o anemia       PHYSICAL ACTIVITY:  Water aerobics class usually 3 times per week in the summer     MEDICATIONS FOR WEIGHT LOSS:  Lomaira, Topamax. - pt reports is going well     NUTRITION DIAGNOSIS:   Obesity r/t long history of positive energy balance aeb BMI >30. - improving/continues     INTERVENTION:  Nutrition Prescription:  Recommended energy/nutrient modification     Implementation:  Reviewed progress towards previous goals.  Praised pt on positive changes she is focusing on with diet/lifestyle.  Discussed quick, easy small meal ideas for dinner  Discussed implementation of exercise. Discussed resources for low impact activities - Fit On brigitte.  Co-developed goals with pt to focus on this month.  Provided pt with list of goals and RD contact info via [employer].       GOALS:  1. Continue 3 small meals/day. Lean protein at each meal. Eat slowly, evaluate fullness and stop when you are comfortable.   2. Increase physical activity as able    [facility] E-store:  You may purchase meal replacement products online at our e-store. Visit e-store at [email or web address]  - The one week starter kits is a great way to sample a variety of products.  - For recipes and ideas on how to use products, visit - VoloMetrix    Meal Replacement Shake Options:   *Protein Shake Criteria: no more than 210 Calories, at least 20 grams of protein, and less than 10 grams of sugar   [facility] smoothie (160 Calories, 20 g protein)   Premier Protein (160 Calories, 30 g protein)  Slim Fast Advanced Nutrition (180 Calories, 20 g protein)  Muscle Milk, lactose-free, 17 oz bottle (210 Calories, 30 g protein)  Integrated Supplements, no artificial sugars (110 Calories, 20 g protein)  Genepro, unflavored protein powder (60 Calories, 30 g  protein)  Boost/Ensure Max (160 calories, 30 gm protein)   Fairlife Core Power (170 calories, 26 gm protein)    Meal Replacement Bar Options:  [facility] Protein Shake (160 Calories, 15 g protein)  Quest Protein Bars (190 Calories, 20 g protein)  Built Bar (170 Calories, 15-20 g protein)  One Protein Bar (210 calories, 20 g protein)  [place] Signature Protein Bar (Costco) (190 Calories, 21 g protein)  Pure Protein Bars (180 Calories, 21 g protein)    Frozen Meal Replacements  Healthy Choice  Lean Cuisine  Atkins Meals  Smart Ones    FOLLOW UP  1-3 months, PRN    TIME SPENT WITH PATIENT: 15 Minutes      Gay Peoples, RD, LD

## 2022-01-10 ENCOUNTER — VIRTUAL VISIT (OUTPATIENT)
Dept: ENDOCRINOLOGY | Facility: CLINIC | Age: 52
End: 2022-01-10
Payer: COMMERCIAL

## 2022-01-10 DIAGNOSIS — E66.9 OBESITY: ICD-10-CM

## 2022-01-10 DIAGNOSIS — Z71.3 NUTRITIONAL COUNSELING: Primary | ICD-10-CM

## 2022-01-10 PROCEDURE — 97803 MED NUTRITION INDIV SUBSEQ: CPT | Mod: GT | Performed by: DIETITIAN, REGISTERED

## 2022-01-10 NOTE — PATIENT INSTRUCTIONS
Jean Mckeon,    Follow-up with RD in 1-3 months.     Thank you,    Gay Peoples, RD, LD  If you would like to schedule or reschedule an appointment with the RD, please call [phone]    Nutrition Goals  1. Continue 3 small meals/day. Lean protein at each meal. Eat slowly, evaluate fullness and stop when you are comfortable.   2. Increase physical activity as able    [facility] E-store:  You may purchase meal replacement products online at our e-store. Visit e-store at [email or web address]  - The one week starter kits is a great way to sample a variety of products.  - For recipes and ideas on how to use products, visit - [employer]    Meal Replacement Shake Options:   *Protein Shake Criteria: no more than 210 Calories, at least 20 grams of protein, and less than 10 grams of sugar   [facility] smoothie (160 Calories, 20 g protein)   Premier Protein (160 Calories, 30 g protein)  Slim Fast Advanced Nutrition (180 Calories, 20 g protein)  Muscle Milk, lactose-free, 17 oz bottle (210 Calories, 30 g protein)  Integrated Supplements, no artificial sugars (110 Calories, 20 g protein)  Genepro, unflavored protein powder (60 Calories, 30 g protein)  Boost/Ensure Max (160 calories, 30 gm protein)   Fairlife Core Power (170 calories, 26 gm protein)    Meal Replacement Bar Options:  [facility] Protein Shake (160 Calories, 15 g protein)  Quest Protein Bars (190 Calories, 20 g protein)  Built Bar (170 Calories, 15-20 g protein)  One Protein Bar (210 calories, 20 g protein)  [place] Signature Protein Bar (Costco) (190 Calories, 21 g protein)  Pure Protein Bars (180 Calories, 21 g protein)    Frozen Meal Replacements  Healthy Choice  Lean Cuisine  Atkins Meals  Smart Ones      Interested in working with a health ?  Health coaches work with you to improve your overall health and wellbeing.  They look at the whole person, and may involve discussion of different areas of life, including, but not limited  to the four pillars of health (sleep, exercise, nutrition, and stress management). Discuss with your care team if you would like to start working a health .    Health Coaching-3 Pack:    $99 for three health coaching visits    Visits may be done in person or via phone    Coaching is a partnership between the  and the client; Coaches do not prescribe or diagnose    Coaching helps inspire the client to reach his/her personal goals      COMPREHENSIVE WEIGHT MANAGEMENT PROGRAM  VIRTUAL SUPPORT GROUPS    For Support Group Information:      We offer support groups for patients who are working on weight loss and considering, preparing for or have had weight loss surgery.   There is no cost for this opportunity.  You are invited to attend the?Virtual Support Groups?provided by any of the following locations:    1. [facility] via Microsoft Teams with Tarsha Blanton RN  2.   [place] via [employer] Spa with David Rooney, PhD, LP  3.   [place] via [employer] with Jayda Martinez RN  4.   [facility] via Microsoft Teams with Jayda Tom [facility]    The following Support Group information can also be found on our website:  [email or web address]      [facility] Weight Loss Surgery Support Group    [facility] Weight Loss Surgery Support Group  The support group is a patient-lead forum that meets monthly to share experiences, encouragement and education. It is open to those who have had weight loss surgery, are scheduled for surgery, and those who are considering surgery.   WHEN: This group meets on the 3rd Wednesday of each month from 5:00PM - 6:00PM virtually using Microsoft Teams.   FACILITATOR: Led by Tarsha Blanton RD, LD, RN, the program's Clinical Coordinator.   TO REGISTER: Please contact the clinic via [employer] or call the nurse line directly at [phone] to inform our staff that you would like an invite sent to you  "and to let us know the email you would like the invite sent to. Prior to the meeting, a link with directions on how to join the meeting will be sent to you.    2021 Meetings August 18: \"Let's Talk\" a time for the group to share.  September 15: \"Let's Talk\" a time for the group to share.  October 20: \"Let's Talk\" a time for the group to share.  November 17: Guest Speaker: Sandra Soria RD, LD \"Protein, Metabolism and Meal Planning\"  December 15: Guest Speaker: Patrick Camarena RD, JESSICA will speak, \"Recipe Modification\"    2022 Meetings January 19 February 16 March 16: Guest Speakers: Chelsea Amanda, PhD, and Anneliese Batres PsyD,  April 20  May 18  Geni 15    [facility] Clinics and Specialty [facility] Support Groups    Connections: Bariatric Care Support Group?  This is open to all [facility] (and those external to this program) pre- and post- operative bariatric surgery patients as well as their support system.   WHEN: This group meets the 2nd Tuesday of each month from 6:30 PM - 8:00 PM virtually using Microsoft Teams.   FACILITATOR: Led by David Rooney, Ph.D who is a Licensed Psychologist with the [facility] Comprehensive Weight Management Program.   TO REGISTER: Please send an email to David Rooney, Ph.D.,  at?[email or web address]?if you would like an invitation to the group and to learn about using Microsoft Teams.    2021 Meetings  August 10: Open Forum  September 14: Guest Speaker: Jayda Martinez RN,CBN, CIC, [facility] Comprehensive Weight Management Program, \"Your Hospital Stay and Post-Operative Compliance\"  October 12: Open Forum  November 9: Guest Speaker: Aditi Kauffman RD,LD, [facility] Comprehensive Weight Management Program,\"Holiday Eating\".  December 14: Guest Speaker Cleopatra Holden MD, MPH, [facility], \"Body Contouring Surgery for Bariatric Surgery Patients\"     2022 Meetings  January 11  February 8  March 8  April 12  May " "10  Geni 14    Connections: Post-Operative Bariatric Surgery Support Group  This is a support group for [facility] bariatric patients (and those external to [facility]) who have had bariatric surgery and are at least 3 months post-surgery.  WHEN: This support group meets the 4th Wednesday of the month from 11:00 AM - 12:00 PM virtually using Microsoft Teams.   FACILITATOR: Led by Certified Bariatric Nurse, Jayda Martinez RN.   TO REGISTER: Please send an email to Jayda at [email or web address] if you would like an invitation to the group and to learn about using Microsoft Teams.    2022 Meetings  January 26  February 23  March 23  April 27  May 25  Geni 22    [facility] Healthy Lifestyle Virtual Support Group    Healthy Lifestyle Virtual Support Group?  This is 60 minutes of small group guided discussion, support and resources. All are welcome who want a healthy lifestyle.  WHEN: This group meets monthly on a Friday from 12:30 PM - 1:30 PM virtually using Microsoft Teams.   FACILITATOR: Led by National Board Certified Health and , Jayda Tom [facility].   TO REGISTER: Please send an email to Jayda [email or web address] to receive monthly invites to the group or if you have any questions about having a health .  Prior to the meeting, a link with directions on how to join the meeting will be sent to you.    2021 Meetings August 27: Open MonoLibre  September 24: Sleep and the 7 Types of Rest  October 29: Open Forum  November 19: Gratitude     December 10: Open Forum    2022 Meetings  January 21: Dee Veliz MS, RN, CIC, CBN, \"Healthy Habits\"  February 25: Open Forum  March 18: \"Setting Limits and Boundaries\"  April 29: Noni Shipman RD, \"Meal Planning Made Easy\"  May 20: Open Forum  June: To be determined          "

## 2022-01-10 NOTE — LETTER
"1/10/2022       RE: Elaine Awad  [id number] [address] [facility] [address]     Dear Colleague,    Thank you for referring your patient, Elaine Awad, to the [facility] WEIGHT MANAGEMENT CLINIC [place] at [facility]. Please see a copy of my visit note below.    Elaine Awad is a 51 year old female who is being evaluated via a billable video visit.      The patient has been notified of following:     \"This video visit will be conducted via a call between you and your physician/provider. We have found that certain health care needs can be provided without the need for an in-person physical exam.  This service lets us provide the care you need with a video conversation.  If a prescription is necessary we can send it directly to your pharmacy.  If lab work is needed we can place an order for that and you can then stop by our lab to have the test done at a later time.    Video visits are billed at different rates depending on your insurance coverage.  Please reach out to your insurance provider with any questions.    If during the course of the call the physician/provider feels a video visit is not appropriate, you will not be charged for this service.\"    Patient has given verbal consent for Video visit? Yes  How would you like to obtain your AVS? MyChart  If you are dropped from the video visit, the video invite should be resent to: Send to e-mail at: [email or web address]  Will anyone else be joining your video visit? No        Video-Visit Details    Type of service:  Video Visit    Video Start Time: 12:36 PM  Video End Time: 12:51 PM  Originating Location (pt. Location): Home    Distant Location (provider location):  [facility] WEIGHT MANAGEMENT CLINIC [place]     Platform used for Video Visit: [employer]    During this virtual visit the patient is located in [place], patient verifies this as the location during the entirety of this visit.     Return Weight " "Management Nutrition Consultation    Reason for visit: Nutrition reassessment    Elaine Awad is a 51 year old female presenting today for follow-up nutrition assessment consult. Pt is referred by HUMBERTO Burns.    Patient with Co-morbidities of obesity including:  Type II DM no  Renal Failure no  Sleep apnea no  Hypertension no   Dyslipidemia no  Joint pain yes  Back pain no  GERD yes    H/o Lap band in 2014, removed in 2017.      ANTHROPOMETRICS:   Initial Weight 7/29/19: 256.5 lbs with BMI of 44.39 kg/m     Last weight (9/20/21): 281.7 lbs     Current weight: 270 lbs (-12 lbs in past 3 months)    NUTRITION HISTORY:   Last seen by wt mgmt RD 1/2020, info obtained at that time:  - Doesn't like to cook, likes quick and easy meals.  - Used to cook more when children lived at home.     - Did not like the smoothies. Felt bars were ok.    - Cravings: chocolate, pretzels.   - Eating Habits: \"Eats whatever's around.\" Invests little time in to meal prep. Easily tempted by snack foods when they are around. Frequently skipping lunch when not at home.     4/29/2021: Pt states, rapid weight gain in the past year after starting new med with OBGYN. Now focusing on increased exercise and movement - walking the dogs daily. Working from home now. Lunch is bigger meal, having that with  before he goes to work. Having dinner on her own. Doesn't like to cook or grocery shop.  Did Hello Fresh recently - liked the recipes, but felt like it took too much time to prepare and too much food.     Sept 2021: Restarted medication for weight loss    1/10/22: She is focusing on eating less frequent (less snacking). Focusing on getting protein servings at meals. Usually not very hungry at dinner, eating something small and not planned out.      Recent Diet Recall:  Breakfast: fruit and yogurt  Lunch: biggest meal - soup and sandwich; pot roast  Dinner: sandwich (PB);   Beverages: Water (48 oz), coffee in am  Dining Out: Once week - " sit-down restaurants (pizza, hamburger)    Progress Toward Previous Goals:  1. Increase protein throughout the day (at least 60 g) - Improving  2. Continue to increase physical activity throughout the week. - Not met. More challenging in winter months. She enjoys swimming, but does not leaving the gym after swimming when so cold. Has a treadmill at home, but reports having ankle pain recently, and agitated by walking on the treadmil.     ADDITIONAL INFORMATION:    - working from home  - Pt with with h/o anemia       PHYSICAL ACTIVITY:  Water aerobics class usually 3 times per week in the summer     MEDICATIONS FOR WEIGHT LOSS:  Lomaira, Topamax. - pt reports is going well     NUTRITION DIAGNOSIS:   Obesity r/t long history of positive energy balance aeb BMI >30. - improving/continues     INTERVENTION:  Nutrition Prescription:  Recommended energy/nutrient modification     Implementation:  Reviewed progress towards previous goals.  Praised pt on positive changes she is focusing on with diet/lifestyle.  Discussed quick, easy small meal ideas for dinner  Discussed implementation of exercise. Discussed resources for low impact activities - Fit On brigitte.  Co-developed goals with pt to focus on this month.  Provided pt with list of goals and RD contact info via [employer].       GOALS:  1. Continue 3 small meals/day. Lean protein at each meal. Eat slowly, evaluate fullness and stop when you are comfortable.   2. Increase physical activity as able    [facility] E-store:  You may purchase meal replacement products online at our e-store. Visit e-store at [email or web address]  - The one week starter kits is a great way to sample a variety of products.  - For recipes and ideas on how to use products, visit - Buru Buru    Meal Replacement Shake Options:   *Protein Shake Criteria: no more than 210 Calories, at least 20 grams of protein, and less than 10 grams of sugar   [facility] smoothie (160 Calories, 20  g protein)   Premier Protein (160 Calories, 30 g protein)  Slim Fast Advanced Nutrition (180 Calories, 20 g protein)  Muscle Milk, lactose-free, 17 oz bottle (210 Calories, 30 g protein)  Integrated Supplements, no artificial sugars (110 Calories, 20 g protein)  Genepro, unflavored protein powder (60 Calories, 30 g protein)  Boost/Ensure Max (160 calories, 30 gm protein)   [employer] Core Power (170 calories, 26 gm protein)    Meal Replacement Bar Options:  [facility] Protein Shake (160 Calories, 15 g protein)  Quest Protein Bars (190 Calories, 20 g protein)  Built Bar (170 Calories, 15-20 g protein)  One Protein Bar (210 calories, 20 g protein)  [place] Signature Protein Bar ([employer]co) (190 Calories, 21 g protein)  Pure Protein Bars (180 Calories, 21 g protein)    Frozen Meal Replacements  Healthy Choice  Lean Cuisine  Atkins Meals  Smart Ones    FOLLOW UP  1-3 months, PRN    TIME SPENT WITH PATIENT: 15 Minutes      Gay Peoples RD, LD

## 2022-02-04 DIAGNOSIS — E66.01 MORBID OBESITY (H): ICD-10-CM

## 2022-02-08 NOTE — TELEPHONE ENCOUNTER
LOMAIRA 8MG TABLETS      Last Written Prescription Date:  9/9/21  Last Fill Quantity: 30,   # refills: 3  Last Office Visit : 12/9/21  Future Office visit:  3/16/21    Routing refill request to provider for review/approval because:  Drug controlled substance

## 2022-03-08 NOTE — PROGRESS NOTES
"Elaine Awad is a 51 year old female who is being evaluated via a billable video visit.      The patient has been notified of following:     \"This video visit will be conducted via a call between you and your physician/provider. We have found that certain health care needs can be provided without the need for an in-person physical exam.  This service lets us provide the care you need with a video conversation.  If a prescription is necessary we can send it directly to your pharmacy.  If lab work is needed we can place an order for that and you can then stop by our lab to have the test done at a later time.    Video visits are billed at different rates depending on your insurance coverage.  Please reach out to your insurance provider with any questions.    If during the course of the call the physician/provider feels a video visit is not appropriate, you will not be charged for this service.\"    Patient has given verbal consent for Video visit? Yes  How would you like to obtain your AVS? MyChart  If you are dropped from the video visit, the video invite should be resent to: Send to e-mail at: [email or web address]  Will anyone else be joining your video visit? No        Video-Visit Details    Type of service:  Video Visit    Video Start Time: 8:31 AM   Video End Time: 8:44 AM  Originating Location (pt. Location): Home    Distant Location (provider location):  [facility]NAG[facility] [place]     Platform used for Video Visit: [employer]    During this virtual visit the patient is located in [place], patient verifies this as the location during the entirety of this visit.     Return Weight Management Nutrition Consultation    Reason for visit: Nutrition reassessment    Elaine Awad is a 51 year old female presenting today for follow-up nutrition assessment consult. Pt is referred by HUMBERTO Burns.    Patient with Co-morbidities of obesity including:  Type II DM no  Renal Failure no  Sleep apnea " "no  Hypertension no   Dyslipidemia no  Joint pain yes  Back pain no  GERD yes    H/o Lap band in 2014, removed in 2017.      ANTHROPOMETRICS:   Initial Weight 7/29/19: 256.5 lbs with BMI of 44.39 kg/m     Current weight: 270 lbs  (-0 in past 3 months, +13 lbs from initial)    Wt Readings from Last 5 Encounters:   12/08/21 123.8 kg (273 lb)   09/09/21 128.4 kg (283 lb)   09/08/21 128.4 kg (283 lb)   07/20/21 130.6 kg (288 lb)   06/30/21 131.1 kg (289 lb)       NUTRITION HISTORY:   Info obtained from previous RD visits:  - Doesn't like to cook, prefers quick and easy meals.  - Used to cook more when children lived at home.     - Did not like the smoothies. Felt bars were ok.    - Cravings: chocolate, pretzels.   - Eating Habits: \"Eats whatever's around.\" Invests little time in to meal prep. Easily tempted by snack foods when they are around. Frequently skipping lunch when not at home.     4/29/2021: Pt states, rapid weight gain in the past year after starting new med with OBGYN. Now focusing on increased exercise and movement - walking the dogs daily. Working from home now. Lunch is bigger meal, having that with  before he goes to work. Having dinner on her own. Doesn't like to cook or grocery shop.  Did Hello Fresh recently - liked the recipes, but felt like it took too much time to prepare and too much food.     Sept 2021: Restarted medication for weight loss    Today - Breakfast and dinner continue to be small meals, struggling to achieve balance at these meals (higher carb).       Recent Diet Recall:  Breakfast: fruit and yogurt  Lunch: biggest meal - soup and sandwich; pot roast  Dinner: oatmeal  Beverages: Water (48 oz), coffee in am  Dining Out: Once week - sit-down restaurants (pizza, hamburger)    Progress Toward Previous Goals:  1. Continue 3 small meals/day. Lean protein at each meal. Eat slowly, evaluate fullness and stop when you are comfortable. - Struggles with protein foods.   2. Increase " physical activity as able - Increasing walking on the weekends, walking the dogs when not too cold. Challenging to get the activity she would like in the winter months.     ADDITIONAL INFORMATION:    - working from home  - Pt with with h/o anemia       PHYSICAL ACTIVITY:  Water aerobics class usually 3 times per week in the summer     MEDICATIONS FOR WEIGHT LOSS:  Lomaira, Topamax.    NUTRITION DIAGNOSIS:   Obesity r/t long history of positive energy balance aeb BMI >30. - improving/continues     INTERVENTION:  Nutrition Prescription:  Recommended energy/nutrient modification     Implementation:  Reviewed progress towards previous goals.  Discussed ways to balance breakfast and dinner meals, increasing protein and healthy fat content and decreasing carb content.  Discussed implementation of exercise. Discussed at home exercises she has done in the past that she enjoyed and could consider at this time.   Co-developed goals.  Provided pt with list of goals and RD contact info via [employer].       GOALS:  1. Consume a more balanced breakfast and dinner meal.   2. Increase physical activity as able - yoga video at home    Ways to add protein and healthy fats to oatmeal:  1 oz nuts (walnuts, almonds, pumpkin seeds)  2 tbsp hemp seeds  [place] protein oatmeal  Greek yogurt       FOLLOW UP  1-3 months, PRN    TIME SPENT WITH PATIENT: 13 Minutes      Gay Peoples, EMILY, LD

## 2022-03-10 ENCOUNTER — VIRTUAL VISIT (OUTPATIENT)
Dept: ENDOCRINOLOGY | Facility: CLINIC | Age: 52
End: 2022-03-10
Payer: COMMERCIAL

## 2022-03-10 DIAGNOSIS — E66.9 OBESITY: ICD-10-CM

## 2022-03-10 DIAGNOSIS — Z71.3 NUTRITIONAL COUNSELING: Primary | ICD-10-CM

## 2022-03-10 PROCEDURE — 97803 MED NUTRITION INDIV SUBSEQ: CPT | Mod: GT | Performed by: DIETITIAN, REGISTERED

## 2022-03-10 NOTE — PATIENT INSTRUCTIONS
Jean Mckeon,    Follow-up with RD in 2-3 months.     Thank you,    Gay Peoples, RD, LD  If you would like to schedule or reschedule an appointment with the RD, please call [phone]    Nutrition Goals  1. Consume a more balanced breakfast and dinner meal.   2. Increase physical activity as able - yoga video at home    Ways to add protein and healthy fats to oatmeal:  1 oz nuts (walnuts, almonds, pumpkin seeds)  2 tbsp hemp seeds  [place] protein oatmeal  Greek yogurt     Interested in working with a health ?  Health coaches work with you to improve your overall health and wellbeing.  They look at the whole person, and may involve discussion of different areas of life, including, but not limited to the four pillars of health (sleep, exercise, nutrition, and stress management). Discuss with your care team if you would like to start working a health .    Health Coaching-3 Pack:    $99 for three health coaching visits    Visits may be done in person or via phone    Coaching is a partnership between the  and the client; Coaches do not prescribe or diagnose    Coaching helps inspire the client to reach his/her personal goals      COMPREHENSIVE WEIGHT MANAGEMENT PROGRAM  VIRTUAL SUPPORT GROUPS    For Support Group Information:      We offer support groups for patients who are working on weight loss and considering, preparing for or have had weight loss surgery.   There is no cost for this opportunity.  You are invited to attend the?Virtual Support Groups?provided by any of the following locations:    1. [facility] via [employer] Teams with Tarsha Blanton RN  2.   [place] via [employer] Teams with David Rooney, PhD, LP  3.   [place] via [employer] Teams with Jayda Martinez RN  4.   [facility] via [employer] Teams with Jayda Tom [facility]    The following Support Group information can also be found on our website:  [email or web address]      [place]  "[facility] Weight Loss Surgery Support Group    [facility] Weight Loss Surgery Support Group  The support group is a patient-lead forum that meets monthly to share experiences, encouragement and education. It is open to those who have had weight loss surgery, are scheduled for surgery, and those who are considering surgery.   WHEN: This group meets on the 3rd Wednesday of each month from 5:00PM - 6:00PM virtually using Microsoft Teams.   FACILITATOR: Led by Tarsha Blanton, EMILY, LD, RN, the program's Clinical Coordinator.   TO REGISTER: Please contact the clinic via [employer] or call the nurse line directly at [phone] to inform our staff that you would like an invite sent to you and to let us know the email you would like the invite sent to. Prior to the meeting, a link with directions on how to join the meeting will be sent to you.    2022 Meetings  January 19: \"Let's Talk\" a time for the group to share.  February 16: \"Let's Talk\" a time for the group to share.  March 16: Guest Speakers: Psychologists, Chelsea Amanda, PhD,LP and Anneliese Batres PsyD,  April 20: Guest Speaker: Health , Bibiana Gardner, [facility],CHES, CPT  May 18: Guest Speaker: DietitianPatrick, EMILY, LP  Geni 15: \"Let's Talk\" a time for the group to share.  July 20: \"Let's Talk\" a time for the group to share.  August 17: TBA  September 21: TBA  October 19: Guest Speaker: Dr Ernesto Talley MD Pulmonologist and Sleep Medicine Physician, \"Getting a Good Night's Sleep\".  November 16: TBA  December 21: TBA    [facility] Support Groups    Connections: Bariatric Care Support Group?  This is open to all [facility] (and those external to this program) pre- and post- operative bariatric surgery patients as well as their support system.   WHEN: This group meets the 2nd Tuesday of each month from 6:30 PM - 8:00 PM virtually using Microsoft Teams.   FACILITATOR: Led by David Rooney, Ph.D " "who is a Licensed Psychologist with the [facility] Comprehensive Weight Management Program.   TO REGISTER: Please send an email to David Rooney, Ph.D., LP at?[email or web address]?if you would like an invitation to the group and to learn about using Microsoft Teams.    2022 Meetings January 11: Marycarmen Guadarrama, PharmD, Pharmacy Resident at [facility], \"Medications and Bariatric Surgery\".  February 8: Open Forum  March 8  April 12  May 10  Geni 14    Connections: Post-Operative Bariatric Surgery Support Group  This is a support group for [facility] bariatric patients (and those external to [facility]) who have had bariatric surgery and are at least 3 months post-surgery.  WHEN: This support group meets the 4th Wednesday of the month from 11:00 AM - 12:00 PM virtually using Microsoft Teams.   FACILITATOR: Led by Certified Bariatric Nurse, Jayda Martinez RN.   TO REGISTER: Please send an email to Jayda at [email or web address] if you would like an invitation to the group and to learn about using Microsoft Teams.    2022 Meetings  January 26  February 23  March 23  April 27  May 25  Geni 22    [facility] Healthy Lifestyle Virtual Support Group    Healthy Lifestyle Virtual Support Group?  This is 60 minutes of small group guided discussion, support and resources. All are welcome who want a healthy lifestyle.  WHEN: This group meets monthly on a Friday from 12:30 PM - 1:30 PM virtually using Microsoft Teams.   FACILITATOR: Led by National Board Certified Health and , Jayda Tom [facility].   TO REGISTER: Please send an email to Jayda [email or web address] to receive monthly invites to the group or if you have any questions about having a health .  Prior to the meeting, a link with directions on how to join the meeting will be sent to you.    2022 Meetings January 21: Dee Veliz MS, RN, CIC, CBN, \"Healthy " "Habits\"  February 25: Open Forum  March 18: \"Setting Limits and Boundaries\"  April 29: Noni Shipman RD, \"Meal Planning Made Easy\"  May 20: Open Forum  June: To be determined          "

## 2022-03-10 NOTE — LETTER
"3/10/2022       RE: Elaine Awad  [address] [address] [address]     Dear Colleague,    Thank you for referring your patient, Elaine Awad, to the [facility]NA[facility]EMENT [facility] [place] at [facility]. Please see a copy of my visit note below.    Elaine Awad is a 51 year old female who is being evaluated via a billable video visit.      The patient has been notified of following:     \"This video visit will be conducted via a call between you and your physician/provider. We have found that certain health care needs can be provided without the need for an in-person physical exam.  This service lets us provide the care you need with a video conversation.  If a prescription is necessary we can send it directly to your pharmacy.  If lab work is needed we can place an order for that and you can then stop by our lab to have the test done at a later time.    Video visits are billed at different rates depending on your insurance coverage.  Please reach out to your insurance provider with any questions.    If during the course of the call the physician/provider feels a video visit is not appropriate, you will not be charged for this service.\"    Patient has given verbal consent for Video visit? Yes  How would you like to obtain your AVS? MyChart  If you are dropped from the video visit, the video invite should be resent to: Send to e-mail at: [email or web address]  Will anyone else be joining your video visit? No        Video-Visit Details    Type of service:  Video Visit    Video Start Time: 8:31 AM   Video End Time: 8:44 AM  Originating Location (pt. Location): Home    Distant Location (provider location):  [facility] [place]     Platform used for Video Visit: [employer]    During this virtual visit the patient is located in [place], patient verifies this as the location during the entirety of this visit.     Return Weight " "Management Nutrition Consultation    Reason for visit: Nutrition reassessment    Elaine Awad is a 51 year old female presenting today for follow-up nutrition assessment consult. Pt is referred by HUMBERTO Burns.    Patient with Co-morbidities of obesity including:  Type II DM no  Renal Failure no  Sleep apnea no  Hypertension no   Dyslipidemia no  Joint pain yes  Back pain no  GERD yes    H/o Lap band in 2014, removed in 2017.      ANTHROPOMETRICS:   Initial Weight 7/29/19: 256.5 lbs with BMI of 44.39 kg/m     Current weight: 270 lbs  (-0 in past 3 months, +13 lbs from initial)    Wt Readings from Last 5 Encounters:   12/08/21 123.8 kg (273 lb)   09/09/21 128.4 kg (283 lb)   09/08/21 128.4 kg (283 lb)   07/20/21 130.6 kg (288 lb)   06/30/21 131.1 kg (289 lb)       NUTRITION HISTORY:   Info obtained from previous RD visits:  - Doesn't like to cook, prefers quick and easy meals.  - Used to cook more when children lived at home.     - Did not like the smoothies. Felt bars were ok.    - Cravings: chocolate, pretzels.   - Eating Habits: \"Eats whatever's around.\" Invests little time in to meal prep. Easily tempted by snack foods when they are around. Frequently skipping lunch when not at home.     4/29/2021: Pt states, rapid weight gain in the past year after starting new med with OBGYN. Now focusing on increased exercise and movement - walking the dogs daily. Working from home now. Lunch is bigger meal, having that with  before he goes to work. Having dinner on her own. Doesn't like to cook or grocery shop.  Did Hello Fresh recently - liked the recipes, but felt like it took too much time to prepare and too much food.     Sept 2021: Restarted medication for weight loss    Today - Breakfast and dinner continue to be small meals, struggling to achieve balance at these meals (higher carb).       Recent Diet Recall:  Breakfast: fruit and yogurt  Lunch: biggest meal - soup and sandwich; pot roast  Dinner: " oatmeal  Beverages: Water (48 oz), coffee in am  Dining Out: Once week - sit-down restaurants (pizza, hamburger)    Progress Toward Previous Goals:  1. Continue 3 small meals/day. Lean protein at each meal. Eat slowly, evaluate fullness and stop when you are comfortable. - Struggles with protein foods.   2. Increase physical activity as able - Increasing walking on the weekends, walking the dogs when not too cold. Challenging to get the activity she would like in the winter months.     ADDITIONAL INFORMATION:    - working from home  - Pt with with h/o anemia       PHYSICAL ACTIVITY:  Water aerobics class usually 3 times per week in the summer     MEDICATIONS FOR WEIGHT LOSS:  Lomaira, Topamax.    NUTRITION DIAGNOSIS:   Obesity r/t long history of positive energy balance aeb BMI >30. - improving/continues     INTERVENTION:  Nutrition Prescription:  Recommended energy/nutrient modification     Implementation:  Reviewed progress towards previous goals.  Discussed ways to balance breakfast and dinner meals, increasing protein and healthy fat content and decreasing carb content.  Discussed implementation of exercise. Discussed at home exercises she has done in the past that she enjoyed and could consider at this time.   Co-developed goals.  Provided pt with list of goals and RD contact info via "[employer].       GOALS:  1. Consume a more balanced breakfast and dinner meal.   2. Increase physical activity as able - yoga video at home    Ways to add protein and healthy fats to oatmeal:  1 oz nuts (walnuts, almonds, pumpkin seeds)  2 tbsp hemp seeds  [place] protein oatmeal  Greek yogurt       FOLLOW UP  1-3 months, PRN    TIME SPENT WITH PATIENT: 13 Minutes      Gay Peoples, EMILY, LD

## 2022-03-16 DIAGNOSIS — E66.01 MORBID OBESITY (H): ICD-10-CM

## 2022-03-16 NOTE — TELEPHONE ENCOUNTER
Patient requesting refill and pharmacy transfer on her Lomaira script. Appointment scheduled with Layla Cyr PA-C in June 2022. Routing to provider for sign off.

## 2022-04-13 ENCOUNTER — LAB (OUTPATIENT)
Dept: LAB | Facility: CLINIC | Age: 52
End: 2022-04-13
Payer: COMMERCIAL

## 2022-04-13 LAB
ANION GAP SERPL CALCULATED.3IONS-SCNC: 10 MMOL/L (ref 3–14)
BUN SERPL-MCNC: 7 MG/DL (ref 7–30)
CALCIUM SERPL-MCNC: 10 MG/DL (ref 8.5–10.1)
CHLORIDE BLD-SCNC: 111 MMOL/L (ref 94–109)
CO2 SERPL-SCNC: 22 MMOL/L (ref 20–32)
CREAT SERPL-MCNC: 0.84 MG/DL (ref 0.52–1.04)
GFR SERPL CREATININE-BSD FRML MDRD: 84 ML/MIN/1.73M2
GLUCOSE BLD-MCNC: 90 MG/DL (ref 70–99)
POTASSIUM BLD-SCNC: 4.2 MMOL/L (ref 3.4–5.3)
SODIUM SERPL-SCNC: 143 MMOL/L (ref 133–144)

## 2022-04-13 PROCEDURE — 80048 BASIC METABOLIC PNL TOTAL CA: CPT

## 2022-04-13 PROCEDURE — 36415 COLL VENOUS BLD VENIPUNCTURE: CPT

## 2022-04-14 DIAGNOSIS — K21.9 GASTROESOPHAGEAL REFLUX DISEASE WITHOUT ESOPHAGITIS: ICD-10-CM

## 2022-04-15 NOTE — TELEPHONE ENCOUNTER
Prescription approved per [facility].    Erika Wagner RN

## 2022-05-10 DIAGNOSIS — L30.4 INTERTRIGO: ICD-10-CM

## 2022-05-11 RX ORDER — NYSTATIN 100000 [USP'U]/G
POWDER TOPICAL
Qty: 60 G | Refills: 1 | Status: SHIPPED | OUTPATIENT
Start: 2022-05-11 | End: 2022-09-15

## 2022-05-11 NOTE — TELEPHONE ENCOUNTER
Prescription approved per [facility] Refill Protocol.    Erika Wagner RN

## 2022-05-17 ENCOUNTER — ANCILLARY PROCEDURE (OUTPATIENT)
Dept: GENERAL RADIOLOGY | Facility: CLINIC | Age: 52
End: 2022-05-17
Attending: PODIATRIST
Payer: COMMERCIAL

## 2022-05-17 ENCOUNTER — OFFICE VISIT (OUTPATIENT)
Dept: PODIATRY | Facility: CLINIC | Age: 52
End: 2022-05-17

## 2022-05-17 VITALS
HEART RATE: 94 BPM | BODY MASS INDEX: 46.35 KG/M2 | SYSTOLIC BLOOD PRESSURE: 147 MMHG | WEIGHT: 270 LBS | OXYGEN SATURATION: 99 % | DIASTOLIC BLOOD PRESSURE: 84 MMHG

## 2022-05-17 DIAGNOSIS — M21.6X1 PRONATION OF BOTH FEET: Primary | ICD-10-CM

## 2022-05-17 DIAGNOSIS — M21.6X2 PRONATION OF BOTH FEET: ICD-10-CM

## 2022-05-17 DIAGNOSIS — M21.6X1 PRONATION OF BOTH FEET: ICD-10-CM

## 2022-05-17 DIAGNOSIS — M21.6X2 PRONATION OF BOTH FEET: Primary | ICD-10-CM

## 2022-05-17 DIAGNOSIS — M25.571 PAIN, JOINT, FOOT, RIGHT: ICD-10-CM

## 2022-05-17 PROCEDURE — 99213 OFFICE O/P EST LOW 20 MIN: CPT | Performed by: PODIATRIST

## 2022-05-17 PROCEDURE — 73610 X-RAY EXAM OF ANKLE: CPT | Mod: TC | Performed by: RADIOLOGY

## 2022-05-17 NOTE — LETTER
5/17/2022         RE: Elaine Awad  [address] [facility] [id number]        Dear Colleague,    Thank you for referring your patient, Elaine Awad, to the [facility] [place]. Please see a copy of my visit note below.    S:     7/20/21   Patient seen today in consult from Dr. Rordiguez and complains of right foot pain.  Points to dorsal medial tarsal joints..  Has had this for 3 months.  Describes it as a burning pain.  Aggrevated by activity and relieved by rest.  Patient denies ecchymosis edema numbness weakness or increased deformity.  Patient wearing slippers around the house.  She is mostly sitting at her work.    5/17/22   patient returns for right foot pain.  She now points to more anterior and lateral ankle.  Pain aggravated by activity and relieved by rest.  She works out of her house.  She is wearing slippers.  Has a pair of new tennis shoes today.  Denies edema or ecchymosis.    ROS: See above       Allergies   Allergen Reactions     Doxycycline Rash     Ampicillin Swelling              Current Outpatient Medications   Medication Sig Dispense Refill     fexofenadine (ALLEGRA) 180 MG tablet Take 180 mg by mouth daily       glucosamine sulfate 1000 MG CAPS Take 1 capsule by mouth daily       losartan (COZAAR) 50 MG tablet Take 1 tablet (50 mg) by mouth daily 90 tablet 3     multivitamin w/minerals (THERA-VIT-M) tablet Take 1 tablet by mouth daily       nystatin (NYSTOP) 592909 UNIT/GM external powder APPLY TO THE AFFECTED AREA UNDER BREASTS THREE TIMES DAILY AS NEEDED. 60 g 1     omeprazole (PRILOSEC) 20 MG DR capsule TAKE 1 CAPSULE BY MOUTH EVERY DAY 90 capsule 1     phentermine (LOMAIRA) 8 MG tablet Take 1 tablet (8 mg) by mouth every morning (before breakfast) 30 tablet 2     rizatriptan (MAXALT-MLT) 5 MG ODT Take 1-2 tablets (5-10 mg) by mouth at onset of headache for migraine May repeat in 2 hours. Max 6 tablets/24 hours. 9 tablet 11     topiramate (TOPAMAX) 25 MG tablet  Take 2 tablets (50 mg) by mouth daily 180 tablet 1     valACYclovir (VALTREX) 500 MG tablet TAKE 1 TABLET BY MOUTH DAILY. INCREASE TO 4 TABLET BY MOUTH 2 TIMES DAILY AT ONSET OF OUTBREAK 100 tablet 2       Patient Active Problem List   Diagnosis     GERD (gastroesophageal reflux disease)     CARDIOVASCULAR SCREENING; LDL GOAL LESS THAN 160     Migraines     Yeast infection of the vagina, recurrent      Recurrent cold sores     Class 3 severe obesity due to excess calories with serious comorbidity and body mass index (BMI) of 45.0 to 49.9 in adult (H)     Primary osteoarthritis of right knee     Intertrigo     History of removal of laparoscopic gastric banding device     B12 deficiency     Menorrhagia     History of anemia     Hypertension goal BP (blood pressure) < 140/90       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     GERD (gastroesophageal reflux disease)      Hypertension goal BP (blood pressure) < 140/90      Iron deficiency anemia      Menorrhagia      Migraine      Morbid obesity (H)      Nonallergic rhinitis 4/17/2019     Recurrent cold sores      Vulvar dermatitis 10/14/2010     Yeast infection of the vagina, recurrent         Past Surgical History:   Procedure Laterality Date     COLPOSCOPY,BX CERVIX/ENDOCERV CURR  7/1994     ESOPHAGOSCOPY, GASTROSCOPY, DUODENOSCOPY (EGD), COMBINED  1/2/2014    Procedure: COMBINED ESOPHAGOSCOPY, GASTROSCOPY, DUODENOSCOPY (EGD);;  Surgeon: Eden Stacy MD;  Location:  GI     ESOPHAGOSCOPY, GASTROSCOPY, DUODENOSCOPY (EGD), COMBINED N/A 1/19/2017    Procedure: COMBINED ESOPHAGOSCOPY, GASTROSCOPY, DUODENOSCOPY (EGD);  Surgeon: Ciro Deras MD;  Location:  OR     LAPAROSCOPIC GASTRIC ADJUSTABLE BANDING  4/28/2014    Procedure: LAPAROSCOPIC GASTRIC ADJUSTABLE BANDING;  Surgeon: Ciro Deras MD;   Location: [facility] OR     LAPAROSCOPIC HERNIORRHAPHY HIATAL  2014    Procedure: LAPAROSCOPIC HERNIORRHAPHY HIATAL;  Surgeon: Ciro Deras MD;  Location: [facility]     LAPAROSCOPIC REMOVAL GASTRIC ADJUSTABLE BAND N/A 2017    Procedure: LAPAROSCOPIC REMOVAL GASTRIC ADJUSTABLE BAND;  Surgeon: Ciro Deras MD;  Location: [facility] OR     LITHOTRIPSY       ZZC TREAT ECTOPIC PREG,RMV TUBE/OVARY      LT       Family History   Problem Relation Age of Onset     Cancer Father 72        d. pancreatic, melanoma      Colon Polyps Father      Diabetes Mother      C.A.D. Mother      Breast Cancer Maternal Grandmother      Heart Disease Maternal Grandfather         CHF     Heart Disease Paternal Grandmother         CHF     Colon Polyps Paternal Grandmother      Respiratory Paternal Grandfather         TB     Obesity Brother      Glaucoma No family hx of      Macular Degeneration No family hx of        Social History     Tobacco Use     Smoking status: Former Smoker     Packs/day: 0.50     Years: 10.00     Pack years: 5.00     Types: Cigarettes     Quit date: 1997     Years since quittin.4     Smokeless tobacco: Never Used   Substance Use Topics     Alcohol use: Yes     Alcohol/week: 0.0 - 0.8 standard drinks     Comment: occasional         Exam:  Vitals: There were no vitals taken for this visit.  BMI: There is no height or weight on file to calculate BMI.  Height: Data Unavailable    Constitutional/ general:  Pt is in no apparent distress, appears well-nourished.  Cooperative with history and physical exam.     Psych:  The patient answered questions appropriately.  Normal affect.  Seems to have reasonable expectations, in terms of treatment.     Vascular:  Pedal pulses are palpable bilaterally for both the DP and PT arteries.  CFT < 3 sec.  No edema.  No varicosities.  Pedal hair growth noted.     Neuro:  Alert and oriented x 3. Coordinated gait.  Light touch sensation is intact     Derm: Normal texture and  turgor.  No erythema, ecchymosis, or cyanosis.  No open lesions.     Musculoskeletal:    Lower extremity muscle strength is normal.  Patient is ambulatory without an assistive device or brace.  No gross deformities.  Normal ROM all fore foot and rearfoot joints.  No equinus.  With weightbearing patient has bilateral pronation.  Right rear foot no pain with range of motion or palpation at this time.  No edema erythema or ecchymosis.  No pain on tendons.    Radiographic:  X-rays normal.    A:  Pronation and pain    P:  X-rays taken today of right foot and ankle.  Discussed no signs of any bone pathology.  Discussed pain from from pronation and overuse.  Discussed importance of good shoes at all times both inside and outside and made suggestions.  Her tennis shoe today is somewhat malleable.  We will get better shoe.  We will try over-the-counter arch support.  Also discussed orthotics.  Patient will call if she would like to try these.  Physical therapy may also be helpful.  RTC as needed.      Chauncey Garnica DPM, FACFAS               Again, thank you for allowing me to participate in the care of your patient.        Sincerely,        Chauncey Garnica DPM

## 2022-05-17 NOTE — PATIENT INSTRUCTIONS
We wish you continued good healing. If you have any questions or concerns, please do not hesitate to contact us at  [phone]    [facility] (secure e-mail communication and access to your chart) to send a message or to make an appointment.    Please remember to call and schedule a follow up appointment if one was recommended at your earliest convenience.     PODIATRY CLINIC HOURS  TELEPHONE NUMBER    Dr. Chauncey TELLEZPYOBANI [facility]        Clinics:  [place] CMA   Tuesday 1PM-6PM  [place]  Wednesday 745AM-330PM  [place]  Thursday/Friday 745AM-230PM  [place] APPOINTMENTS  [phone]    [place] APPOINTMENTS  [phone]        If you need a medication refill, please contact us you may need lab work and/or a follow up visit prior to your refill (i.e. Antifungal medications).  If MRI needed please call Imaging at [phone] or [phone]  HOW DO I GET MY KNEE SCOOTER? Knee scooters can be picked up at ANY Medical Supply stores with your knee scooter Prescription.  OR  Bring your signed prescription to an [facility] Medical [facility] showroom.         Linda to follow up with Primary Care provider regarding elevated blood pressure.

## 2022-05-17 NOTE — PROGRESS NOTES
S:     7/20/21   Patient seen today in consult from Dr. Rodriguez and complains of right foot pain.  Points to dorsal medial tarsal joints..  Has had this for 3 months.  Describes it as a burning pain.  Aggrevated by activity and relieved by rest.  Patient denies ecchymosis edema numbness weakness or increased deformity.  Patient wearing slippers around the house.  She is mostly sitting at her work.    5/17/22   patient returns for right foot pain.  She now points to more anterior and lateral ankle.  Pain aggravated by activity and relieved by rest.  She works out of her house.  She is wearing slippers.  Has a pair of new tennis shoes today.  Denies edema or ecchymosis.    ROS: See above       Allergies   Allergen Reactions     Doxycycline Rash     Ampicillin Swelling              Current Outpatient Medications   Medication Sig Dispense Refill     fexofenadine (ALLEGRA) 180 MG tablet Take 180 mg by mouth daily       glucosamine sulfate 1000 MG CAPS Take 1 capsule by mouth daily       losartan (COZAAR) 50 MG tablet Take 1 tablet (50 mg) by mouth daily 90 tablet 3     multivitamin w/minerals (THERA-VIT-M) tablet Take 1 tablet by mouth daily       nystatin (NYSTOP) 320635 UNIT/GM external powder APPLY TO THE AFFECTED AREA UNDER BREASTS THREE TIMES DAILY AS NEEDED. 60 g 1     omeprazole (PRILOSEC) 20 MG DR capsule TAKE 1 CAPSULE BY MOUTH EVERY DAY 90 capsule 1     phentermine (LOMAIRA) 8 MG tablet Take 1 tablet (8 mg) by mouth every morning (before breakfast) 30 tablet 2     rizatriptan (MAXALT-MLT) 5 MG ODT Take 1-2 tablets (5-10 mg) by mouth at onset of headache for migraine May repeat in 2 hours. Max 6 tablets/24 hours. 9 tablet 11     topiramate (TOPAMAX) 25 MG tablet Take 2 tablets (50 mg) by mouth daily 180 tablet 1     valACYclovir (VALTREX) 500 MG tablet TAKE 1 TABLET BY MOUTH DAILY. INCREASE TO 4 TABLET BY MOUTH 2 TIMES DAILY AT ONSET OF OUTBREAK 100 tablet 2       Patient Active Problem List   Diagnosis      GERD (gastroesophageal reflux disease)     CARDIOVASCULAR SCREENING; LDL GOAL LESS THAN 160     Migraines     Yeast infection of the vagina, recurrent      Recurrent cold sores     Class 3 severe obesity due to excess calories with serious comorbidity and body mass index (BMI) of 45.0 to 49.9 in adult (H)     Primary osteoarthritis of right knee     Intertrigo     History of removal of laparoscopic gastric banding device     B12 deficiency     Menorrhagia     History of anemia     Hypertension goal BP (blood pressure) < 140/90       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     GERD (gastroesophageal reflux disease)      Hypertension goal BP (blood pressure) < 140/90      Iron deficiency anemia      Menorrhagia      Migraine      Morbid obesity (H)      Nonallergic rhinitis 4/17/2019     Recurrent cold sores      Vulvar dermatitis 10/14/2010     Yeast infection of the vagina, recurrent         Past Surgical History:   Procedure Laterality Date     COLPOSCOPY,BX CERVIX/ENDOCERV CURR  7/1994     ESOPHAGOSCOPY, GASTROSCOPY, DUODENOSCOPY (EGD), COMBINED  1/2/2014    Procedure: COMBINED ESOPHAGOSCOPY, GASTROSCOPY, DUODENOSCOPY (EGD);;  Surgeon: Eden Stacy MD;  Location:  GI     ESOPHAGOSCOPY, GASTROSCOPY, DUODENOSCOPY (EGD), COMBINED N/A 1/19/2017    Procedure: COMBINED ESOPHAGOSCOPY, GASTROSCOPY, DUODENOSCOPY (EGD);  Surgeon: Ciro Deras MD;  Location:  OR     LAPAROSCOPIC GASTRIC ADJUSTABLE BANDING  4/28/2014    Procedure: LAPAROSCOPIC GASTRIC ADJUSTABLE BANDING;  Surgeon: Ciro Deras MD;  Location:  OR     LAPAROSCOPIC HERNIORRHAPHY HIATAL  4/28/2014    Procedure: LAPAROSCOPIC HERNIORRHAPHY HIATAL;  Surgeon: Ciro Deras MD;  Location:  OR     LAPAROSCOPIC REMOVAL GASTRIC ADJUSTABLE BAND N/A 1/19/2017    Procedure:  LAPAROSCOPIC REMOVAL GASTRIC ADJUSTABLE BAND;  Surgeon: Ciro Deras MD;  Location: [facility] OR     LITHOTRIPSY       ZZC TREAT ECTOPIC PREG,RMV TUBE/OVARY      LT       Family History   Problem Relation Age of Onset     Cancer Father 72        d. pancreatic, melanoma      Colon Polyps Father      Diabetes Mother      C.A.D. Mother      Breast Cancer Maternal Grandmother      Heart Disease Maternal Grandfather         CHF     Heart Disease Paternal Grandmother         CHF     Colon Polyps Paternal Grandmother      Respiratory Paternal Grandfather         TB     Obesity Brother      Glaucoma No family hx of      Macular Degeneration No family hx of        Social History     Tobacco Use     Smoking status: Former Smoker     Packs/day: 0.50     Years: 10.00     Pack years: 5.00     Types: Cigarettes     Quit date: 1997     Years since quittin.4     Smokeless tobacco: Never Used   Substance Use Topics     Alcohol use: Yes     Alcohol/week: 0.0 - 0.8 standard drinks     Comment: occasional         Exam:  Vitals: There were no vitals taken for this visit.  BMI: There is no height or weight on file to calculate BMI.  Height: Data Unavailable    Constitutional/ general:  Pt is in no apparent distress, appears well-nourished.  Cooperative with history and physical exam.     Psych:  The patient answered questions appropriately.  Normal affect.  Seems to have reasonable expectations, in terms of treatment.     Vascular:  Pedal pulses are palpable bilaterally for both the DP and PT arteries.  CFT < 3 sec.  No edema.  No varicosities.  Pedal hair growth noted.     Neuro:  Alert and oriented x 3. Coordinated gait.  Light touch sensation is intact     Derm: Normal texture and turgor.  No erythema, ecchymosis, or cyanosis.  No open lesions.     Musculoskeletal:    Lower extremity muscle strength is normal.  Patient is ambulatory without an assistive device or brace.  No gross deformities.  Normal ROM all fore foot and  rearfoot joints.  No equinus.  With weightbearing patient has bilateral pronation.  Right rear foot no pain with range of motion or palpation at this time.  No edema erythema or ecchymosis.  No pain on tendons.    Radiographic:  X-rays normal.    A:  Pronation and pain    P:  X-rays taken today of right foot and ankle.  Discussed no signs of any bone pathology.  Discussed pain from from pronation and overuse.  Discussed importance of good shoes at all times both inside and outside and made suggestions.  Her tennis shoe today is somewhat malleable.  We will get better shoe.  We will try over-the-counter arch support.  Also discussed orthotics.  Patient will call if she would like to try these.  Physical therapy may also be helpful.  RTC as needed.      Chauncey Garnica DPM, FACFAS

## 2022-06-01 NOTE — PROGRESS NOTES
Virtual Visit Check-In    During this virtual visit the patient is located in [place], patient verifies this as the location during the entirety of this visit.     Linda is a 51 year old who is being evaluated via a billable video visit.      How would you like to obtain your AVS? MyChart  If the video visit is dropped, the invitation should be resent by: Send to e-mail at: [email or web address]  Will anyone else be joining your video visit? No    Video Start Time:      Video-Visit Details    Type of service:  Video Visit    Video End Time:11:27 AM    Originating Location (pt. Location): Home    Distant Location (provider location):  [facility] [place]     Platform used for Video Visit: Sourav Vanessa NREMT      15 minutes spent on the date of the encounter doing chart review, history and exam, documentation and further activities per the note    Return Medical Weight Management Note     Elaine Awad  MRN:  [id number]  :  1970  [place]:  2022    Dear Fernanda Rodriguez MD,    I had the pleasure of seeing your patient Elaine Awad. She is a 51 year old female who I am continuing to see for treatment of obesity related to:       2019   I have the following health issues associated with obesity: GERD (Reflux), Weight Bearing Joint Pain   I have the following symptoms associated with obesity: Knee Pain, GERD (Reflux)       Assessment & Plan   Problem List Items Addressed This Visit    None            INTERVAL HISTORY:  MWM follow up  Hx of lap band removal   Hx of 24 week HLP   Last visit 21 and continued phentermine/topiramate  Discussed possible sleeve at last visit but she hasn't checked insurance yet.  She isn't sure about surgery.  Lost 5 lbs since last visit.  Topiramate 50mg daily doing well. BMP normal 22  Phentermine 8mg daily doing well.  BP at home 119/72       Exercise limited with foot pain    PLAN:    Continue lomaira 8mg  daily  Continue topiramate 50mg     Follow up 6 months return [facility]        CURRENT WEIGHT:   268 lbs 6.4 oz    Initial Weight (lbs): 239.4 lbs  Last Visits Weight: 123.8 kg (273 lb)  Cumulative weight loss (lbs): -29  Weight Loss Percentage: -12.11%    Changes and Difficulties 5/31/2022   I have made the following changes to my diet since my last visit: -   With regards to my diet, I am still struggling with: Protein?   I have made the following changes to my activity/exercise since my last visit: Will be adding back in water exercises workout   With regards to my activity/exercise, I am still struggling with: Pain         MEDICATIONS:   Current Outpatient Medications   Medication Sig Dispense Refill     clindamycin (CLEOCIN T) 1 % external lotion APPLY TOPICALLY TO FACE TWICE DAILY FOR ACNE       fexofenadine (ALLEGRA) 180 MG tablet Take 180 mg by mouth daily       glucosamine sulfate 1000 MG CAPS Take 1 capsule by mouth daily       losartan (COZAAR) 50 MG tablet Take 1 tablet (50 mg) by mouth daily 90 tablet 3     multivitamin w/minerals (THERA-VIT-M) tablet Take 1 tablet by mouth daily       nystatin (NYSTOP) 309710 UNIT/GM external powder APPLY TO THE AFFECTED AREA UNDER BREASTS THREE TIMES DAILY AS NEEDED. 60 g 1     omeprazole (PRILOSEC) 20 MG DR capsule TAKE 1 CAPSULE BY MOUTH EVERY DAY 90 capsule 1     phentermine (LOMAIRA) 8 MG tablet Take 1 tablet (8 mg) by mouth every morning (before breakfast) 30 tablet 2     rizatriptan (MAXALT-MLT) 5 MG ODT Take 1-2 tablets (5-10 mg) by mouth at onset of headache for migraine May repeat in 2 hours. Max 6 tablets/24 hours. 9 tablet 11     topiramate (TOPAMAX) 25 MG tablet Take 2 tablets (50 mg) by mouth daily 180 tablet 1     triamcinolone (KENALOG) 0.1 % external cream APPLY TO BODY RASH TWICE DAILY AS NEEDED.       valACYclovir (VALTREX) 500 MG tablet TAKE 1 TABLET BY MOUTH DAILY. INCREASE TO 4 TABLET BY MOUTH 2 TIMES DAILY AT ONSET OF OUTBREAK 100 tablet 2       Weight  "Loss Medication History Reviewed With Patient 5/31/2022   Which weight loss medications are you currently taking on a regular basis?  Phentermine, Topamax (topiramate)   If you are not taking a weight loss medication that was prescribed to you, please indicate why: -   Are you having any side effects from the weight loss medication that we have prescribed you? Yes   If you are having side effects please describe: Not sure, maybe       Lab on 04/13/2022   Component Date Value Ref Range Status     Sodium 04/13/2022 143  133 - 144 mmol/L Final     Potassium 04/13/2022 4.2  3.4 - 5.3 mmol/L Final     Chloride 04/13/2022 111 (A) 94 - 109 mmol/L Final     Carbon Dioxide (CO2) 04/13/2022 22  20 - 32 mmol/L Final     Anion Gap 04/13/2022 10  3 - 14 mmol/L Final     Urea Nitrogen 04/13/2022 7  7 - 30 mg/dL Final     Creatinine 04/13/2022 0.84  0.52 - 1.04 mg/dL Final     Calcium 04/13/2022 10.0  8.5 - 10.1 mg/dL Final     Glucose 04/13/2022 90  70 - 99 mg/dL Final     GFR Estimate 04/13/2022 84  >60 mL/min/1.73m2 Final    Effective December 21, 2021 eGFRcr in adults is calculated using the 2021 CKD-EPI creatinine equation which includes age and gender (Gaurang et al., NE, DOI: 10.1056/KSXRcc0704306)       PHYSICAL EXAM:  Objective    Ht 1.626 m (5' 4\")   Wt 121.7 kg (268 lb 6.4 oz)   BMI 46.07 kg/m             Vitals:  No vitals were obtained today due to virtual visit.    GENERAL: Healthy, alert and no distress  EYES: Eyes grossly normal to inspection.  No discharge or erythema, or obvious scleral/conjunctival abnormalities.  RESP: No audible wheeze, cough, or visible cyanosis.  No visible retractions or increased work of breathing.    SKIN: Visible skin clear. No significant rash, abnormal pigmentation or lesions.  NEURO: Cranial nerves grossly intact.  Mentation and speech appropriate for age.  PSYCH: Mentation appears normal, affect normal/bright, judgement and insight intact, normal speech and appearance " well-groomed.        Sincerely,    Layla Cyr PA-C

## 2022-06-02 ENCOUNTER — VIRTUAL VISIT (OUTPATIENT)
Dept: ENDOCRINOLOGY | Facility: CLINIC | Age: 52
End: 2022-06-02
Payer: COMMERCIAL

## 2022-06-02 VITALS — WEIGHT: 268.4 LBS | HEIGHT: 64 IN | BODY MASS INDEX: 45.82 KG/M2

## 2022-06-02 DIAGNOSIS — E66.01 MORBID OBESITY, UNSPECIFIED OBESITY TYPE (H): ICD-10-CM

## 2022-06-02 DIAGNOSIS — E66.01 MORBID OBESITY (H): ICD-10-CM

## 2022-06-02 PROCEDURE — 99213 OFFICE O/P EST LOW 20 MIN: CPT | Mod: 95 | Performed by: PHYSICIAN ASSISTANT

## 2022-06-02 RX ORDER — CLINDAMYCIN PHOSPHATE 10 UG/ML
LOTION TOPICAL
COMMUNITY
Start: 2022-05-10

## 2022-06-02 RX ORDER — TOPIRAMATE 25 MG/1
50 TABLET, FILM COATED ORAL DAILY
Qty: 180 TABLET | Refills: 1 | Status: SHIPPED | OUTPATIENT
Start: 2022-06-02 | End: 2022-12-07

## 2022-06-02 RX ORDER — TRIAMCINOLONE ACETONIDE 1 MG/G
CREAM TOPICAL
COMMUNITY
Start: 2022-04-13 | End: 2023-11-06

## 2022-06-02 NOTE — LETTER
2022       RE: Elaine Awad  [id number] [facility] [address]     Dear Colleague,    Thank you for referring your patient, Elaine Awad, to the [facility] [place] at [facility]. Please see a copy of my visit note below.    Virtual Visit Check-In    During this virtual visit the patient is located in [place], patient verifies this as the location during the entirety of this visit.     Linda is a 51 year old who is being evaluated via a billable video visit.      How would you like to obtain your AVS? MyChart  If the video visit is dropped, the invitation should be resent by: Send to e-mail at: [email or web address]  Will anyone else be joining your video visit? No    Video Start Time:      Video-Visit Details    Type of service:  Video Visit    Video End Time:11:27 AM    Originating Location (pt. Location): Home    Distant Location (provider location):  [facility] [place]     Platform used for Video Visit: Sourav Vanessa NREMT      15 minutes spent on the date of the encounter doing chart review, history and exam, documentation and further activities per the note    Return Medical Weight Management Note     Elaine Awad  MRN:  [id number]  :  1970  [place]:  2022    Dear Fernanda Rodriguez MD,    I had the pleasure of seeing your patient Elaine Awad. She is a 51 year old female who I am continuing to see for treatment of obesity related to:       2019   I have the following health issues associated with obesity: GERD (Reflux), Weight Bearing Joint Pain   I have the following symptoms associated with obesity: Knee Pain, GERD (Reflux)       Assessment & Plan   Problem List Items Addressed This Visit    None            INTERVAL HISTORY:  MWM follow up  Hx of lap band removal   Hx of 24 week HLP   Last visit 21 and continued phentermine/topiramate  Discussed  possible sleeve at last visit but she hasn't checked insurance yet.  She isn't sure about surgery.  Lost 5 lbs since last visit.  Topiramate 50mg daily doing well. BMP normal 4/13/22  Phentermine 8mg daily doing well.  BP at home 119/72       Exercise limited with foot pain    PLAN:    Continue lomaira 8mg daily  Continue topiramate 50mg     Follow up 6 months return MWM        CURRENT WEIGHT:   268 lbs 6.4 oz    Initial Weight (lbs): 239.4 lbs  Last Visits Weight: 123.8 kg (273 lb)  Cumulative weight loss (lbs): -29  Weight Loss Percentage: -12.11%    Changes and Difficulties 5/31/2022   I have made the following changes to my diet since my last visit: -   With regards to my diet, I am still struggling with: Protein?   I have made the following changes to my activity/exercise since my last visit: Will be adding back in water exercises workout   With regards to my activity/exercise, I am still struggling with: Pain         MEDICATIONS:   Current Outpatient Medications   Medication Sig Dispense Refill     clindamycin (CLEOCIN T) 1 % external lotion APPLY TOPICALLY TO FACE TWICE DAILY FOR ACNE       fexofenadine (ALLEGRA) 180 MG tablet Take 180 mg by mouth daily       glucosamine sulfate 1000 MG CAPS Take 1 capsule by mouth daily       losartan (COZAAR) 50 MG tablet Take 1 tablet (50 mg) by mouth daily 90 tablet 3     multivitamin w/minerals (THERA-VIT-M) tablet Take 1 tablet by mouth daily       nystatin (NYSTOP) 083142 UNIT/GM external powder APPLY TO THE AFFECTED AREA UNDER BREASTS THREE TIMES DAILY AS NEEDED. 60 g 1     omeprazole (PRILOSEC) 20 MG DR capsule TAKE 1 CAPSULE BY MOUTH EVERY DAY 90 capsule 1     phentermine (LOMAIRA) 8 MG tablet Take 1 tablet (8 mg) by mouth every morning (before breakfast) 30 tablet 2     rizatriptan (MAXALT-MLT) 5 MG ODT Take 1-2 tablets (5-10 mg) by mouth at onset of headache for migraine May repeat in 2 hours. Max 6 tablets/24 hours. 9 tablet 11     topiramate (TOPAMAX) 25 MG  "tablet Take 2 tablets (50 mg) by mouth daily 180 tablet 1     triamcinolone (KENALOG) 0.1 % external cream APPLY TO BODY RASH TWICE DAILY AS NEEDED.       valACYclovir (VALTREX) 500 MG tablet TAKE 1 TABLET BY MOUTH DAILY. INCREASE TO 4 TABLET BY MOUTH 2 TIMES DAILY AT ONSET OF OUTBREAK 100 tablet 2       Weight Loss Medication History Reviewed With Patient 5/31/2022   Which weight loss medications are you currently taking on a regular basis?  Phentermine, Topamax (topiramate)   If you are not taking a weight loss medication that was prescribed to you, please indicate why: -   Are you having any side effects from the weight loss medication that we have prescribed you? Yes   If you are having side effects please describe: Not sure, maybe       Lab on 04/13/2022   Component Date Value Ref Range Status     Sodium 04/13/2022 143  133 - 144 mmol/L Final     Potassium 04/13/2022 4.2  3.4 - 5.3 mmol/L Final     Chloride 04/13/2022 111 (A) 94 - 109 mmol/L Final     Carbon Dioxide (CO2) 04/13/2022 22  20 - 32 mmol/L Final     Anion Gap 04/13/2022 10  3 - 14 mmol/L Final     Urea Nitrogen 04/13/2022 7  7 - 30 mg/dL Final     Creatinine 04/13/2022 0.84  0.52 - 1.04 mg/dL Final     Calcium 04/13/2022 10.0  8.5 - 10.1 mg/dL Final     Glucose 04/13/2022 90  70 - 99 mg/dL Final     GFR Estimate 04/13/2022 84  >60 mL/min/1.73m2 Final    Effective December 21, 2021 eGFRcr in adults is calculated using the 2021 CKD-EPI creatinine equation which includes age and gender (Gaurang et al., NEJM, DOI: 10.1056/GZZVvc9082955)       PHYSICAL EXAM:  Objective    Ht 1.626 m (5' 4\")   Wt 121.7 kg (268 lb 6.4 oz)   BMI 46.07 kg/m             Vitals:  No vitals were obtained today due to virtual visit.    GENERAL: Healthy, alert and no distress  EYES: Eyes grossly normal to inspection.  No discharge or erythema, or obvious scleral/conjunctival abnormalities.  RESP: No audible wheeze, cough, or visible cyanosis.  No visible retractions or increased " work of breathing.    SKIN: Visible skin clear. No significant rash, abnormal pigmentation or lesions.  NEURO: Cranial nerves grossly intact.  Mentation and speech appropriate for age.  PSYCH: Mentation appears normal, affect normal/bright, judgement and insight intact, normal speech and appearance well-groomed.        Sincerely,    Layla Cyr PA-C

## 2022-06-02 NOTE — NURSING NOTE
Chief Complaint   Patient presents with     Follow Up     Return medical weight management.         Vitals:    06/02/22 1052   Weight: 268 lb 6.4 oz       Body mass index is 46.07 kg/m .      Duran Vanessa, EMT  [facility]

## 2022-06-16 ENCOUNTER — VIRTUAL VISIT (OUTPATIENT)
Dept: FAMILY MEDICINE | Facility: CLINIC | Age: 52
End: 2022-06-16
Payer: COMMERCIAL

## 2022-06-16 DIAGNOSIS — B34.9 VIRAL ILLNESS: ICD-10-CM

## 2022-06-16 DIAGNOSIS — U07.1 INFECTION DUE TO 2019 NOVEL CORONAVIRUS: Primary | ICD-10-CM

## 2022-06-16 DIAGNOSIS — I10 HYPERTENSION GOAL BP (BLOOD PRESSURE) < 140/90: ICD-10-CM

## 2022-06-16 DIAGNOSIS — R05.9 COUGH: ICD-10-CM

## 2022-06-16 DIAGNOSIS — E66.01 MORBID OBESITY (H): ICD-10-CM

## 2022-06-16 DIAGNOSIS — R50.9 FEVER, UNSPECIFIED FEVER CAUSE: ICD-10-CM

## 2022-06-16 PROCEDURE — 99214 OFFICE O/P EST MOD 30 MIN: CPT | Mod: 95 | Performed by: NURSE PRACTITIONER

## 2022-06-16 NOTE — PROGRESS NOTES
"Linda is a 51 year old who is being evaluated via a billable video visit.      How would you like to obtain your AVS? MyChart     If the video visit is dropped, the invitation should be resent by: Text to cell phone: [phone]     Will anyone else be joining your video visit? No        Assessment & Plan     Infection due to 2019 novel coronavirus  HTN, Obesity. GFR stable.   holding supplements  Patient is stable for home plan.  Quarantine advised until 5 days through AND symptoms have improved. Then 5 days with strict masking in public with good handwashing.   Home cares discussed.   Warning signs of Covid reviewed- if any breathing difficulty to call RN nurseline.   Discussed Covid testing.  Return/call to clinic with any new or worsening symptoms, and as needed.      - nirmatrelvir and ritonavir (PAXLOVID) therapy pack; Take 3 tablets by mouth 2 times daily             BMI:   Estimated body mass index is 46.07 kg/m  as calculated from the following:    Height as of 6/2/22: 1.626 m (5' 4\").    Weight as of 6/2/22: 121.7 kg (268 lb 6.4 oz).   Weight management plan: Discussed healthy diet and exercise guidelines    MEDICATIONS:  Continue current medications without change- holding OTC supplements.     Return in about 3 months (around 9/16/2022) for Physical Exam.    KYLE Montgomery CNP  [facility] PRADIP Mckeon is a 51 year old, presenting for the following health issues:  Covid Concern      History of Present Illness       Reason for visit:  Positive covid test  Symptom onset:  1-3 days ago  Symptoms include:  Sore throat , fever, cough,  Symptom intensity:  Moderate  Symptom progression:  Staying the same  Had these symptoms before:  No    She eats 2-3 servings of fruits and vegetables daily.She consumes 1 sweetened beverage(s) daily.She exercises with enough effort to increase her heart rate 10 to 19 minutes per day.  She exercises with enough effort to increase her heart rate " 5 days per week.   She is taking medications regularly.       Symptom onset 6/14.    Risk factors- Obesity/HTN.         Review of Systems   Constitutional, HEENT, cardiovascular, pulmonary, gi and gu systems are negative, except as otherwise noted.      Objective           Vitals:  No vitals were obtained today due to virtual visit.    Physical Exam   GENERAL: Healthy, alert and no distress  EYES: Eyes grossly normal to inspection.  No discharge or erythema, or obvious scleral/conjunctival abnormalities.  RESP: No audible wheeze, cough, or visible cyanosis.  No visible retractions or increased work of breathing.    SKIN: Visible skin clear. No significant rash, abnormal pigmentation or lesions.  NEURO: Cranial nerves grossly intact.  Mentation and speech appropriate for age.  PSYCH: Mentation appears normal, affect normal/bright, judgement and insight intact, normal speech and appearance well-groomed.    Clinically appears very good.             Video-Visit Details    Video Start Time: 9:41 AM    Type of service:  Video Visit    Video End Time:9:52 AM    Originating Location (pt. Location): Home    Distant Location (provider location):  [facility] [place]     Platform used for Video Visit: OpenTable    Jelena Fuchs

## 2022-08-18 DIAGNOSIS — E66.01 MORBID OBESITY, UNSPECIFIED OBESITY TYPE (H): ICD-10-CM

## 2022-08-22 DIAGNOSIS — I10 HYPERTENSION GOAL BP (BLOOD PRESSURE) < 140/90: ICD-10-CM

## 2022-08-22 RX ORDER — LOSARTAN POTASSIUM 50 MG/1
TABLET ORAL
Qty: 30 TABLET | Refills: 0 | Status: SHIPPED | OUTPATIENT
Start: 2022-08-22 | End: 2022-11-14

## 2022-08-22 NOTE — TELEPHONE ENCOUNTER
Routing refill request to provider for review/approval because:  Failed Protocol    Eden Coffey RN BSN  [facility]

## 2022-08-23 RX ORDER — TOPIRAMATE 25 MG/1
TABLET, FILM COATED ORAL
Qty: 180 TABLET | Refills: 1 | OUTPATIENT
Start: 2022-08-23

## 2022-09-15 DIAGNOSIS — G43.909 MIGRAINE WITHOUT STATUS MIGRAINOSUS, NOT INTRACTABLE, UNSPECIFIED MIGRAINE TYPE: ICD-10-CM

## 2022-09-15 RX ORDER — RIZATRIPTAN BENZOATE 5 MG/1
TABLET, ORALLY DISINTEGRATING ORAL
Qty: 9 TABLET | Refills: 0 | Status: SHIPPED | OUTPATIENT
Start: 2022-09-15 | End: 2022-11-14

## 2022-09-16 DIAGNOSIS — E66.01 MORBID OBESITY (H): ICD-10-CM

## 2022-09-16 NOTE — TELEPHONE ENCOUNTER
phentermine (LOMAIRA) 8 MG tablet  Last Written Prescription Date:  6/2/2022  Last Fill Quantity: 30,   # refills: 2  Last Office Visit : 6/2/2022  Future Office visit:  12/7/2022    Routing refill request to provider for review/approval because:  Pt has visit on  12/7/2022.  Med not on refill protocol to fill  Please send new order Per Providers orders for Pt care.  Thank you       Elvira Cope RN  Central Triage Red Flags/Med Refills

## 2022-10-12 DIAGNOSIS — K21.9 GASTROESOPHAGEAL REFLUX DISEASE WITHOUT ESOPHAGITIS: ICD-10-CM

## 2022-10-13 ENCOUNTER — VIRTUAL VISIT (OUTPATIENT)
Dept: INTERNAL MEDICINE | Facility: CLINIC | Age: 52
End: 2022-10-13
Payer: COMMERCIAL

## 2022-10-13 VITALS — HEIGHT: 64 IN | WEIGHT: 260 LBS | TEMPERATURE: 98.4 F | BODY MASS INDEX: 44.39 KG/M2

## 2022-10-13 DIAGNOSIS — U07.1 INFECTION DUE TO 2019 NOVEL CORONAVIRUS: Primary | ICD-10-CM

## 2022-10-13 PROCEDURE — 99213 OFFICE O/P EST LOW 20 MIN: CPT | Mod: CS | Performed by: NURSE PRACTITIONER

## 2022-10-13 NOTE — PATIENT INSTRUCTIONS
Paxlovid sent in     Medication adjustments while on Paxlovid:    May decrease effect of these medications while on Paxlovid   omeprazole    May return to public after 5 days if symptoms improved and no fever, or use of fever reducing medication   Wear a mask for 5 more days when in the presence of others

## 2022-10-13 NOTE — PROGRESS NOTES
Linda is a 51 year old who is being evaluated via a billable video visit.      How would you like to obtain your AVS? MyChart  If the video visit is dropped, the invitation should be resent by: Text to cell phone: [phone]  Will anyone else be joining your video visit? No        Assessment & Plan     Infection due to 2019 novel coronavirus    - nirmatrelvir and ritonavir (PAXLOVID) therapy pack; Take 3 tablets by mouth 2 times daily for 5 days (Take 2 Nirmatrelvir tablets and 1 Ritonavir tablet twice daily for 5 days)      15 minutes spent on the date of the encounter doing chart review, history and exam, documentation and further activities per the note       Patient Instructions   Paxlovid sent in     Medication adjustments while on Paxlovid:    May decrease effect of these medications while on Paxlovid   omeprazole    May return to public after 5 days if symptoms improved and no fever, or use of fever reducing medication   Wear a mask for 5 more days when in the presence of others         Return in about 1 year (around 10/13/2023).    KYLE [facility]   Linda is a 51 year old accompanied by her spouse, presenting for the following health issues:  Covid Concern    HPI     COVID-19 Symptom Review    Two positive home Covid tests last night.  How many days ago did these symptoms start? 1  10/12/2022  Had in June 2022- took paxlovid and helped     Are any of the following symptoms significant for you?    New or worsening difficulty breathing? No    Worsening cough? Yes, it's a dry cough.     Fever or chills? No    Headache: YES    Sore throat: No    Chest pain: No    Diarrhea: No    Body aches? YES    What treatments has patient tried? Acetaminophen and Cough syrup   Does patient live in a nursing home, group home, or shelter? No  Does patient have a way to get food/medications during quarantined? Yes, I have a friend or family member who can help  me.      Review of Systems   Constitutional, HEENT, cardiovascular, pulmonary, GI, , musculoskeletal, neuro, skin, endocrine and psych systems are negative, except as otherwise noted.      Objective           Vitals:  No vitals were obtained today due to virtual visit.    Physical Exam   GENERAL:  alert and no distress  EYES: Eyes grossly normal to inspection.  No discharge or erythema, or obvious scleral/conjunctival abnormalities.  RESP: No audible wheeze, cough, or visible cyanosis.  No visible retractions or increased work of breathing.    SKIN: Visible skin clear. No significant rash, abnormal pigmentation or lesions.  NEURO: Cranial nerves grossly intact.  Mentation and speech appropriate for age.  PSYCH: Mentation appears normal, affect normal/bright, judgement and insight intact, normal speech and appearance well-groomed.                Video-Visit Details    Video Start Time: 1048    Type of service:  Video Visit    Video End Time:10:58 AM    Originating Location (pt. Location): Home    Distant Location (provider location):  [facility]     Platform used for Video Visit: [employer]

## 2022-11-08 DIAGNOSIS — E66.01 MORBID OBESITY (H): ICD-10-CM

## 2022-11-11 ENCOUNTER — MYC MEDICAL ADVICE (OUTPATIENT)
Dept: PODIATRY | Facility: CLINIC | Age: 52
End: 2022-11-11

## 2022-11-11 ENCOUNTER — IMMUNIZATION (OUTPATIENT)
Dept: FAMILY MEDICINE | Facility: CLINIC | Age: 52
End: 2022-11-11
Payer: COMMERCIAL

## 2022-11-11 DIAGNOSIS — M25.571 PAIN, JOINT, FOOT, RIGHT: ICD-10-CM

## 2022-11-11 DIAGNOSIS — M21.6X1 PRONATION OF BOTH FEET: Primary | ICD-10-CM

## 2022-11-11 DIAGNOSIS — M21.6X2 PRONATION OF BOTH FEET: Primary | ICD-10-CM

## 2022-11-11 DIAGNOSIS — Z23 NEED FOR PROPHYLACTIC VACCINATION AND INOCULATION AGAINST INFLUENZA: Primary | ICD-10-CM

## 2022-11-11 DIAGNOSIS — M77.8 CAPSULITIS OF FOOT, RIGHT: ICD-10-CM

## 2022-11-11 PROCEDURE — 90471 IMMUNIZATION ADMIN: CPT

## 2022-11-11 PROCEDURE — 99207 PR NO CHARGE NURSE ONLY: CPT

## 2022-11-11 PROCEDURE — 90682 RIV4 VACC RECOMBINANT DNA IM: CPT

## 2022-11-13 DIAGNOSIS — G43.909 MIGRAINE WITHOUT STATUS MIGRAINOSUS, NOT INTRACTABLE, UNSPECIFIED MIGRAINE TYPE: ICD-10-CM

## 2022-11-13 DIAGNOSIS — I10 HYPERTENSION GOAL BP (BLOOD PRESSURE) < 140/90: ICD-10-CM

## 2022-11-14 RX ORDER — LOSARTAN POTASSIUM 50 MG/1
TABLET ORAL
Qty: 90 TABLET | Refills: 0 | Status: SHIPPED | OUTPATIENT
Start: 2022-11-14 | End: 2022-12-12

## 2022-11-14 RX ORDER — RIZATRIPTAN BENZOATE 5 MG/1
TABLET, ORALLY DISINTEGRATING ORAL
Qty: 36 TABLET | Refills: 0 | Status: SHIPPED | OUTPATIENT
Start: 2022-11-14 | End: 2022-12-12

## 2022-11-14 NOTE — TELEPHONE ENCOUNTER
phentermine (LOMAIRA) 8 MG tablet      Last Written Prescription Date:  9/16/2022  Last Fill Quantity: 30,   # refills: 1  Last Office Visit : 6/2/2022  Future Office visit:  12/7/2022    Routing refill request to provider for review/approval because:  Controlled medication

## 2022-11-17 ENCOUNTER — OFFICE VISIT (OUTPATIENT)
Dept: OBGYN | Facility: CLINIC | Age: 52
End: 2022-11-17
Payer: COMMERCIAL

## 2022-11-17 ENCOUNTER — OFFICE VISIT (OUTPATIENT)
Dept: OPHTHALMOLOGY | Facility: CLINIC | Age: 52
End: 2022-11-17
Payer: COMMERCIAL

## 2022-11-17 DIAGNOSIS — H52.223 MYOPIA OF BOTH EYES WITH REGULAR ASTIGMATISM AND PRESBYOPIA: ICD-10-CM

## 2022-11-17 DIAGNOSIS — H52.4 MYOPIA OF BOTH EYES WITH REGULAR ASTIGMATISM AND PRESBYOPIA: ICD-10-CM

## 2022-11-17 DIAGNOSIS — H52.13 MYOPIA OF BOTH EYES WITH REGULAR ASTIGMATISM AND PRESBYOPIA: ICD-10-CM

## 2022-11-17 DIAGNOSIS — B37.2 YEAST INFECTION OF THE SKIN: ICD-10-CM

## 2022-11-17 DIAGNOSIS — Z01.419 ENCOUNTER FOR GYNECOLOGICAL EXAMINATION WITHOUT ABNORMAL FINDING: Primary | ICD-10-CM

## 2022-11-17 DIAGNOSIS — B37.31 YEAST INFECTION OF THE VAGINA: ICD-10-CM

## 2022-11-17 DIAGNOSIS — H53.19 PHOTOPSIA OF LEFT EYE: Primary | ICD-10-CM

## 2022-11-17 PROCEDURE — 92015 DETERMINE REFRACTIVE STATE: CPT | Performed by: STUDENT IN AN ORGANIZED HEALTH CARE EDUCATION/TRAINING PROGRAM

## 2022-11-17 PROCEDURE — 99213 OFFICE O/P EST LOW 20 MIN: CPT | Performed by: OBSTETRICS & GYNECOLOGY

## 2022-11-17 PROCEDURE — 92014 COMPRE OPH EXAM EST PT 1/>: CPT | Performed by: STUDENT IN AN ORGANIZED HEALTH CARE EDUCATION/TRAINING PROGRAM

## 2022-11-17 RX ORDER — CLOTRIMAZOLE 1 %
CREAM (GRAM) TOPICAL
Qty: 113 G | Refills: 1 | Status: SHIPPED | OUTPATIENT
Start: 2022-11-17 | End: 2024-07-15

## 2022-11-17 RX ORDER — FLUCONAZOLE 150 MG/1
150 TABLET ORAL
Qty: 3 TABLET | Refills: 3 | Status: SHIPPED | OUTPATIENT
Start: 2022-11-17

## 2022-11-17 ASSESSMENT — REFRACTION_WEARINGRX
OD_AXIS: 085
SPECS_TYPE: SVL
OS_SPHERE: -1.00
OS_CYLINDER: +0.25
OD_SPHERE: -0.50
OS_AXIS: 080
OD_CYLINDER: +0.25

## 2022-11-17 ASSESSMENT — CONF VISUAL FIELD
OS_INFERIOR_NASAL_RESTRICTION: 0
OS_SUPERIOR_TEMPORAL_RESTRICTION: 0
OS_NORMAL: 1
OD_SUPERIOR_NASAL_RESTRICTION: 0
OS_SUPERIOR_NASAL_RESTRICTION: 0
OS_INFERIOR_TEMPORAL_RESTRICTION: 0
OD_INFERIOR_TEMPORAL_RESTRICTION: 0
OD_NORMAL: 1
OD_INFERIOR_NASAL_RESTRICTION: 0
OD_SUPERIOR_TEMPORAL_RESTRICTION: 0

## 2022-11-17 ASSESSMENT — VISUAL ACUITY
METHOD: SNELLEN - LINEAR
OS_SC+: -3
OS_SC: 20/25
OS_CC: 20/20
OD_SC: J1
OD_SC: 20/25
OD_CC: 20/20
OD_SC+: -3
OS_SC: J2
OD_CC+: -1
CORRECTION_TYPE: GLASSES

## 2022-11-17 ASSESSMENT — ANXIETY QUESTIONNAIRES
1. FEELING NERVOUS, ANXIOUS, OR ON EDGE: NOT AT ALL
7. FEELING AFRAID AS IF SOMETHING AWFUL MIGHT HAPPEN: NOT AT ALL
GAD7 TOTAL SCORE: 1
3. WORRYING TOO MUCH ABOUT DIFFERENT THINGS: NOT AT ALL
GAD7 TOTAL SCORE: 1
5. BEING SO RESTLESS THAT IT IS HARD TO SIT STILL: NOT AT ALL
IF YOU CHECKED OFF ANY PROBLEMS ON THIS QUESTIONNAIRE, HOW DIFFICULT HAVE THESE PROBLEMS MADE IT FOR YOU TO DO YOUR WORK, TAKE CARE OF THINGS AT HOME, OR GET ALONG WITH OTHER PEOPLE: NOT DIFFICULT AT ALL
6. BECOMING EASILY ANNOYED OR IRRITABLE: SEVERAL DAYS
2. NOT BEING ABLE TO STOP OR CONTROL WORRYING: NOT AT ALL

## 2022-11-17 ASSESSMENT — PATIENT HEALTH QUESTIONNAIRE - PHQ9
5. POOR APPETITE OR OVEREATING: NOT AT ALL
SUM OF ALL RESPONSES TO PHQ QUESTIONS 1-9: 0

## 2022-11-17 ASSESSMENT — CUP TO DISC RATIO
OS_RATIO: 0.4
OD_RATIO: 0.4

## 2022-11-17 ASSESSMENT — TONOMETRY
OD_IOP_MMHG: 15
OS_IOP_MMHG: 12
IOP_METHOD: ICARE

## 2022-11-17 ASSESSMENT — REFRACTION_MANIFEST
OD_CYLINDER: +0.25
OD_ADD: +1.75
OS_SPHERE: -0.75
OD_SPHERE: -0.75
OS_AXIS: 045
OS_CYLINDER: +0.25
OS_ADD: +1.75
OD_AXIS: 095

## 2022-11-17 ASSESSMENT — EXTERNAL EXAM - LEFT EYE: OS_EXAM: NORMAL

## 2022-11-17 ASSESSMENT — SLIT LAMP EXAM - LIDS
COMMENTS: NORMAL
COMMENTS: NORMAL

## 2022-11-17 ASSESSMENT — EXTERNAL EXAM - RIGHT EYE: OD_EXAM: NORMAL

## 2022-11-17 NOTE — PATIENT INSTRUCTIONS
Continue allergy drops as needed    Glasses prescription given - optional to update. Consider a bifocal if you grow tired of holding reading material out further.    Siomara Tate MD  [phone]

## 2022-11-17 NOTE — PROGRESS NOTES
Elaine is a 51 year old  female who presents for annual exam.     Menses are irregular and irregular lasting 10-14 days.  Menses flow: normal, light and medium.  Patient's last menstrual period was 2022.. Using none for contraception.  She is not currently considering pregnancy.  Besides routine health maintenance,  she would like to discuss irregular bleeding. She gets about 6-7 periods a year. Periods have been heavy, last for about 2 weeks. She had normal EMB with Dr. Muñoz. She tried OCPs but she gained weight. Working on losing weight now. Wondering if she could be doing anything else to manage it.   Here for gyn and breast exam. Has remainder of annual exam scheduled with PCP in 3 weeks.   Mammogram is scheduled.   GYNECOLOGIC HISTORY:  Menarche: 10  Age at first intercourse: 15 Number of lifetime partners: 6  Elaine is sexually active with 1male partner(s) and is currently in monogamous relationship.    History sexually transmitted infections:No STD history  STI testing offered?  Declined  BERTA exposure: No  History of abnormal Pap smear: NO - age 30-65 PAP every 5 years with negative HPV co-testing recommended  Family history of breast CA: Yes (Please explain): MGMO  Family history of uterine/ovarian CA: No    Family history of colon CA: No    HEALTH MAINTENANCE:  Cholesterol: (  Cholesterol   Date Value Ref Range Status   2021 184 <200 mg/dL Final   2019 139 <200 mg/dL Final    History of abnormal lipids: No  Mammo: 2021 . History of abnormal Mammo: No.  Regular Self Breast Exams: Yes  Calcium/Vitamin D intake: source:  dairy, dietary supplement(s) Adequate? Yes  TSH: (  TSH   Date Value Ref Range Status   2021 0.98 0.40 - 4.00 mU/L Final    )  Pap; (  Lab Results   Component Value Date    PAP OTHER-NIL, See Result 10/24/2018    PAP NIL 2014    PAP NIL 2011    )    HISTORY:  OB History    Para Term  AB Living   3 2 0 0 1 2   SAB IAB Ectopic  Multiple Live Births   0 0 1 0 2      # Outcome Date GA Lbr Felipe/2nd Weight Sex Delivery Anes PTL Lv   3 Ectopic            2 Para               Birth Comments: kidney stones   1 Para              Past Medical History:   Diagnosis Date     Allergic rhinitis      B12 deficiency      Degenerative joint disease of knee, right      Eczema      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     GERD (gastroesophageal reflux disease)      Hypertension goal BP (blood pressure) < 140/90      Iron deficiency anemia      Menorrhagia      Migraine      Morbid obesity (H)      Nonallergic rhinitis 2019     Recurrent cold sores      Vulvar dermatitis 10/14/2010     Yeast infection of the vagina, recurrent       Past Surgical History:   Procedure Laterality Date     COLPOSCOPY,BX CERVIX/ENDOCERV CURR  1994     ESOPHAGOSCOPY, GASTROSCOPY, DUODENOSCOPY (EGD), COMBINED  2014    Procedure: COMBINED ESOPHAGOSCOPY, GASTROSCOPY, DUODENOSCOPY (EGD);;  Surgeon: Eden Stacy MD;  [facility]     ESOPHAGOSCOPY, GASTROSCOPY, DUODENOSCOPY (EGD), COMBINED N/A 2017    Procedure: COMBINED ESOPHAGOSCOPY, GASTROSCOPY, DUODENOSCOPY (EGD);  Surgeon: Ciro Deras MD;  [facility]     LAPAROSCOPIC GASTRIC ADJUSTABLE BANDING  2014    Procedure: LAPAROSCOPIC GASTRIC ADJUSTABLE BANDING;  Surgeon: Ciro Deras MD;  [facility]     LAPAROSCOPIC HERNIORRHAPHY HIATAL  2014    Procedure: LAPAROSCOPIC HERNIORRHAPHY HIATAL;  Surgeon: Ciro Deras MD;  [facility]     LAPAROSCOPIC REMOVAL GASTRIC ADJUSTABLE BAND N/A 2017    Procedure: LAPAROSCOPIC REMOVAL GASTRIC ADJUSTABLE BAND;  Surgeon: Ciro Deras MD;  [facility]     LITHOTRIPSY       ZZC TREAT ECTOPIC PREG,RMV TUBE/OVARY      LT     Family History   Problem Relation Age of Onset     Cancer Father 72        d. pancreatic, melanoma      Colon Polyps Father      Diabetes Mother      C.A.D. Mother      Breast  Cancer Maternal Grandmother      Heart Disease Maternal Grandfather         CHF     Heart Disease Paternal Grandmother         CHF     Colon Polyps Paternal Grandmother      Respiratory Paternal Grandfather         TB     Obesity Brother      Glaucoma No family hx of      Macular Degeneration No family hx of      Social History     Socioeconomic History     Marital status:      Spouse name: Mohinder     Number of children: 2     Years of education: 16     Highest education level: None   Occupational History     Occupation: RN     Employer: [employer]   Tobacco Use     Smoking status: Former     Packs/day: 0.50     Years: 10.00     Pack years: 5.00     Types: Cigarettes     Quit date: 1997     Years since quittin.9     Smokeless tobacco: Never   Vaping Use     Vaping Use: Never used   Substance and Sexual Activity     Alcohol use: Yes     Comment: occasional (infrequent)     Drug use: No     Sexual activity: Yes     Partners: Male     Birth control/protection: None   Other Topics Concern     Parent/sibling w/ CABG, MI or angioplasty before 65F 55M? No      Service No     Blood Transfusions No     Caffeine Concern No     Occupational Exposure Yes     Comment: nurse     Hobby Hazards No     Sleep Concern No     Stress Concern No     Weight Concern No     Special Diet No     Back Care No     Exercise Yes     Bike Helmet Yes     Seat Belt Yes     Self-Exams Yes       Current Outpatient Medications:      clindamycin (CLEOCIN T) 1 % external lotion, APPLY TOPICALLY TO FACE TWICE DAILY FOR ACNE, Disp: , Rfl:      clotrimazole (LOTRIMIN) 1 % external cream, Use twice a day for 14 days when signs of yeast in skin folds., Disp: 113 g, Rfl: 1     fexofenadine (ALLEGRA) 180 MG tablet, Take 180 mg by mouth daily, Disp: , Rfl:      fluconazole (DIFLUCAN) 150 MG tablet, Take 1 tablet (150 mg) by mouth every 72 hours as needed, Disp: 3 tablet, Rfl: 3     glucosamine sulfate 1000 MG CAPS, Take 1  capsule by mouth daily, Disp: , Rfl:      losartan (COZAAR) 50 MG tablet, TAKE 1 TABLET(50 MG) BY MOUTH DAILY, Disp: 90 tablet, Rfl: 0     multivitamin w/minerals (THERA-VIT-M) tablet, Take 1 tablet by mouth daily, Disp: , Rfl:      nystatin (NYSTOP) 475875 UNIT/GM external powder, APPLY TO TO THE AFFECTED AREA UNDER BREASTS THREE TIMES DAILY AS NEEDED, Disp: 60 g, Rfl: 1     omeprazole (PRILOSEC) 20 MG DR capsule, TAKE 1 CAPSULE BY MOUTH EVERY DAY, Disp: 90 capsule, Rfl: 1     phentermine (LOMAIRA) 8 MG tablet, Take 1 tablet (8 mg) by mouth every morning (before breakfast), Disp: 30 tablet, Rfl: 0     rizatriptan (MAXALT-MLT) 5 MG ODT, TAKE 1-2 TABLETS BY MOUTH AT ONSET OF HEADACHE MAY REPEAT IN 2 HOURS. MAX OF 6 TABLETS PER 24 HOURS., Disp: 36 tablet, Rfl: 0     topiramate (TOPAMAX) 25 MG tablet, Take 2 tablets (50 mg) by mouth daily, Disp: 180 tablet, Rfl: 1     triamcinolone (KENALOG) 0.1 % external cream, APPLY TO BODY RASH TWICE DAILY AS NEEDED., Disp: , Rfl:      valACYclovir (VALTREX) 500 MG tablet, TAKE 1 TABLET BY MOUTH DAILY. INCREASE TO 4 TABLET BY MOUTH 2 TIMES DAILY AT ONSET OF OUTBREAK, Disp: 100 tablet, Rfl: 2     Allergies   Allergen Reactions     Doxycycline Rash     Ampicillin Swelling              Past medical, surgical, social and family history were reviewed and updated in EPIC.    ROS:   C:     NEGATIVE for fever, chills, change in weight  I:       NEGATIVE for worrisome rashes, moles or lesions  E:     NEGATIVE for vision changes or irritation  E/M: NEGATIVE for ear, mouth and throat problems  R:     NEGATIVE for significant cough or SOB  CV:   NEGATIVE for chest pain, palpitations or peripheral edema  GI:     NEGATIVE for nausea, abdominal pain, heartburn, or change in bowel habits  :   NEGATIVE for frequency, dysuria, hematuria, vaginal discharge, or irregular bleeding  M:     NEGATIVE for significant arthralgias or myalgia  N:      NEGATIVE for weakness, dizziness or paresthesias  E:       NEGATIVE for temperature intolerance, skin/hair changes  P:      NEGATIVE for changes in mood or affect.    EXAM:  LMP 11/01/2022    BMI: There is no height or weight on file to calculate BMI.  Constitutional: healthy, alert and no distress  Breast: Breasts reveal mild symmetric fibrocystic densities, but there are no dominant, discrete, fixed or suspicious masses found.  Gastrointestinal: Abdomen soft, non-tender, non-distended. No masses, organomegaly.  :  Vulva:  No external lesions, normal female hair distribution, no inguinal adenopathy.    Urethra:  Midline, non-tender, well supported, no discharge  Vagina:  Moist, pink, no abnormal discharge, no lesions  Uterus:  Normal size, anteverted , non-tender, freely mobile  Ovaries:  No masses appreciated, non-tender, mobile  Psychiatric: Affect appropriate, cooperative,mentation appears normal.     COUNSELING:   Reviewed preventive health counseling, as reflected in patient instructions       Regular exercise       Healthy diet/nutrition       (Nichelle)menopause management   reports that she quit smoking about 24 years ago. Her smoking use included cigarettes. She has a 5.00 pack-year smoking history. She has never used smokeless tobacco.    There is no height or weight on file to calculate BMI.  Seeing Weight Management program.   FRAX Risk Assessment    ASSESSMENT:  51 year old female with satisfactory annual exam  (Z01.419) Encounter for gynecological examination without abnormal finding  (primary encounter diagnosis)  Comment:   Plan: Discussed perimenopause. Discussed LNG IUD as possible means to control perimenopausal bleeding and reduce risk for endometrial cancer.   Written information given. She will continue.     (B37.31) Yeast infection of the vagina  Comment:   Plan: fluconazole (DIFLUCAN) 150 MG tablet        Refills, episodes 1-2 times a year    (B37.2) Yeast infection of the skin  Comment:   Plan: clotrimazole (LOTRIMIN) 1 % external cream        Using  nystatin, but incompletely controlled. Recommended azole cream with anti-inflammatory properties.

## 2022-11-17 NOTE — PROGRESS NOTES
Current Eye Medications:  Over-the-counter allergy drops occasionally.       Subjective:  Comprehensive Eye Exam.   She continue to see flashing lights, which usually begins in her inferior vision, and then moves around in a C-shaped pattern - last just a few seconds.  Most often this occurs in her left eye, but will also occur in her right eye.  She tends not to take note of them as much anymore. No headache, or other systemic signs or symptoms.  Vision appears to be about the same.  She has distance glasses, but rarely wears them.  When reading, she has to hold the material out a little further.       Objective:  See Ophthalmology Exam.      Assessment:  Elaine Awad is a 51 year old female who presents with:   Encounter Diagnoses   Name Primary?     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 Saw Dr. ADRIEL Berumen in 2020.     Plan:  Continue allergy drops as needed    Glasses prescription given - optional to update. Consider a bifocal if you grow tired of holding reading material out further.    Siomara Tate MD  [phone]

## 2022-11-17 NOTE — LETTER
11/17/2022         RE: Elaine Awad  [id number] [facility] [address]        Dear Colleague,    Thank you for referring your patient, Elaine Awad, to the [facility]. Please see a copy of my visit note below.     Current Eye Medications:  Over-the-counter allergy drops occasionally.       Subjective:  Comprehensive Eye Exam.   She continue to see flashing lights, which usually begins in her inferior vision, and then moves around in a C-shaped pattern - last just a few seconds.  Most often this occurs in her left eye, but will also occur in her right eye.  She tends not to take note of them as much anymore. No headache, or other systemic signs or symptoms.  Vision appears to be about the same.  She has distance glasses, but rarely wears them.  When reading, she has to hold the material out a little further.       Objective:  See Ophthalmology Exam.      Assessment:  Elaine Awad is a 51 year old female who presents with:   Encounter Diagnoses   Name Primary?     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 Saw Dr. ADRIEL Berumen in 2020.     Plan:  Continue allergy drops as needed    Glasses prescription given - optional to update. Consider a bifocal if you grow tired of holding reading material out further.    Siomara Tate MD  [phone]          Again, thank you for allowing me to participate in the care of your patient.        Sincerely,        Siomara Tate MD

## 2022-12-07 ENCOUNTER — VIRTUAL VISIT (OUTPATIENT)
Dept: ENDOCRINOLOGY | Facility: CLINIC | Age: 52
End: 2022-12-07
Payer: COMMERCIAL

## 2022-12-07 VITALS — BODY MASS INDEX: 44.22 KG/M2 | WEIGHT: 259 LBS | HEIGHT: 64 IN

## 2022-12-07 DIAGNOSIS — E66.813 CLASS 3 SEVERE OBESITY DUE TO EXCESS CALORIES WITH SERIOUS COMORBIDITY AND BODY MASS INDEX (BMI) OF 45.0 TO 49.9 IN ADULT (H): Primary | ICD-10-CM

## 2022-12-07 DIAGNOSIS — E66.01 CLASS 3 SEVERE OBESITY DUE TO EXCESS CALORIES WITH SERIOUS COMORBIDITY AND BODY MASS INDEX (BMI) OF 45.0 TO 49.9 IN ADULT (H): Primary | ICD-10-CM

## 2022-12-07 PROCEDURE — 99213 OFFICE O/P EST LOW 20 MIN: CPT | Mod: 95 | Performed by: PHYSICIAN ASSISTANT

## 2022-12-07 RX ORDER — TOPIRAMATE 50 MG/1
50 TABLET, FILM COATED ORAL DAILY
Qty: 90 TABLET | Refills: 1 | Status: SHIPPED | OUTPATIENT
Start: 2022-12-07 | End: 2023-05-12

## 2022-12-07 NOTE — LETTER
2022       RE: Elaine Awad  [id number] [address] [facility] [address]     Dear Colleague,    Thank you for referring your patient, Elaine Awad, to the [facility] [place] at [facility]. Please see a copy of my visit note below.    Virtual Visit Check-In    During this virtual visit the patient is located in [place], patient verifies this as the location during the entirety of this visit.     Linda is a 51 year old who is being evaluated via a billable video visit.      How would you like to obtain your AVS? MyChart  If the video visit is dropped, the invitation should be resent by: Text to cell phone: [phone]  Will anyone else be joining your video visit? No    Video-Visit Details    Video Start Time: 134    Type of service:  Video Visit    Video End Time:140    Originating Location (pt. Location): Home        Distant Location (provider location):  Off-site    Platform used for Video Visit: Sourav Vanessa NREMT      Return Medical Weight Management Note     Elaine Awad  MRN:  [id number]  :  1970  [place]:  2022    Dear Fernanda Rodriguez MD,    I had the pleasure of seeing your patient Elaine Awad. She is a 51 year old female who I am continuing to see for treatment of obesity related to:       2019   I have the following health issues associated with obesity: GERD (Reflux), Weight Bearing Joint Pain   I have the following symptoms associated with obesity: Knee Pain, GERD (Reflux)       Assessment & Plan   Problem List Items Addressed This Visit        Endocrine Diagnoses    Class 3 severe obesity due to excess calories with serious comorbidity and body mass index (BMI) of 45.0 to 49.9 in adult (H) - Primary    Relevant Medications    phentermine (LOMAIRA) 8 MG tablet    Other Relevant Orders    Basic metabolic panel        Refill topiramate and lomaira  Follow up 6 mo Layla return MWM  Check BMP at  "your convenience any  lab.     To schedule the Lab Appointment using [employer]:  o Select \"Schedule an Appointment\"  o Select \"Lab Only\"  o For \"A couple of questions\", select \"Other\"  o For \"Which locations work for you?, select the location and set up the appointment    To schedule by phone call [phone] to schedule a lab only appointment at any [facility] lab.            INTERVAL HISTORY:  MWM follow up  Hx of lap band removal 2016  Hx of 24 week HLP 2020  Lost 9 lbs since last visit  Interested in sleeve but afraid of eating changes especially eating with drinking    Anti-obesity medications:     Current:   Lomaira 8mg  Topiramate 50mg daily    Recent stressors: No changes    Recent sleep changes: sleeping 6-7 hours    Vitamins/Labs: Due for BMP    CURRENT WEIGHT:   259 lbs 0 oz    Initial Weight (lbs): 239.4 lbs  Last Visits Weight: 121.7 kg (268 lb 6.4 oz)  Cumulative weight loss (lbs): -19.6  Weight Loss Percentage: -8.19%    Changes and Difficulties 12/6/2022   I have made the following changes to my diet since my last visit: -   With regards to my diet, I am still struggling with: Protein   I have made the following changes to my activity/exercise since my last visit: -   With regards to my activity/exercise, I am still struggling with: Foot pain         MEDICATIONS:   Current Outpatient Medications   Medication Sig Dispense Refill     phentermine (LOMAIRA) 8 MG tablet Take 1 tablet (8 mg) by mouth every morning (before breakfast) 90 tablet 1     topiramate (TOPAMAX) 50 MG tablet Take 1 tablet (50 mg) by mouth daily 90 tablet 1     clindamycin (CLEOCIN T) 1 % external lotion APPLY TOPICALLY TO FACE TWICE DAILY FOR ACNE       clotrimazole (LOTRIMIN) 1 % external cream Use twice a day for 14 days when signs of yeast in skin folds. 113 g 1     fexofenadine (ALLEGRA) 180 MG tablet Take 180 mg by mouth daily       fluconazole (DIFLUCAN) 150 MG tablet Take 1 tablet (150 mg) by mouth every 72 hours as " needed 3 tablet 3     glucosamine sulfate 1000 MG CAPS Take 1 capsule by mouth daily       losartan (COZAAR) 50 MG tablet TAKE 1 TABLET(50 MG) BY MOUTH DAILY 90 tablet 0     multivitamin w/minerals (THERA-VIT-M) tablet Take 1 tablet by mouth daily       nystatin (NYSTOP) 701120 UNIT/GM external powder APPLY TO TO THE AFFECTED AREA UNDER BREASTS THREE TIMES DAILY AS NEEDED 60 g 1     omeprazole (PRILOSEC) 20 MG DR capsule TAKE 1 CAPSULE BY MOUTH EVERY DAY 90 capsule 1     rizatriptan (MAXALT-MLT) 5 MG ODT TAKE 1-2 TABLETS BY MOUTH AT ONSET OF HEADACHE MAY REPEAT IN 2 HOURS. MAX OF 6 TABLETS PER 24 HOURS. 36 tablet 0     triamcinolone (KENALOG) 0.1 % external cream APPLY TO BODY RASH TWICE DAILY AS NEEDED.       valACYclovir (VALTREX) 500 MG tablet TAKE 1 TABLET BY MOUTH DAILY. INCREASE TO 4 TABLET BY MOUTH 2 TIMES DAILY AT ONSET OF OUTBREAK 100 tablet 2       Weight Loss Medication History Reviewed With Patient 12/6/2022   Which weight loss medications are you currently taking on a regular basis?  Topamax (topiramate)   If you are not taking a weight loss medication that was prescribed to you, please indicate why: -   Are you having any side effects from the weight loss medication that we have prescribed you? No   If you are having side effects please describe: Nithing new       Lab on 04/13/2022   Component Date Value Ref Range Status     Sodium 04/13/2022 143  133 - 144 mmol/L Final     Potassium 04/13/2022 4.2  3.4 - 5.3 mmol/L Final     Chloride 04/13/2022 111 (H)  94 - 109 mmol/L Final     Carbon Dioxide (CO2) 04/13/2022 22  20 - 32 mmol/L Final     Anion Gap 04/13/2022 10  3 - 14 mmol/L Final     Urea Nitrogen 04/13/2022 7  7 - 30 mg/dL Final     Creatinine 04/13/2022 0.84  0.52 - 1.04 mg/dL Final     Calcium 04/13/2022 10.0  8.5 - 10.1 mg/dL Final     Glucose 04/13/2022 90  70 - 99 mg/dL Final     GFR Estimate 04/13/2022 84  >60 mL/min/1.73m2 Final    Effective December 21, 2021 eGFRcr in adults is calculated  "using the 2021 CKD-EPI creatinine equation which includes age and gender (Gaurang et al., NE, DOI: 10.1056/LGOLzq8236456)       PHYSICAL EXAM:  Objective     Ht 1.626 m (5' 4\")   Wt 117.5 kg (259 lb)   LMP 11/01/2022   BMI 44.46 kg/m             Vitals:  No vitals were obtained today due to virtual visit.    GENERAL: Healthy, alert and no distress  EYES: Eyes grossly normal to inspection.  No discharge or erythema, or obvious scleral/conjunctival abnormalities.  RESP: No audible wheeze, cough, or visible cyanosis.  No visible retractions or increased work of breathing.    SKIN: Visible skin clear. No significant rash, abnormal pigmentation or lesions.  NEURO: Cranial nerves grossly intact.  Mentation and speech appropriate for age.  PSYCH: Mentation appears normal, affect normal/bright, judgement and insight intact, normal speech and appearance well-groomed.        Sincerely,    Layla Cyr PA-C      10 minutes spent on the date of the encounter doing chart review, history and exam, documentation and further activities per the note      "

## 2022-12-07 NOTE — PROGRESS NOTES
"Virtual Visit Check-In    During this virtual visit the patient is located in [place], patient verifies this as the location during the entirety of this visit.     Linda is a 51 year old who is being evaluated via a billable video visit.      How would you like to obtain your AVS? Emb[employer]  If the video visit is dropped, the invitation should be resent by: Text to cell phone: [phone]  Will anyone else be joining your video visit? No    Video-Visit Details    Video Start Time: 134    Type of service:  Video Visit    Video End Time:140    Originating Location (pt. Location): Home        Distant Location (provider location):  Off-site    Platform used for Video Visit: Sourav Vanessa NREMT      Return Medical Weight Management Note     Elaine Awad  MRN:  [id number]  :  1970  [place]:  2022    Dear Fernanda Rodriguez MD,    I had the pleasure of seeing your patient Elaine Awad. She is a 51 year old female who I am continuing to see for treatment of obesity related to:       2019   I have the following health issues associated with obesity: GERD (Reflux), Weight Bearing Joint Pain   I have the following symptoms associated with obesity: Knee Pain, GERD (Reflux)       Assessment & Plan   Problem List Items Addressed This Visit        Endocrine Diagnoses    Class 3 severe obesity due to excess calories with serious comorbidity and body mass index (BMI) of 45.0 to 49.9 in adult (H) - Primary    Relevant Medications    phentermine (LOMAIRA) 8 MG tablet    Other Relevant Orders    Basic metabolic panel        Refill topiramate and lomaira  Follow up 6 mo Layla return MWM  Check BMP at your convenience any [facility] lab.     To schedule the Lab Appointment using OM Latam:  o Select \"Schedule an Appointment\"  o Select \"Lab Only\"  o For \"A couple of questions\", select \"Other\"  o For \"Which locations work for you?, select the location and set up the appointment    To schedule by phone call [phone] to " schedule a lab only appointment at [facility] lab.            INTERVAL HISTORY:  MWM follow up  Hx of lap band removal 2016  Hx of 24 week HLP 2020  Lost 9 lbs since last visit  Interested in sleeve but afraid of eating changes especially eating with drinking    Anti-obesity medications:     Current:   Lomaira 8mg  Topiramate 50mg daily    Recent stressors: No changes    Recent sleep changes: sleeping 6-7 hours    Vitamins/Labs: Due for BMP    CURRENT WEIGHT:   259 lbs 0 oz    Initial Weight (lbs): 239.4 lbs  Last Visits Weight: 121.7 kg (268 lb 6.4 oz)  Cumulative weight loss (lbs): -19.6  Weight Loss Percentage: -8.19%    Changes and Difficulties 12/6/2022   I have made the following changes to my diet since my last visit: -   With regards to my diet, I am still struggling with: Protein   I have made the following changes to my activity/exercise since my last visit: -   With regards to my activity/exercise, I am still struggling with: Foot pain         MEDICATIONS:   Current Outpatient Medications   Medication Sig Dispense Refill     phentermine (LOMAIRA) 8 MG tablet Take 1 tablet (8 mg) by mouth every morning (before breakfast) 90 tablet 1     topiramate (TOPAMAX) 50 MG tablet Take 1 tablet (50 mg) by mouth daily 90 tablet 1     clindamycin (CLEOCIN T) 1 % external lotion APPLY TOPICALLY TO FACE TWICE DAILY FOR ACNE       clotrimazole (LOTRIMIN) 1 % external cream Use twice a day for 14 days when signs of yeast in skin folds. 113 g 1     fexofenadine (ALLEGRA) 180 MG tablet Take 180 mg by mouth daily       fluconazole (DIFLUCAN) 150 MG tablet Take 1 tablet (150 mg) by mouth every 72 hours as needed 3 tablet 3     glucosamine sulfate 1000 MG CAPS Take 1 capsule by mouth daily       losartan (COZAAR) 50 MG tablet TAKE 1 TABLET(50 MG) BY MOUTH DAILY 90 tablet 0     multivitamin w/minerals (THERA-VIT-M) tablet Take 1 tablet by mouth daily       nystatin (NYSTOP) 011361 UNIT/GM external powder APPLY TO TO  "THE AFFECTED AREA UNDER BREASTS THREE TIMES DAILY AS NEEDED 60 g 1     omeprazole (PRILOSEC) 20 MG DR capsule TAKE 1 CAPSULE BY MOUTH EVERY DAY 90 capsule 1     rizatriptan (MAXALT-MLT) 5 MG ODT TAKE 1-2 TABLETS BY MOUTH AT ONSET OF HEADACHE MAY REPEAT IN 2 HOURS. MAX OF 6 TABLETS PER 24 HOURS. 36 tablet 0     triamcinolone (KENALOG) 0.1 % external cream APPLY TO BODY RASH TWICE DAILY AS NEEDED.       valACYclovir (VALTREX) 500 MG tablet TAKE 1 TABLET BY MOUTH DAILY. INCREASE TO 4 TABLET BY MOUTH 2 TIMES DAILY AT ONSET OF OUTBREAK 100 tablet 2       Weight Loss Medication History Reviewed With Patient 12/6/2022   Which weight loss medications are you currently taking on a regular basis?  Topamax (topiramate)   If you are not taking a weight loss medication that was prescribed to you, please indicate why: -   Are you having any side effects from the weight loss medication that we have prescribed you? No   If you are having side effects please describe: Nithing new       Lab on 04/13/2022   Component Date Value Ref Range Status     Sodium 04/13/2022 143  133 - 144 mmol/L Final     Potassium 04/13/2022 4.2  3.4 - 5.3 mmol/L Final     Chloride 04/13/2022 111 (H)  94 - 109 mmol/L Final     Carbon Dioxide (CO2) 04/13/2022 22  20 - 32 mmol/L Final     Anion Gap 04/13/2022 10  3 - 14 mmol/L Final     Urea Nitrogen 04/13/2022 7  7 - 30 mg/dL Final     Creatinine 04/13/2022 0.84  0.52 - 1.04 mg/dL Final     Calcium 04/13/2022 10.0  8.5 - 10.1 mg/dL Final     Glucose 04/13/2022 90  70 - 99 mg/dL Final     GFR Estimate 04/13/2022 84  >60 mL/min/1.73m2 Final    Effective December 21, 2021 eGFRcr in adults is calculated using the 2021 CKD-EPI creatinine equation which includes age and gender (Gaurang et al., NEJ, DOI: 10.1056/YQGJrm0910986)       PHYSICAL EXAM:  Objective    Ht 1.626 m (5' 4\")   Wt 117.5 kg (259 lb)   LMP 11/01/2022   BMI 44.46 kg/m             Vitals:  No vitals were obtained today due to virtual " visit.    GENERAL: Healthy, alert and no distress  EYES: Eyes grossly normal to inspection.  No discharge or erythema, or obvious scleral/conjunctival abnormalities.  RESP: No audible wheeze, cough, or visible cyanosis.  No visible retractions or increased work of breathing.    SKIN: Visible skin clear. No significant rash, abnormal pigmentation or lesions.  NEURO: Cranial nerves grossly intact.  Mentation and speech appropriate for age.  PSYCH: Mentation appears normal, affect normal/bright, judgement and insight intact, normal speech and appearance well-groomed.        Sincerely,    Layla Cyr PA-C      10 minutes spent on the date of the encounter doing chart review, history and exam, documentation and further activities per the note

## 2022-12-07 NOTE — NURSING NOTE
Chief Complaint   Patient presents with     Follow Up     Return weight management.         Vitals:    12/07/22 1302   Weight: 259 lb       Body mass index is 44.46 kg/m .      Duran Vanessa, EMT  [facility]

## 2022-12-11 ASSESSMENT — ENCOUNTER SYMPTOMS
HEARTBURN: 1
HEMATOCHEZIA: 0
SHORTNESS OF BREATH: 0
HEADACHES: 1
PARESTHESIAS: 0
FREQUENCY: 0
JOINT SWELLING: 0
EYE PAIN: 0
DIZZINESS: 0
ARTHRALGIAS: 1
MYALGIAS: 0
HEMATURIA: 0
DIARRHEA: 0
COUGH: 0
CHILLS: 0
NERVOUS/ANXIOUS: 0
BREAST MASS: 0
FEVER: 0
DYSURIA: 0
SORE THROAT: 0
WEAKNESS: 0
PALPITATIONS: 0
NAUSEA: 0
ABDOMINAL PAIN: 0
CONSTIPATION: 0

## 2022-12-12 ENCOUNTER — OFFICE VISIT (OUTPATIENT)
Dept: FAMILY MEDICINE | Facility: CLINIC | Age: 52
End: 2022-12-12
Payer: COMMERCIAL

## 2022-12-12 ENCOUNTER — ANCILLARY PROCEDURE (OUTPATIENT)
Dept: MAMMOGRAPHY | Facility: CLINIC | Age: 52
End: 2022-12-12
Attending: FAMILY MEDICINE
Payer: COMMERCIAL

## 2022-12-12 VITALS
DIASTOLIC BLOOD PRESSURE: 86 MMHG | TEMPERATURE: 97.7 F | SYSTOLIC BLOOD PRESSURE: 137 MMHG | BODY MASS INDEX: 48.1 KG/M2 | HEIGHT: 62 IN | OXYGEN SATURATION: 99 % | HEART RATE: 94 BPM | WEIGHT: 261.4 LBS

## 2022-12-12 DIAGNOSIS — N92.1 MENORRHAGIA WITH IRREGULAR CYCLE: ICD-10-CM

## 2022-12-12 DIAGNOSIS — E66.813 CLASS 3 SEVERE OBESITY DUE TO EXCESS CALORIES WITH SERIOUS COMORBIDITY AND BODY MASS INDEX (BMI) OF 45.0 TO 49.9 IN ADULT (H): ICD-10-CM

## 2022-12-12 DIAGNOSIS — K21.9 GASTROESOPHAGEAL REFLUX DISEASE WITHOUT ESOPHAGITIS: ICD-10-CM

## 2022-12-12 DIAGNOSIS — Z12.31 VISIT FOR SCREENING MAMMOGRAM: ICD-10-CM

## 2022-12-12 DIAGNOSIS — E66.01 CLASS 3 SEVERE OBESITY DUE TO EXCESS CALORIES WITH SERIOUS COMORBIDITY AND BODY MASS INDEX (BMI) OF 45.0 TO 49.9 IN ADULT (H): ICD-10-CM

## 2022-12-12 DIAGNOSIS — Z80.3 FAMILY HISTORY OF MALIGNANT NEOPLASM OF BREAST: ICD-10-CM

## 2022-12-12 DIAGNOSIS — Z00.00 ROUTINE GENERAL MEDICAL EXAMINATION AT A HEALTH CARE FACILITY: Primary | ICD-10-CM

## 2022-12-12 DIAGNOSIS — K43.9 VENTRAL HERNIA WITHOUT OBSTRUCTION OR GANGRENE: ICD-10-CM

## 2022-12-12 DIAGNOSIS — G43.909 MIGRAINE WITHOUT STATUS MIGRAINOSUS, NOT INTRACTABLE, UNSPECIFIED MIGRAINE TYPE: ICD-10-CM

## 2022-12-12 DIAGNOSIS — I10 HYPERTENSION GOAL BP (BLOOD PRESSURE) < 140/90: ICD-10-CM

## 2022-12-12 PROCEDURE — 90471 IMMUNIZATION ADMIN: CPT | Performed by: FAMILY MEDICINE

## 2022-12-12 PROCEDURE — 36415 COLL VENOUS BLD VENIPUNCTURE: CPT | Performed by: FAMILY MEDICINE

## 2022-12-12 PROCEDURE — 77063 BREAST TOMOSYNTHESIS BI: CPT | Mod: TC | Performed by: RADIOLOGY

## 2022-12-12 PROCEDURE — 99396 PREV VISIT EST AGE 40-64: CPT | Mod: 25 | Performed by: FAMILY MEDICINE

## 2022-12-12 PROCEDURE — 99213 OFFICE O/P EST LOW 20 MIN: CPT | Mod: 25 | Performed by: FAMILY MEDICINE

## 2022-12-12 PROCEDURE — 91313 COVID-19 VACCINE BIVALENT BOOSTER 18+ (MODERNA): CPT | Performed by: FAMILY MEDICINE

## 2022-12-12 PROCEDURE — 0134A COVID-19 VACCINE BIVALENT BOOSTER 18+ (MODERNA): CPT | Performed by: FAMILY MEDICINE

## 2022-12-12 PROCEDURE — 80048 BASIC METABOLIC PNL TOTAL CA: CPT | Performed by: FAMILY MEDICINE

## 2022-12-12 PROCEDURE — 90750 HZV VACC RECOMBINANT IM: CPT | Performed by: FAMILY MEDICINE

## 2022-12-12 PROCEDURE — 77067 SCR MAMMO BI INCL CAD: CPT | Mod: TC | Performed by: RADIOLOGY

## 2022-12-12 RX ORDER — RIZATRIPTAN BENZOATE 5 MG/1
TABLET, ORALLY DISINTEGRATING ORAL
Qty: 36 TABLET | Refills: 3 | Status: SHIPPED | OUTPATIENT
Start: 2022-12-12 | End: 2023-11-06

## 2022-12-12 RX ORDER — LOSARTAN POTASSIUM 50 MG/1
50 TABLET ORAL DAILY
Qty: 90 TABLET | Refills: 3 | Status: SHIPPED | OUTPATIENT
Start: 2022-12-12 | End: 2023-11-06

## 2022-12-12 ASSESSMENT — ENCOUNTER SYMPTOMS
ABDOMINAL PAIN: 0
FEVER: 0
WEAKNESS: 0
FREQUENCY: 0
NAUSEA: 0
BREAST MASS: 0
PALPITATIONS: 0
DYSURIA: 0
DIZZINESS: 0
COUGH: 0
DIARRHEA: 0
EYE PAIN: 0
CONSTIPATION: 0
SHORTNESS OF BREATH: 0
SORE THROAT: 0
MYALGIAS: 0
HEADACHES: 1
JOINT SWELLING: 0
NERVOUS/ANXIOUS: 0
HEMATURIA: 0
PARESTHESIAS: 0
HEMATOCHEZIA: 0
ARTHRALGIAS: 1
CHILLS: 0
HEARTBURN: 1

## 2022-12-12 NOTE — PROGRESS NOTES
SUBJECTIVE:   CC: Linda is an 52 year old who presents for preventive health visit.     Patient has been advised of split billing requirements and indicates understanding: Yes     Hypertension well controlled on current medications without side effects, chest pain, or dyspnea. Migraines are well controlled and infrequent without medication side effects. Also presents for follow-up of GERD controlled with medication. She also has as an intermittent painless bulge in her left abdomen.     She is followed by gynecology for heavy irregular periods for which an IUD is planned.     Healthy Habits:     Getting at least 3 servings of Calcium per day:  NO    Bi-annual eye exam:  Yes    Dental care twice a year:  Yes    Sleep apnea or symptoms of sleep apnea:  None    Diet:  Low salt and Low fat/cholesterol    Frequency of exercise:  2-3 days/week    Duration of exercise:  15-30 minutes    Taking medications regularly:  Yes    Medication side effects:  None    PHQ-2 Total Score: 0    Additional concerns today:  Yes    Today's PHQ-2 Score:   PHQ-2 (  [employer]) 2022   Q1: Little interest or pleasure in doing things 0   Q2: Feeling down, depressed or hopeless 0   PHQ-2 Score 0   PHQ-2 Total Score (12-17 Years)- Positive if 3 or more points; Administer PHQ-A if positive -   Q1: Little interest or pleasure in doing things Not at all   Q2: Feeling down, depressed or hopeless Not at all   PHQ-2 Score 0       Social History     Tobacco Use     Smoking status: Former     Packs/day: 0.50     Years: 10.00     Pack years: 5.00     Types: Cigarettes     Quit date: 1997     Years since quittin.0     Smokeless tobacco: Never   Substance Use Topics     Alcohol use: Yes     Comment: occasional (infrequent)         Alcohol Use 2022   Prescreen: >3 drinks/day or >7 drinks/week? No   Prescreen: >3 drinks/day or >7 drinks/week? -       Reviewed orders with patient.  Reviewed health maintenance and updated orders  accordingly - Yes  Patient Active Problem List   Diagnosis     GERD (gastroesophageal reflux disease)     Migraines     Yeast infection of the vagina, recurrent      Recurrent cold sores     Class 3 severe obesity due to excess calories with serious comorbidity and body mass index (BMI) of 45.0 to 49.9 in adult (H)     Primary osteoarthritis of right knee     Intertrigo     History of removal of laparoscopic gastric banding device     B12 deficiency     Menorrhagia     History of anemia     Hypertension goal BP (blood pressure) < 140/90     Ventral hernia without obstruction or gangrene     Family history of malignant neoplasm of breast     Past Surgical History:   Procedure Laterality Date     COLPOSCOPY,BX CERVIX/ENDOCERV CURR  1994     ESOPHAGOSCOPY, GASTROSCOPY, DUODENOSCOPY (EGD), COMBINED  2014    Procedure: COMBINED ESOPHAGOSCOPY, GASTROSCOPY, DUODENOSCOPY (EGD);;  Surgeon: Eden Stacy MD;  Location:  GI     ESOPHAGOSCOPY, GASTROSCOPY, DUODENOSCOPY (EGD), COMBINED N/A 2017    Procedure: COMBINED ESOPHAGOSCOPY, GASTROSCOPY, DUODENOSCOPY (EGD);  Surgeon: Ciro Deras MD;  Location: [facility] OR     LAPAROSCOPIC GASTRIC ADJUSTABLE BANDING  2014    Procedure: LAPAROSCOPIC GASTRIC ADJUSTABLE BANDING;  Surgeon: Ciro Deras MD;  Location: [facility] OR     LAPAROSCOPIC HERNIORRHAPHY HIATAL  2014    Procedure: LAPAROSCOPIC HERNIORRHAPHY HIATAL;  Surgeon: Ciro Deras MD;  Location: [facility] OR     LAPAROSCOPIC REMOVAL GASTRIC ADJUSTABLE BAND N/A 2017    Procedure: LAPAROSCOPIC REMOVAL GASTRIC ADJUSTABLE BAND;  Surgeon: Ciro Deras MD;  Location: [facility] OR     LITHOTRIPSY       ZZC TREAT ECTOPIC PREG,RMV TUBE/OVARY      LT       Social History     Tobacco Use     Smoking status: Former     Packs/day: 0.50     Years: 10.00     Pack years: 5.00     Types: Cigarettes     Quit date: 1997     Years since quittin.0     Smokeless tobacco: Never   Substance Use  Topics     Alcohol use: Yes     Comment: occasional (infrequent)     Family History   Problem Relation Age of Onset     Diabetes Mother      C.A.D. Mother      Cancer Father 72        d. pancreatic, melanoma      Colon Polyps Father      Obesity Brother      Breast Cancer Maternal Grandmother      Heart Disease Maternal Grandfather         CHF     Heart Disease Paternal Grandmother         CHF     Colon Polyps Paternal Grandmother      Respiratory Paternal Grandfather         TB     Breast Cancer Maternal Aunt      Glaucoma No family hx of      Macular Degeneration No family hx of      Ovarian Cancer No family hx of          Current Outpatient Medications   Medication Sig Dispense Refill     clindamycin (CLEOCIN T) 1 % external lotion APPLY TOPICALLY TO FACE TWICE DAILY FOR ACNE       clotrimazole (LOTRIMIN) 1 % external cream Use twice a day for 14 days when signs of yeast in skin folds. 113 g 1     fexofenadine (ALLEGRA) 180 MG tablet Take 180 mg by mouth as needed       fluconazole (DIFLUCAN) 150 MG tablet Take 1 tablet (150 mg) by mouth every 72 hours as needed 3 tablet 3     losartan (COZAAR) 50 MG tablet Take 1 tablet (50 mg) by mouth daily 90 tablet 3     nystatin (NYSTOP) 369776 UNIT/GM external powder APPLY TO TO THE AFFECTED AREA UNDER BREASTS THREE TIMES DAILY AS NEEDED 60 g 1     omeprazole (PRILOSEC) 20 MG DR capsule Take 1 capsule (20 mg) by mouth daily 90 capsule 3     phentermine (LOMAIRA) 8 MG tablet Take 1 tablet (8 mg) by mouth every morning (before breakfast) 90 tablet 1     rizatriptan (MAXALT-MLT) 5 MG ODT TAKE 1-2 TABLETS BY MOUTH AT ONSET OF HEADACHE MAY REPEAT IN 2 HOURS. MAX OF 6 TABLETS PER 24 HOURS 36 tablet 3     topiramate (TOPAMAX) 50 MG tablet Take 1 tablet (50 mg) by mouth daily 90 tablet 1     triamcinolone (KENALOG) 0.1 % external cream APPLY TO BODY RASH TWICE DAILY AS NEEDED.       valACYclovir (VALTREX) 500 MG tablet TAKE 1 TABLET BY MOUTH DAILY. INCREASE TO 4 TABLET BY MOUTH 2  TIMES DAILY AT ONSET OF OUTBREAK 100 tablet 2     glucosamine sulfate 1000 MG CAPS Take 1 capsule by mouth daily (Patient not taking: Reported on 12/12/2022)       multivitamin w/minerals (THERA-VIT-M) tablet Take 1 tablet by mouth daily (Patient not taking: Reported on 12/12/2022)       Allergies   Allergen Reactions     Doxycycline Rash     Ampicillin Swelling              Breast Cancer Screening:    FHS-7:   Breast CA Risk Assessment (FHS-7) 9/3/2021 11/24/2021 12/11/2022 12/12/2022   Did any of your first-degree relatives have breast or ovarian cancer? No No No No   Did any of your relatives have bilateral breast cancer? No No No No   Did any man in your family have breast cancer? No No No No   Did any woman in your family have breast and ovarian cancer? No No No No   Did any woman in your family have breast cancer before age 50 y? No No No No   Do you have 2 or more relatives with breast and/or ovarian cancer? No No Yes No   Do you have 2 or more relatives with breast and/or bowel cancer? No No Yes No        Mammogram Screening: Recommended annual mammography  Pertinent mammograms are reviewed under the imaging tab.    History of abnormal Pap smear: NO - age 30-65 PAP every 5 years with negative HPV co-testing recommended  PAP / HPV Latest Ref Rng & Units 10/24/2018 7/17/2014 6/20/2011   PAP (Historical) - OTHER-NIL, See Result NIL NIL   HPV16 NEG:Negative Negative - -   HPV18 NEG:Negative Negative - -   HRHPV NEG:Negative Negative - -     Reviewed and updated as needed this visit by clinical staff   Tobacco  Allergies  Meds  Problems  Med Hx  Surg Hx  Fam Hx          Reviewed and updated as needed this visit by Provider   Tobacco  Allergies  Meds  Problems  Med Hx  Surg Hx  Fam Hx         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   HENT: Negative for congestion, ear pain, hearing loss and sore throat.    Eyes: Negative for pain and visual disturbance.   Respiratory: Negative for cough and shortness of breath.    Cardiovascular: Negative for chest pain, palpitations and peripheral edema.   Gastrointestinal: Positive for heartburn. Negative for abdominal pain, constipation, diarrhea, hematochezia and nausea.   Breasts:  Negative for tenderness, breast mass and discharge.   Genitourinary: Negative for dysuria, frequency, genital sores, hematuria, pelvic pain, urgency, vaginal bleeding and vaginal discharge.   Musculoskeletal: Positive for arthralgias. Negative for joint swelling and myalgias.   Skin: Negative for rash.   Neurological: Positive for headaches. Negative for dizziness, weakness and paresthesias.   Psychiatric/Behavioral: Negative for mood changes. The patient is not nervous/anxious.           OBJECTIVE:   /86 (BP Location: Right arm, Patient Position: Sitting, Cuff Size: Adult Large)   Pulse 94   Temp 97.7  F (36.5  C) (Oral)   Ht 1.582 m (5' 2.28\")   Wt 118.6 kg (261 lb 6.4 oz)   LMP 11/01/2022   SpO2 99%   BMI 47.38 kg/m    Physical Exam  GENERAL: alert, no distress and obese  EYES: Eyes grossly normal to inspection, PERRL and conjunctivae and sclerae normal  HENT: ear canals and TM's normal, nose and mouth without ulcers or lesions  NECK: no adenopathy, no asymmetry, masses, or scars and thyroid normal to palpation  RESP: lungs clear to auscultation - no rales, rhonchi or wheezes  CV: regular rate and rhythm, normal S1 S2, no S3 or S4, no " murmur, click or rub, no peripheral edema    ABDOMEN: soft, nontender, no organomegaly or masses, bowel sounds normal and hernia 3 cm reducible left of midline   MS: no gross musculoskeletal defects noted, no edema  SKIN: no suspicious lesions or rashes  NEURO: Normal strength and tone, mentation intact and speech normal  PSYCH: mentation appears normal, affect normal/bright    Diagnostic Test Results:  Labs reviewed in Epic    ASSESSMENT/PLAN:   (Z00.00) Routine general medical examination at a health care facility  (primary encounter diagnosis)    (I10) Hypertension goal BP (blood pressure) < 140/90  Comment: Well controlled with medications without side effects.   Plan: losartan (COZAAR) 50 MG tablet, Basic metabolic        panel  (Ca, Cl, CO2, Creat, Gluc, K, Na, BUN),         OFFICE/OUTPT VISIT,EST,LEVL IV          (E66.01,  Z68.42) Class 3 severe obesity due to excess calories with serious comorbidity and body mass index (BMI) of 45.0 to 49.9 in adult (H)  Plan: OFFICE/OUTPT VISIT,EST,LEVL IV        Counseled to make better food choices, exercise as tolerated, and lose weight.     (G43.909) Migraine without status migrainosus, not intractable, unspecified migraine type  Comment: Well controlled with medications without side effects.   Plan: rizatriptan (MAXALT-MLT) 5 MG ODT, OFFICE/OUTPT        VISIT,EST,LEVL IV          (K21.9) Gastroesophageal reflux disease without esophagitis  Plan: omeprazole (PRILOSEC) 20 MG DR capsule,         OFFICE/OUTPT VISIT,EST,LEVL IV          (N92.1) Menorrhagia with irregular cycle  Plan: Mirena IUD insertion planned with ob gyne     (K43.9) Ventral hernia without obstruction or gangrene  Plan: Adult General Surg Referral          (Z80.3) Family history of malignant neoplasm of breast  Plan: Cancer Risk Mgmt/Cancer Genetic Counseling         Referral            Patient has been advised of split billing requirements and indicates understanding: Yes      COUNSELING:  Reviewed  "preventive health counseling, as reflected in patient instructions  Special attention given to:        Regular exercise       Healthy diet/nutrition       Osteoporosis prevention/bone health       Colorectal Cancer Screening       Consider Hep C screening for all patients one time for ages 18-79 years       HIV screeninx in teen years, 1x in adult years, and at intervals if high risk       (Nichelle)menopause management       The 10-year ASCVD risk score (Helen IVERSON, et al., 2019) is: 2.2%    Values used to calculate the score:      Age: 52 years      Sex: Female      Is Non- : No      Diabetic: No      Tobacco smoker: No      Systolic Blood Pressure: 137 mmHg      Is BP treated: Yes      HDL Cholesterol: 52 mg/dL      Total Cholesterol: 184 mg/dL      BMI:   Estimated body mass index is 47.38 kg/m  as calculated from the following:    Height as of this encounter: 1.582 m (5' 2.28\").    Weight as of this encounter: 118.6 kg (261 lb 6.4 oz).   Weight management plan: Discussed healthy diet and exercise guidelines      She reports that she quit smoking about 25 years ago. Her smoking use included cigarettes. She has a 5.00 pack-year smoking history. She has never used smokeless tobacco.          Fernanda Rodriguez MD  [facility]  "

## 2022-12-13 LAB
ANION GAP SERPL CALCULATED.3IONS-SCNC: 5 MMOL/L (ref 3–14)
BUN SERPL-MCNC: 7 MG/DL (ref 7–30)
CALCIUM SERPL-MCNC: 9.9 MG/DL (ref 8.5–10.1)
CHLORIDE BLD-SCNC: 107 MMOL/L (ref 94–109)
CO2 SERPL-SCNC: 27 MMOL/L (ref 20–32)
CREAT SERPL-MCNC: 0.84 MG/DL (ref 0.52–1.04)
GFR SERPL CREATININE-BSD FRML MDRD: 83 ML/MIN/1.73M2
GLUCOSE BLD-MCNC: 93 MG/DL (ref 70–99)
POTASSIUM BLD-SCNC: 4.1 MMOL/L (ref 3.4–5.3)
SODIUM SERPL-SCNC: 139 MMOL/L (ref 133–144)

## 2023-01-02 ENCOUNTER — OFFICE VISIT (OUTPATIENT)
Dept: SURGERY | Facility: CLINIC | Age: 53
End: 2023-01-02
Payer: COMMERCIAL

## 2023-01-02 VITALS
WEIGHT: 261 LBS | SYSTOLIC BLOOD PRESSURE: 134 MMHG | BODY MASS INDEX: 47.3 KG/M2 | DIASTOLIC BLOOD PRESSURE: 84 MMHG | HEART RATE: 101 BPM

## 2023-01-02 DIAGNOSIS — K43.9 VENTRAL HERNIA WITHOUT OBSTRUCTION OR GANGRENE: Primary | ICD-10-CM

## 2023-01-02 PROCEDURE — 99203 OFFICE O/P NEW LOW 30 MIN: CPT | Performed by: SURGERY

## 2023-01-02 NOTE — PROGRESS NOTES
Patient seen in consultation for ventral hernia by Fernanda Rodriguez    HPI:  Patient is a 52 year old female  with complaints of lump in LUQ  The patient noticed the symptoms about 6 months ago.    History of lap band 2014 and then was removed in 2017. Was told there was a hernia repair also at the time but not known where that was as she did not feel it - Old OR note located and was small hiatal hernia with Dr Deras  Not painful. Sometimes is there, sometimes is not  nothing makes the episode better.  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     GERD (gastroesophageal reflux disease)      Hypertension goal BP (blood pressure) < 140/90      Iron deficiency anemia      Menorrhagia      Migraine      Morbid obesity (H)      Nonallergic rhinitis 4/17/2019     Recurrent cold sores      Vulvar dermatitis 10/14/2010     Yeast infection of the vagina, recurrent         Past Surgical History:   Procedure Laterality Date     COLPOSCOPY,BX CERVIX/ENDOCERV CURR  7/1994     ESOPHAGOSCOPY, GASTROSCOPY, DUODENOSCOPY (EGD), COMBINED  1/2/2014    Procedure: COMBINED ESOPHAGOSCOPY, GASTROSCOPY, DUODENOSCOPY (EGD);;  Surgeon: Eden Stacy MD;  Location:  GI     ESOPHAGOSCOPY, GASTROSCOPY, DUODENOSCOPY (EGD), COMBINED N/A 1/19/2017    Procedure: COMBINED ESOPHAGOSCOPY, GASTROSCOPY, DUODENOSCOPY (EGD);  Surgeon: Ciro Deras MD;  Location:  OR      LAPAROSCOPIC GASTRIC ADJUSTABLE BANDING  2014    Procedure: LAPAROSCOPIC GASTRIC ADJUSTABLE BANDING;  Surgeon: Ciro Deras MD;  Location: [facility]     LAPAROSCOPIC HERNIORRHAPHY HIATAL  2014    Procedure: LAPAROSCOPIC HERNIORRHAPHY HIATAL;  Surgeon: Ciro Deras MD;  Location: [facility]     LAPAROSCOPIC REMOVAL GASTRIC ADJUSTABLE BAND N/A 2017    Procedure: LAPAROSCOPIC REMOVAL GASTRIC ADJUSTABLE BAND;  Surgeon: Ciro Deras MD;  Location: [facility]     LITHOTRIPSY       ZZC TREAT ECTOPIC PREG,RMV TUBE/OVARY      LT       Social History     Socioeconomic History     Marital status:      Spouse name: [facility]     Number of children: 2     Years of education: 16     Highest education level: Not on file   Occupational History     Occupation: RN     Employer: [employer]   Tobacco Use     Smoking status: Former     Packs/day: 0.50     Years: 10.00     Pack years: 5.00     Types: Cigarettes     Quit date: 1997     Years since quittin.1     Smokeless tobacco: Never   Vaping Use     Vaping Use: Never used   Substance and Sexual Activity     Alcohol use: Yes     Comment: occasional (infrequent)     Drug use: No     Sexual activity: Yes     Partners: Male     Birth control/protection: None   Other Topics Concern     Parent/sibling w/ CABG, MI or angioplasty before 65F 55M? No      Service No     Blood Transfusions No     Caffeine Concern No     Occupational Exposure Yes     Comment: nurse     Hobby Hazards No     Sleep Concern No     Stress Concern No     Weight Concern No     Special Diet No     Back Care No     Exercise Yes     Bike Helmet Yes     Seat Belt Yes     Self-Exams Yes   Social History Narrative     Not on file     Social Determinants of Health     Financial Resource Strain: Not on file   Food Insecurity: Not on file   Transportation Needs: Not on file   Physical Activity: Not on file   Stress: Not on file   Social Connections: Not on file    Intimate Partner Violence: Not on file   Housing Stability: Not on file       Current Outpatient Medications   Medication Sig Dispense Refill     clindamycin (CLEOCIN T) 1 % external lotion APPLY TOPICALLY TO FACE TWICE DAILY FOR ACNE       clotrimazole (LOTRIMIN) 1 % external cream Use twice a day for 14 days when signs of yeast in skin folds. 113 g 1     fexofenadine (ALLEGRA) 180 MG tablet Take 180 mg by mouth as needed       fluconazole (DIFLUCAN) 150 MG tablet Take 1 tablet (150 mg) by mouth every 72 hours as needed 3 tablet 3     glucosamine sulfate 1000 MG CAPS Take 1 capsule by mouth daily (Patient not taking: Reported on 12/12/2022)       losartan (COZAAR) 50 MG tablet Take 1 tablet (50 mg) by mouth daily 90 tablet 3     multivitamin w/minerals (THERA-VIT-M) tablet Take 1 tablet by mouth daily (Patient not taking: Reported on 12/12/2022)       nystatin (NYSTOP) 275770 UNIT/GM external powder APPLY TO TO THE AFFECTED AREA UNDER BREASTS THREE TIMES DAILY AS NEEDED 60 g 1     omeprazole (PRILOSEC) 20 MG DR capsule Take 1 capsule (20 mg) by mouth daily 90 capsule 3     phentermine (LOMAIRA) 8 MG tablet Take 1 tablet (8 mg) by mouth every morning (before breakfast) 90 tablet 1     rizatriptan (MAXALT-MLT) 5 MG ODT TAKE 1-2 TABLETS BY MOUTH AT ONSET OF HEADACHE MAY REPEAT IN 2 HOURS. MAX OF 6 TABLETS PER 24 HOURS 36 tablet 3     topiramate (TOPAMAX) 50 MG tablet Take 1 tablet (50 mg) by mouth daily 90 tablet 1     triamcinolone (KENALOG) 0.1 % external cream APPLY TO BODY RASH TWICE DAILY AS NEEDED.       valACYclovir (VALTREX) 500 MG tablet TAKE 1 TABLET BY MOUTH DAILY. INCREASE TO 4 TABLET BY MOUTH 2 TIMES DAILY AT ONSET OF OUTBREAK 100 tablet 2       Medications and history reviewed    Physical exam:  Vitals: /84   Pulse 101   Wt 118.4 kg (261 lb)   BMI 47.30 kg/m    BMI= Body mass index is 47.3 kg/m .    Constitutional: healthy, alert and no distress  Head: Normocephalic. No masses, lesions,  tenderness or abnormalities  Gastrointestinal: positive findings: obese, approximately 4 cm scar left of umbilicus where previous lap band port wounds.  Superior to this and to left of midline in the left upper quadrant is approximately fist size bulge noticed with patient set up for strain.  Unable to feel any fascial defect however  : Deferred  Musculoskeletal: extremities normal- no gross deformities noted, gait normal and normal muscle tone  Skin: no suspicious lesions or rashes  Psychiatric: mentation appears normal and affect normal/bright      Assessment:     ICD-10-CM    1. Ventral hernia without obstruction or gangrene  K43.9 Adult General Surg Referral     CT Abdomen Pelvis w/o Contrast        Plan: Appearance is suspicious for hernia though unable to feel a fascial defect.  Some kind of localized weakness in the abdominal wall may also cause a protrusion.  This is off midline so unlikely diastases.  This is asymptomatic.  We will check CT to get better idea of anatomy/diagnosis  Did discuss that if we end up pursuing hernia repair would need to work on weight loss first.  She might consider pursuing bariatric surgery again, otherwise can work on weight loss on her own and we can recheck in about 3 months to see how that is going.  We will let her know CT results once that is completed.    Kenneth Deutsch MD

## 2023-01-02 NOTE — LETTER
1/2/2023         RE: Elaine Awad  [address] [facility] [address]        Dear Colleague,    Thank you for referring your patient, Elaine Awad, to the [facility]. Please see a copy of my visit note below.    Patient seen in consultation for ventral hernia by Fernanda Rodriguez    HPI:  Patient is a 52 year old female  with complaints of lump in LUQ  The patient noticed the symptoms about 6 months ago.    History of lap band 2014 and then was removed in 2017. Was told there was a hernia repair also at the time but not known where that was as she did not feel it - Old OR note located and was small hiatal hernia with Dr Deras  Not painful. Sometimes is there, sometimes is not  nothing makes the episode better.  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     GERD (gastroesophageal reflux disease)      Hypertension goal BP (blood pressure) < 140/90      Iron deficiency anemia      Menorrhagia      Migraine      Morbid obesity (H)      Nonallergic rhinitis 4/17/2019     Recurrent cold sores      Vulvar dermatitis 10/14/2010     Yeast infection of the vagina, recurrent         Past Surgical History:   Procedure Laterality Date     COLPOSCOPY,BX CERVIX/ENDOCERV CURR  7/1994     ESOPHAGOSCOPY, GASTROSCOPY, DUODENOSCOPY (EGD), COMBINED  1/2/2014    Procedure: COMBINED ESOPHAGOSCOPY, GASTROSCOPY, DUODENOSCOPY (EGD);;  Surgeon:  Eden Stacy MD;  Location: [facility]     ESOPHAGOSCOPY, GASTROSCOPY, DUODENOSCOPY (EGD), COMBINED N/A 2017    Procedure: COMBINED ESOPHAGOSCOPY, GASTROSCOPY, DUODENOSCOPY (EGD);  Surgeon: Ciro Deras MD;  Location: [facility]     LAPAROSCOPIC GASTRIC ADJUSTABLE BANDING  2014    Procedure: LAPAROSCOPIC GASTRIC ADJUSTABLE BANDING;  Surgeon: Ciro Deras MD;  Location: [facility]     LAPAROSCOPIC HERNIORRHAPHY HIATAL  2014    Procedure: LAPAROSCOPIC HERNIORRHAPHY HIATAL;  Surgeon: Ciro Deras MD;  Location: [facility]     LAPAROSCOPIC REMOVAL GASTRIC ADJUSTABLE BAND N/A 2017    Procedure: LAPAROSCOPIC REMOVAL GASTRIC ADJUSTABLE BAND;  Surgeon: Ciro Deras MD;  Location: [facility]     LITHOTRIPSY       ZZC TREAT ECTOPIC PREG,RMV TUBE/OVARY      LT       Social History     Socioeconomic History     Marital status:      Spouse name: [facility]     Number of children: 2     Years of education: 16     Highest education level: Not on file   Occupational History     Occupation: RN     Employer: [employer]   Tobacco Use     Smoking status: Former     Packs/day: 0.50     Years: 10.00     Pack years: 5.00     Types: Cigarettes     Quit date: 1997     Years since quittin.1     Smokeless tobacco: Never   Vaping Use     Vaping Use: Never used   Substance and Sexual Activity     Alcohol use: Yes     Comment: occasional (infrequent)     Drug use: No     Sexual activity: Yes     Partners: Male     Birth control/protection: None   Other Topics Concern     Parent/sibling w/ CABG, MI or angioplasty before 65F 55M? No      Service No     Blood Transfusions No     Caffeine Concern No     Occupational Exposure Yes     Comment: nurse     Hobby Hazards No     Sleep Concern No     Stress Concern No     Weight Concern No     Special Diet No     Back Care No     Exercise Yes     Bike Helmet Yes     Seat Belt Yes     Self-Exams Yes   Social History Narrative     Not  on file     Social Determinants of Health     Financial Resource Strain: Not on file   Food Insecurity: Not on file   Transportation Needs: Not on file   Physical Activity: Not on file   Stress: Not on file   Social Connections: Not on file   Intimate Partner Violence: Not on file   Housing Stability: Not on file       Current Outpatient Medications   Medication Sig Dispense Refill     clindamycin (CLEOCIN T) 1 % external lotion APPLY TOPICALLY TO FACE TWICE DAILY FOR ACNE       clotrimazole (LOTRIMIN) 1 % external cream Use twice a day for 14 days when signs of yeast in skin folds. 113 g 1     fexofenadine (ALLEGRA) 180 MG tablet Take 180 mg by mouth as needed       fluconazole (DIFLUCAN) 150 MG tablet Take 1 tablet (150 mg) by mouth every 72 hours as needed 3 tablet 3     glucosamine sulfate 1000 MG CAPS Take 1 capsule by mouth daily (Patient not taking: Reported on 12/12/2022)       losartan (COZAAR) 50 MG tablet Take 1 tablet (50 mg) by mouth daily 90 tablet 3     multivitamin w/minerals (THERA-VIT-M) tablet Take 1 tablet by mouth daily (Patient not taking: Reported on 12/12/2022)       nystatin (NYSTOP) 746360 UNIT/GM external powder APPLY TO TO THE AFFECTED AREA UNDER BREASTS THREE TIMES DAILY AS NEEDED 60 g 1     omeprazole (PRILOSEC) 20 MG DR capsule Take 1 capsule (20 mg) by mouth daily 90 capsule 3     phentermine (LOMAIRA) 8 MG tablet Take 1 tablet (8 mg) by mouth every morning (before breakfast) 90 tablet 1     rizatriptan (MAXALT-MLT) 5 MG ODT TAKE 1-2 TABLETS BY MOUTH AT ONSET OF HEADACHE MAY REPEAT IN 2 HOURS. MAX OF 6 TABLETS PER 24 HOURS 36 tablet 3     topiramate (TOPAMAX) 50 MG tablet Take 1 tablet (50 mg) by mouth daily 90 tablet 1     triamcinolone (KENALOG) 0.1 % external cream APPLY TO BODY RASH TWICE DAILY AS NEEDED.       valACYclovir (VALTREX) 500 MG tablet TAKE 1 TABLET BY MOUTH DAILY. INCREASE TO 4 TABLET BY MOUTH 2 TIMES DAILY AT ONSET OF OUTBREAK 100 tablet 2       Medications and  history reviewed    Physical exam:  Vitals: /84   Pulse 101   Wt 118.4 kg (261 lb)   BMI 47.30 kg/m    BMI= Body mass index is 47.3 kg/m .    Constitutional: healthy, alert and no distress  Head: Normocephalic. No masses, lesions, tenderness or abnormalities  Gastrointestinal: positive findings: obese, approximately 4 cm scar left of umbilicus where previous lap band port wounds.  Superior to this and to left of midline in the left upper quadrant is approximately fist size bulge noticed with patient set up for strain.  Unable to feel any fascial defect however  : Deferred  Musculoskeletal: extremities normal- no gross deformities noted, gait normal and normal muscle tone  Skin: no suspicious lesions or rashes  Psychiatric: mentation appears normal and affect normal/bright      Assessment:     ICD-10-CM    1. Ventral hernia without obstruction or gangrene  K43.9 Adult General Surg Referral     CT Abdomen Pelvis w/o Contrast        Plan: Appearance is suspicious for hernia though unable to feel a fascial defect.  Some kind of localized weakness in the abdominal wall may also cause a protrusion.  This is off midline so unlikely diastases.  This is asymptomatic.  We will check CT to get better idea of anatomy/diagnosis  Did discuss that if we end up pursuing hernia repair would need to work on weight loss first.  She might consider pursuing bariatric surgery again, otherwise can work on weight loss on her own and we can recheck in about 3 months to see how that is going.  We will let her know CT results once that is completed.    Kenneth Deutsch MD        Again, thank you for allowing me to participate in the care of your patient.        Sincerely,        Kenneth Deutsch MD

## 2023-01-16 ENCOUNTER — ANCILLARY PROCEDURE (OUTPATIENT)
Dept: CT IMAGING | Facility: CLINIC | Age: 53
End: 2023-01-16
Attending: SURGERY
Payer: COMMERCIAL

## 2023-01-16 DIAGNOSIS — K43.9 VENTRAL HERNIA WITHOUT OBSTRUCTION OR GANGRENE: ICD-10-CM

## 2023-01-16 PROCEDURE — 74176 CT ABD & PELVIS W/O CONTRAST: CPT | Mod: TC | Performed by: RADIOLOGY

## 2023-01-17 ENCOUNTER — TELEPHONE (OUTPATIENT)
Dept: SURGERY | Facility: CLINIC | Age: 53
End: 2023-01-17
Payer: COMMERCIAL

## 2023-01-17 DIAGNOSIS — B00.1 RECURRENT COLD SORES: ICD-10-CM

## 2023-01-17 RX ORDER — VALACYCLOVIR HYDROCHLORIDE 500 MG/1
TABLET, FILM COATED ORAL
Qty: 100 TABLET | Refills: 0 | Status: SHIPPED | OUTPATIENT
Start: 2023-01-17 | End: 2023-11-06

## 2023-01-17 NOTE — TELEPHONE ENCOUNTER
Want BMI under 40, not sure what that would calculate as for lbs. When we were in clinic and talked about it she mentioned maybe looking into bariatric surgery

## 2023-01-22 DIAGNOSIS — H10.9 CONJUNCTIVITIS OF BOTH EYES, UNSPECIFIED CONJUNCTIVITIS TYPE: ICD-10-CM

## 2023-01-23 RX ORDER — OLOPATADINE HYDROCHLORIDE 1 MG/ML
SOLUTION/ DROPS OPHTHALMIC
OUTPATIENT
Start: 2023-01-23

## 2023-01-23 NOTE — TELEPHONE ENCOUNTER
Rx given one year ago for conjunctivitis     Refused Prescriptions:                       Disp   Refills    olopatadine (PATANOL) 0.1 % ophthalmic sol*                Sig: INSTILL 1 DROP IN BOTH EYES TWICE DAILY  Refused By: ANABELA ESQUIVEL  Reason for Refusal: Patient should contact provider first

## 2023-02-02 ENCOUNTER — OFFICE VISIT (OUTPATIENT)
Dept: OBGYN | Facility: CLINIC | Age: 53
End: 2023-02-02
Payer: COMMERCIAL

## 2023-02-02 VITALS
HEART RATE: 106 BPM | BODY MASS INDEX: 47.67 KG/M2 | WEIGHT: 263 LBS | SYSTOLIC BLOOD PRESSURE: 140 MMHG | DIASTOLIC BLOOD PRESSURE: 88 MMHG | TEMPERATURE: 97.4 F

## 2023-02-02 DIAGNOSIS — N83.201 BILATERAL OVARIAN CYSTS: ICD-10-CM

## 2023-02-02 DIAGNOSIS — Z30.430 ENCOUNTER FOR INSERTION OF INTRAUTERINE CONTRACEPTIVE DEVICE: Primary | ICD-10-CM

## 2023-02-02 DIAGNOSIS — N83.202 BILATERAL OVARIAN CYSTS: ICD-10-CM

## 2023-02-02 DIAGNOSIS — Z12.4 SCREENING FOR MALIGNANT NEOPLASM OF CERVIX: ICD-10-CM

## 2023-02-02 PROCEDURE — G0145 SCR C/V CYTO,THINLAYER,RESCR: HCPCS | Performed by: OBSTETRICS & GYNECOLOGY

## 2023-02-02 PROCEDURE — 99213 OFFICE O/P EST LOW 20 MIN: CPT | Mod: 25 | Performed by: OBSTETRICS & GYNECOLOGY

## 2023-02-02 PROCEDURE — 58300 INSERT INTRAUTERINE DEVICE: CPT | Performed by: OBSTETRICS & GYNECOLOGY

## 2023-02-02 PROCEDURE — 87624 HPV HI-RISK TYP POOLED RSLT: CPT | Performed by: OBSTETRICS & GYNECOLOGY

## 2023-02-02 NOTE — PROGRESS NOTES
Assessment & Plan     Encounter for insertion of intrauterine contraceptive device  Inserted without difficulty  - levonorgestrel (MIRENA) 20 MCG/DAY IUD; 1 each (20 mcg) by Intrauterine route once  - levonorgestrel (MIRENA) 20 MCG/DAY IUD 20 mcg  - INSERTION INTRAUTERINE DEVICE    Bilateral ovarian cysts  Discussed CT scan not ideal imaging for ovaries  Will get pelvic ultrasound to evaluate possible dermoid  - US Transvaginal Pelvic Non-OB; Future    Screening for malignant neoplasm of cervix  - Pap imaged thin layer screen with HPV - recommended age 30 - 65 years (select HPV order below)  - HPV Hold (Lab Only)  - HPV High Risk Types DNA Cervical    Review of the result(s) of each unique test - CT scan  Ordering of each unique test       No follow-ups on file.    Maral Mathur MD  [facility]    Teodora Mckeon is a 52 year old presenting for the following health issues:  IUD (Insert /)      HPI   Presents for IUD insertion for heavy periods.   Also recently had imaging that showed ovarian cysts and is wondering what to do about that.       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     GERD (gastroesophageal reflux disease)      Hypertension goal BP (blood pressure) < 140/90      Iron deficiency anemia      Menorrhagia      Migraine      Morbid obesity (H)      Nonallergic rhinitis 4/17/2019     Recurrent cold sores      Vulvar dermatitis 10/14/2010     Yeast infection of the vagina, recurrent         Past Surgical History:   Procedure Laterality Date     COLPOSCOPY,BX CERVIX/ENDOCERV CURR  7/1994     ESOPHAGOSCOPY, GASTROSCOPY, DUODENOSCOPY (EGD), COMBINED  1/2/2014    Procedure: COMBINED ESOPHAGOSCOPY, GASTROSCOPY, DUODENOSCOPY (EGD);;  Surgeon: Eden Stacy MD;  Location:  GI     ESOPHAGOSCOPY, GASTROSCOPY,  DUODENOSCOPY (EGD), COMBINED N/A 2017    Procedure: COMBINED ESOPHAGOSCOPY, GASTROSCOPY, DUODENOSCOPY (EGD);  Surgeon: Ciro Deras MD;  Location: [facility]     LAPAROSCOPIC GASTRIC ADJUSTABLE BANDING  2014    Procedure: LAPAROSCOPIC GASTRIC ADJUSTABLE BANDING;  Surgeon: Ciro Deras MD;  Location: [facility]     LAPAROSCOPIC HERNIORRHAPHY HIATAL  2014    Procedure: LAPAROSCOPIC HERNIORRHAPHY HIATAL;  Surgeon: Ciro Deras MD;  Location: [facility]     LAPAROSCOPIC REMOVAL GASTRIC ADJUSTABLE BAND N/A 2017    Procedure: LAPAROSCOPIC REMOVAL GASTRIC ADJUSTABLE BAND;  Surgeon: Ciro Deras MD;  Location: [facility] OR     LITHOTRIPSY       ZZC TREAT ECTOPIC PREG,RMV TUBE/OVARY      LT       Family History   Problem Relation Age of Onset     Diabetes Mother      C.A.D. Mother      Cancer Father 72        d. pancreatic, melanoma      Colon Polyps Father      Obesity Brother      Breast Cancer Maternal Grandmother      Heart Disease Maternal Grandfather         CHF     Heart Disease Paternal Grandmother         CHF     Colon Polyps Paternal Grandmother      Respiratory Paternal Grandfather         TB     Breast Cancer Maternal Aunt      Glaucoma No family hx of      Macular Degeneration No family hx of      Ovarian Cancer No family hx of        Social History     Socioeconomic History     Marital status:      Spouse name: [facility]     Number of children: 2     Years of education: 16     Highest education level: Not on file   Occupational History     Occupation: RN     Employer: [employer]   Tobacco Use     Smoking status: Former     Packs/day: 0.50     Years: 10.00     Pack years: 5.00     Types: Cigarettes     Quit date: 1997     Years since quittin.2     Smokeless tobacco: Never   Vaping Use     Vaping Use: Never used   Substance and Sexual Activity     Alcohol use: Yes     Comment: occasional (infrequent)     Drug use: No     Sexual activity: Yes     Partners:  Male     Birth control/protection: None   Other Topics Concern     Parent/sibling w/ CABG, MI or angioplasty before 65F 55M? No      Service No     Blood Transfusions No     Caffeine Concern No     Occupational Exposure Yes     Comment: nurse     Hobby Hazards No     Sleep Concern No     Stress Concern No     Weight Concern No     Special Diet No     Back Care No     Exercise Yes     Bike Helmet Yes     Seat Belt Yes     Self-Exams Yes   Social History Narrative     Not on file     Social Determinants of Health     Financial Resource Strain: Not on file   Food Insecurity: Not on file   Transportation Needs: Not on file   Physical Activity: Not on file   Stress: Not on file   Social Connections: Not on file   Intimate Partner Violence: Not on file   Housing Stability: Not on file       Current Outpatient Medications   Medication     clindamycin (CLEOCIN T) 1 % external lotion     clotrimazole (LOTRIMIN) 1 % external cream     fexofenadine (ALLEGRA) 180 MG tablet     fluconazole (DIFLUCAN) 150 MG tablet     levonorgestrel (MIRENA) 20 MCG/DAY IUD     losartan (COZAAR) 50 MG tablet     nystatin (NYSTOP) 982005 UNIT/GM external powder     omeprazole (PRILOSEC) 20 MG DR capsule     phentermine (LOMAIRA) 8 MG tablet     rizatriptan (MAXALT-MLT) 5 MG ODT     topiramate (TOPAMAX) 50 MG tablet     triamcinolone (KENALOG) 0.1 % external cream     valACYclovir (VALTREX) 500 MG tablet     No current facility-administered medications for this visit.          Allergies   Allergen Reactions     Doxycycline Rash     Ampicillin Swelling                Review of Systems   Constitutional, HEENT, cardiovascular, pulmonary, gi and gu systems are negative, except as otherwise noted.      Objective    BP (!) 140/88   Pulse 106   Temp 97.4  F (36.3  C)   Wt 119.3 kg (263 lb)   BMI 47.67 kg/m    Body mass index is 47.67 kg/m .  Physical Exam   GENERAL: healthy, alert and no distress  ABDOMEN: soft, nontender, no  hepatosplenomegaly, no masses and bowel sounds normal   (female): normal female external genitalia, normal urethral meatus, vaginal mucosa, normal cervix/adnexa/uterus without masses or discharge  MS: no gross musculoskeletal defects noted, no edema  PSYCH: mentation appears normal, affect normal/bright    CT ABDOMEN PELVIS W/O CONTRAST 1/16/2023 10:26 AM     CLINICAL HISTORY: Ventral hernia without obstruction or gangrene  TECHNIQUE: CT scan of the abdomen and pelvis was performed without IV  contrast. Multiplanar reformats were obtained. Dose reduction  techniques were used.  CONTRAST: None.     COMPARISON: Pelvic ultrasound 12/10/2019     FINDINGS:   LOWER CHEST: Normal.     HEPATOBILIARY: Normal.     PANCREAS: Normal.     SPLEEN: Normal.     ADRENAL GLANDS: Normal.     KIDNEYS/BLADDER: Exophytic from the anterior left kidney is a 1.3 cm  low-density lesion that measures 33 Hounsfield units. No stones or  hydronephrosis.     BOWEL: Stomach and small bowel unremarkable. No obstruction. Normal  appendix. Scattered colonic diverticula.     LYMPH NODES: No abdominopelvic lymphadenopathy. No ascites. No free  air.     There is a fat-containing anterior abdominal wall hernia with rectus  muscle diastasis. The hernia neck measures 5 cm. The herniated fat  measures 8.8 x 2.9 x 8.0 cm. It is approximately 6 cm above the  umbilicus.     VASCULATURE: No abdominal aortic aneurysm.     PELVIC ORGANS: Tampon in the vagina. There is a low density structure  in the expected location of the right ovary/adnexa measuring 2.0 cm.  There is a fat density left ovarian lesion measuring 2.7 x 1.9 cm in  size.     OTHER: None.     MUSCULOSKELETAL: Normal.                                                                      IMPRESSION:   1.  Fat-containing supraumbilical anterior abdominal wall hernia. No  herniated or obstructed bowel.     2.  There is a 1.3 cm indeterminate left renal lesion. Please consider  further evaluation with  nonemergent renal ultrasound.     3.  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     4.  There is probably a 2 cm right ovarian cyst/follicle. This can  also be further evaluated by pelvic ultrasound.     RA PHAM MD               IUD Insertion:  CONSULT:    Is a pregnancy test required: No.  Was a consent obtained?  Yes    Subjective: Elaine Awad is a 52 year old  presents for IUD and desires Mirena type IUD.    Patient has been given the opportunity to ask questions about all forms of birth control, including all options appropriate for Elaine Awad. Discussed that no method of birth control, except abstinence is 100% effective against pregnancy or sexually transmitted infection.     Elaine Awad understands she may have the IUD removed at any time. IUD should be removed by a health care provider.    The entire insertion procedure was reviewed with the patient, including care after placement.    No LMP recorded. . No allergy to betadine or shellfish.   HCG Qual Urine   Date Value Ref Range Status   2020 Negative NEG^Negative Final     Comment:     This test is for screening purposes.  Results should be interpreted along with   the clinical picture.  Confirmation testing is available if warranted by   ordering [id number], HCG Quantitative Pregnancy.           BP (!) 140/88   Pulse 106   Temp 97.4  F (36.3  C)   Wt 119.3 kg (263 lb)   BMI 47.67 kg/m      Pelvic Exam:   EG/BUS: normal genital architecture without lesions, erythema or abnormal secretions.   Vagina: moist, pink, rugae with physiologic discharge and secretions  Cervix: parous no lesions and pink, moist, closed, without lesion or CMT  Uterus: midposition, mobile, no pain  Adnexa: within normal limits and no masses, nodularity, tenderness    PROCEDURE NOTE: -- IUD Insertion    Reason for Insertion: abnormal uterine  bleeding    Premedicated with ibuprofen.  Under sterile technique, cervix was visualized with speculum and prepped with Betadine solution swab x 3. Tenaculum was placed for stability. The uterus was gently straightened and sounded to 8.0 cm. IUD prepared for placement, and IUD inserted according to 's instructions without difficulty or significant resitance, and deployed at the fundus. The strings were visualized and trimmed to 3.0 cm from the external os. Tenaculum was removed and hemostasis noted. Speculum removed.  Patient tolerated procedure well.    Lot # see MAR  Exp: see MAR    EBL: minimal    Complications: none    ASSESSMENT:     ICD-10-CM    1. Encounter for insertion of intrauterine contraceptive device  Z30.430            PLAN:    Given 's handouts, including when to have IUD removed, list of danger s/sx, side effects and follow up recommended. Encouraged condom use for prevention of STD. Back up contraception advised for 7 days if progestin method. Advised to call for any fever, for prolonged or severe pain or bleeding, abnormal vaginal discharge, or unable to palpate strings. She was advised to use pain medications (ibuprofen) as needed for mild to moderate pain. Advised to follow-up in clinic in 4-6 weeks for IUD string check if unable to find strings or as directed by provider.     Maral Mathur MD

## 2023-02-08 LAB
BKR LAB AP GYN ADEQUACY: NORMAL
BKR LAB AP GYN INTERPRETATION: NORMAL
BKR LAB AP HPV REFLEX: NORMAL
BKR LAB AP PREVIOUS ABNORMAL: NORMAL
PATH REPORT.COMMENTS IMP SPEC: NORMAL
PATH REPORT.COMMENTS IMP SPEC: NORMAL
PATH REPORT.RELEVANT HX SPEC: NORMAL

## 2023-02-10 LAB
HUMAN PAPILLOMA VIRUS 16 DNA: NEGATIVE
HUMAN PAPILLOMA VIRUS 18 DNA: NEGATIVE
HUMAN PAPILLOMA VIRUS FINAL DIAGNOSIS: NORMAL
HUMAN PAPILLOMA VIRUS OTHER HR: NEGATIVE

## 2023-02-17 RX ORDER — TOPIRAMATE 50 MG/1
50 TABLET, FILM COATED ORAL DAILY
Qty: 90 TABLET | Refills: 1 | OUTPATIENT
Start: 2023-02-17

## 2023-03-13 ENCOUNTER — VIRTUAL VISIT (OUTPATIENT)
Dept: ONCOLOGY | Facility: CLINIC | Age: 53
End: 2023-03-13
Attending: GENETIC COUNSELOR, MS
Payer: COMMERCIAL

## 2023-03-13 DIAGNOSIS — Z80.8 FAMILY HISTORY OF MALIGNANT MELANOMA: ICD-10-CM

## 2023-03-13 DIAGNOSIS — Z80.0 FAMILY HISTORY OF PANCREATIC CANCER: Primary | ICD-10-CM

## 2023-03-13 DIAGNOSIS — Z80.3 FAMILY HISTORY OF MALIGNANT NEOPLASM OF BREAST: ICD-10-CM

## 2023-03-13 PROCEDURE — 96040 HC GENETIC COUNSELING, EACH 30 MINUTES: CPT | Mod: GT | Performed by: GENETIC COUNSELOR, MS

## 2023-03-13 NOTE — PROGRESS NOTES
3/13/2023    Video-Visit Details  Type of service:  Video Visit  Video Start Time (time video started): 9:00am  Video End Time (time video stopped): 9:38am  Originating Location (pt. Location): Home  Distant Location (provider location):  Off-site  Mode of Communication:  Video Conference via Venuu    Referring Provider: Fernanda Rodriguez MD    Presenting Information:   Given concerns regarding the potential for COVID-19 exposure during a clinic visit, Linda elected for a video genetic counseling visit through the [facility] to discuss her family history of breast, melanoma, and pancreatic cancer. We reviewed this history, cancer screening recommendations, and available genetic testing options.    Personal History:  Elaine is a 52 year old female. She does not have any personal history of cancer.    She had her first menstrual period at age 10, her first child at age 27, and is premenopausal. Elaine has her ovaries, right fallopian tube (left tube removed due to an ectopic pregnancy), and uterus in place. She had a recent abdominal CT in January 2023 that identified an indeterminate left renal lesion (non-emergent ultrasound recommended), left ovarian likely mature cystic teratoma, and right ovarian 2cm cyst/follicle; a follow-up transvaginal ultrasound has been recommended. She reports that she has never used hormone replacement therapy.      She has annual mammograms and her most recent mammogram in December 2022 was normal. Elaine has not had a colonoscopy, but her most recent Cologuard test in February 2021 was normal. She does not regularly do any other cancer screening at this time.    Family History: (Please see scanned pedigree for detailed family history information)    One maternal aunt is 80 and was diagnosed with breast cancer in her 60's, followed by a likely recurrence in her 70's. She has likely not pursued genetic testing.    Linda's maternal grandmother was diagnosed  "with breast cancer at age 65 and passed away at age 86.    Linda's father was diagnosed with a left eye melanoma at age 48 (the site of a previous welding injury), followed by pancreatic cancer at age 72, and passed away at age 74; he had a history of smoking, but no significant alcohol use. Linda recalls that his oncologist mentioned that the melanoma and pancreatic cancer \"were related\".    Her maternal ethnicity is [place] and [place]. Her paternal ethnicity is [place] and [place]. There is no known Ashkenazi Religion ancestry on either side of her family.    Discussion:    We reviewed the features of sporadic, familial, and hereditary cancers. In looking at Elaine's paternal family history, it is possible that a cancer susceptibility gene is present as her father was diagnosed with two related primary cancers, including a rare cancer under age 50 (ocular melanoma). Her maternal family history of breast cancer may also be consistent with hereditary cancer given that both her aunt and grandmother were diagnosed with cancer, though it is otherwise reassuring that they were both diagnosed at typical ages. This may reduce, but not eliminate, the chance for a maternally inherited cancer syndrome.    We discussed the natural history and genetics of cancer, including Hereditary Breast and Ovarian Cancer (HBOC) syndrome and Familial Atypical Multiple Mole Melanoma (FAMMM) syndrome.     We reviewed that the most common cause of hereditary breast and pancreatic cancer is HBOC syndrome, which is caused by mutations in the BRCA1 and BRCA2 genes. Individuals with HBOC syndrome are at increased risk for several different cancers, including breast, ovarian, male breast, prostate, melanoma (both skin and ocular), and pancreatic cancer.    We then reviewed FAMMM, which is caused by mutations in the CDKN2A and CDK4 genes. Individuals with FAMMM are at increased risk for melanoma (both skin and ocular) and pancreatic cancer (specifically " CDKN2A mutations), as well as possibly other cancers.     We then discussed that there are additional genes that could cause increased risk for the cancers in Linda's family. As many of these genes present with overlapping features in a family and accurate cancer risk cannot always be established based upon the pedigree analysis alone, it would be reasonable for Elaine to consider panel genetic testing to analyze multiple genes at once.    Based on her personal and family history, Elaine meets current National Comprehensive Cancer Network (NCCN) criteria for genetic testing of pancreatic cancer risk genes (i.e. JOSEFA, BRCA1, BRCA2, CDKN2A, PALB2, STK11, TP53, Abebe syndrome genes, etc.).      A detailed handout regarding these genes/syndromes and the information we discussed was provided to Elaine at the end of our appointment today and can be found in the after visit summary. Topics included: inheritance pattern, cancer risks, cancer screening recommendations, and also risks, benefits and limitations of testing.    We discussed that genetic testing for cancer susceptibility genes is typically most informative, though, when it is first performed on a family member with a personal history of cancer. Testing is available to Elaine, but with limitations. If Elaine pursues testing at this time and receives a negative result, this does not rule out the possibility of a hereditary cancer syndrome in her and/or her family. Despite these limitations and given that her father passed away before proceeding with genetic testing, Elaine expressed interest in proceeding with testing herself.    We reviewed genetic testing options for hereditary breast, pancreatic, melanoma, and related cancers: [facility] Common Hereditary Cancers Panel + Melanoma Panel and expanded [facility] Multi-Cancer Panel. Elaine expressed interest in learning as much information regarding the cancers in her family. She opted for the [facility] Common  Hereditary Cancers Panel + Melanoma Panel.    Genetic testing is available for 53 genes: [facility] Common Hereditary Cancers + Melanoma Panel (APC, JOSEFA, AXIN2, BAP1, BARD1, BMPR1A, BRCA1, BRCA2, BRIP1, CDH1, CDK4, CDKN2A, CHEK2, CTNNA1, DICER1, EPCAM, GREM1, HOXB13, KIT, MC1R, MEN1, MITF, MLH1, MSH2, MSH3, MSH6, MUTYH, NBN, NF1, NTHL1, PALB2, PDGFRA, PMS2, POLD1, POLE, POT1, PTEN, RAD50, RAD51C, RAD51D, RB1, SDHA, SDHB, SDHC, SDHD, SMAD4, SMARCA4, STK11, TERT, TP53, TSC1, TSC2, VHL).      Consent was obtained over the video and Linda elected to schedule a lab appointment at her earliest convenience. Once her blood is drawn, genetic testing using the Common Hereditary Cancers Panel + Melanoma Panel will be sent to [facility]. Of note, testing will begin with the BRCA1 and BRCA2 genes with reflex to the complete panel. Turn around time: 2-3 weeks after [facility] receives her blood sample.    Medical Management: For Elaine, we reviewed that the information from genetic testing may determine:    additional cancer screening for which Elaine may qualify (i.e. mammogram and breast MRI, more frequent colonoscopies, more frequent dermatologic exams, regular pancreatic imaging, etc.),    options for risk reducing surgeries Elaine could consider (i.e. bilateral mastectomy, surgery to remove her ovaries and/or uterus, etc.),      and targeted chemotherapies if she were to develop certain cancers in the future (i.e. immunotherapy for individuals with Abebe syndrome, PARP inhibitors, etc.).     These recommendations and possible targeted chemotherapies will be discussed in detail once genetic testing is completed.     Plan:  1) Today Elaine elected to proceed with testing of the BRCA1 and BRCA2 genes, with reflex to the [facility] Common Hereditary Cancers Panel + Melanoma Panel, through [facility]. She will schedule a lab appointment at her earliest convenience.  2) The results should be available 2-3 weeks after [facility]  receives her blood sample.  3) Elaine will be contacted to schedule a virtual visit to discuss the results.    Maia Zarate MS, [facility]  Licensed, Certified Genetic Counselor  Office: [phone]  Pager: [phone]

## 2023-03-13 NOTE — NURSING NOTE
Is the patient currently in the state of [place]? YES    Visit mode:VIDEO    If the visit is dropped, the patient can be reconnected by: VIDEO VISIT: Text to cell phone: [phone]    Will anyone else be joining the visit? NO      How would you like to obtain your AVS? MyChart    Are changes needed to the allergy or medication list? NO    Reason for visit: New patient visit      Dahiana Gastelum VF

## 2023-03-13 NOTE — LETTER
3/13/2023       RE: Elaine Awad  [address] [facility] [address]    Dear Colleague,    Thank you for referring your patient, Elaine Awad, to the [facility]. Please see a copy of my visit note below.    3/13/2023    Video-Visit Details  Type of service:  Video Visit  Video Start Time (time video started): 9:00am  Video End Time (time video stopped): 9:38am  Originating Location (pt. Location): Home  Distant Location (provider location):  Off-site  Mode of Communication:  Video Conference via [employer]    Referring Provider: Fernanda Rodriguez MD    Presenting Information:   Given concerns regarding the potential for COVID-19 exposure during a clinic visit, Linda elected for a video genetic counseling visit through the Cancer Risk Management Program to discuss her family history of breast, melanoma, and pancreatic cancer. We reviewed this history, cancer screening recommendations, and available genetic testing options.    Personal History:  Elaine is a 52 year old female. She does not have any personal history of cancer.    She had her first menstrual period at age 10, her first child at age 27, and is premenopausal. Elaine has her ovaries, right fallopian tube (left tube removed due to an ectopic pregnancy), and uterus in place. She had a recent abdominal CT in January 2023 that identified an indeterminate left renal lesion (non-emergent ultrasound recommended), left ovarian likely mature cystic teratoma, and right ovarian 2cm cyst/follicle; a follow-up transvaginal ultrasound has been recommended. She reports that she has never used hormone replacement therapy.      She has annual mammograms and her most recent mammogram in December 2022 was normal. Elaine has not had a colonoscopy, but her most recent Cologuard test in February 2021 was normal. She does not regularly do any other cancer screening at this time.    Family History: (Please see scanned pedigree for  "detailed family history information)    One maternal aunt is 80 and was diagnosed with breast cancer in her 60's, followed by a likely recurrence in her 70's. She has likely not pursued genetic testing.    Linda's maternal grandmother was diagnosed with breast cancer at age 65 and passed away at age 86.    Linda's father was diagnosed with a left eye melanoma at age 48 (the site of a previous welding injury), followed by pancreatic cancer at age 72, and passed away at age 74; he had a history of smoking, but no significant alcohol use. Linda recalls that his oncologist mentioned that the melanoma and pancreatic cancer \"were related\".    Her maternal ethnicity is Polish and Turkish. Her paternal ethnicity is Turkish and [place]. There is no known Ashkenazi Religious ancestry on either side of her family.    Discussion:    We reviewed the features of sporadic, familial, and hereditary cancers. In looking at Elaine's paternal family history, it is possible that a cancer susceptibility gene is present as her father was diagnosed with two related primary cancers, including a rare cancer under age 50 (ocular melanoma). Her maternal family history of breast cancer may also be consistent with hereditary cancer given that both her aunt and grandmother were diagnosed with cancer, though it is otherwise reassuring that they were both diagnosed at typical ages. This may reduce, but not eliminate, the chance for a maternally inherited cancer syndrome.    We discussed the natural history and genetics of cancer, including Hereditary Breast and Ovarian Cancer (HBOC) syndrome and Familial Atypical Multiple Mole Melanoma (FAMMM) syndrome.     We reviewed that the most common cause of hereditary breast and pancreatic cancer is HBOC syndrome, which is caused by mutations in the BRCA1 and BRCA2 genes. Individuals with HBOC syndrome are at increased risk for several different cancers, including breast, ovarian, male breast, prostate, melanoma " (both skin and ocular), and pancreatic cancer.    We then reviewed FAMMM, which is caused by mutations in the CDKN2A and CDK4 genes. Individuals with FAMMM are at increased risk for melanoma (both skin and ocular) and pancreatic cancer (specifically CDKN2A mutations), as well as possibly other cancers.     We then discussed that there are additional genes that could cause increased risk for the cancers in Linda's family. As many of these genes present with overlapping features in a family and accurate cancer risk cannot always be established based upon the pedigree analysis alone, it would be reasonable for Elaine to consider panel genetic testing to analyze multiple genes at once.    Based on her personal and family history, Elaine meets current National Comprehensive Cancer Network (NCCN) criteria for genetic testing of pancreatic cancer risk genes (i.e. JOSEFA, BRCA1, BRCA2, CDKN2A, PALB2, STK11, TP53, Abebe syndrome genes, etc.).      A detailed handout regarding these genes/syndromes and the information we discussed was provided to Elaine at the end of our appointment today and can be found in the after visit summary. Topics included: inheritance pattern, cancer risks, cancer screening recommendations, and also risks, benefits and limitations of testing.    We discussed that genetic testing for cancer susceptibility genes is typically most informative, though, when it is first performed on a family member with a personal history of cancer. Testing is available to Elaine, but with limitations. If Elaine pursues testing at this time and receives a negative result, this does not rule out the possibility of a hereditary cancer syndrome in her and/or her family. Despite these limitations and given that her father passed away before proceeding with genetic testing, Elaine expressed interest in proceeding with testing herself.    We reviewed genetic testing options for hereditary breast, pancreatic, melanoma,  and related cancers: [facility] Common Hereditary Cancers Panel + Melanoma Panel and expanded [facility] Multi-Cancer Panel. Elaine expressed interest in learning as much information regarding the cancers in her family. She opted for the [facility] Common Hereditary Cancers Panel + Melanoma Panel.    Genetic testing is available for 53 genes: [facility] Common Hereditary Cancers + Melanoma Panel (APC, JOSEFA, AXIN2, BAP1, BARD1, BMPR1A, BRCA1, BRCA2, BRIP1, CDH1, CDK4, CDKN2A, CHEK2, CTNNA1, DICER1, EPCAM, GREM1, HOXB13, KIT, MC1R, MEN1, MITF, MLH1, MSH2, MSH3, MSH6, MUTYH, NBN, NF1, NTHL1, PALB2, PDGFRA, PMS2, POLD1, POLE, POT1, PTEN, RAD50, RAD51C, RAD51D, RB1, SDHA, SDHB, SDHC, SDHD, SMAD4, SMARCA4, STK11, TERT, TP53, TSC1, TSC2, VHL).      Consent was obtained over the video and Linda elected to schedule a lab appointment at her earliest convenience. Once her blood is drawn, genetic testing using the Common Hereditary Cancers Panel + Melanoma Panel will be sent to [facility]. Of note, testing will begin with the BRCA1 and BRCA2 genes with reflex to the complete panel. Turn around time: 2-3 weeks after Petra receives her blood sample.    Medical Management: For Elaine, we reviewed that the information from genetic testing may determine:    additional cancer screening for which Elaine may qualify (i.e. mammogram and breast MRI, more frequent colonoscopies, more frequent dermatologic exams, regular pancreatic imaging, etc.),    options for risk reducing surgeries Elaine could consider (i.e. bilateral mastectomy, surgery to remove her ovaries and/or uterus, etc.),      and targeted chemotherapies if she were to develop certain cancers in the future (i.e. immunotherapy for individuals with Abebe syndrome, PARP inhibitors, etc.).     These recommendations and possible targeted chemotherapies will be discussed in detail once genetic testing is completed.     Plan:  1) Today Elaine elected to proceed with testing of the BRCA1  and BRCA2 genes, with reflex to the [facility] Common Hereditary Cancers Panel + Melanoma Panel, through [facility]. She will schedule a lab appointment at her earliest convenience.  2) The results should be available 2-3 weeks after Petra receives her blood sample.  3) Elaine will be contacted to schedule a virtual visit to discuss the results.    Maia Zraate MS, [facility] [place]  Licensed, Certified Genetic Counselor  Office: [phone]   Pager: [phone]

## 2023-03-14 NOTE — PATIENT INSTRUCTIONS
Assessing Cancer Risk  Only about 5-10% of cancers are thought to be due to an inherited cancer susceptibility gene. These families often have:  Several people with the same or related types of cancer  Cancers diagnosed at a young age (before age 50)  Individuals with more than one primary cancer  Multiple generations of the family affected with cancer    Some people may be candidates for genetic testing of more than one gene.  For these families, genetic testing using a cancer panel may be offered.  These panels can test many genes at once known to increase the risk for pancreatic (and other) cancers: APC, JOSEFA, BRCA1, BRCA2, CDKN2A, EPCAM, MLH1, MSH2, MSH6, PALB2, PMS2, STK11, and TP53. The purpose of this handout is to serve as a brief summary of the pancreatic cancer risk genes and cancer syndrome with pancreatic cancer risks.     Hereditary Breast and Ovarian Cancer Syndrome  (BRCA1 and BRCA2)    A single mutation in one of the copies of BRCA1 or BRCA2 increases the risk for breast and ovarian cancer.  The risk for pancreatic cancer and melanoma may be slightly increased in some families. BRCA2 is considered the most common gene responsible for familial pancreatic cancer.    A person s ethnic background is also important to consider, as individuals of Ashkenazi Roman Catholic ancestry have a higher chance of having a BRCA gene mutation.  There are three BRCA mutations that occur more frequently in this population.    Abebe Syndrome  (MLH1, MSH2, MSH6, PMS2, and EPCAM)    Currently five genes are known to cause Abebe Syndrome: MLH1, MSH2, MSH6, PMS2, and EPCAM.  A single mutation in one of the Abebe Syndrome genes increases the risk for colon, endometrial, ovarian, and stomach cancers.  Other cancers that occur less commonly in Abebe Syndrome include urinary tract cancers, brain cancers, and other cancers.  People who have Abebe Syndrome have up to a 6% risk of pancreatic cancer.     *Cancer risk varies depending on  Abebe syndrome gene found    Familial Atypical Multiple Mole Melanoma Syndrome  (CDKN2A)    Familial Atypical Multiple Mole Melanoma Syndrome (FAMMM) is caused by a single mutation in the CDKN2A gene. This gene used to be called p16. The lifetime risk for melanoma is between 58-92%5. People with FAMMM have increased risks for pancreatic cancer, and possibly other cancers.      People with FAMMM typically have many moles (more than 50), which can be atypical.The exact risk of pancreatic cancer can depend on a person s specific CDKN2A mutation. The risk for pancreatic cancer be as high as 60% depending on the mutation.    Peutz-Jeghers Syndrome  (STK11)    Peutz-Jeghers Syndrome is caused by a mutation in the STK11 gene. The main features of Peutz-Jeghers Syndrome are multiple hamartomatous colon polyps and blue pigmentation of the lips and oral mucosa. Cancers associated with Peutz-Jeghers Syndrome include: gastrointestinal, gynecological, lung, breast, and pancreatic cancer. Up to a 36% lifetime risk of developing pancreatic cancer has been reported for those with Peutz-Jeghers syndrome.     Li-Fraumeni Syndrome  (TP53)    Li-Fraumeni Syndrome (LFS) is a cancer predisposition syndrome caused by a mutation in the TP53 gene. A single mutation in one of the copies of TP53 increases the risk for multiple cancers. Individuals with LFS are at an increased risk for developing cancer at a young age. The lifetime risk for development of a LFS-associated cancer is 50% by age 30 and 90% by age 60.    Core Cancers: Sarcomas, Breast, Brain, Lung, Leukemias/Lymphomas, Adrenocortical carcinomas  Other Cancers: Gastrointestinal (including pancreatic), Thyroid, Skin, Genitourinary    Familial Adenomatous Polyposis Syndrome  (APC)    Familial Adenomatous Polyposis syndrome (FAP) is caused by a single mutation in the APC gene and is characterize by having over 100 adenomatous polyps in the colon and significantly increased risk for  colon cancer. These typically appear during adolescence. FAP is associated with other tumors in the thyroid, stomach, and duodenum. People with FAP have an increased lifetime pancreatic cancer risk over the general population.    Additional Genes  PALB2  Mutations in the PALB2 gene have been shown to increase the risk of breast cancer up to 58% in some families. PALB2 mutations have also been associated with increased risk for pancreatic cancer.  Individuals who inherit two PALB2 mutations--one from their mother and one from their father--have a condition called Fanconi Anemia.  This condition is associated with short stature, developmental delay, bone marrow failure, and increased risk for childhood cancers.    JOSEFA  Mutations in the JOSEFA gene typically increase the risk of breast cancer 2-4 times higher than an average woman. JOSEFA mutations have also been associated with an increased risk of pancreatic cancer. People who inherit an JOSEFA mutation from both their mother and father have a condition called ataxia-telangiectasia. Ataxia telangiectasia is associated with ataxia in childhood (trouble with balance and walking), telangiectasias (red or purple spotty clusters on the skin), involuntary movements, frequent infections, and increased risk of leukemia and lymphoma.     Inheritance    All of the genes reviewed above are inherited in an autosomal dominant pattern.  This means that if a parent has a mutation, each of their children will have a 50% chance of inheriting that same mutation.  Every child--male or female--would have a 50% chance of inheriting the mutation and being at increased risk for developing cancer.       https://r.nlm.nih.gov/primer/inheritance/inheritancepatterns    Mutations in some genes can occur de amanda, which means that a person s mutation occurred for the first time in them and was not inherited from a parent.  Now that they have the mutation, however, it can be passed on to future  generations.     Genetic Testing  Genetic testing involves a blood test and will look for any harmful mutations that are associated with increased cancer risk.  If possible, it is recommended that the person(s) who has had cancer be tested before other family members.  That person will give us the most useful information about whether or not a specific gene is associated with the cancer in the family.    Advantages and Disadvantages   There are advantages and disadvantages to genetic testing.  Advantages  May clarify your cancer risk and additional appropriate cancer screenings   Can help you make medical decisions  May explain the cancers in your family  May give useful information to your family members (if you share your results)     Disadvantages  Possible negative emotional impact of learning about inherited cancer risk  Uncertainty in interpreting a negative test result in some situations  Possible genetic discrimination concerns (see below)     Genetic Information Nondiscrimination Act (MELANIE)  MELANIE is a federal law that protects individuals from health insurance or employment discrimination based on a genetic test result.  There are currently no legal discrimination protections in terms of life insurance, long term care, or disability insurances.  Visit the National Human Genome Research [place] website to learn more: https://www.genome.gov/71850376/genetic-discrimination/    Questions to Think About Regarding Genetic Testing:  What effect will the test result have on me and my relationship with my family members if I have an inherited gene mutation?  If I don t have a gene mutation?  Should I share my test results, and how will my family react to this news, which may also affect them?  Are my children ready to learn new information that may one day affect their own health?    There are three possible results of genetic testing:  Positive--a harmful mutation was identified in one or more of the  genes  Negative--no mutation was identified in any of the genes on this panel  Variant of unknown significance (VUS)--a variation in one of the genes was identified, but it is unclear how this impacts cancer risk in the family  Families with VUS results can contact their genetic counselor annually for updates     Reducing Cancer Risk  Recommendations are based upon an individual s genetic test result as well as their personal and family history of cancer. Pancreatic cancer screening may be available based on family history. Talk with your physician about screening options.     Cancer Resources    National Pancreatic Cancer Foundation: npcf.   Pancreatic Cancer Action Network: pancan.org   FORCE: Facing Our Risk of Cancer Empowered: facingourrisk.org  [place]: bebrURXpink.org  Li-Fraumeni Syndrome Association: lfsassociation.org  Collaborative Group of the Americas on Inherited Colorectal Cancer (CGA)   cgaicc.com http://www.facingRent My Items.org/  Cancer Care: cancercare.org  American Cancer Society (ACS): cancer.org  National Cancer [place] (NCI): cancer.gov      Please call us if you have any questions or concerns.   Cancer Risk Management Program [phone] ([phone])  Keyon Porter, MS [facility]  [phone]  Elana Woodruff, MS, [facility] [phone]  Cat Arrieta, MS, [facility]  [phone]  Rochelle Castillo, MS, [facility]  [phone]  Mayra Navarro, MS, [facility]  [phone]  Maia Zarate, MS, [facility] [phone]  Mayelin Gilbert, MS, [facility] [phone]    References  Genetic / Familial High-Risk Assessment?: Breast and Ovarian. NCCN Pract [place] Oncol. 2017;1.2018.  Denzel J, Evelina A, Justin J, et al. The incidence of pancreatic cancer in BRCA1 and BRCA2 mutation carriers. Br J Cancer. 2012;107(12):4845-9577. doi:10.1038/bjc.2012.483.  Mahesh ASHRAF, Mary Ann KG, Jeffrey S, et al. BRCA1, BRCA2, PALB2, and CDKN2A mutations in familial pancreatic cancer: a PACGENE study. [place] Med. 2015;17(7):569-577. doi:10.1038/gim.6084.153.  Kanu MILLER.  Genetic / Familial High-Risk Assessment?: Colorectal. NCCN Pract Guidel Oncol. 2017;2.2017.  Taylor THOMPSON,  M, Rosa Elena E, Monica J. Familial Atypical Multiple Mole Melanoma Syndrome. National Center for Biotechnology Information ([place]); 2009. http://www.ncbi.nlm.nih.gov/pubmed/95323721. Accessed October 10, 2017.  Abebe HT, Matthew RM, Mikie J, et al. Tumour spectrum in the FAMMM syndrome. Br J Cancer. 1981;44(4):553-560. http://www.ncbi.nlm.nih.gov/pubmed/7125680. Accessed October 10, 2017.  Danielle F, Ernesto J, Kannan A, et al. Very high risk of cancer in familial Peutz-Jeghers syndrome. Gastroenterology. 2000;119(6):4050-3416. doi:10.1053/MICHAEL.2000.66448.  Danielle BREWER, Offerhaus GJ, Rohit DH, et al. Increased risk of thyroid and pancreatic carcinoma in familial adenomatous polyposis. Gut. 1993;34(10):9471-3724. doi:10.1136/GUT.34.10.1394.

## 2023-03-21 ENCOUNTER — LAB (OUTPATIENT)
Dept: LAB | Facility: CLINIC | Age: 53
End: 2023-03-21
Payer: COMMERCIAL

## 2023-03-21 DIAGNOSIS — Z80.8 FAMILY HISTORY OF MALIGNANT MELANOMA: ICD-10-CM

## 2023-03-21 DIAGNOSIS — Z80.3 FAMILY HISTORY OF MALIGNANT NEOPLASM OF BREAST: ICD-10-CM

## 2023-03-21 DIAGNOSIS — Z80.0 FAMILY HISTORY OF PANCREATIC CANCER: ICD-10-CM

## 2023-03-21 PROCEDURE — 99000 SPECIMEN HANDLING OFFICE-LAB: CPT

## 2023-03-21 PROCEDURE — 36415 COLL VENOUS BLD VENIPUNCTURE: CPT

## 2023-03-29 LAB — SCANNED LAB RESULT: NORMAL

## 2023-03-31 ENCOUNTER — OFFICE VISIT (OUTPATIENT)
Dept: OBGYN | Facility: CLINIC | Age: 53
End: 2023-03-31
Attending: OBSTETRICS & GYNECOLOGY
Payer: COMMERCIAL

## 2023-03-31 ENCOUNTER — ANCILLARY PROCEDURE (OUTPATIENT)
Dept: ULTRASOUND IMAGING | Facility: CLINIC | Age: 53
End: 2023-03-31
Attending: OBSTETRICS & GYNECOLOGY
Payer: COMMERCIAL

## 2023-03-31 VITALS
SYSTOLIC BLOOD PRESSURE: 139 MMHG | BODY MASS INDEX: 46.92 KG/M2 | WEIGHT: 258.9 LBS | HEART RATE: 91 BPM | TEMPERATURE: 98.6 F | DIASTOLIC BLOOD PRESSURE: 86 MMHG

## 2023-03-31 DIAGNOSIS — N83.201 BILATERAL OVARIAN CYSTS: ICD-10-CM

## 2023-03-31 DIAGNOSIS — N83.202 BILATERAL OVARIAN CYSTS: ICD-10-CM

## 2023-03-31 DIAGNOSIS — N92.1 MENORRHAGIA WITH IRREGULAR CYCLE: ICD-10-CM

## 2023-03-31 DIAGNOSIS — N83.202 BILATERAL OVARIAN CYSTS: Primary | ICD-10-CM

## 2023-03-31 DIAGNOSIS — N83.201 BILATERAL OVARIAN CYSTS: Primary | ICD-10-CM

## 2023-03-31 PROCEDURE — 76830 TRANSVAGINAL US NON-OB: CPT | Performed by: OBSTETRICS & GYNECOLOGY

## 2023-03-31 PROCEDURE — 99213 OFFICE O/P EST LOW 20 MIN: CPT | Performed by: OBSTETRICS & GYNECOLOGY

## 2023-03-31 ASSESSMENT — PATIENT HEALTH QUESTIONNAIRE - PHQ9
5. POOR APPETITE OR OVEREATING: NOT AT ALL
SUM OF ALL RESPONSES TO PHQ QUESTIONS 1-9: 0

## 2023-03-31 ASSESSMENT — ANXIETY QUESTIONNAIRES
2. NOT BEING ABLE TO STOP OR CONTROL WORRYING: NOT AT ALL
GAD7 TOTAL SCORE: 0
IF YOU CHECKED OFF ANY PROBLEMS ON THIS QUESTIONNAIRE, HOW DIFFICULT HAVE THESE PROBLEMS MADE IT FOR YOU TO DO YOUR WORK, TAKE CARE OF THINGS AT HOME, OR GET ALONG WITH OTHER PEOPLE: NOT DIFFICULT AT ALL
GAD7 TOTAL SCORE: 0
7. FEELING AFRAID AS IF SOMETHING AWFUL MIGHT HAPPEN: NOT AT ALL
6. BECOMING EASILY ANNOYED OR IRRITABLE: NOT AT ALL
5. BEING SO RESTLESS THAT IT IS HARD TO SIT STILL: NOT AT ALL
1. FEELING NERVOUS, ANXIOUS, OR ON EDGE: NOT AT ALL
3. WORRYING TOO MUCH ABOUT DIFFERENT THINGS: NOT AT ALL

## 2023-04-19 ENCOUNTER — VIRTUAL VISIT (OUTPATIENT)
Dept: ONCOLOGY | Facility: CLINIC | Age: 53
End: 2023-04-19
Attending: GENETIC COUNSELOR, MS
Payer: COMMERCIAL

## 2023-04-19 DIAGNOSIS — Z80.8 FAMILY HISTORY OF MALIGNANT MELANOMA: ICD-10-CM

## 2023-04-19 DIAGNOSIS — Z80.0 FAMILY HISTORY OF PANCREATIC CANCER: ICD-10-CM

## 2023-04-19 DIAGNOSIS — N28.9 RENAL LESION: Primary | ICD-10-CM

## 2023-04-19 DIAGNOSIS — Z80.3 FAMILY HISTORY OF MALIGNANT NEOPLASM OF BREAST: ICD-10-CM

## 2023-04-19 PROCEDURE — 999N000069 HC STATISTIC GENETIC COUNSELING, < 16 MIN: Mod: GT,95 | Performed by: GENETIC COUNSELOR, MS

## 2023-04-19 NOTE — LETTER
4/19/2023         RE: Elaine Awad  [id number] [facility] [address]        Dear Colleague,    Thank you for referring your patient, Elaine Awad, to the [facility]. Please see a copy of my visit note below.    4/19/2023    Virtual Visit Details  Type of service:  Video Visit   Originating Location (pt. Location): Home  Distant Location (provider location):  Off-site  Platform used for Video Visit: Well   Length of video visit: 18 minutes (length of consult 16 minutes)    Referring Provider: Fernanda Rodriguez MD    Presenting Information:  I spoke to Elaine by video today to discuss her genetic testing results. We last met on 3/13/2023 and her blood was drawn on 3/21/2023. The [facility] Common Hereditary Cancers Panel + Melanoma Panel was ordered from [facility]. This testing was done because of Elaine's family history of pancreatic, melanoma, and breast cancer.    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 This variant is called c.1402G>A (p.Fiv127Aff). No other variants or mutations were found in the TSC2 gene. Given the uncertain significance of this result, medical management decisions should NOT be made based on this test result alone.    Of note, Elaine tested negative for mutations in the following genes by sequencing and deletion/duplication analysis: APC, JOSEFA, AXIN2, BAP1, BARD1, BMPR1A, BRCA1, BRCA2, BRIP1, CDH1, CDK4, CDKN2A, CHEK2, CTNNA1, DICER1, EPCAM (deletions/duplications only), GREM1, HOXB13, KIT, MC1R, MEN1, MITF (single variant analyzed and reported), MLH1, MSH2, MSH3, MSH6, MUTYH, NBN, NF1, NTHL1, PALB2, PDGFRA, PMS2, POLD1, POLE, POT1, PTEN, RAD50, RAD51C, RAD51D, RB1, SDHA (sequencing only), SDHB, SDHC, SDHD, SMAD4, SMARCA4, STK11, TERT, TP53, TSC1, VHL.   We reviewed the autosomal dominant inheritance of these genes.   Elaine cannot  "pass on a mutation in any of these genes to her children based on this test result. Mutations in these genes do not skip generations.      A copy of the test report can be found in the Laboratory tab, dated 3/21/2023, and named \"LABORATORY MISCELLANEOUS ORDER\". The report is scanned in as a linked document.    Interpretation:  We discussed several different interpretations of this inconclusive test result. It is not clear if this variant in the TSC2 gene is associated with increased cancer risk.  1. This variant may be a benign change that does not increase cancer risk.  2. This variant may be a harmful mutation that causes Tuberous sclerosis complex (TSC).  We discussed that this TSC2 variant has been identified in individual(s) with clinical features of TSC. As such, we discussed common features associated with TSC, including birthmarks, intellectual delay/learning disabilities, seizures, lung disease, and different growths/tumors (i.e. skin, brain, heart, kidney, pancreas, etc.). Aside from an indeterminate left renal lesion recently identified by CT scan, Linda denied having any of the above features.  We discussed that it would still be reasonable for Linda to consider meeting with a  for a targeted physical exam, to assess if Linda has any subtle/minor features of the condition. Linda verbalized agreement with this plan and a referral was placed.    Genetic testing labs are working to collect evidence to determine if this variant is harmful or benign, and they will contact me if it is reclassified. If this variant is determined to be a benign change, there may be a different gene or combination of genes and environment that are associated with the cancers in this family that are not identifiable using this test. As such, Linda is encouraged to contact me regularly to review any new genetic testing options that may be appropriate for her.    It is also important to consider that her relatives with " cancer, such as her father, may have had a mutation in one of the genes tested and Linda did not inherit it.    Inheritance:  We reviewed the autosomal dominant inheritance of this variant in the TSC2 gene. We discussed that Elaine has a 50% chance to pass this variant to each of her children. Likewise, each of her siblings has a 50% risk of having the same variant. Because it is unclear what, if any, risk is associated with this variant, clinical genetic testing for this TSC2 variant alone is not recommended for relatives.    Screening:  Based on this inconclusive test result, it is important for Elaine and her relatives to refer back to the family history for appropriate cancer screening.    Based on her personal and family history, Elaine has a 11.2% lifetime risk of developing breast cancer based on the BRENDA 8 model. Therefore, Elaine does not meet current National Comprehensive Cancer Network (NCCN) guidelines for high risk breast screening, which is offered to women with a 20% lifetime risk or higher. However, it is still important for Elaine to continue with annual routine breast screening under the care of her physicians.   Elaine s close maternal female relatives remain at increased risk for breast cancer given their family history. Breast cancer screening is generally recommended to begin approximately 10 years younger than the earliest age of breast cancer diagnosis in the family, or at age 40, whichever comes first. In this family, screening may begin at age 40. Breast screening options should be discussed with an individual's primary care provider and a genetic counselor, to determine at what age to begin screening, what screening is appropriate, and if additional screening (such as breast MRI) is necessary based on personal/family history factors.  Given her father's history of ocular melanoma, Linda and her siblings may remain at some increased risk for ocular melanoma. They are encouraged  to share this family history with their ophthalmologists.  Other population cancer screening options, such as those recommended by the American Cancer Society and NCCN, are also appropriate for Elaine and her family. These screening recommendations may change if there are changes to Elaine's personal and/or family history of cancer. Final screening recommendations should be made by each individual's primary care provider.      Additional Testing Considerations:  Although Elaine's genetic testing result is inconclusive, other relatives may still carry a harmful gene mutation associated with hereditary cancer. Genetic counseling is recommended for her siblings and paternal aunt to discuss their genetic testing options. Her maternal aunt with breast cancer could also consider meeting with a genetic counselor. If any of these relatives do pursue genetic testing, Elaine is encouraged to contact me so that we may review the impact of their test results on her.    Summary:  We do not have an explanation for Elaine's family history of cancer. While no obviously harmful genetic changes were identified, Elaine may still be at risk for certain cancers due to family history, environmental factors, or other genetic causes not identified by this test. Because of that, it is important that she continue with cancer screening based on her personal and family history as discussed above.    Genetic testing is rapidly advancing, and new cancer susceptibility genes will most likely be identified in the future. Therefore, I encouraged Elaine to contact me periodically or if there are changes in her personal or family history. This may change how we assess her cancer risk, screening, and the testing we would offer.    Plan:  1. At her request, I provided Elaine with a copy of her test results via [facility]'s patient portal.    2. She plans to follow-up with her medical providers, as needed.  3. She should contact me  regularly, or sooner if her family history changes.  4. I will attempt to contact Elaine if the laboratory informs me that this TSC2 variant has been reclassified. This may change screening and testing recommendations for Elaine and her relatives.    If Elaine has any further questions, I encouraged her to contact me at [phone].    Maia Zarate MS, [facility] [place]  Licensed, Certified Genetic Counselor  Office: [phone]  Pager: [phone]

## 2023-04-19 NOTE — PATIENT INSTRUCTIONS
Genetic Testing  Genetic testing involved a blood or saliva test which looked at the genetic information in select genes for variants associated with cancer risk.  This testing may have included analysis of a single gene due to a known variant in the family, multiple genes most associated with the cancers in a family, or an expanded panel of genes related to many types of cancers.    Results  There are several possible genetic test results, including:   Positive--a harmful mutation (also known as a  pathogenic  or  likely pathogenic  variant) was identified in a gene associated with increased cancer risk.  These risks, as well as medical management options, depend on the specific genetic variant identified.    Negative--no variants were identified in the genes analyzed   Variant of unknown significance--a variant was identified in one or more genes, though it is currently unclear how this impacts cancer risk in the family.  Genetic testing labs are working to collect evidence about these uncertain variants and may provide updates in the future.    What is a Variant of Unknown Significance?  A variant of unknown significance (VUS) is a genetic change with unclear clinical significance.  Scientists currently do not know if this specific variant is associated with increased cancer risks,  or if it is a benign (harmless) change with no impact on health.       A variant may be of uncertain significance for several reasons.  It is possible that this specific variant has not been seen before by the laboratory, or only in a small number of families.  There is currently not enough information to know how this variant may impact your health.          Reclassification  Genetic testing laboratories and researchers are collecting evidence to determine the importance of variants of unknown significance.  Many variants of uncertain significance are later reclassified as benign findings, however some may be associated with  increased cancer risk.  Laboratories will often notify the genetic counselor/ordering provider when a patient s VUS has been reclassified.        Some families may be candidates for participation in the laboratory s variant research programs to help determine the importance of their VUS.  Participating in these programs does not guarantee that families will learn the significance of their VUS immediately.  It could be months or years before a VUS is reclassified.       Screening Recommendations  A combination of personal and family history factors may inform cancer risk and medical management recommendations.  Population cancer screening options, such as those recommended by the American Cancer Society and the National Comprehensive Cancer Network (NCCN) are appropriate for many families at average risk for cancer.  However, earlier and/or more frequent screening may be recommended based on personal factors (lifestyle, exposures, medications, screening results), family history of cancer, and sometimes genetic factors.  These cancer risk management options should be discussed in more detail with an individual's medical providers.      It is usually not recommended that other relatives have genetic testing for the VUS unless it is done as part of the laboratory s variant research program because an individual s test results should not influence their cancer screening until we determine the importance of the VUS.  Your genetic counselor can help you and your relatives understand the risks and benefits of all genetic testing and cancer screening options.    Please call us if you have any questions or concerns.   Cancer Risk Management Program (Appointments: [phone])  Keyon Porter, MS [facility] [place]  [phone]  Elana Woodruff, MS, [facility] [phone]  Cat Arrieta, MS, [facility]  [phone]  Rochelle Castillo, MS, [facility]  [phone]  Mayra Navarro, MS, [facility]  [phone]  Maia Zarate, MS, [facility] [phone]  Mayelin Gilbert MS,  [facility] [place] [phone]

## 2023-04-19 NOTE — NURSING NOTE
Is the patient currently in the state of [place]? YES    Visit mode:VIDEO    If the visit is dropped, the patient can be reconnected by: VIDEO VISIT: Text to cell phone: [phone]    Will anyone else be joining the visit? NO      How would you like to obtain your AVS? MyChart    Are changes needed to the allergy or medication list? NO    Reason for visit: Video Visit (GC Results)    Jaylene Ventura VF

## 2023-04-19 NOTE — PROGRESS NOTES
"4/19/2023    Virtual Visit Details  Type of service:  Video Visit   Originating Location (pt. Location): Home  Distant Location (provider location):  Off-site  Platform used for Video Visit: Sourav   Length of video visit: 18 minutes (length of consult 16 minutes)    Referring Provider: Fernanda Rodriguez MD    Presenting Information:  I spoke to Elaine by video today to discuss her genetic testing results. We last met on 3/13/2023 and her blood was drawn on 3/21/2023. The [facility] Common Hereditary Cancers Panel + Melanoma Panel was ordered from [facility]. This testing was done because of Elaine's family history of pancreatic, melanoma, and breast cancer.    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 This variant is called c.1402G>A (p.[id number]). No other variants or mutations were found in the TSC2 gene. Given the uncertain significance of this result, medical management decisions should NOT be made based on this test result alone.    Of note, Elaine tested negative for mutations in the following genes by sequencing and deletion/duplication analysis: APC, JOSEFA, AXIN2, BAP1, BARD1, BMPR1A, BRCA1, BRCA2, BRIP1, CDH1, CDK4, CDKN2A, CHEK2, CTNNA1, DICER1, EPCAM (deletions/duplications only), GREM1, HOXB13, KIT, MC1R, MEN1, MITF (single variant analyzed and reported), MLH1, MSH2, MSH3, MSH6, MUTYH, NBN, NF1, NTHL1, PALB2, PDGFRA, PMS2, POLD1, POLE, POT1, PTEN, RAD50, RAD51C, RAD51D, RB1, SDHA (sequencing only), SDHB, SDHC, SDHD, SMAD4, SMARCA4, STK11, TERT, TP53, TSC1, VHL.     We reviewed the autosomal dominant inheritance of these genes.     Elaine cannot pass on a mutation in any of these genes to her children based on this test result. Mutations in these genes do not skip generations.      A copy of the test report can be found in the Laboratory tab, dated 3/21/2023, and named \"LABORATORY " "MISCELLANEOUS ORDER\". The report is scanned in as a linked document.    Interpretation:  We discussed several different interpretations of this inconclusive test result. It is not clear if this variant in the TSC2 gene is associated with increased cancer risk.  1. This variant may be a benign change that does not increase cancer risk.  2. This variant may be a harmful mutation that causes Tuberous sclerosis complex (TSC).    We discussed that this TSC2 variant has been identified in individual(s) with clinical features of TSC. As such, we discussed common features associated with TSC, including birthmarks, intellectual delay/learning disabilities, seizures, lung disease, and different growths/tumors (i.e. skin, brain, heart, kidney, pancreas, etc.). Aside from an indeterminate left renal lesion recently identified by CT scan, Linda denied having any of the above features.    We discussed that it would still be reasonable for Linda to consider meeting with a  for a targeted physical exam, to assess if Linda has any subtle/minor features of the condition. Linda verbalized agreement with this plan and a referral was placed.    Genetic testing labs are working to collect evidence to determine if this variant is harmful or benign, and they will contact me if it is reclassified. If this variant is determined to be a benign change, there may be a different gene or combination of genes and environment that are associated with the cancers in this family that are not identifiable using this test. As such, Linda is encouraged to contact me regularly to review any new genetic testing options that may be appropriate for her.    It is also important to consider that her relatives with cancer, such as her father, may have had a mutation in one of the genes tested and Linda did not inherit it.    Inheritance:  We reviewed the autosomal dominant inheritance of this variant in the TSC2 gene. We discussed that Elaine has a " 50% chance to pass this variant to each of her children. Likewise, each of her siblings has a 50% risk of having the same variant. Because it is unclear what, if any, risk is associated with this variant, clinical genetic testing for this TSC2 variant alone is not recommended for relatives.    Screening:  Based on this inconclusive test result, it is important for Elaine and her relatives to refer back to the family history for appropriate cancer screening.      Based on her personal and family history, Elaine has a 11.2% lifetime risk of developing breast cancer based on the BRENDA 8 model. Therefore, Elaine does not meet current National Comprehensive Cancer Network (NCCN) guidelines for high risk breast screening, which is offered to women with a 20% lifetime risk or higher. However, it is still important for Elaine to continue with annual routine breast screening under the care of her physicians.     Elaine s close maternal female relatives remain at increased risk for breast cancer given their family history. Breast cancer screening is generally recommended to begin approximately 10 years younger than the earliest age of breast cancer diagnosis in the family, or at age 40, whichever comes first. In this family, screening may begin at age 40. Breast screening options should be discussed with an individual's primary care provider and a genetic counselor, to determine at what age to begin screening, what screening is appropriate, and if additional screening (such as breast MRI) is necessary based on personal/family history factors.    Given her father's history of ocular melanoma, Linda and her siblings may remain at some increased risk for ocular melanoma. They are encouraged to share this family history with their ophthalmologists.    Other population cancer screening options, such as those recommended by the American Cancer Society and NCCN, are also appropriate for Elaine and her family. These  screening recommendations may change if there are changes to Elaine's personal and/or family history of cancer. Final screening recommendations should be made by each individual's primary care provider.      Additional Testing Considerations:  Although Elaine's genetic testing result is inconclusive, other relatives may still carry a harmful gene mutation associated with hereditary cancer. Genetic counseling is recommended for her siblings and paternal aunt to discuss their genetic testing options. Her maternal aunt with breast cancer could also consider meeting with a genetic counselor. If any of these relatives do pursue genetic testing, Elaine is encouraged to contact me so that we may review the impact of their test results on her.    Summary:  We do not have an explanation for Elaine's family history of cancer. While no obviously harmful genetic changes were identified, Elaine may still be at risk for certain cancers due to family history, environmental factors, or other genetic causes not identified by this test. Because of that, it is important that she continue with cancer screening based on her personal and family history as discussed above.    Genetic testing is rapidly advancing, and new cancer susceptibility genes will most likely be identified in the future. Therefore, I encouraged Elaine to contact me periodically or if there are changes in her personal or family history. This may change how we assess her cancer risk, screening, and the testing we would offer.    Plan:  1. At her request, I provided Elaine with a copy of her test results via [facility]'s patient portal.    2. She plans to follow-up with her medical providers, as needed.  3. She should contact me regularly, or sooner if her family history changes.  4. I will attempt to contact Elaine if the laboratory informs me that this TSC2 variant has been reclassified. This may change screening and testing recommendations for Elaine  and her relatives.    If Elaine has any further questions, I encouraged her to contact me at [phone].    Maia Zarate MS, [facility] [place] [facility] [place]  Licensed, Certified Genetic Counselor  Office: [phone]  Pager: [phone]

## 2023-04-21 ENCOUNTER — MYC MEDICAL ADVICE (OUTPATIENT)
Dept: SURGERY | Facility: CLINIC | Age: 53
End: 2023-04-21
Payer: COMMERCIAL

## 2023-04-21 NOTE — TELEPHONE ENCOUNTER
Yes I agree with working on the weight loss first, then we can revisit later. For the kidney it was a small indeterminate lesion so would check with your PCP to see how they want to follow that going forward

## 2023-04-21 NOTE — PROGRESS NOTES
Assessment & Plan     Bilateral ovarian cysts  Reviewed ultrasound report and images.  No concerning features.  Overall consistent with CT appearance.  Discussed teratoma is not confirmed but the size is stable.  Can repeat ultrasound in 6 to 12 months. Ordered    Menorrhagia with irregular cycle  Reviewed ultrasound report and images.  Endometrium appears normal.  No other structural cause.  Mirena IUD is correctly positioned.  Anticipate improvement in prolonged bleeding.  Notify me if ongoing bleeding and we can use medical management  We will check by MyChart in bleeding in 3 months.      Review of the result(s) of each unique test - ultrasound             No follow-ups on file.    Maral Mathur MD  [facility]    Teodora Mckeon is a 52 year old, presenting for the following health issues:  Follow Up (Follow Up/Ultrasound and Pelvic F/U Ovarian cyst bilateral.)         View : No data to display.              JOSE   Presents for follow-up from last visit  2/2/2023.  At that time Mirena IUD was placed for heavy uterine bleeding.  At that time pelvic ultrasound was ordered to follow-up ovarian cyst incidentally seen on abdominal pelvic CT.    Since Mirena IUD was placed Elaine has experienced prolonged light to moderate bleeding.  Over the last 2 weeks bleeding has started to lessen and stop.      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     GERD (gastroesophageal reflux disease)      Hypertension goal BP (blood pressure) < 140/90      Iron deficiency anemia      Menorrhagia      Migraine      Morbid obesity (H)      Nonallergic rhinitis 4/17/2019     Recurrent cold sores      Vulvar dermatitis 10/14/2010     Yeast infection of the vagina, recurrent         Past Surgical History:   Procedure Laterality Date     COLPOSCOPY,BX  CERVIX/ENDOCERV CURR  7/1994     ESOPHAGOSCOPY, GASTROSCOPY, DUODENOSCOPY (EGD), COMBINED  1/2/2014    Procedure: COMBINED ESOPHAGOSCOPY, GASTROSCOPY, DUODENOSCOPY (EGD);;  Surgeon: Eden Stacy MD;  Location: [facility]     ESOPHAGOSCOPY, GASTROSCOPY, DUODENOSCOPY (EGD), COMBINED N/A 1/19/2017    Procedure: COMBINED ESOPHAGOSCOPY, GASTROSCOPY, DUODENOSCOPY (EGD);  Surgeon: Ciro Deras MD;  Location: [facility]     LAPAROSCOPIC GASTRIC ADJUSTABLE BANDING  4/28/2014    Procedure: LAPAROSCOPIC GASTRIC ADJUSTABLE BANDING;  Surgeon: Ciro Deras MD;  Location: [facility]     LAPAROSCOPIC HERNIORRHAPHY HIATAL  4/28/2014    Procedure: LAPAROSCOPIC HERNIORRHAPHY HIATAL;  Surgeon: Ciro Deras MD;  Location: [facility]     LAPAROSCOPIC REMOVAL GASTRIC ADJUSTABLE BAND N/A 1/19/2017    Procedure: LAPAROSCOPIC REMOVAL GASTRIC ADJUSTABLE BAND;  Surgeon: Ciro Deras MD;  Location: [facility]     LITHOTRIPSY       ZZC TREAT ECTOPIC PREG,RMV TUBE/OVARY      LT       Family History   Problem Relation Age of Onset     Diabetes Mother      C.A.D. Mother      Cancer Father 72        d. pancreatic, melanoma      Colon Polyps Father      Obesity Brother      Breast Cancer Maternal Grandmother      Heart Disease Maternal Grandfather         CHF     Heart Disease Paternal Grandmother         CHF     Colon Polyps Paternal Grandmother      Respiratory Paternal Grandfather         TB     Breast Cancer Maternal Aunt      Glaucoma No family hx of      Macular Degeneration No family hx of      Ovarian Cancer No family hx of        Social History     Socioeconomic History     Marital status:      Spouse name: [facility]     Number of children: 2     Years of education: 16     Highest education level: Not on file   Occupational History     Occupation: RN     Employer: [employer]   Tobacco Use     Smoking status: Former     Packs/day: 0.50     Years: 10.00     Pack years: 5.00     Types: Cigarettes     Quit  date: 1997     Years since quittin.4     Smokeless tobacco: Never   Vaping Use     Vaping status: Never Used   Substance and Sexual Activity     Alcohol use: Yes     Comment: occasional (infrequent)     Drug use: No     Sexual activity: Yes     Partners: Male     Birth control/protection: None   Other Topics Concern     Parent/sibling w/ CABG, MI or angioplasty before 65F 55M? No      Service No     Blood Transfusions No     Caffeine Concern No     Occupational Exposure Yes     Comment: nurse     Hobby Hazards No     Sleep Concern No     Stress Concern No     Weight Concern No     Special Diet No     Back Care No     Exercise Yes     Bike Helmet Yes     Seat Belt Yes     Self-Exams Yes   Social History Narrative     Not on file     Social Determinants of Health     Financial Resource Strain: Not on file   Food Insecurity: Not on file   Transportation Needs: Not on file   Physical Activity: Not on file   Stress: Not on file   Social Connections: Not on file   Intimate Partner Violence: Not on file   Housing Stability: Not on file       Current Outpatient Medications   Medication     clindamycin (CLEOCIN T) 1 % external lotion     clotrimazole (LOTRIMIN) 1 % external cream     fexofenadine (ALLEGRA) 180 MG tablet     fluconazole (DIFLUCAN) 150 MG tablet     levonorgestrel (MIRENA) 20 MCG/DAY IUD     losartan (COZAAR) 50 MG tablet     nystatin (NYSTOP) 111888 UNIT/GM external powder     omeprazole (PRILOSEC) 20 MG DR capsule     phentermine (LOMAIRA) 8 MG tablet     rizatriptan (MAXALT-MLT) 5 MG ODT     topiramate (TOPAMAX) 50 MG tablet     triamcinolone (KENALOG) 0.1 % external cream     valACYclovir (VALTREX) 500 MG tablet     No current facility-administered medications for this visit.          Allergies   Allergen Reactions     Doxycycline Rash     Ampicillin Swelling                  Review of Systems   Constitutional, HEENT, cardiovascular, pulmonary, gi and gu systems are negative, except as  otherwise noted.      Objective    /86   Pulse 91   Temp 98.6  F (37  C) (Temporal)   Wt 117.4 kg (258 lb 14.4 oz)   Breastfeeding No   BMI 46.92 kg/m    Body mass index is 46.92 kg/m .  Physical Exam   GENERAL: healthy, alert and no distress  MS: no gross musculoskeletal defects noted, no edema  PSYCH: mentation appears normal, affect normal/bright    Results for orders placed or performed in visit on 03/31/23   US Transvaginal Pelvic Non-OB     Status: None    Narrative    Table formatting from the original result was not included.  US Transvaginal Pelvic Non-OB  Order #: [id number] Accession #: [id number]  Study Notes       Ronel Espinosa on 3/31/2023  1:21 PM      Gynecological Ultrasound Report  Pelvic U/S - Transvaginal   [facility]  Referring Provider: Dr. Maral Mathur  Sonographer:  Ronel Espinosa RDMS  Indication: ovarian cysts seen on CT.  LMP: No LMP recorded.  History: IUD  Gynecological Ultrasonography:   Uterus: anteverted. Contour is smooth/regular.  Size: 8.2 x 5.0 x 5.1 cm  Endometrium: Thickness Total 7.9 mm  Findings: IUD in cavity  Right Ovary: 3.2 x 2.5 x 2.2 cm. Simple cyst 3.2 x 2.5 x 2.2 cm  Left Ovary: 2.2 x 1.6 x 1.8 cm. Complex cyst 1.9 x 1.7 x 1.8 cm  Cul de Sac Free Fluid: No free fluid     Impression:      An IUD is located within the uterine cavity.  A simple cyst was noted in the right ovary, consistent with a   physiological cyst.  A complex cyst was noted in the left ovary which   appeared hemorrhagic t.  Recommend repeat ultrasound in 6-8 weeks to check for cyst resolution.    ENOCH ENRIQUE MD          CT ABDOMEN PELVIS W/O CONTRAST 1/16/2023 10:26 AM     CLINICAL HISTORY: Ventral hernia without obstruction or gangrene  TECHNIQUE: CT scan of the abdomen and pelvis was performed without IV  contrast. Multiplanar reformats were obtained. Dose reduction  techniques were used.  CONTRAST: None.     COMPARISON: Pelvic  ultrasound 12/10/2019     FINDINGS:   LOWER CHEST: Normal.     HEPATOBILIARY: Normal.     PANCREAS: Normal.     SPLEEN: Normal.     ADRENAL GLANDS: Normal.     KIDNEYS/BLADDER: Exophytic from the anterior left kidney is a 1.3 cm  low-density lesion that measures 33 Hounsfield units. No stones or  hydronephrosis.     BOWEL: Stomach and small bowel unremarkable. No obstruction. Normal  appendix. Scattered colonic diverticula.     LYMPH NODES: No abdominopelvic lymphadenopathy. No ascites. No free  air.     There is a fat-containing anterior abdominal wall hernia with rectus  muscle diastasis. The hernia neck measures 5 cm. The herniated fat  measures 8.8 x 2.9 x 8.0 cm. It is approximately 6 cm above the  umbilicus.     VASCULATURE: No abdominal aortic aneurysm.     PELVIC ORGANS: Tampon in the vagina. There is a low density structure  in the expected location of the right ovary/adnexa measuring 2.0 cm.  There is a fat density left ovarian lesion measuring 2.7 x 1.9 cm in  size.     OTHER: None.     MUSCULOSKELETAL: Normal.                                                                      IMPRESSION:   1.  Fat-containing supraumbilical anterior abdominal wall hernia. No  herniated or obstructed bowel.     2.  There is a 1.3 cm indeterminate left renal lesion. Please consider  further evaluation with nonemergent renal ultrasound.     3.  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     4.  There is probably a 2 cm right ovarian cyst/follicle. This can  also be further evaluated by pelvic ultrasound.     RA PHAM MD

## 2023-05-10 DIAGNOSIS — E66.01 MORBID OBESITY, UNSPECIFIED OBESITY TYPE (H): Primary | ICD-10-CM

## 2023-05-12 RX ORDER — TOPIRAMATE 50 MG/1
50 TABLET, FILM COATED ORAL DAILY
Qty: 30 TABLET | Refills: 0 | Status: SHIPPED | OUTPATIENT
Start: 2023-05-12 | End: 2023-06-01

## 2023-05-12 NOTE — TELEPHONE ENCOUNTER
TOPIRAMATE 50MG TABLETS      Last Written Prescription Date:  12/7/22  Last Fill Quantity: 90,   # refills: 1  Last Office Visit : 12/7/22  Future Office visit:  6/1/23    Component      Latest Ref Rng 12/12/2022  5:03 PM   Sodium      133 - 144 mmol/L 139    Potassium      3.4 - 5.3 mmol/L 4.1    Chloride      94 - 109 mmol/L 107    Carbon Dioxide      20 - 32 mmol/L 27    Anion Gap      3 - 14 mmol/L 5    Glucose      70 - 99 mg/dL 93    Urea Nitrogen      7 - 30 mg/dL 7    Creatinine      0.52 - 1.04 mg/dL 0.84    GFR Estimate      >60 mL/min/1.73m2 83    Calcium      8.5 - 10.1 mg/dL 9.9        30d refill sent to cover pt until next OV on 6/1/23

## 2023-06-01 ENCOUNTER — VIRTUAL VISIT (OUTPATIENT)
Dept: ENDOCRINOLOGY | Facility: CLINIC | Age: 53
End: 2023-06-01
Payer: COMMERCIAL

## 2023-06-01 ENCOUNTER — TELEPHONE (OUTPATIENT)
Dept: SURGERY | Facility: CLINIC | Age: 53
End: 2023-06-01

## 2023-06-01 VITALS — BODY MASS INDEX: 44.39 KG/M2 | WEIGHT: 260 LBS | HEIGHT: 64 IN

## 2023-06-01 DIAGNOSIS — E66.01 CLASS 3 SEVERE OBESITY WITH SERIOUS COMORBIDITY AND BODY MASS INDEX (BMI) OF 40.0 TO 44.9 IN ADULT, UNSPECIFIED OBESITY TYPE (H): Primary | ICD-10-CM

## 2023-06-01 DIAGNOSIS — Z98.84 HISTORY OF REMOVAL OF LAPAROSCOPIC GASTRIC BANDING DEVICE: ICD-10-CM

## 2023-06-01 DIAGNOSIS — E66.813 CLASS 3 SEVERE OBESITY WITH SERIOUS COMORBIDITY AND BODY MASS INDEX (BMI) OF 40.0 TO 44.9 IN ADULT, UNSPECIFIED OBESITY TYPE (H): Primary | ICD-10-CM

## 2023-06-01 PROCEDURE — 99213 OFFICE O/P EST LOW 20 MIN: CPT | Mod: VID | Performed by: PHYSICIAN ASSISTANT

## 2023-06-01 RX ORDER — PHENTERMINE HYDROCHLORIDE 15 MG/1
15 CAPSULE ORAL EVERY MORNING
Qty: 30 CAPSULE | Refills: 5 | Status: SHIPPED | OUTPATIENT
Start: 2023-06-01 | End: 2023-12-13

## 2023-06-01 RX ORDER — TOPIRAMATE 50 MG/1
50 TABLET, FILM COATED ORAL DAILY
Qty: 90 TABLET | Refills: 1 | Status: SHIPPED | OUTPATIENT
Start: 2023-06-01 | End: 2023-12-13

## 2023-06-01 NOTE — LETTER
2023       RE: Elaine Awad  [id number] [address] [facility] [address] [facility] [address]     Dear Colleague,    Thank you for referring your patient, Elaine Awad, to the [facility] [place] [facility]. Please see a copy of my visit note below.          Return Medical Weight Management Note     Elaine Awad  MRN:  [id number]  :  1970  [place]:  2023    Dear Fernanda Rodriguez MD,    I had the pleasure of seeing your patient Elaine Awad. She is a 52 year old female who I am continuing to see for treatment of obesity related to:        2019    10:46 PM   --   I have the following health issues associated with obesity GERD (Reflux)    Weight Bearing Joint Pain   I have the following symptoms associated with obesity Knee Pain    GERD (Reflux)       Assessment & Plan   Problem List Items Addressed This Visit          Endocrine Diagnoses    Class 3 severe obesity with serious comorbidity and body mass index (BMI) of 40.0 to 44.9 in adult, unspecified obesity type (H) - Primary    Relevant Medications    phentermine (ADIPEX-P) 15 MG capsule    liraglutide - Weight Management (SAXENDA) 18 MG/3ML pen    insulin pen needle (31G X 5 MM) 31G X 5 MM miscellaneous       Other    History of removal of laparoscopic gastric banding device      Hx of lap band removal. Possible interest in sleeve but not ready yet.  Weight mgmt follow up. Weight stable since last visit  Needs to work on weight loss for possible ventral hernia repair    Increase lomaira 8mg to phentermine 15mg Monitor blood preessure  Continue topiramate 50mg daily  Start saxenda ramp to 3mg, will start PA  Plan to see Dr Deras after 20 lbs lost to evaluate hernia and weight loss need    RD and HC prn  Follow up MTM pharmacist 6 weeks if start Saxenda  Follow up Layla in 3-4 months    INTERVAL HISTORY:  MWM follow up  Hx of lap band removal 2016  Hx of 24 week HLP 2020  Weight  stable since last visit Dec 2022  Interested in sleeve but afraid of eating changes especially eating with drinking    Ventral hernia dx recently: has bulge near old lap band port site.  Minimal pain.     CT scan 1/16/23  There is a fat-containing anterior abdominal wall hernia with rectus  muscle diastasis. The hernia neck measures 5 cm. The herniated fat  measures 8.8 x 2.9 x 8.0 cm. It is approximately 6 cm above the  Umbilicus.      Anti-obesity medications:      Current:   Lomaira 8mg started low dose due to HTN.  On losartan well controlled now.  Topiramate 50mg (brain fog at higher doses)    CURRENT WEIGHT:   260 lbs 0 oz    Highest wt in life: 297 lbs  Lowest after lap band 204 lbs  Initial Weight (lbs): 239.4 lbs starting MWM after and removed.  Last Visits Weight: 117.5 kg (259 lb)  Cumulative weight loss (lbs): -20.6  Weight Loss Percentage: -8.6%        5/31/2023    11:24 PM   Changes and Difficulties   With regards to my diet, I am still struggling with: Protein   I have made the following changes to my activity/exercise since my last visit: Trying to increase   With regards to my activity/exercise, I am still struggling with: Foot pain         MEDICATIONS:   Current Outpatient Medications   Medication Sig Dispense Refill    clindamycin (CLEOCIN T) 1 % external lotion APPLY TOPICALLY TO FACE TWICE DAILY FOR ACNE      clotrimazole (LOTRIMIN) 1 % external cream Use twice a day for 14 days when signs of yeast in skin folds. 113 g 1    fexofenadine (ALLEGRA) 180 MG tablet Take 180 mg by mouth as needed      fluconazole (DIFLUCAN) 150 MG tablet Take 1 tablet (150 mg) by mouth every 72 hours as needed 3 tablet 3    insulin pen needle (31G X 5 MM) 31G X 5 MM miscellaneous Use 1 pen needles daily or as directed. 100 each 0    levonorgestrel (MIRENA) 20 MCG/DAY IUD 1 each (20 mcg) by Intrauterine route once      liraglutide - Weight Management (SAXENDA) 18 MG/3ML pen Week 1: 0.6 mg subcutaneous daily, Week 2:  "1.2 mg daily, Week 3: 1.8 mg daily, Week 4: 2.4 mg daily, Week 5 & on: 3 mg daily 15 mL 1    losartan (COZAAR) 50 MG tablet Take 1 tablet (50 mg) by mouth daily 90 tablet 3    nystatin (NYSTOP) 154868 UNIT/GM external powder APPLY TO TO THE AFFECTED AREA UNDER BREASTS THREE TIMES DAILY AS NEEDED 60 g 1    omeprazole (PRILOSEC) 20 MG DR capsule Take 1 capsule (20 mg) by mouth daily 90 capsule 3    phentermine (ADIPEX-P) 15 MG capsule Take 1 capsule (15 mg) by mouth every morning 30 capsule 5    phentermine (LOMAIRA) 8 MG tablet Take 1 tablet (8 mg) by mouth every morning (before breakfast) 90 tablet 1    rizatriptan (MAXALT-MLT) 5 MG ODT TAKE 1-2 TABLETS BY MOUTH AT ONSET OF HEADACHE MAY REPEAT IN 2 HOURS. MAX OF 6 TABLETS PER 24 HOURS 36 tablet 3    topiramate (TOPAMAX) 50 MG tablet Take 1 tablet (50 mg) by mouth daily 90 tablet 1    triamcinolone (KENALOG) 0.1 % external cream APPLY TO BODY RASH TWICE DAILY AS NEEDED.      valACYclovir (VALTREX) 500 MG tablet TAKE 1 TABLET BY MOUTH DAILY. INCREASE TO 4 TABLET BY MOUTH 2 TIMES DAILY AT ONSET OF OUTBREAK 100 tablet 0           5/31/2023    11:24 PM   Weight Loss Medication History Reviewed With Patient   Which weight loss medications are you currently taking on a regular basis? Phentermine    Topamax (topiramate)       Lab on 03/21/2023   Component Date Value Ref Range Status    See Scanned Result 03/21/2023 LABORATORY MISCELLANEOUS ORDER-Scanned   Final       PHYSICAL EXAM:  Objective    Ht 1.626 m (5' 4\")   Wt 117.9 kg (260 lb)   BMI 44.63 kg/m      Vitals - Patient Reported  Weight (Patient Reported): 117.9 kg (260 lb)  Height (Patient Reported): 162.6 cm (5' 4\")  BMI (Based on Pt Reported Ht/Wt): 44.63  Pain Score: No Pain (0)        GENERAL: Healthy, alert and no distress  EYES: Eyes grossly normal to inspection.  No discharge or erythema, or obvious scleral/conjunctival abnormalities.  RESP: No audible wheeze, cough, or visible cyanosis.  No visible " retractions or increased work of breathing.    SKIN: Visible skin clear. No significant rash, abnormal pigmentation or lesions.  NEURO: Cranial nerves grossly intact.  Mentation and speech appropriate for age.  PSYCH: Mentation appears normal, affect normal/bright, judgement and insight intact, normal speech and appearance well-groomed.        Sincerely,    Layla Cyr PA-C      20 minutes spent by me on the date of the encounter doing chart review, history and exam, documentation and further activities per the noteVirtual Visit Details    Type of service:  Video Visit     Originating Location (pt. Location): Home  Distant Location (provider location):  Off-site  Platform used for Video Visit: Caterva

## 2023-06-01 NOTE — PATIENT INSTRUCTIONS
MEDICATION STARTED AT THIS APPOINTMENT    We are starting a GLP-1 (Glucagon-like Peptide-1) medication called Saxenda. One of the ways it works is by slowing down the rate that food leaves your stomach. You feel alcaraz and will eat less. It also helps regulate hormones that can help improve your blood sugars.    If you are a patient that checks blood sugars, continue to check as instructed by your doctor. Low blood sugars are rare but can happen if patients are on insulin or other oral agents. If you notice consistent low sugars or high sugars, your medication may need to be adjusted after your appointment. If this is the case, please call RN and provide her your blood sugar record from the last 3-4 days. The RN will get in touch with the doctor and call you back/Hotchalk message with recommendations. We tolerate high sugars for a bit, so if sugars are running 180-200, this is ok. As weight starts dropping the blood sugars should too. If readings are consistently over 200 for 1-2 weeks, then you should call the doctor/nurse.    Dosing for this medication:   Week 1- Inject 0.6 mg daily  Week 2- Inject 1.2 mg daily  Week 3- Inject 1.8 mg daily  Week 4- Inject 2.4 mg daily  Week 5 and thereafter- Inject 3.0 mg daily    Side effects of GLP- Medications include: The most common side effects are all GI related and consist of: nausea, constipation, diarrhea, burping, or gassiness. Patients are advised to eat slowly and less, and nausea typically passes if people can stick it out.     The risk of pancreatitis (inflammation of the pancreas) has been associated with this type of medication, but is very rare.  If you have had pancreatitis in the past, this medication may not be for you. Please let us know about any past history of pancreas problems.    Symptoms of pancreatitis include: Pain in your upper stomach area which may travel to your back and be worse after eating. Your stomach area may be tender to the touch.  You  may have vomiting or nausea and/or have a fever. If you should develop any of these symptoms, stop the medication and contact your primary care doctor. They will do a blood test to check for pancreatitis.         There is a small chance you may have some low blood sugar after taking the medication.   The signs of low blood sugar are:  o Weakness  o Shaky   o Hungry  o Sweating  o Confusion      See below for ways to treat low blood sugar without adding in lots of extra calories.      Treating Low Blood Sugar    If you have symptoms of low blood sugar (sweating, shaking, dizzy, confused) eat 15 grams of carbs and wait 15 minutes:      Glucose Tabs are best for sugars under 70 -  Dex4 or BD Glucose tablets are good, you will need to take 3-4 of these to equal 15 grams.       One small box of raisins    4 oz fruit juice box or   cup fruit juice    1 small apple    1 small banana      cup canned fruit in water      English muffin or a slice of bread with jelly     1 low fat frozen waffle with sugar-free syrup      cup cottage cheese with   cup frozen or fresh blueberries    1 cup skim or low-fat milk      cup whole grain cereal    4-6 crackers such as Triscuits      This medication is usually not covered by insurance and can be quite expensive. Sometimes a prior authorization is required, which may take up to 1-2 weeks for an insurance company to make a decision if they will cover the medication. Please be patient, you will be notified after a decision has been made.    Contact the nurse via JibJab or call [phone] if you have any questions or concerns. (Do not stop taking it if you don't think it's working. For some people it works without them knowing it.)     In order to get refills of this or any medication we prescribe you must be seen in the medical weight mgmt clinic every 2-4 months.

## 2023-06-01 NOTE — PROGRESS NOTES
Return Medical Weight Management Note     Elaine Awad  MRN:  [id number]  :  1970  KEAGAN:  2023    Dear Fernanda Rodriguez MD,    I had the pleasure of seeing your patient Elaine Awad. She is a 52 year old female who I am continuing to see for treatment of obesity related to:        2019    10:46 PM   --   I have the following health issues associated with obesity GERD (Reflux)    Weight Bearing Joint Pain   I have the following symptoms associated with obesity Knee Pain    GERD (Reflux)       Assessment & Plan   Problem List Items Addressed This Visit        Endocrine Diagnoses    Class 3 severe obesity with serious comorbidity and body mass index (BMI) of 40.0 to 44.9 in adult, unspecified obesity type (H) - Primary    Relevant Medications    phentermine (ADIPEX-P) 15 MG capsule    liraglutide - Weight Management (SAXENDA) 18 MG/3ML pen    insulin pen needle (31G X 5 MM) 31G X 5 MM miscellaneous       Other    History of removal of laparoscopic gastric banding device      Hx of lap band removal. Possible interest in sleeve but not ready yet.  Weight mgmt follow up. Weight stable since last visit  Needs to work on weight loss for possible ventral hernia repair    Increase lomaira 8mg to phentermine 15mg Monitor blood preessure  Continue topiramate 50mg daily  Start saxenda ramp to 3mg, will start PA  Plan to see Dr Deras after 20 lbs lost to evaluate hernia and weight loss need    RD and HC prn  Follow up MTM pharmacist 6 weeks if start Saxenda  Follow up Layla in 3-4 months    INTERVAL HISTORY:  MWM follow up  Hx of lap band removal   Hx of 24 week [facility]   Weight stable since last visit Dec 2022  Interested in sleeve but afraid of eating changes especially eating with drinking    Ventral hernia dx recently: has bulge near old lap band port site.  Minimal pain.     CT scan 23  There is a fat-containing anterior abdominal wall hernia with rectus  muscle diastasis. The hernia  neck measures 5 cm. The herniated fat  measures 8.8 x 2.9 x 8.0 cm. It is approximately 6 cm above the  Umbilicus.      Anti-obesity medications:      Current:   Lomaira 8mg started low dose due to HTN.  On losartan well controlled now.  Topiramate 50mg (brain fog at higher doses)    CURRENT WEIGHT:   260 lbs 0 oz    Highest wt in life: 297 lbs  Lowest after lap band 204 lbs  Initial Weight (lbs): 239.4 lbs starting MWM after and removed.  Last Visits Weight: 117.5 kg (259 lb)  Cumulative weight loss (lbs): -20.6  Weight Loss Percentage: -8.6%        5/31/2023    11:24 PM   Changes and Difficulties   With regards to my diet, I am still struggling with: Protein   I have made the following changes to my activity/exercise since my last visit: Trying to increase   With regards to my activity/exercise, I am still struggling with: Foot pain         MEDICATIONS:   Current Outpatient Medications   Medication Sig Dispense Refill     clindamycin (CLEOCIN T) 1 % external lotion APPLY TOPICALLY TO FACE TWICE DAILY FOR ACNE       clotrimazole (LOTRIMIN) 1 % external cream Use twice a day for 14 days when signs of yeast in skin folds. 113 g 1     fexofenadine (ALLEGRA) 180 MG tablet Take 180 mg by mouth as needed       fluconazole (DIFLUCAN) 150 MG tablet Take 1 tablet (150 mg) by mouth every 72 hours as needed 3 tablet 3     insulin pen needle (31G X 5 MM) 31G X 5 MM miscellaneous Use 1 pen needles daily or as directed. 100 each 0     levonorgestrel (MIRENA) 20 MCG/DAY IUD 1 each (20 mcg) by Intrauterine route once       liraglutide - Weight Management (SAXENDA) 18 MG/3ML pen Week 1: 0.6 mg subcutaneous daily, Week 2: 1.2 mg daily, Week 3: 1.8 mg daily, Week 4: 2.4 mg daily, Week 5 & on: 3 mg daily 15 mL 1     losartan (COZAAR) 50 MG tablet Take 1 tablet (50 mg) by mouth daily 90 tablet 3     nystatin (NYSTOP) 076363 UNIT/GM external powder APPLY TO TO THE AFFECTED AREA UNDER BREASTS THREE TIMES DAILY AS NEEDED 60 g 1      "omeprazole (PRILOSEC) 20 MG DR capsule Take 1 capsule (20 mg) by mouth daily 90 capsule 3     phentermine (ADIPEX-P) 15 MG capsule Take 1 capsule (15 mg) by mouth every morning 30 capsule 5     phentermine (LOMAIRA) 8 MG tablet Take 1 tablet (8 mg) by mouth every morning (before breakfast) 90 tablet 1     rizatriptan (MAXALT-MLT) 5 MG ODT TAKE 1-2 TABLETS BY MOUTH AT ONSET OF HEADACHE MAY REPEAT IN 2 HOURS. MAX OF 6 TABLETS PER 24 HOURS 36 tablet 3     topiramate (TOPAMAX) 50 MG tablet Take 1 tablet (50 mg) by mouth daily 90 tablet 1     triamcinolone (KENALOG) 0.1 % external cream APPLY TO BODY RASH TWICE DAILY AS NEEDED.       valACYclovir (VALTREX) 500 MG tablet TAKE 1 TABLET BY MOUTH DAILY. INCREASE TO 4 TABLET BY MOUTH 2 TIMES DAILY AT ONSET OF OUTBREAK 100 tablet 0           5/31/2023    11:24 PM   Weight Loss Medication History Reviewed With Patient   Which weight loss medications are you currently taking on a regular basis? Phentermine    Topamax (topiramate)       Lab on 03/21/2023   Component Date Value Ref Range Status     See Scanned Result 03/21/2023 LABORATORY MISCELLANEOUS ORDER-Scanned   Final       PHYSICAL EXAM:  Objective    Ht 1.626 m (5' 4\")   Wt 117.9 kg (260 lb)   BMI 44.63 kg/m      Vitals - Patient Reported  Weight (Patient Reported): 117.9 kg (260 lb)  Height (Patient Reported): 162.6 cm (5' 4\")  BMI (Based on Pt Reported Ht/Wt): 44.63  Pain Score: No Pain (0)        GENERAL: Healthy, alert and no distress  EYES: Eyes grossly normal to inspection.  No discharge or erythema, or obvious scleral/conjunctival abnormalities.  RESP: No audible wheeze, cough, or visible cyanosis.  No visible retractions or increased work of breathing.    SKIN: Visible skin clear. No significant rash, abnormal pigmentation or lesions.  NEURO: Cranial nerves grossly intact.  Mentation and speech appropriate for age.  PSYCH: Mentation appears normal, affect normal/bright, judgement and insight intact, normal speech " and appearance well-groomed.        Sincerely,    Layla Cyr PA-C      20 minutes spent by me on the date of the encounter doing chart review, history and exam, documentation and further activities per the noteVirtual Visit Details    Type of service:  Video Visit     Originating Location (pt. Location): Home  Distant Location (provider location):  Off-site  Platform used for Video Visit: Sourav

## 2023-06-01 NOTE — TELEPHONE ENCOUNTER
PA Initiation    Medication: SAXENDA 18 MG/3ML SC SOPN  Insurance Company: [employer] [address] - Phone [phone] Fax [phone]  Pharmacy Filling the Rx:    Filling Pharmacy Phone:    Filling Pharmacy Fax:    Start Date: 6/1/2023    Called and submit a verbal prior auth since [facility]

## 2023-06-01 NOTE — NURSING NOTE
Is the patient currently in the state of [place]? YES    Visit mode:VIDEO    If the visit is dropped, the patient can be reconnected by: VIDEO VISIT: Text to cell phone: [phone]    Will anyone else be joining the visit? NO      How would you like to obtain your AVS? MyChart    Are changes needed to the allergy or medication list? NO    Reason for visit: Follow Up (RETURN MW)

## 2023-06-01 NOTE — TELEPHONE ENCOUNTER
Prior Authorization Approval    Medication: SAXENDA 18 MG/3ML SC SOPN  Authorization Effective Date: 6/1/2023  Authorization Expiration Date: 12/1/2023  Approved Dose/Quantity: 15ml/30 days   Reference #: [id number]   Insurance Company: [facility] - Phone [phone] Fax [phone]  Expected CoPay: 50.00     CoPay Card Available:      Financial Assistance Needed: no  Which Pharmacy is filling the prescription:    Pharmacy Notified:    Patient Notified:

## 2023-07-27 ENCOUNTER — VIRTUAL VISIT (OUTPATIENT)
Dept: PHARMACY | Facility: CLINIC | Age: 53
End: 2023-07-27
Payer: COMMERCIAL

## 2023-07-27 DIAGNOSIS — E66.813 CLASS 3 SEVERE OBESITY WITH SERIOUS COMORBIDITY AND BODY MASS INDEX (BMI) OF 40.0 TO 44.9 IN ADULT, UNSPECIFIED OBESITY TYPE (H): ICD-10-CM

## 2023-07-27 DIAGNOSIS — Z78.9 TAKES DIETARY SUPPLEMENTS: ICD-10-CM

## 2023-07-27 DIAGNOSIS — J30.2 SEASONAL ALLERGIES: ICD-10-CM

## 2023-07-27 DIAGNOSIS — K21.9 GASTROESOPHAGEAL REFLUX DISEASE WITHOUT ESOPHAGITIS: ICD-10-CM

## 2023-07-27 DIAGNOSIS — R21 RASH: ICD-10-CM

## 2023-07-27 DIAGNOSIS — B00.1 RECURRENT COLD SORES: ICD-10-CM

## 2023-07-27 DIAGNOSIS — I10 HYPERTENSION GOAL BP (BLOOD PRESSURE) < 140/90: Primary | ICD-10-CM

## 2023-07-27 DIAGNOSIS — G43.711 INTRACTABLE CHRONIC MIGRAINE WITHOUT AURA AND WITH STATUS MIGRAINOSUS: ICD-10-CM

## 2023-07-27 DIAGNOSIS — E66.01 CLASS 3 SEVERE OBESITY WITH SERIOUS COMORBIDITY AND BODY MASS INDEX (BMI) OF 40.0 TO 44.9 IN ADULT, UNSPECIFIED OBESITY TYPE (H): ICD-10-CM

## 2023-07-27 PROCEDURE — 99207 PR NO CHARGE LOS: CPT | Mod: VID | Performed by: PHARMACIST

## 2023-07-27 RX ORDER — DIPHENHYDRAMINE HCL 25 MG
25 TABLET ORAL
COMMUNITY

## 2023-07-27 RX ORDER — MAGNESIUM OXIDE 400 MG/1
1 TABLET ORAL DAILY
COMMUNITY

## 2023-07-27 RX ORDER — SODIUM PHOSPHATE,MONO-DIBASIC 19G-7G/118
500 ENEMA (ML) RECTAL DAILY
COMMUNITY

## 2023-07-27 ASSESSMENT — PAIN SCALES - GENERAL: PAINLEVEL: NO PAIN (0)

## 2023-07-27 NOTE — NURSING NOTE
Is the patient currently in the state of [place]? YES    Visit mode:VIDEO    If the visit is dropped, the patient can be reconnected by: VIDEO VISIT: Text to cell phone: [phone]    Will anyone else be joining the visit? NO      How would you like to obtain your AVS? MyChart    Are changes needed to the allergy or medication list? YES: Pt states phentermine dosage is now 15mg.      Pt states will get height/weight and give to provider.    Reason for visit: RECHECK      TYRONE Naqvi on 7/27/2023 at 8:14 AM

## 2023-07-27 NOTE — PATIENT INSTRUCTIONS
"Recommendations from today's MTM visit:                                                    MTM (medication therapy management) is a service provided by a clinical pharmacist designed to help you get the most of out of your medicines.        Try Miralax 1 capful daily as needed instead of sennokot for constipation   Increase Saxenda back to 2.4 mg daily for 3 days then 3 mg daily thereafter once you get your refill from [facility]  Monitor blood pressure at home, reach out if dizziness continues/worsens and/or if blood pressure remains lower. Will likely then lower your losartan dose at that time.      Follow-up: Return in about 8 weeks (around 9/21/2023) for Medication Therapy Management Pharmacist Visit, Call [phone] to schedule.    It was great speaking with you today.  I value your experience and would be very thankful for your time in providing feedback in our clinic survey. In the next few days, you may receive an email or text message from [employer] with a link to a survey related to your  clinical pharmacist.\"     To schedule another appointment with your MTM pharmacist, please call [facility] Comprehensive Weight Management Scheduling at [phone].     My Clinical Pharmacist's contact information:                                                      Please feel free to contact me with any questions or concerns you have.      Lauren Bloch, PharmD  Medication Therapy Management Pharmacist   [facility]             "

## 2023-07-27 NOTE — PROGRESS NOTES
Medication Therapy Management (MTM) Encounter    ASSESSMENT:                            Medication Adherence/Access: Saxenda available at [facility], so will send refills over there.     Weight Management:   Would benefit from continuing weight loss medication(s) regimen at this time. When she is able to get Saxenda she can titrate back to 3 mg daily dose in quicker fashion as previously tolerated the 3 mg daily without issue. Discussed moving from daily sennokot use to miralax daily as needed to reduce risk of lazy gut/colon. Will want to consider transitioning to Wegovy in future due to likelihood that [employer] has stopped readily making Saxenda and moving production over to Wegovy. Can look at needing combination weight loss medication(s) in future or if can consider tapering off 1+ medications in future.     Hypertension:   Stable for now. Blood pressure at goal < 130/80. Appears blood pressure is lower than has been historically. Discussed if continues to notice issues of dizziness and lowering of blood pressures at home, will likely consider decrease losartan in near future.     GERD:   Stable.     Supplements:   Stable.     Allergic Rhinitis:   Stable.     Rash:   Stable.     Migraines:   Stable.     Cold Sores:   Stable.     PLAN:                            Try Miralax 1 capful daily as needed instead of sennokot for constipation   Increase Saxenda back to 2.4 mg daily for 3 days then 3 mg daily thereafter once you get your refill from [facility]  Monitor blood pressure at home, reach out if dizziness continues/worsens and/or if blood pressure remains lower. Will likely then lower your losartan dose at that time.      Follow-up: Return in about 8 weeks (around 9/21/2023) for Medication Therapy Management Pharmacist Visit, Call [phone] to schedule.    SUBJECTIVE/OBJECTIVE:                          Linda Awad is a 52 year old female contacted via secure  "video for a follow-up visit from Layla Cyr.       Reason for visit: weight loss medication(s) follow up.    Allergies/ADRs: Reviewed in chart  Past Medical History: Reviewed in chart  Tobacco: She reports that she quit smoking about 25 years ago. Her smoking use included cigarettes. She has a 5.00 pack-year smoking history. She has never used smokeless tobacco.  Alcohol: not currently using    Medication Adherence/Access: She is having hard time finding Saxenda. Called around to her pharmacies around her and none have it.     Weight Management:   Saxenda 3 mg once daily  Phentermine 15 mg daily morning  Topiramate 50 mg daily bedtime     Followed by Layla Cyr PA-C, seen June 2023 for Return Medical Weight Management. At that visit Saxenda was started and phentermine was increased from 8 mg daily to 15 mg daily while waiting to get Saxenda Prior Authorization approval. Patient is experiencing the follow side effects: constipation. Using sennokot daily and managing constipation effectively. When she knew she was going to run out of Saxenda she has been decreasing to 1.8 mg daily as of now. She is feeling satisfied between meals. She is eating consistent meals daily. Largest meal of day is lunch that she eats with her . Exercise via walking 1 hour 4 times per week.     Current weight today: 247 lb   Weight Prior to Starting Saxenda: 260 lb   Cumulative Weight Loss: -13 lb, 5%   Wt Readings from Last 4 Encounters:   06/01/23 260 lb (117.9 kg)   03/31/23 258 lb 14.4 oz (117.4 kg)   02/02/23 263 lb (119.3 kg)   01/02/23 261 lb (118.4 kg)     Estimated body mass index is 44.63 kg/m  as calculated from the following:    Height as of 6/1/23: 5' 4\" (1.626 m).    Weight as of 6/1/23: 260 lb (117.9 kg).    Hypertension:   Losartan 50 mg daily     Patient reports no current medication side effects. She is noticing some dizziness at times with her current weight loss in the last couple weeks.    Patient " self-monitors blood pressure.  Home BP monitoring in range of lows 100s-120s/60s-70s mm Hg.    BP Readings from Last 3 Encounters:   03/31/23 139/86   02/02/23 (!) 140/88   01/02/23 134/84     Pulse Readings from Last 3 Encounters:   03/31/23 91   02/02/23 106   01/02/23 101     GERD:   Omeprazole 20 mg once daily   Patient reports no current symptoms.   The patient does not have a history of GI bleed.  The patient does notice symptoms if they miss a dose. Patient feels that current regimen is effective.    Supplements:   Glucosamine 1 capsule daily     Glucosamine for joint pain particularly in foot. No medication side effects.     Allergic Rhinitis:   Fexofenadine 180 mg once daily as needed    Patient reports no current medication side effects.  Primary symptoms are swollen eyes, runny dose. Diphenhydramine nightly as needed when eyes more swollen.   Primary triggers are ragweed and pollen.   Patient feels that current therapy is effective.     Rash:   Nystatin as needed   Triamcinolone cream    Will use under breasts if needed.      Migraines:   Magnesium 400 mg daily   Rizatriptan 5-10 mg as needed     Reports getting 2-3 migraine per month, but will last for 3+ days. Sometimes nausea and light sensitivity. A lot of times they are premenstrual migraine, sometimes humidity but mostly hormonal. She does find rizatriptan helpful. Sumatriptan had side effect of drowsiness. Rizatriptan takes away pain to function, but doesn't absolve issue.     Cold Sores:   Valacyclovir 500 mg as needed for outbreak.      Hasn't used for a while.     ----------------  I spent 30 minutes with this patient today. All changes were made via collaborative practice agreement with Layla Cyr PA-C. A copy of the visit note was provided to the patient's provider(s).    A summary of these recommendations was sent via LiveU.    Lauren Bloch, PharmD, BCACP   Medication Therapy Management Pharmacist   [facility]    Telemedicine Visit Details  Type of service:  Video Conference via [place]  Start Time:  8:35 AM  End Time:  9:05 AM     Medication Therapy Recommendations  Class 3 severe obesity with serious comorbidity and body mass index (BMI) of 40.0 to 44.9 in adult, unspecified obesity type (H)    Current Medication: liraglutide - Weight Management (SAXENDA) 18 MG/3ML pen   Rationale: Dose too low - Dosage too low - Effectiveness   Recommendation: Increase Dose   Status: Accepted per CPA          Rationale: Unsafe medication for the patient - Adverse medication event - Safety   Recommendation: Change Medication - MiraLax 17 g Pack   Status: Accepted - no CPA Needed

## 2023-07-27 NOTE — PROGRESS NOTES
Virtual Visit Details    Type of service:  Video Visit     Originating Location (pt. Location): Home    Distant Location (provider location):  Off-site  Platform used for Video Visit: Sourav

## 2023-09-01 DIAGNOSIS — E66.813 CLASS 3 SEVERE OBESITY WITH SERIOUS COMORBIDITY AND BODY MASS INDEX (BMI) OF 40.0 TO 44.9 IN ADULT, UNSPECIFIED OBESITY TYPE (H): ICD-10-CM

## 2023-09-01 DIAGNOSIS — E66.01 CLASS 3 SEVERE OBESITY WITH SERIOUS COMORBIDITY AND BODY MASS INDEX (BMI) OF 40.0 TO 44.9 IN ADULT, UNSPECIFIED OBESITY TYPE (H): ICD-10-CM

## 2023-09-02 RX ORDER — FLURBIPROFEN SODIUM 0.3 MG/ML
SOLUTION/ DROPS OPHTHALMIC
Qty: 100 EACH | Refills: 0 | Status: SHIPPED | OUTPATIENT
Start: 2023-09-02 | End: 2024-01-16

## 2023-09-02 NOTE — TELEPHONE ENCOUNTER
Pen needle  (31G X 5 MM) 31G X 5 MM   100 each 0 6/1/2023 6/1/2023  [facility] Layla Amin PA-C  Surgery      Nv: 12/13/23

## 2023-09-19 ENCOUNTER — MYC REFILL (OUTPATIENT)
Dept: PHARMACY | Facility: CLINIC | Age: 53
End: 2023-09-19
Payer: COMMERCIAL

## 2023-09-19 DIAGNOSIS — E66.01 CLASS 3 SEVERE OBESITY WITH SERIOUS COMORBIDITY AND BODY MASS INDEX (BMI) OF 40.0 TO 44.9 IN ADULT, UNSPECIFIED OBESITY TYPE (H): ICD-10-CM

## 2023-09-19 DIAGNOSIS — E66.813 CLASS 3 SEVERE OBESITY WITH SERIOUS COMORBIDITY AND BODY MASS INDEX (BMI) OF 40.0 TO 44.9 IN ADULT, UNSPECIFIED OBESITY TYPE (H): ICD-10-CM

## 2023-09-20 NOTE — CONFIDENTIAL NOTE
Patient had MTM visit in July. She is scheduled with Layla Cyr PA-C in December. Routed to KAYLA for sign off.

## 2023-11-03 ASSESSMENT — ENCOUNTER SYMPTOMS
FREQUENCY: 0
PALPITATIONS: 0
MYALGIAS: 1
DIARRHEA: 0
SORE THROAT: 0
PARESTHESIAS: 1
HEMATURIA: 0
SHORTNESS OF BREATH: 0
EYE PAIN: 0
BREAST MASS: 0
CHILLS: 0
CONSTIPATION: 0
DIZZINESS: 0
WEAKNESS: 0
NAUSEA: 1
HEMATOCHEZIA: 0
COUGH: 0
NERVOUS/ANXIOUS: 0
ARTHRALGIAS: 1
HEADACHES: 1
DYSURIA: 0
ABDOMINAL PAIN: 1
HEARTBURN: 1
FEVER: 0
JOINT SWELLING: 0

## 2023-11-03 NOTE — COMMUNITY RESOURCES LIST (ENGLISH)
11/03/2023   [facility] Feng[facility]fei  N/A  For questions about this resource list or additional care needs, please contact your primary care clinic or care manager.  Phone: [phone]   Email: N/A   Address: [address] [place] [address]   Hours: N/A        Financial Stability       Utility payment assistance  1  [employer]) - Emergency Assistance - Utility payment assistance Distance: 7.3 miles      In-Person   [address] [place] [address]  Language: English, Welsh  Hours: Mon 10:00 AM - 1:30 PM Appt. Only, Wed 10:00 AM - 1:30 PM Appt. Only, Thu 4:30 PM - 6:30 PM Appt. Only, Fri 10:00 AM - 1:30 PM Appt. Only  Fees: Free   Phone: (350) 996-5466 Email: info@[employer] Website: http://www.[employer].org     2  [facility]  Office - [facility] Distance: 14.2 miles      Phone/Virtual   [address] [address]  Language: English  Hours: Mon - Fri 8:30 AM - 12:00 PM , Mon - Fri 1:00 PM - 4:00 PM  Fees: Free   Phone: (312) 777-8775 Email: wood@[facility].[employer].org Website: https://www.[employer].org/usn/          Important Numbers & Websites       Emergency Services   911  [facility]   311  Poison Control   (807) 125-6696  Suicide Prevention Lifeline   (323) 699-1144 (TALK)  Child Abuse Hotline   (399) 955-9312 (4-A-Child)  Sexual Assault Hotline   (988) 764-2742 (HOPE)  National Runaway Safeline   (320) 656-7713 (RUNAWAY)  All-Options Talkline   (888) 954-3564  Substance Abuse Referral   (302) 627-3536 (HELP)

## 2023-11-06 ENCOUNTER — OFFICE VISIT (OUTPATIENT)
Dept: FAMILY MEDICINE | Facility: CLINIC | Age: 53
End: 2023-11-06
Payer: COMMERCIAL

## 2023-11-06 VITALS
OXYGEN SATURATION: 95 % | RESPIRATION RATE: 18 BRPM | BODY MASS INDEX: 45.82 KG/M2 | SYSTOLIC BLOOD PRESSURE: 123 MMHG | TEMPERATURE: 97.8 F | HEIGHT: 62 IN | WEIGHT: 249 LBS | HEART RATE: 98 BPM | DIASTOLIC BLOOD PRESSURE: 80 MMHG

## 2023-11-06 DIAGNOSIS — L29.9 PRURITIC DISORDER: ICD-10-CM

## 2023-11-06 DIAGNOSIS — K43.9 VENTRAL HERNIA WITHOUT OBSTRUCTION OR GANGRENE: ICD-10-CM

## 2023-11-06 DIAGNOSIS — Z12.11 SPECIAL SCREENING FOR MALIGNANT NEOPLASMS, COLON: ICD-10-CM

## 2023-11-06 DIAGNOSIS — G43.909 MIGRAINE WITHOUT STATUS MIGRAINOSUS, NOT INTRACTABLE, UNSPECIFIED MIGRAINE TYPE: ICD-10-CM

## 2023-11-06 DIAGNOSIS — D27.1 TERATOMA OF LEFT OVARY: ICD-10-CM

## 2023-11-06 DIAGNOSIS — E66.813 CLASS 3 SEVERE OBESITY WITH SERIOUS COMORBIDITY AND BODY MASS INDEX (BMI) OF 40.0 TO 44.9 IN ADULT, UNSPECIFIED OBESITY TYPE (H): ICD-10-CM

## 2023-11-06 DIAGNOSIS — N83.202 LEFT OVARIAN CYST: ICD-10-CM

## 2023-11-06 DIAGNOSIS — I10 HYPERTENSION GOAL BP (BLOOD PRESSURE) < 140/90: ICD-10-CM

## 2023-11-06 DIAGNOSIS — N28.89 LEFT KIDNEY MASS: ICD-10-CM

## 2023-11-06 DIAGNOSIS — K21.9 GASTROESOPHAGEAL REFLUX DISEASE WITHOUT ESOPHAGITIS: ICD-10-CM

## 2023-11-06 DIAGNOSIS — Z00.00 ROUTINE GENERAL MEDICAL EXAMINATION AT A HEALTH CARE FACILITY: Primary | ICD-10-CM

## 2023-11-06 DIAGNOSIS — E66.01 CLASS 3 SEVERE OBESITY WITH SERIOUS COMORBIDITY AND BODY MASS INDEX (BMI) OF 40.0 TO 44.9 IN ADULT, UNSPECIFIED OBESITY TYPE (H): ICD-10-CM

## 2023-11-06 PROBLEM — Z86.2 HISTORY OF ANEMIA: Status: RESOLVED | Noted: 2019-09-12 | Resolved: 2023-11-06

## 2023-11-06 PROBLEM — Z80.3 FAMILY HISTORY OF MALIGNANT NEOPLASM OF BREAST: Status: RESOLVED | Noted: 2022-12-12 | Resolved: 2023-11-06

## 2023-11-06 LAB
ANION GAP SERPL CALCULATED.3IONS-SCNC: 9 MMOL/L (ref 7–15)
BUN SERPL-MCNC: 7.1 MG/DL (ref 6–20)
CALCIUM SERPL-MCNC: 10.1 MG/DL (ref 8.6–10)
CHLORIDE SERPL-SCNC: 105 MMOL/L (ref 98–107)
CREAT SERPL-MCNC: 0.89 MG/DL (ref 0.51–0.95)
DEPRECATED HCO3 PLAS-SCNC: 24 MMOL/L (ref 22–29)
EGFRCR SERPLBLD CKD-EPI 2021: 78 ML/MIN/1.73M2
GLUCOSE SERPL-MCNC: 90 MG/DL (ref 70–99)
POTASSIUM SERPL-SCNC: 4.2 MMOL/L (ref 3.4–5.3)
SODIUM SERPL-SCNC: 138 MMOL/L (ref 135–145)

## 2023-11-06 PROCEDURE — 90480 ADMN SARSCOV2 VAC 1/ONLY CMP: CPT | Performed by: FAMILY MEDICINE

## 2023-11-06 PROCEDURE — 36415 COLL VENOUS BLD VENIPUNCTURE: CPT | Performed by: FAMILY MEDICINE

## 2023-11-06 PROCEDURE — 99396 PREV VISIT EST AGE 40-64: CPT | Performed by: FAMILY MEDICINE

## 2023-11-06 PROCEDURE — 91320 SARSCV2 VAC 30MCG TRS-SUC IM: CPT | Performed by: FAMILY MEDICINE

## 2023-11-06 PROCEDURE — 99213 OFFICE O/P EST LOW 20 MIN: CPT | Mod: 25 | Performed by: FAMILY MEDICINE

## 2023-11-06 PROCEDURE — 80048 BASIC METABOLIC PNL TOTAL CA: CPT | Performed by: FAMILY MEDICINE

## 2023-11-06 RX ORDER — RIZATRIPTAN BENZOATE 5 MG/1
TABLET, ORALLY DISINTEGRATING ORAL
Qty: 36 TABLET | Refills: 3 | Status: SHIPPED | OUTPATIENT
Start: 2023-11-06

## 2023-11-06 RX ORDER — LOSARTAN POTASSIUM 50 MG/1
50 TABLET ORAL DAILY
Qty: 90 TABLET | Refills: 3 | Status: SHIPPED | OUTPATIENT
Start: 2023-11-06

## 2023-11-06 ASSESSMENT — ENCOUNTER SYMPTOMS
PSYCHIATRIC NEGATIVE: 1
FEVER: 0
PALPITATIONS: 0
CONSTIPATION: 0
HEMATURIA: 0
HEMATOCHEZIA: 0
NERVOUS/ANXIOUS: 0
ALLERGIC/IMMUNOLOGIC NEGATIVE: 1
SORE THROAT: 0
WEAKNESS: 0
DYSURIA: 0
DIARRHEA: 0
CHILLS: 0
COUGH: 0
NAUSEA: 1
PARESTHESIAS: 1
BREAST MASS: 0
MYALGIAS: 1
SHORTNESS OF BREATH: 0
EYE PAIN: 0
BRUISES/BLEEDS EASILY: 0
JOINT SWELLING: 0
HEADACHES: 1
HEARTBURN: 1
FREQUENCY: 0
ABDOMINAL PAIN: 1
ARTHRALGIAS: 1
DIZZINESS: 0

## 2023-11-06 NOTE — COMMUNITY RESOURCES LIST (ENGLISH)
11/06/2023   [facility] - Outpatient Clinics  N/A  For additional resource needs, please contact your health insurance member services or your primary care team.  Phone: [phone]   Email: N/A   Address: [facility] [place] [address]   Hours: N/A        Financial Stability       Utility payment assistance  1  [place] [facility] and [facility] Distance: 28.49 miles      In-Person, Phone/Virtual   [address] [place] [address]  Language: English  Hours: Mon - Fri 8:30 AM - 4:30 PM  Fees: Free   Phone: (606) 407-7533 Website: https://[place]/Jaman/energy/consumer-assistance/energy-assistance-program/     2  [place] [employer] [facility] [place]'s Telephone Assistance Plan (TAP) and [facility] Lifeline and Affordable Connectivity Program (ACP) Distance: 31.58 miles      Phone/Virtual   [address] [place] [address]  Language: English  Fees: Free   Phone: (262) 814-2182 Email: suha.buzz@[facility].mn. Website: https://[place]/puc/consumers/telephone/          Important Numbers & Websites       [facility]   211 211unitedway.org  Poison Control   (167) 277-5039 [place]po[place]  Suicide and Crisis Lifeline   [address] [place], [place] [address]line.org  Childhelp [place] Child Abuse Hotline   466.181.4995 Childhelphotline.org  National Sexual Assault Hotline   (844) 767-7524 (HOPE) Rainn.org  National Runaway Safeline   (231) 906-6958 (RUNAWAY) 1800runaway.org  Pregnancy & Postpartum Support [place]   Call/text 541-600-5362 Ppsupport[place].org  Substance Abuse National Helpline ([facility]   782-774-HELP (1179) Findtreatment.gov  Emergency Services   911

## 2023-11-06 NOTE — PROGRESS NOTES
SUBJECTIVE:   CC: Linda is an 52 year old who presents for preventive health visit.       2023    10:01 AM   Additional Questions   Roomed by Iona SANDERS CMA   Accompanied by Self       Healthy Habits:     Getting at least 3 servings of Calcium per day:  Yes    Bi-annual eye exam:  Yes    Dental care twice a year:  Yes    Sleep apnea or symptoms of sleep apnea:  None    Diet:  Regular (no restrictions)    Frequency of exercise:  2-3 days/week    Duration of exercise:  15-30 minutes    Taking medications regularly:  Yes    Medication side effects:  Other    Additional concerns today:  Yes (Dicuss hernia, itching on back and chest, CT scan result.)    She wants a hernia repaired as it causes discomfort, is unsightly, and she has occasional nausea which she attributes to the hernia. She is under care of bariatrics with medical therapy.     She also presents for follow-up of CT results.     She has ongoing diffuse pruritic disorder and rash including on her chest, lost to care with outside dermatology and poorly responsive to multiple topicals and chronic antihistamine.     Hypertension well controlled on current medications without side effects, chest pain, or dyspnea. Migraines are well controlled and infrequent without medication side effects. Also presents for follow-up of GERD controlled with medication.       Social History     Tobacco Use    Smoking status: Former     Packs/day: 0.50     Years: 10.00     Additional pack years: 0.00     Total pack years: 5.00     Types: Cigarettes     Quit date: 1997     Years since quittin.9    Smokeless tobacco: Never   Substance Use Topics    Alcohol use: Yes     Comment: occasional (infrequent)             11/3/2023     5:28 PM   Alcohol Use   Prescreen: >3 drinks/day or >7 drinks/week? No     Reviewed orders with patient.  Reviewed health maintenance and updated orders accordingly - Yes  Patient Active Problem List   Diagnosis    GERD (gastroesophageal reflux  disease)    Migraines    Yeast infection of the vagina, recurrent     Recurrent cold sores    Class 3 severe obesity with serious comorbidity and body mass index (BMI) of 40.0 to 44.9 in adult, unspecified obesity type (H)    Primary osteoarthritis of right knee    Intertrigo    History of removal of laparoscopic gastric banding device    B12 deficiency    Menorrhagia    Hypertension goal BP (blood pressure) < 140/90    Ventral hernia without obstruction or gangrene     Past Surgical History:   Procedure Laterality Date    COLPOSCOPY,BX CERVIX/ENDOCERV CURR  1994    ESOPHAGOSCOPY, GASTROSCOPY, DUODENOSCOPY (EGD), COMBINED  2014    Procedure: COMBINED ESOPHAGOSCOPY, GASTROSCOPY, DUODENOSCOPY (EGD);;  Surgeon: Eden Stacy MD;  Location: [facility]    ESOPHAGOSCOPY, GASTROSCOPY, DUODENOSCOPY (EGD), COMBINED N/A 2017    Procedure: COMBINED ESOPHAGOSCOPY, GASTROSCOPY, DUODENOSCOPY (EGD);  Surgeon: Ciro Deras MD;  Location: [facility]    LAPAROSCOPIC GASTRIC ADJUSTABLE BANDING  2014    Procedure: LAPAROSCOPIC GASTRIC ADJUSTABLE BANDING;  Surgeon: Ciro Deras MD;  Location: [facility]    LAPAROSCOPIC HERNIORRHAPHY HIATAL  2014    Procedure: LAPAROSCOPIC HERNIORRHAPHY HIATAL;  Surgeon: Ciro Deras MD;  Location: [facility]    LAPAROSCOPIC REMOVAL GASTRIC ADJUSTABLE BAND N/A 2017    Procedure: LAPAROSCOPIC REMOVAL GASTRIC ADJUSTABLE BAND;  Surgeon: Ciro Deras MD;  Location: [facility]    LITHOTRIPSY      ZZC TREAT ECTOPIC PREG,RMV TUBE/OVARY      LT       Social History     Tobacco Use    Smoking status: Former     Packs/day: 0.50     Years: 10.00     Additional pack years: 0.00     Total pack years: 5.00     Types: Cigarettes     Quit date: 1997     Years since quittin.9    Smokeless tobacco: Never   Substance Use Topics    Alcohol use: Yes     Comment: occasional (infrequent)     Family History   Problem Relation Age of Onset    Diabetes Mother     C.A.D. Mother      Cancer Father 72        d. pancreatic, melanoma     Colon Polyps Father     Obesity Brother     Breast Cancer Maternal Grandmother     Heart Disease Maternal Grandfather         CHF    Heart Disease Paternal Grandmother         CHF    Colon Polyps Paternal Grandmother     Respiratory Paternal Grandfather         TB    Breast Cancer Maternal Aunt     Glaucoma No family hx of     Macular Degeneration No family hx of     Ovarian Cancer No family hx of          Current Outpatient Medications   Medication Sig Dispense Refill    clindamycin (CLEOCIN T) 1 % external lotion APPLY TOPICALLY TO FACE TWICE DAILY FOR ACNE      clotrimazole (LOTRIMIN) 1 % external cream Use twice a day for 14 days when signs of yeast in skin folds. 113 g 1    diphenhydrAMINE (BENADRYL) 25 MG tablet Take 25 mg by mouth nightly as needed for sleep      fexofenadine (ALLEGRA) 180 MG tablet Take 180 mg by mouth as needed      fluconazole (DIFLUCAN) 150 MG tablet Take 1 tablet (150 mg) by mouth every 72 hours as needed 3 tablet 3    glucosamine 500 MG CAPS capsule Take 500 mg by mouth daily      insulin pen needle (B-D U/F) 31G X 5 MM miscellaneous USE 1 PEN NEEDLE DAILY OR AS DIRECTED. 100 each 0    levonorgestrel (MIRENA) 20 MCG/DAY IUD 1 each (20 mcg) by Intrauterine route once      liraglutide - Weight Management (SAXENDA) 18 MG/3ML pen Inject 3 mg Subcutaneous daily 15 mL 2    losartan (COZAAR) 50 MG tablet Take 1 tablet (50 mg) by mouth daily 90 tablet 3    magnesium oxide (MAG-OX) 400 MG tablet Take 1 tablet by mouth daily      nystatin (NYAMYC) 413643 UNIT/GM external powder APPLY TO THE AFFECTED AREA UNDER THE BREASTS THREE TIMES DAILY AS NEEDED. 60 g 0    omeprazole (PRILOSEC) 20 MG DR capsule Take 1 capsule (20 mg) by mouth daily 90 capsule 3    phentermine (ADIPEX-P) 15 MG capsule Take 1 capsule (15 mg) by mouth every morning 30 capsule 5    rizatriptan (MAXALT-MLT) 5 MG ODT TAKE 1-2 TABLETS BY MOUTH AT ONSET OF HEADACHE MAY REPEAT IN  2 HOURS. MAX OF 6 TABLETS PER 24 HOURS 36 tablet 3    topiramate (TOPAMAX) 50 MG tablet Take 1 tablet (50 mg) by mouth daily 90 tablet 1     Allergies   Allergen Reactions    Doxycycline Rash     Full body rash    Ampicillin Swelling     Tongue swelling at end of treatment course       Breast Cancer Screening:    FHS-7:       9/3/2021     9:48 PM 11/24/2021     4:13 PM 12/11/2022     7:08 PM 12/12/2022     3:33 PM   Breast CA Risk Assessment (FHS-7)   Did any of your first-degree relatives have breast or ovarian cancer? No No No No   Did any of your relatives have bilateral breast cancer? No No No No   Did any man in your family have breast cancer? No No No No   Did any woman in your family have breast and ovarian cancer? No No No No   Did any woman in your family have breast cancer before age 50 y? No No No No   Do you have 2 or more relatives with breast and/or ovarian cancer? No No Yes No   Do you have 2 or more relatives with breast and/or bowel cancer? No No Yes No        Mammogram Screening: Recommended annual mammography  Pertinent mammograms are reviewed under the imaging tab.    History of abnormal Pap smear: NO - age 30-65 PAP every 5 years with negative HPV co-testing recommended      Latest Ref Rng & Units 2/2/2023     2:15 PM 10/24/2018     2:08 PM 10/24/2018     8:46 AM   PAP / HPV   PAP  Negative for Intraepithelial Lesion or Malignancy (NILM)      PAP (Historical)   OTHER-NIL, See Result     HPV 16 DNA Negative Negative   Negative    HPV 18 DNA Negative Negative   Negative    Other HR HPV Negative Negative   Negative      Reviewed and updated as needed this visit by clinical staff   Tobacco  Allergies  Meds  Problems  Med Hx  Surg Hx  Fam Hx          Reviewed and updated as needed this visit by Provider   Tobacco  Allergies  Meds  Problems  Med Hx  Surg Hx  Fam Hx         Past Medical History:   Diagnosis Date    Allergic rhinitis     B12 deficiency     Degenerative joint disease of  "knee, right     Eczema     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   HENT:  Negative for congestion, ear pain, hearing loss and sore throat.    Eyes:  Negative for pain and visual disturbance.   Respiratory:  Negative for cough and shortness of breath.    Cardiovascular:  Negative for chest pain, palpitations and peripheral edema.   Gastrointestinal:  Positive for abdominal pain, heartburn and nausea. Negative for constipation, diarrhea and hematochezia.   Endocrine: Negative for polyuria.   Breasts:  Negative for tenderness, breast mass and discharge.   Genitourinary:  Positive for vaginal bleeding. Negative for dysuria, frequency, genital sores, hematuria, pelvic pain, urgency and vaginal discharge.   Musculoskeletal:  Positive for arthralgias and myalgias. Negative for joint swelling.   Skin:  Positive for rash.   Allergic/Immunologic: Negative.    Neurological:  Positive for headaches and paresthesias. Negative for dizziness and weakness.   Hematological:  Does not bruise/bleed easily.   Psychiatric/Behavioral: Negative.  Negative for mood changes. The patient is not nervous/anxious.           OBJECTIVE:   /80 (BP Location: Right arm, Patient Position: Sitting, Cuff Size: Adult Large)   Pulse 98   Temp 97.8  F (36.6  C) (Oral)   Resp 18   Ht 1.582 m (5' 2.28\")   Wt 112.9 kg (249 lb)   LMP 11/02/2023   SpO2 95%   BMI 45.13 kg/m    Physical Exam  GENERAL: alert, no distress, and obese  EYES: Eyes grossly normal to inspection, PERRL and conjunctivae and sclerae normal  HENT: ear canals and TM's normal, nose and mouth " without ulcers or lesions  NECK: no adenopathy, no asymmetry, masses, or scars and thyroid normal to palpation  RESP: lungs clear to auscultation - no rales, rhonchi or wheezes  CV: regular rate and rhythm, normal S1 S2, no S3 or S4, no murmur, click or rub, no peripheral edema    ABDOMEN: soft, nontender and bowel sounds normal  MS: no gross musculoskeletal defects noted, no edema  SKIN: pink papular fine rash on chest   NEURO: Normal strength and tone, mentation intact and speech normal  PSYCH: mentation appears normal, affect normal/bright    Diagnostic Test Results:  Labs reviewed in Epic    ASSESSMENT/PLAN:   (Z00.00) Routine general medical examination at a health care facility  (primary encounter diagnosis)    (K43.9) Ventral hernia without obstruction or gangrene  Comment: symptomatic   Plan: OFFICE/OUTPT VISIT,EST,LEVL III, Adult General         Surg Referral        Okay for 2nd opinion; continue weight loss efforts with bariatrics in terms of timing for possible hernia repair     (N28.89) Left kidney mass  Plan: OFFICE/OUTPT VISIT,EST,LEVL III, US Renal         Complete Non-Vascular          (N83.202) Left ovarian cyst  Plan: OFFICE/OUTPT VISIT,EST,LEVL III, US Pelvic         Complete with Transvaginal          (I10) Hypertension goal BP (blood pressure) < 140/90  Comment: Well controlled with medications without side effects.   Plan: losartan (COZAAR) 50 MG tablet, OFFICE/OUTPT         VISIT,EST,LEVL III, Basic metabolic panel  (Ca,        Cl, CO2, Creat, Gluc, K, Na, BUN)          (E66.01,  Z68.41) Class 3 severe obesity with serious comorbidity and body mass index (BMI) of 40.0 to 44.9 in adult, unspecified obesity type (H)  Plan: OFFICE/OUTPT VISIT,EST,LEVL III, Basic         metabolic panel  (Ca, Cl, CO2, Creat, Gluc, K,         Na, BUN)        Continue medical management with bariatrics; she is not interested in bariatric surgery     (G43.909) Migraine without status migrainosus, not intractable,  "unspecified migraine type  Comment: Well controlled with medications without side effects.   Plan: rizatriptan (MAXALT-MLT) 5 MG ODT, OFFICE/OUTPT        VISIT,EST,LEVL III          (K21.9) Gastroesophageal reflux disease without esophagitis  Plan: omeprazole (PRILOSEC) 20 MG DR capsule,         OFFICE/OUTPT VISIT,EST,LEVL III          (L29.9) Pruritic disorder  Plan: OFFICE/OUTPT VISIT,EST,LEVL III, Adult         Dermatology  Referral        Continue antihistamine     (Z12.11) Special screening for malignant neoplasms, colon  Plan: COLOGUSAMA([employer])          Patient has been advised of split billing requirements and indicates understanding: Yes      COUNSELING:  Reviewed preventive health counseling, as reflected in patient instructions  Special attention given to:        Regular exercise       Healthy diet/nutrition       Osteoporosis prevention/bone health       Colorectal Cancer Screening       Consider Hep C screening for all patients one time for ages 18-79 years       HIV screeninx in teen years, 1x in adult years, and at intervals if high risk       The 10-year ASCVD risk score (Helen IVERSON, et al., 2019) is: 1.8%    Values used to calculate the score:      Age: 52 years      Sex: Female      Is Non- : No      Diabetic: No      Tobacco smoker: No      Systolic Blood Pressure: 123 mmHg      Is BP treated: Yes      HDL Cholesterol: 52 mg/dL      Total Cholesterol: 184 mg/dL      BMI:   Estimated body mass index is 45.13 kg/m  as calculated from the following:    Height as of this encounter: 1.582 m (5' 2.28\").    Weight as of this encounter: 112.9 kg (249 lb).   Weight management plan: Patient referred to endocrine and/or weight management specialty      She reports that she quit smoking about 25 years ago. Her smoking use included cigarettes. She has a 5.00 pack-year smoking history. She has never used smokeless tobacco.          Fernanda Rodriguez MD  [facility] " [facility]

## 2023-11-13 ENCOUNTER — PATIENT OUTREACH (OUTPATIENT)
Dept: CARE COORDINATION | Facility: CLINIC | Age: 53
End: 2023-11-13
Payer: COMMERCIAL

## 2023-11-14 ENCOUNTER — ANCILLARY PROCEDURE (OUTPATIENT)
Dept: ULTRASOUND IMAGING | Facility: CLINIC | Age: 53
End: 2023-11-14
Attending: FAMILY MEDICINE
Payer: COMMERCIAL

## 2023-11-14 DIAGNOSIS — N28.89 LEFT KIDNEY MASS: ICD-10-CM

## 2023-11-14 DIAGNOSIS — N83.202 LEFT OVARIAN CYST: ICD-10-CM

## 2023-11-14 PROBLEM — Z97.5 IUD (INTRAUTERINE DEVICE) IN PLACE: Status: ACTIVE | Noted: 2023-11-14

## 2023-11-14 PROBLEM — D27.1 TERATOMA OF LEFT OVARY: Status: ACTIVE | Noted: 2023-11-14

## 2023-11-14 PROCEDURE — 76830 TRANSVAGINAL US NON-OB: CPT | Performed by: RADIOLOGY

## 2023-11-14 PROCEDURE — 76770 US EXAM ABDO BACK WALL COMP: CPT | Performed by: RADIOLOGY

## 2023-11-14 PROCEDURE — 76856 US EXAM PELVIC COMPLETE: CPT | Performed by: RADIOLOGY

## 2023-11-14 NOTE — RESULT ENCOUNTER NOTE
Linda,    This is a good result. You have a benign left kidney cyst. No follow-up testing is needed.     Fernanda Rodriguez MD

## 2023-11-14 NOTE — RESULT ENCOUNTER NOTE
Please call patient:    Your ultrasound shows a left ovary mass called a teratoma. While these are usually benign, I recommend consultation with a gynecologist. I've made a referral.     Fernanda Rodriguez MD

## 2023-11-15 ENCOUNTER — OFFICE VISIT (OUTPATIENT)
Dept: SURGERY | Facility: CLINIC | Age: 53
End: 2023-11-15
Payer: COMMERCIAL

## 2023-11-15 VITALS
BODY MASS INDEX: 46.38 KG/M2 | DIASTOLIC BLOOD PRESSURE: 70 MMHG | WEIGHT: 252 LBS | HEIGHT: 62 IN | TEMPERATURE: 98.6 F | SYSTOLIC BLOOD PRESSURE: 124 MMHG

## 2023-11-15 DIAGNOSIS — K43.9 VENTRAL HERNIA WITHOUT OBSTRUCTION OR GANGRENE: ICD-10-CM

## 2023-11-15 PROCEDURE — 99203 OFFICE O/P NEW LOW 30 MIN: CPT | Performed by: SURGERY

## 2023-11-15 ASSESSMENT — PAIN SCALES - GENERAL: PAINLEVEL: NO PAIN (0)

## 2023-11-15 NOTE — LETTER
11/15/2023         RE: Elaine Awad  [id number] [facility] [id number]        Dear Colleague,    Thank you for referring your patient, Elaine Awad, to the [facility]. Please see a copy of my visit note below.    Patient seen in consultation for ventral hernia by Fernanda Rodriguez    HPI:  Patient is a 52 year old female with ventral hernia for at least a year or more. She has history of lap band surgery and reversal.  She has had some success with weight loss this year with diet and medications.  She is down about 10 to 15 pounds.  She previously saw a general surgeon for her hernia and was recommended to lose more weight prior to further discussion.  She states there is occasional discomfort in her hernia and does feel bloated in that area often.  She is concerned about it becoming an emergency.  She is not a smoker and not a diabetic.    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     GERD (gastroesophageal reflux disease)      Hypertension goal BP (blood pressure) < 140/90      Iron deficiency anemia      Menorrhagia      Migraine      Morbid obesity (H)      Nonallergic rhinitis 4/17/2019     Recurrent cold sores      Vulvar dermatitis 10/14/2010     Yeast infection of the vagina, recurrent         Past Surgical History:   Procedure Laterality Date     COLPOSCOPY,BX CERVIX/ENDOCERV CURR  7/1994     ESOPHAGOSCOPY, GASTROSCOPY, DUODENOSCOPY (EGD), COMBINED  1/2/2014    Procedure: COMBINED  ESOPHAGOSCOPY, GASTROSCOPY, DUODENOSCOPY (EGD);;  Surgeon: Eden Stacy MD;  Location: [facility]     ESOPHAGOSCOPY, GASTROSCOPY, DUODENOSCOPY (EGD), COMBINED N/A 2017    Procedure: COMBINED ESOPHAGOSCOPY, GASTROSCOPY, DUODENOSCOPY (EGD);  Surgeon: Ciro Deras MD;  Location: [facility]     LAPAROSCOPIC GASTRIC ADJUSTABLE BANDING  2014    Procedure: LAPAROSCOPIC GASTRIC ADJUSTABLE BANDING;  Surgeon: Ciro Deras MD;  Location: [facility]     LAPAROSCOPIC HERNIORRHAPHY HIATAL  2014    Procedure: LAPAROSCOPIC HERNIORRHAPHY HIATAL;  Surgeon: Ciro Deras MD;  Location: [facility]     LAPAROSCOPIC REMOVAL GASTRIC ADJUSTABLE BAND N/A 2017    Procedure: LAPAROSCOPIC REMOVAL GASTRIC ADJUSTABLE BAND;  Surgeon: Ciro Deras MD;  Location: [facility]     LITHOTRIPSY       ZZC TREAT ECTOPIC PREG,RMV TUBE/OVARY      LT       Family History   Problem Relation Age of Onset     Diabetes Mother      C.A.D. Mother      Cancer Father 72        d. pancreatic, melanoma      Colon Polyps Father      Obesity Brother      Breast Cancer Maternal Grandmother      Heart Disease Maternal Grandfather         CHF     Heart Disease Paternal Grandmother         CHF     Colon Polyps Paternal Grandmother      Respiratory Paternal Grandfather         TB     Breast Cancer Maternal Aunt      Glaucoma No family hx of      Macular Degeneration No family hx of      Ovarian Cancer No family hx of        Social History     Socioeconomic History     Marital status:      Spouse name: Mohinder     Number of children: 2     Years of education: 16     Highest education level: Not on file   Occupational History     Occupation: RN     Employer: [employer]   Tobacco Use     Smoking status: Former     Packs/day: 0.50     Years: 10.00     Additional pack years: 0.00     Total pack years: 5.00     Types: Cigarettes     Quit date: 1997     Years since quittin.9     Smokeless tobacco: Never   Vaping  Use     Vaping Use: Never used   Substance and Sexual Activity     Alcohol use: Yes     Comment: occasional (infrequent)     Drug use: No     Sexual activity: Yes     Partners: Male     Birth control/protection: I.U.D.   Other Topics Concern     Parent/sibling w/ CABG, MI or angioplasty before 65F 55M? No      Service No     Blood Transfusions No     Caffeine Concern No     Occupational Exposure Yes     Comment: nurse     Hobby Hazards No     Sleep Concern No     Stress Concern No     Weight Concern No     Special Diet No     Back Care No     Exercise Yes     Bike Helmet Yes     Seat Belt Yes     Self-Exams Yes   Social History Narrative     Not on file     Social Determinants of Health     Financial Resource Strain: Low Risk  (11/6/2023)    Financial Resource Strain      Within the past 12 months, have you or your family members you live with been unable to get utilities (heat, electricity) when it was really needed?: No   Recent Concern: Financial Resource Strain - High Risk (11/3/2023)    Financial Resource Strain      Within the past 12 months, have you or your family members you live with been unable to get utilities (heat, electricity) when it was really needed?: Yes   Food Insecurity: Low Risk  (11/3/2023)    Food Insecurity      Within the past 12 months, did you worry that your food would run out before you got money to buy more?: No      Within the past 12 months, did the food you bought just not last and you didn t have money to get more?: No   Transportation Needs: Low Risk  (11/3/2023)    Transportation Needs      Within the past 12 months, has lack of transportation kept you from medical appointments, getting your medicines, non-medical meetings or appointments, work, or from getting things that you need?: No   Physical Activity: Not on file   Stress: Not on file   Social Connections: Not on file   Interpersonal Safety: Low Risk  (11/6/2023)    Interpersonal Safety      Do you feel physically  and emotionally safe where you currently live?: Yes      Within the past 12 months, have you been hit, slapped, kicked or otherwise physically hurt by someone?: No      Within the past 12 months, have you been humiliated or emotionally abused in other ways by your partner or ex-partner?: No   Housing Stability: Low Risk  (11/3/2023)    Housing Stability      Do you have housing? : Yes      Are you worried about losing your housing?: No       Current Outpatient Medications   Medication Sig Dispense Refill     clindamycin (CLEOCIN T) 1 % external lotion APPLY TOPICALLY TO FACE TWICE DAILY FOR ACNE       clotrimazole (LOTRIMIN) 1 % external cream Use twice a day for 14 days when signs of yeast in skin folds. 113 g 1     diphenhydrAMINE (BENADRYL) 25 MG tablet Take 25 mg by mouth nightly as needed for sleep       fexofenadine (ALLEGRA) 180 MG tablet Take 180 mg by mouth as needed       fluconazole (DIFLUCAN) 150 MG tablet Take 1 tablet (150 mg) by mouth every 72 hours as needed 3 tablet 3     glucosamine 500 MG CAPS capsule Take 500 mg by mouth daily       insulin pen needle (B-D U/F) 31G X 5 MM miscellaneous USE 1 PEN NEEDLE DAILY OR AS DIRECTED. 100 each 0     levonorgestrel (MIRENA) 20 MCG/DAY IUD 1 each (20 mcg) by Intrauterine route once       liraglutide - Weight Management (SAXENDA) 18 MG/3ML pen Inject 3 mg Subcutaneous daily 15 mL 2     losartan (COZAAR) 50 MG tablet Take 1 tablet (50 mg) by mouth daily 90 tablet 3     magnesium oxide (MAG-OX) 400 MG tablet Take 1 tablet by mouth daily       nystatin (NYAMYC) 372394 UNIT/GM external powder APPLY TO THE AFFECTED AREA UNDER THE BREASTS THREE TIMES DAILY AS NEEDED. 60 g 0     omeprazole (PRILOSEC) 20 MG DR capsule Take 1 capsule (20 mg) by mouth daily 90 capsule 3     phentermine (ADIPEX-P) 15 MG capsule Take 1 capsule (15 mg) by mouth every morning 30 capsule 5     rizatriptan (MAXALT-MLT) 5 MG ODT TAKE 1-2 TABLETS BY MOUTH AT ONSET OF HEADACHE MAY REPEAT IN 2  "HOURS. MAX OF 6 TABLETS PER 24 HOURS 36 tablet 3     topiramate (TOPAMAX) 50 MG tablet Take 1 tablet (50 mg) by mouth daily 90 tablet 1       Medications and history reviewed    Physical exam:  Vitals: /70   Temp 98.6  F (37  C) (Temporal)   Ht 1.58 m (5' 2.2\")   Wt 114.3 kg (252 lb)   LMP 11/02/2023   BMI 45.80 kg/m    BMI= Body mass index is 45.8 kg/m .    Constitutional: alert, non-distressed   Head: normo-cephalic, atraumatic   Cardiovascular: RRR  Respiratory: equal chest rise, good respiratory effort, no audible wheeze  Gastrointestinal: Soft, non-tender, non distended, hernia not protruding when laying flat but defect palpated several centimeters above the umbilicus  : deferred  Musculoskeletal: moves all extremities   Skin: no suspicious lesions or rashes   Psychiatric: mentation appears normal, affect appropriate   Patient able to get up on table without difficulty.    Labs show:  Results for orders placed or performed in visit on 11/14/23 (from the past 24 hour(s))   US Pelvic Complete with Transvaginal    Narrative    EXAMINATION: US PELVIC TRANSABDOMINAL AND TRANSVAGINAL 11/14/2023 9:06  AM      CLINICAL HISTORY: Left salpingectomy due to ectopic pregnancy.  Left ovarian cyst    COMPARISON: Pelvic ultrasound 3/31/2023, CT 1/16/2023    PROCEDURE COMMENT: Both transabdominal and transvaginal imaging  performed of the pelvis with color Doppler.    FINDINGS:  The uterus measures  4.3 cm x 9.1 cm x 4.6 cm. No focal myometrial  mass. Nabothian cysts.    The endometrial stripe measures 3 mm. There is an intrauterine device  within the endometrial canal.    The right ovary measures 1.9 cm x 3.3 cm x 2.9 cm. The left ovary  measures 2.3 cm x 3.4 cm x 2.7 cm. In the right ovary, there are  dominant follicles, the largest measures 1.8 cm. The known left  ovarian teratoma measures 2.5 x 2.3 x 1.6 cm.     There is no simple free fluid in the pelvis.  No adnexal mass.      Impression    IMPRESSION:  1. " The intrauterine device is in expected position.  2. Benign right ovarian follicles.  3. Left ovarian teratoma, 2.5 cm.    EMILIANA JULIAN MD         SYSTEM ID:  [id number]       Imaging shows:  Recent Results (from the past 744 hour(s))   US Renal Complete Non-Vascular    Narrative    EXAMINATION: US RENAL COMPLETE NON-VASCULAR, 11/14/2023 9:06 AM     COMPARISON: CT 1/16/2023    HISTORY:  left renal mass seen by CT dated 1/16/23; Left kidney mass    FINDINGS:  Right kidney: 10.2 cm  cm in length. Parenchyma is of normal thickness  and echogenicity.  No solid mass. No hydronephrosis.    Left kidney: 11.4 cm cm in length. Parenchyma is of normal thickness  and echogenicity.  No solid mass.  No hydronephrosis. In the  midportion of the left kidney, there is a cyst measuring 1.5 x 1.2 x  1.1 cm.    Bladder: Fully distended.  No mass-like lesions.      Impression    IMPRESSION:  1.  Benign left cyst, 1.5 cm.    EMILIANA JULIAN MD         SYSTEM ID:  [id number]   US Pelvic Complete with Transvaginal    Narrative    EXAMINATION: US PELVIC TRANSABDOMINAL AND TRANSVAGINAL 11/14/2023 9:06  AM      CLINICAL HISTORY: Left salpingectomy due to ectopic pregnancy.  Left ovarian cyst    COMPARISON: Pelvic ultrasound 3/31/2023, CT 1/16/2023    PROCEDURE COMMENT: Both transabdominal and transvaginal imaging  performed of the pelvis with color Doppler.    FINDINGS:  The uterus measures  4.3 cm x 9.1 cm x 4.6 cm. No focal myometrial  mass. Nabothian cysts.    The endometrial stripe measures 3 mm. There is an intrauterine device  within the endometrial canal.    The right ovary measures 1.9 cm x 3.3 cm x 2.9 cm. The left ovary  measures 2.3 cm x 3.4 cm x 2.7 cm. In the right ovary, there are  dominant follicles, the largest measures 1.8 cm. The known left  ovarian teratoma measures 2.5 x 2.3 x 1.6 cm.     There is no simple free fluid in the pelvis.  No adnexal mass.      Impression    IMPRESSION:  1. The intrauterine device is in expected  position.  2. Benign right ovarian follicles.  3. Left ovarian teratoma, 2.5 cm.    EMILIANA JULIAN MD         SYSTEM ID:  [id number]   CT scan from earlier this year also reviewed and shown to patient    Assessment:     ICD-10-CM    1. Ventral hernia without obstruction or gangrene  K43.9 Adult General Surg Referral        Plan: We discussed her hernia and its natural course.  We discussed signs and symptoms of incarceration or strangulation and her risk of that happening.  I do think she would be a good surgical candidate with some additional weight loss.  I gave her a goal of 20 pounds additional weight loss prior to additional discussion of surgery but we briefly went over my approach to ventral hernia repair.  He is going to return in 2 months for follow-up.    30 minutes spent by me on the date of the encounter doing chart review, history and exam, documentation and further activities per the note    Aly Cai DO      Again, thank you for allowing me to participate in the care of your patient.        Sincerely,        Aly Cai, DO

## 2023-11-15 NOTE — PROGRESS NOTES
Patient seen in consultation for ventral hernia by Fernanda Rodriguez    HPI:  Patient is a 52 year old female with ventral hernia for at least a year or more. She has history of lap band surgery and reversal.  She has had some success with weight loss this year with diet and medications.  She is down about 10 to 15 pounds.  She previously saw a general surgeon for her hernia and was recommended to lose more weight prior to further discussion.  She states there is occasional discomfort in her hernia and does feel bloated in that area often.  She is concerned about it becoming an emergency.  She is not a smoker and not a diabetic.    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    GERD (gastroesophageal reflux disease)     Hypertension goal BP (blood pressure) < 140/90     Iron deficiency anemia     Menorrhagia     Migraine     Morbid obesity (H)     Nonallergic rhinitis 4/17/2019    Recurrent cold sores     Vulvar dermatitis 10/14/2010    Yeast infection of the vagina, recurrent         Past Surgical History:   Procedure Laterality Date    COLPOSCOPY,BX CERVIX/ENDOCERV CURR  7/1994    ESOPHAGOSCOPY, GASTROSCOPY, DUODENOSCOPY (EGD), COMBINED  1/2/2014    Procedure: COMBINED ESOPHAGOSCOPY, GASTROSCOPY, DUODENOSCOPY (EGD);;  Surgeon: Eden Stacy MD;  Location:  GI    ESOPHAGOSCOPY, GASTROSCOPY, DUODENOSCOPY (EGD), COMBINED N/A 1/19/2017    Procedure: COMBINED ESOPHAGOSCOPY, GASTROSCOPY, DUODENOSCOPY (EGD);  Surgeon:  Ciro Deras MD;  Location: [facility]    LAPAROSCOPIC GASTRIC ADJUSTABLE BANDING  2014    Procedure: LAPAROSCOPIC GASTRIC ADJUSTABLE BANDING;  Surgeon: Ciro Deras MD;  Location: [facility]    LAPAROSCOPIC HERNIORRHAPHY HIATAL  2014    Procedure: LAPAROSCOPIC HERNIORRHAPHY HIATAL;  Surgeon: Ciro Deras MD;  Location: [facility]    LAPAROSCOPIC REMOVAL GASTRIC ADJUSTABLE BAND N/A 2017    Procedure: LAPAROSCOPIC REMOVAL GASTRIC ADJUSTABLE BAND;  Surgeon: Ciro Deras MD;  Location: [facility]    LITHOTRIPSY      ZZC TREAT ECTOPIC PREG,RMV TUBE/OVARY      LT       Family History   Problem Relation Age of Onset    Diabetes Mother     C.A.D. Mother     Cancer Father 72        d. pancreatic, melanoma     Colon Polyps Father     Obesity Brother     Breast Cancer Maternal Grandmother     Heart Disease Maternal Grandfather         CHF    Heart Disease Paternal Grandmother         CHF    Colon Polyps Paternal Grandmother     Respiratory Paternal Grandfather         TB    Breast Cancer Maternal Aunt     Glaucoma No family hx of     Macular Degeneration No family hx of     Ovarian Cancer No family hx of        Social History     Socioeconomic History    Marital status:      Spouse name: Mohinder    Number of children: 2    Years of education: 16    Highest education level: Not on file   Occupational History    Occupation: RN     Employer: [employer]   Tobacco Use    Smoking status: Former     Packs/day: 0.50     Years: 10.00     Additional pack years: 0.00     Total pack years: 5.00     Types: Cigarettes     Quit date: 1997     Years since quittin.9    Smokeless tobacco: Never   Vaping Use    Vaping Use: Never used   Substance and Sexual Activity    Alcohol use: Yes     Comment: occasional (infrequent)    Drug use: No    Sexual activity: Yes     Partners: Male     Birth control/protection: I.U.D.   Other Topics Concern    Parent/sibling w/ CABG, MI or angioplasty before  65F 55M? No     Service No    Blood Transfusions No    Caffeine Concern No    Occupational Exposure Yes     Comment: nurse    Hobby Hazards No    Sleep Concern No    Stress Concern No    Weight Concern No    Special Diet No    Back Care No    Exercise Yes    Bike Helmet Yes    Seat Belt Yes    Self-Exams Yes   Social History Narrative    Not on file     Social Determinants of Health     Financial Resource Strain: Low Risk  (11/6/2023)    Financial Resource Strain     Within the past 12 months, have you or your family members you live with been unable to get utilities (heat, electricity) when it was really needed?: No   Recent Concern: Financial Resource Strain - High Risk (11/3/2023)    Financial Resource Strain     Within the past 12 months, have you or your family members you live with been unable to get utilities (heat, electricity) when it was really needed?: Yes   Food Insecurity: Low Risk  (11/3/2023)    Food Insecurity     Within the past 12 months, did you worry that your food would run out before you got money to buy more?: No     Within the past 12 months, did the food you bought just not last and you didn t have money to get more?: No   Transportation Needs: Low Risk  (11/3/2023)    Transportation Needs     Within the past 12 months, has lack of transportation kept you from medical appointments, getting your medicines, non-medical meetings or appointments, work, or from getting things that you need?: No   Physical Activity: Not on file   Stress: Not on file   Social Connections: Not on file   Interpersonal Safety: Low Risk  (11/6/2023)    Interpersonal Safety     Do you feel physically and emotionally safe where you currently live?: Yes     Within the past 12 months, have you been hit, slapped, kicked or otherwise physically hurt by someone?: No     Within the past 12 months, have you been humiliated or emotionally abused in other ways by your partner or ex-partner?: No   Housing Stability: Low  "Risk  (11/3/2023)    Housing Stability     Do you have housing? : Yes     Are you worried about losing your housing?: No       Current Outpatient Medications   Medication Sig Dispense Refill    clindamycin (CLEOCIN T) 1 % external lotion APPLY TOPICALLY TO FACE TWICE DAILY FOR ACNE      clotrimazole (LOTRIMIN) 1 % external cream Use twice a day for 14 days when signs of yeast in skin folds. 113 g 1    diphenhydrAMINE (BENADRYL) 25 MG tablet Take 25 mg by mouth nightly as needed for sleep      fexofenadine (ALLEGRA) 180 MG tablet Take 180 mg by mouth as needed      fluconazole (DIFLUCAN) 150 MG tablet Take 1 tablet (150 mg) by mouth every 72 hours as needed 3 tablet 3    glucosamine 500 MG CAPS capsule Take 500 mg by mouth daily      insulin pen needle (B-D U/F) 31G X 5 MM miscellaneous USE 1 PEN NEEDLE DAILY OR AS DIRECTED. 100 each 0    levonorgestrel (MIRENA) 20 MCG/DAY IUD 1 each (20 mcg) by Intrauterine route once      liraglutide - Weight Management (SAXENDA) 18 MG/3ML pen Inject 3 mg Subcutaneous daily 15 mL 2    losartan (COZAAR) 50 MG tablet Take 1 tablet (50 mg) by mouth daily 90 tablet 3    magnesium oxide (MAG-OX) 400 MG tablet Take 1 tablet by mouth daily      nystatin (NYAMYC) 123470 UNIT/GM external powder APPLY TO THE AFFECTED AREA UNDER THE BREASTS THREE TIMES DAILY AS NEEDED. 60 g 0    omeprazole (PRILOSEC) 20 MG DR capsule Take 1 capsule (20 mg) by mouth daily 90 capsule 3    phentermine (ADIPEX-P) 15 MG capsule Take 1 capsule (15 mg) by mouth every morning 30 capsule 5    rizatriptan (MAXALT-MLT) 5 MG ODT TAKE 1-2 TABLETS BY MOUTH AT ONSET OF HEADACHE MAY REPEAT IN 2 HOURS. MAX OF 6 TABLETS PER 24 HOURS 36 tablet 3    topiramate (TOPAMAX) 50 MG tablet Take 1 tablet (50 mg) by mouth daily 90 tablet 1       Medications and history reviewed    Physical exam:  Vitals: /70   Temp 98.6  F (37  C) (Temporal)   Ht 1.58 m (5' 2.2\")   Wt 114.3 kg (252 lb)   LMP 11/02/2023   BMI 45.80 kg/m    BMI= " Body mass index is 45.8 kg/m .    Constitutional: alert, non-distressed   Head: normo-cephalic, atraumatic   Cardiovascular: RRR  Respiratory: equal chest rise, good respiratory effort, no audible wheeze  Gastrointestinal: Soft, non-tender, non distended, hernia not protruding when laying flat but defect palpated several centimeters above the umbilicus  : deferred  Musculoskeletal: moves all extremities   Skin: no suspicious lesions or rashes   Psychiatric: mentation appears normal, affect appropriate   Patient able to get up on table without difficulty.    Labs show:  Results for orders placed or performed in visit on 11/14/23 (from the past 24 hour(s))   US Pelvic Complete with Transvaginal    Narrative    EXAMINATION: US PELVIC TRANSABDOMINAL AND TRANSVAGINAL 11/14/2023 9:06  AM      CLINICAL HISTORY: Left salpingectomy due to ectopic pregnancy.  Left ovarian cyst    COMPARISON: Pelvic ultrasound 3/31/2023, CT 1/16/2023    PROCEDURE COMMENT: Both transabdominal and transvaginal imaging  performed of the pelvis with color Doppler.    FINDINGS:  The uterus measures  4.3 cm x 9.1 cm x 4.6 cm. No focal myometrial  mass. Nabothian cysts.    The endometrial stripe measures 3 mm. There is an intrauterine device  within the endometrial canal.    The right ovary measures 1.9 cm x 3.3 cm x 2.9 cm. The left ovary  measures 2.3 cm x 3.4 cm x 2.7 cm. In the right ovary, there are  dominant follicles, the largest measures 1.8 cm. The known left  ovarian teratoma measures 2.5 x 2.3 x 1.6 cm.     There is no simple free fluid in the pelvis.  No adnexal mass.      Impression    IMPRESSION:  1. The intrauterine device is in expected position.  2. Benign right ovarian follicles.  3. Left ovarian teratoma, 2.5 cm.    EMILIANA JULIAN MD         SYSTEM ID:  [id number]       Imaging shows:  Recent Results (from the past 744 hour(s))   US Renal Complete Non-Vascular    Narrative    EXAMINATION: US RENAL COMPLETE NON-VASCULAR, 11/14/2023 9:06  AM     COMPARISON: CT 1/16/2023    HISTORY:  left renal mass seen by CT dated 1/16/23; Left kidney mass    FINDINGS:  Right kidney: 10.2 cm  cm in length. Parenchyma is of normal thickness  and echogenicity.  No solid mass. No hydronephrosis.    Left kidney: 11.4 cm cm in length. Parenchyma is of normal thickness  and echogenicity.  No solid mass.  No hydronephrosis. In the  midportion of the left kidney, there is a cyst measuring 1.5 x 1.2 x  1.1 cm.    Bladder: Fully distended.  No mass-like lesions.      Impression    IMPRESSION:  1.  Benign left cyst, 1.5 cm.    EMILIANA JULIAN MD         SYSTEM ID:  [id number]   US Pelvic Complete with Transvaginal    Narrative    EXAMINATION: US PELVIC TRANSABDOMINAL AND TRANSVAGINAL 11/14/2023 9:06  AM      CLINICAL HISTORY: Left salpingectomy due to ectopic pregnancy.  Left ovarian cyst    COMPARISON: Pelvic ultrasound 3/31/2023, CT 1/16/2023    PROCEDURE COMMENT: Both transabdominal and transvaginal imaging  performed of the pelvis with color Doppler.    FINDINGS:  The uterus measures  4.3 cm x 9.1 cm x 4.6 cm. No focal myometrial  mass. Nabothian cysts.    The endometrial stripe measures 3 mm. There is an intrauterine device  within the endometrial canal.    The right ovary measures 1.9 cm x 3.3 cm x 2.9 cm. The left ovary  measures 2.3 cm x 3.4 cm x 2.7 cm. In the right ovary, there are  dominant follicles, the largest measures 1.8 cm. The known left  ovarian teratoma measures 2.5 x 2.3 x 1.6 cm.     There is no simple free fluid in the pelvis.  No adnexal mass.      Impression    IMPRESSION:  1. The intrauterine device is in expected position.  2. Benign right ovarian follicles.  3. Left ovarian teratoma, 2.5 cm.    EMILIANA JULIAN MD         SYSTEM ID:  [id number]   CT scan from earlier this year also reviewed and shown to patient    Assessment:     ICD-10-CM    1. Ventral hernia without obstruction or gangrene  K43.9 Adult General Surg Referral        Plan: We discussed her hernia  and its natural course.  We discussed signs and symptoms of incarceration or strangulation and her risk of that happening.  I do think she would be a good surgical candidate with some additional weight loss.  I gave her a goal of 20 pounds additional weight loss prior to additional discussion of surgery but we briefly went over my approach to ventral hernia repair.  He is going to return in 2 months for follow-up.    30 minutes spent by me on the date of the encounter doing chart review, history and exam, documentation and further activities per the note    Aly Cai, DO

## 2023-11-28 ENCOUNTER — TELEPHONE (OUTPATIENT)
Dept: SURGERY | Facility: CLINIC | Age: 53
End: 2023-11-28
Payer: COMMERCIAL

## 2023-11-29 NOTE — TELEPHONE ENCOUNTER
PA Initiation    Medication: SAXENDA 18 MG/3ML SC SOPN  Insurance Company: [employer] ([facility]) - Phone [phone] Fax [phone]  Pharmacy Filling the Rx:    Filling Pharmacy Phone:    Filling Pharmacy Fax:    Start Date: 11/29/2023    Faxed pa form to insurance

## 2023-12-01 NOTE — TELEPHONE ENCOUNTER
Prior Authorization Approval    Medication: SAXENDA 18 MG/3ML SC SOPN  Authorization Effective Date: 12/1/2023  Authorization Expiration Date: 12/1/2024  Approved Dose/Quantity: 15ml/30 days   Reference #:     Insurance Company: [employer] ([facility]) - Phone [phone] Fax [phone]  Expected CoPay: $    CoPay Card Available:      Financial Assistance Needed: no  Which Pharmacy is filling the prescription:    Pharmacy Notified: no  Patient Notified: renewal

## 2023-12-06 RX ORDER — TOPIRAMATE 50 MG/1
50 TABLET, FILM COATED ORAL DAILY
Qty: 90 TABLET | Refills: 0 | OUTPATIENT
Start: 2023-12-06

## 2023-12-06 NOTE — TELEPHONE ENCOUNTER
Patient has appointment with Layla Cyr PA-C next week. Sent Member Desk message to see if she has enough or needs a refill prior to appt.

## 2023-12-06 NOTE — TELEPHONE ENCOUNTER
topiramate (TOPAMAX) 50 MG tablet   90 tablet 1 6/1/2023       Last Office Visit : 6-1-2023  Future Office visit:  12-    Anti-Seizure Meds Protocol  [id number]/01/2023 12:29 AM   Protocol Details Review Authorizing provider's last note.       Normal ALT or AST on file in past 26 months    Normal platelet count on file in past 26 months     Labs completed on : 11-6-2023  Potassium  3.4 - 5.3 mmol/L 4.2     Chloride  98 - 107 mmol/L 105     Anion Gap  7 - 15 mmol/L 9     Creatinine  0.51 - 0.95 mg/dL 0.89

## 2023-12-09 DIAGNOSIS — E66.01 CLASS 3 SEVERE OBESITY WITH SERIOUS COMORBIDITY AND BODY MASS INDEX (BMI) OF 40.0 TO 44.9 IN ADULT, UNSPECIFIED OBESITY TYPE (H): ICD-10-CM

## 2023-12-09 DIAGNOSIS — E66.813 CLASS 3 SEVERE OBESITY WITH SERIOUS COMORBIDITY AND BODY MASS INDEX (BMI) OF 40.0 TO 44.9 IN ADULT, UNSPECIFIED OBESITY TYPE (H): ICD-10-CM

## 2023-12-11 ENCOUNTER — PATIENT OUTREACH (OUTPATIENT)
Dept: CARE COORDINATION | Facility: CLINIC | Age: 53
End: 2023-12-11

## 2023-12-12 RX ORDER — PHENTERMINE HYDROCHLORIDE 15 MG/1
15 CAPSULE ORAL EVERY MORNING
Qty: 30 CAPSULE | Status: CANCELLED | OUTPATIENT
Start: 2023-12-12

## 2023-12-12 NOTE — TELEPHONE ENCOUNTER
phentermine (ADIPEX-P) 15 MG capsule   Last Written Prescription Date:  6/1/2023  Last Fill Quantity: 30,   # refills: 5  Last Office Visit : 6/1/2023  Future Office visit:  12/13/2023    Routing refill request to provider for review/approval because:  Pt has visit on Dec, 13, 2023  Refer to clinic for review and refills as med not on protocol to fill  Thank you     Elvira Cope RN  Central Triage Red Flags/Med Refills

## 2023-12-13 ENCOUNTER — VIRTUAL VISIT (OUTPATIENT)
Dept: ENDOCRINOLOGY | Facility: CLINIC | Age: 53
End: 2023-12-13
Payer: COMMERCIAL

## 2023-12-13 VITALS — WEIGHT: 242 LBS | HEIGHT: 62 IN | BODY MASS INDEX: 44.53 KG/M2

## 2023-12-13 DIAGNOSIS — E66.01 CLASS 3 SEVERE OBESITY WITH SERIOUS COMORBIDITY AND BODY MASS INDEX (BMI) OF 40.0 TO 44.9 IN ADULT, UNSPECIFIED OBESITY TYPE (H): ICD-10-CM

## 2023-12-13 DIAGNOSIS — E66.813 CLASS 3 SEVERE OBESITY WITH SERIOUS COMORBIDITY AND BODY MASS INDEX (BMI) OF 40.0 TO 44.9 IN ADULT, UNSPECIFIED OBESITY TYPE (H): ICD-10-CM

## 2023-12-13 PROCEDURE — 99213 OFFICE O/P EST LOW 20 MIN: CPT | Mod: VID | Performed by: PHYSICIAN ASSISTANT

## 2023-12-13 RX ORDER — PHENTERMINE HYDROCHLORIDE 15 MG/1
15 CAPSULE ORAL EVERY MORNING
Qty: 30 CAPSULE | Refills: 5 | Status: SHIPPED | OUTPATIENT
Start: 2023-12-13 | End: 2024-05-23

## 2023-12-13 RX ORDER — TOPIRAMATE 50 MG/1
50 TABLET, FILM COATED ORAL DAILY
Qty: 90 TABLET | Refills: 1 | Status: SHIPPED | OUTPATIENT
Start: 2023-12-13 | End: 2024-05-23

## 2023-12-13 ASSESSMENT — PAIN SCALES - GENERAL: PAINLEVEL: NO PAIN (0)

## 2023-12-13 NOTE — NURSING NOTE
Is the patient currently in the state of [place]? YES    Visit mode:VIDEO    If the visit is dropped, the patient can be reconnected by: VIDEO VISIT: Text to cell phone:   Telephone Information:   Mobile [phone]    and VIDEO VISIT: Send to e-mail at: [email or web address]    Will anyone else be joining the visit? NO  (If patient encounters technical issues they should call [phone])    How would you like to obtain your AVS? MyChart    Are changes needed to the allergy or medication list? No    Reason for visit: YEYO ORTIZ

## 2023-12-13 NOTE — LETTER
2023       RE: Elaine Awad  [address] [address] [address]     Dear Colleague,    Thank you for referring your patient, Elaine Awad, to the [facility] [place] at [facility]. Please see a copy of my visit note below.      Return Medical Weight Management Note     Elaine Awad  MRN:  [id number]  :  1970  [place]:  2023    Dear Fernanda Rodriguez MD,    I had the pleasure of seeing your patient Elaine Awad. She is a 53 year old female who I am continuing to see for treatment of obesity related to:        2019    10:46 PM   --   I have the following health issues associated with obesity GERD (Reflux)    Weight Bearing Joint Pain   I have the following symptoms associated with obesity Knee Pain    GERD (Reflux)       Assessment & Plan  Problem List Items Addressed This Visit          Endocrine Diagnoses    Class 3 severe obesity with serious comorbidity and body mass index (BMI) of 40.0 to 44.9 in adult, unspecified obesity type (H)      Hx of lap band removal. Possible interest in sleeve but not ready yet.  Weight mgmt follow up  Lost 18 lbs since last visit 23  Needs to work on weight loss for possible ventral hernia repair   Taking phentermine, topiramate and saxenda.  Highest saxenda has been 2.4mg     Consider switching from saxenda to zepbound, sent to pharmacy to see if covered    Follow up 6 weeks MTM phone  Layla 3-4 months return MWM    INTERVAL HISTORY:      Anti-obesity medication history    Current:   Phentermine  Topiramate  Saxenda 1.2mg     CURRENT WEIGHT:   242 lbs 0 oz    Initial Weight (lbs): 239.4 lbs  Last Visits Weight: 114.3 kg (252 lb)  Cumulative weight loss (lbs): -2.6  Weight Loss Percentage: -1.09%        2023     9:34 PM   Changes and Difficulties   I have made the following changes to my diet since my last visit: continue to try to get in more protein daily   With  regards to my diet, I am still struggling with: continue to try to get in more protein daily   I have made the following changes to my activity/exercise since my last visit: always a challenge for me in the winter   With regards to my activity/exercise, I am still struggling with: always a challenge for me in the winter         MEDICATIONS:   Current Outpatient Medications   Medication Sig Dispense Refill    clindamycin (CLEOCIN T) 1 % external lotion APPLY TOPICALLY TO FACE TWICE DAILY FOR ACNE      clotrimazole (LOTRIMIN) 1 % external cream Use twice a day for 14 days when signs of yeast in skin folds. 113 g 1    diphenhydrAMINE (BENADRYL) 25 MG tablet Take 25 mg by mouth nightly as needed for sleep      fexofenadine (ALLEGRA) 180 MG tablet Take 180 mg by mouth as needed      fluconazole (DIFLUCAN) 150 MG tablet Take 1 tablet (150 mg) by mouth every 72 hours as needed 3 tablet 3    glucosamine 500 MG CAPS capsule Take 500 mg by mouth daily      insulin pen needle (B-D U/F) 31G X 5 MM miscellaneous USE 1 PEN NEEDLE DAILY OR AS DIRECTED. 100 each 0    levonorgestrel (MIRENA) 20 MCG/DAY IUD 1 each (20 mcg) by Intrauterine route once      liraglutide - Weight Management (SAXENDA) 18 MG/3ML pen Inject 3 mg Subcutaneous daily 15 mL 2    losartan (COZAAR) 50 MG tablet Take 1 tablet (50 mg) by mouth daily 90 tablet 3    magnesium oxide (MAG-OX) 400 MG tablet Take 1 tablet by mouth daily      nystatin (NYAMYC) 517986 UNIT/GM external powder APPLY TO THE AFFECTED AREA UNDER THE BREASTS THREE TIMES DAILY AS NEEDED. 60 g 0    omeprazole (PRILOSEC) 20 MG DR capsule Take 1 capsule (20 mg) by mouth daily 90 capsule 3    phentermine (ADIPEX-P) 15 MG capsule Take 1 capsule (15 mg) by mouth every morning 30 capsule 5    rizatriptan (MAXALT-MLT) 5 MG ODT TAKE 1-2 TABLETS BY MOUTH AT ONSET OF HEADACHE MAY REPEAT IN 2 HOURS. MAX OF 6 TABLETS PER 24 HOURS 36 tablet 3    topiramate (TOPAMAX) 50 MG tablet Take 1 tablet (50 mg) by mouth  "daily 90 tablet 1           12/11/2023     9:34 PM   Weight Loss Medication History Reviewed With Patient   Which weight loss medications are you currently taking on a regular basis? Phentermine    Saxenda (liraglutide)    Topamax (topiramate)   Are you having any side effects from the weight loss medication that we have prescribed you? Yes   If you are having side effects please describe: I have to be careful of constipation       Office Visit on 11/06/2023   Component Date Value Ref Range Status    Sodium 11/06/2023 138  135 - 145 mmol/L Final    Reference intervals for this test were updated on 09/26/2023 to more accurately reflect our healthy population. There may be differences in the flagging of prior results with similar values performed with this method. Interpretation of those prior results can be made in the context of the updated reference intervals.     Potassium 11/06/2023 4.2  3.4 - 5.3 mmol/L Final    Chloride 11/06/2023 105  98 - 107 mmol/L Final    Carbon Dioxide (CO2) 11/06/2023 24  22 - 29 mmol/L Final    Anion Gap 11/06/2023 9  7 - 15 mmol/L Final    Urea Nitrogen 11/06/2023 7.1  6.0 - 20.0 mg/dL Final    Creatinine 11/06/2023 0.89  0.51 - 0.95 mg/dL Final    GFR Estimate 11/06/2023 78  >60 mL/min/1.73m2 Final    Calcium 11/06/2023 10.1 (H)  8.6 - 10.0 mg/dL Final    Glucose 11/06/2023 90  70 - 99 mg/dL Final             PHYSICAL EXAM:  Objective   Ht 1.58 m (5' 2.21\")   Wt 109.8 kg (242 lb)   LMP 11/02/2023   BMI 43.97 kg/m      Vitals - Patient Reported  Pain Score: No Pain (0)        GENERAL: Healthy, alert and no distress  EYES: Eyes grossly normal to inspection.  No discharge or erythema, or obvious scleral/conjunctival abnormalities.  RESP: No audible wheeze, cough, or visible cyanosis.  No visible retractions or increased work of breathing.    SKIN: Visible skin clear. No significant rash, abnormal pigmentation or lesions.  NEURO: Cranial nerves grossly intact.  Mentation and speech " appropriate for age.  PSYCH: Mentation appears normal, affect normal/bright, judgement and insight intact, normal speech and appearance well-groomed.        Sincerely,    Layla Cyr PA-C      10 minutes spent by me on the date of the encounter doing chart review, history and exam, documentation and further activities per the note    Virtual Visit Details    Type of service:  Video Visit     Originating Location (pt. Location): Home    Distant Location (provider location):  Off-site  Platform used for Video Visit: Sourav

## 2023-12-13 NOTE — PROGRESS NOTES
Return Medical Weight Management Note     Elaine Awad  MRN:  [id number]  :  1970  [place]:  2023    Dear Fernanda Rodriguez MD,    I had the pleasure of seeing your patient Elaine Awad. She is a 53 year old female who I am continuing to see for treatment of obesity related to:        2019    10:46 PM   --   I have the following health issues associated with obesity GERD (Reflux)    Weight Bearing Joint Pain   I have the following symptoms associated with obesity Knee Pain    GERD (Reflux)       Assessment & Plan   Problem List Items Addressed This Visit          Endocrine Diagnoses    Class 3 severe obesity with serious comorbidity and body mass index (BMI) of 40.0 to 44.9 in adult, unspecified obesity type (H)      Hx of lap band removal. Possible interest in sleeve but not ready yet.  Weight mgmt follow up  Lost 18 lbs since last visit 23  Needs to work on weight loss for possible ventral hernia repair   Taking phentermine, topiramate and saxenda.  Highest saxenda has been 2.4mg     Consider switching from saxenda to zepbound, sent to pharmacy to see if covered    Follow up 6 weeks MTM phone  Layla 3-4 months return MWM    INTERVAL HISTORY:      Anti-obesity medication history    Current:   Phentermine  Topiramate  Saxenda 1.2mg     CURRENT WEIGHT:   242 lbs 0 oz    Initial Weight (lbs): 239.4 lbs  Last Visits Weight: 114.3 kg (252 lb)  Cumulative weight loss (lbs): -2.6  Weight Loss Percentage: -1.09%        2023     9:34 PM   Changes and Difficulties   I have made the following changes to my diet since my last visit: continue to try to get in more protein daily   With regards to my diet, I am still struggling with: continue to try to get in more protein daily   I have made the following changes to my activity/exercise since my last visit: always a challenge for me in the winter   With regards to my activity/exercise, I am still struggling with: always a challenge for me in the  winter         MEDICATIONS:   Current Outpatient Medications   Medication Sig Dispense Refill    clindamycin (CLEOCIN T) 1 % external lotion APPLY TOPICALLY TO FACE TWICE DAILY FOR ACNE      clotrimazole (LOTRIMIN) 1 % external cream Use twice a day for 14 days when signs of yeast in skin folds. 113 g 1    diphenhydrAMINE (BENADRYL) 25 MG tablet Take 25 mg by mouth nightly as needed for sleep      fexofenadine (ALLEGRA) 180 MG tablet Take 180 mg by mouth as needed      fluconazole (DIFLUCAN) 150 MG tablet Take 1 tablet (150 mg) by mouth every 72 hours as needed 3 tablet 3    glucosamine 500 MG CAPS capsule Take 500 mg by mouth daily      insulin pen needle (B-D U/F) 31G X 5 MM miscellaneous USE 1 PEN NEEDLE DAILY OR AS DIRECTED. 100 each 0    levonorgestrel (MIRENA) 20 MCG/DAY IUD 1 each (20 mcg) by Intrauterine route once      liraglutide - Weight Management (SAXENDA) 18 MG/3ML pen Inject 3 mg Subcutaneous daily 15 mL 2    losartan (COZAAR) 50 MG tablet Take 1 tablet (50 mg) by mouth daily 90 tablet 3    magnesium oxide (MAG-OX) 400 MG tablet Take 1 tablet by mouth daily      nystatin (NYAMYC) 653682 UNIT/GM external powder APPLY TO THE AFFECTED AREA UNDER THE BREASTS THREE TIMES DAILY AS NEEDED. 60 g 0    omeprazole (PRILOSEC) 20 MG DR capsule Take 1 capsule (20 mg) by mouth daily 90 capsule 3    phentermine (ADIPEX-P) 15 MG capsule Take 1 capsule (15 mg) by mouth every morning 30 capsule 5    rizatriptan (MAXALT-MLT) 5 MG ODT TAKE 1-2 TABLETS BY MOUTH AT ONSET OF HEADACHE MAY REPEAT IN 2 HOURS. MAX OF 6 TABLETS PER 24 HOURS 36 tablet 3    topiramate (TOPAMAX) 50 MG tablet Take 1 tablet (50 mg) by mouth daily 90 tablet 1           12/11/2023     9:34 PM   Weight Loss Medication History Reviewed With Patient   Which weight loss medications are you currently taking on a regular basis? Phentermine    Saxenda (liraglutide)    Topamax (topiramate)   Are you having any side effects from the weight loss medication that  "we have prescribed you? Yes   If you are having side effects please describe: I have to be careful of constipation       Office Visit on 11/06/2023   Component Date Value Ref Range Status    Sodium 11/06/2023 138  135 - 145 mmol/L Final    Reference intervals for this test were updated on 09/26/2023 to more accurately reflect our healthy population. There may be differences in the flagging of prior results with similar values performed with this method. Interpretation of those prior results can be made in the context of the updated reference intervals.     Potassium 11/06/2023 4.2  3.4 - 5.3 mmol/L Final    Chloride 11/06/2023 105  98 - 107 mmol/L Final    Carbon Dioxide (CO2) 11/06/2023 24  22 - 29 mmol/L Final    Anion Gap 11/06/2023 9  7 - 15 mmol/L Final    Urea Nitrogen 11/06/2023 7.1  6.0 - 20.0 mg/dL Final    Creatinine 11/06/2023 0.89  0.51 - 0.95 mg/dL Final    GFR Estimate 11/06/2023 78  >60 mL/min/1.73m2 Final    Calcium 11/06/2023 10.1 (H)  8.6 - 10.0 mg/dL Final    Glucose 11/06/2023 90  70 - 99 mg/dL Final             PHYSICAL EXAM:  Objective    Ht 1.58 m (5' 2.21\")   Wt 109.8 kg (242 lb)   LMP 11/02/2023   BMI 43.97 kg/m      Vitals - Patient Reported  Pain Score: No Pain (0)        GENERAL: Healthy, alert and no distress  EYES: Eyes grossly normal to inspection.  No discharge or erythema, or obvious scleral/conjunctival abnormalities.  RESP: No audible wheeze, cough, or visible cyanosis.  No visible retractions or increased work of breathing.    SKIN: Visible skin clear. No significant rash, abnormal pigmentation or lesions.  NEURO: Cranial nerves grossly intact.  Mentation and speech appropriate for age.  PSYCH: Mentation appears normal, affect normal/bright, judgement and insight intact, normal speech and appearance well-groomed.        Sincerely,    Layla Cyr PA-C      10 minutes spent by me on the date of the encounter doing chart review, history and exam, documentation and " further activities per the note    Virtual Visit Details    Type of service:  Video Visit     Originating Location (pt. Location): Home    Distant Location (provider location):  Off-site  Platform used for Video Visit: Sourav

## 2023-12-14 ENCOUNTER — TELEPHONE (OUTPATIENT)
Dept: SURGERY | Facility: CLINIC | Age: 53
End: 2023-12-14

## 2023-12-14 NOTE — TELEPHONE ENCOUNTER
PA Initiation    Medication: ZEPBOUND 2.5 MG/0.5ML SC SOAJ  Insurance Company: [facility] - Phone [phone] Fax [phone]  Pharmacy Filling the Rx:    Filling Pharmacy Phone:    Filling Pharmacy Fax:    Start Date: 12/14/2023      Called in a verbal with arabella PAUL# [id number]     Need to fax chart notes to fax# [phone]   Attn:  [id number]

## 2023-12-18 ENCOUNTER — HOSPITAL ENCOUNTER (OUTPATIENT)
Dept: MAMMOGRAPHY | Facility: CLINIC | Age: 53
Discharge: HOME | End: 2023-12-18
Attending: FAMILY MEDICINE | Admitting: FAMILY MEDICINE
Payer: COMMERCIAL

## 2023-12-18 DIAGNOSIS — Z12.31 VISIT FOR SCREENING MAMMOGRAM: ICD-10-CM

## 2023-12-18 PROCEDURE — 77067 SCR MAMMO BI INCL CAD: CPT

## 2024-01-03 NOTE — TELEPHONE ENCOUNTER
PA Initiation    Medication: ZEPBOUND 2.5 MG/0.5ML SC SOAJ  Insurance Company: [facility] - Phone [phone] Fax [phone]  Pharmacy Filling the Rx:    Filling Pharmacy Phone:    Filling Pharmacy Fax:    Start Date: 12/14/2023    Case #[id number]

## 2024-01-08 NOTE — TELEPHONE ENCOUNTER
PA Initiation    Medication: ZEPBOUND 2.5 MG/0.5ML SC SOAJ  Insurance Company: [employer] ([facility]) - Phone [phone] Fax [phone]  Pharmacy Filling the Rx:    Filling Pharmacy Phone:    Filling Pharmacy Fax:    Start Date: 12/14/2023    Key: [id number]

## 2024-01-09 DIAGNOSIS — L30.4 INTERTRIGO: ICD-10-CM

## 2024-01-10 RX ORDER — NYSTATIN 100000 [USP'U]/G
POWDER TOPICAL
Qty: 60 G | Refills: 1 | Status: SHIPPED | OUTPATIENT
Start: 2024-01-10 | End: 2024-05-30

## 2024-01-11 NOTE — TELEPHONE ENCOUNTER
PRIOR AUTHORIZATION DENIED    Medication: ZEPBOUND 2.5 MG/0.5ML SC SOAJ  Insurance Company: [employer] ([facility]) - Phone [phone] Fax [phone]  Denial Date: 12/18/2023  Denial Reason(s): they dont cover weight loss medications  Appeal Information: n/a      I called insurance multiple times to get them to refax the denial letter to me. I called again and just asked for the denial and appeal information. They stated insurance wont cover weight loss medications and we can't appeal it.

## 2024-01-12 DIAGNOSIS — E66.813 CLASS 3 SEVERE OBESITY WITH SERIOUS COMORBIDITY AND BODY MASS INDEX (BMI) OF 40.0 TO 44.9 IN ADULT, UNSPECIFIED OBESITY TYPE (H): ICD-10-CM

## 2024-01-12 DIAGNOSIS — E66.01 CLASS 3 SEVERE OBESITY WITH SERIOUS COMORBIDITY AND BODY MASS INDEX (BMI) OF 40.0 TO 44.9 IN ADULT, UNSPECIFIED OBESITY TYPE (H): ICD-10-CM

## 2024-01-16 RX ORDER — FLURBIPROFEN SODIUM 0.3 MG/ML
SOLUTION/ DROPS OPHTHALMIC
Qty: 100 EACH | Refills: 1 | Status: SHIPPED | OUTPATIENT
Start: 2024-01-16 | End: 2024-07-03

## 2024-01-16 NOTE — TELEPHONE ENCOUNTER
insulin pen needle (B-D U/F) 31G X 5 MM miscellaneous 100 each 0 9/2/2023     Last Office Visit: 12/13/23  Future Office visit:  5/23/24    Refill protocol passed  Lucy Ricci RN

## 2024-01-17 ENCOUNTER — OFFICE VISIT (OUTPATIENT)
Dept: SURGERY | Facility: CLINIC | Age: 54
End: 2024-01-17
Payer: COMMERCIAL

## 2024-01-17 ENCOUNTER — TELEPHONE (OUTPATIENT)
Dept: SURGERY | Facility: CLINIC | Age: 54
End: 2024-01-17

## 2024-01-17 VITALS
HEIGHT: 62 IN | WEIGHT: 244 LBS | SYSTOLIC BLOOD PRESSURE: 116 MMHG | BODY MASS INDEX: 44.9 KG/M2 | TEMPERATURE: 98.6 F | DIASTOLIC BLOOD PRESSURE: 74 MMHG

## 2024-01-17 DIAGNOSIS — K43.9 VENTRAL HERNIA WITHOUT OBSTRUCTION OR GANGRENE: Primary | ICD-10-CM

## 2024-01-17 PROCEDURE — 99214 OFFICE O/P EST MOD 30 MIN: CPT | Performed by: SURGERY

## 2024-01-17 RX ORDER — CLINDAMYCIN PHOSPHATE 900 MG/50ML
900 INJECTION, SOLUTION INTRAVENOUS SEE ADMIN INSTRUCTIONS
Status: CANCELLED | OUTPATIENT
Start: 2024-01-17

## 2024-01-17 RX ORDER — CLINDAMYCIN PHOSPHATE 900 MG/50ML
900 INJECTION, SOLUTION INTRAVENOUS
Status: CANCELLED | OUTPATIENT
Start: 2024-01-17

## 2024-01-17 NOTE — TELEPHONE ENCOUNTER
Type of surgery: Robot-assisted laparoscopic incarcerated ventral incisional hernia repair with mesh   Location of surgery: [facility]  Date and time of surgery: 4/15  Surgeon: Ayala  Pre-Op Appt Date: 4/1  Post-Op Appt Date: 4/25   Packet sent out: Yes  Pre-cert/Authorization completed:  Not Applicable  Date: na

## 2024-01-17 NOTE — PROGRESS NOTES
"General Surgery Follow Up    Pt returns for follow up visit for discussion of ventral hernia    HPI:  Patient returns to discuss hernia.  We met 2 months ago and I recommended around 20 pound weight loss prior to further discussion.  She has had some success, losing 10 pounds so far.  Nothing else is different with her medical history.  She does not have diabetes and is not a smoker.  She denies any significant pain however there are times when the hernia \"bothers her\".  She is also mostly concerned with incarceration and/or strangulation of the hernia.    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    GERD (gastroesophageal reflux disease)     Hypertension goal BP (blood pressure) < 140/90     Iron deficiency anemia     Menorrhagia     Migraine     Morbid obesity (H)     Nonallergic rhinitis 4/17/2019    Recurrent cold sores     Vulvar dermatitis 10/14/2010    Yeast infection of the vagina, recurrent         Past Surgical History:   Procedure Laterality Date    COLPOSCOPY,BX CERVIX/ENDOCERV CURR  7/1994    ESOPHAGOSCOPY, GASTROSCOPY, DUODENOSCOPY (EGD), COMBINED  1/2/2014    Procedure: COMBINED ESOPHAGOSCOPY, GASTROSCOPY, DUODENOSCOPY (EGD);;  Surgeon: Eden Stacy MD;  Location:  GI    ESOPHAGOSCOPY, GASTROSCOPY, DUODENOSCOPY (EGD), COMBINED N/A 1/19/2017    Procedure: COMBINED ESOPHAGOSCOPY, GASTROSCOPY, DUODENOSCOPY (EGD);  Surgeon: Ciro Deras MD;  Location:  OR    LAPAROSCOPIC GASTRIC ADJUSTABLE BANDING  " 2014    Procedure: LAPAROSCOPIC GASTRIC ADJUSTABLE BANDING;  Surgeon: Ciro Deras MD;  Location: [facility] OR    LAPAROSCOPIC HERNIORRHAPHY HIATAL  2014    Procedure: LAPAROSCOPIC HERNIORRHAPHY HIATAL;  Surgeon: Ciro Deras MD;  Location: [facility] OR    LAPAROSCOPIC REMOVAL GASTRIC ADJUSTABLE BAND N/A 2017    Procedure: LAPAROSCOPIC REMOVAL GASTRIC ADJUSTABLE BAND;  Surgeon: Ciro Deras MD;  Location: [facility] OR    LITHOTRIPSY      ZZC TREAT ECTOPIC PREG,RMV TUBE/OVARY      LT       Social History     Socioeconomic History    Marital status:      Spouse name: [facility]    Number of children: 2    Years of education: 16    Highest education level: Not on file   Occupational History    Occupation: RN     Employer: [employer]   Tobacco Use    Smoking status: Former     Packs/day: 0.50     Years: 10.00     Additional pack years: 0.00     Total pack years: 5.00     Types: Cigarettes     Quit date: 1997     Years since quittin.1    Smokeless tobacco: Never   Vaping Use    Vaping Use: Never used   Substance and Sexual Activity    Alcohol use: Yes     Comment: occasional (infrequent)    Drug use: No    Sexual activity: Yes     Partners: Male     Birth control/protection: I.U.D.   Other Topics Concern    Parent/sibling w/ CABG, MI or angioplasty before 65F 55M? No     Service No    Blood Transfusions No    Caffeine Concern No    Occupational Exposure Yes     Comment: nurse    Hobby Hazards No    Sleep Concern No    Stress Concern No    Weight Concern No    Special Diet No    Back Care No    Exercise Yes    Bike Helmet Yes    Seat Belt Yes    Self-Exams Yes   Social History Narrative    Not on file     Social Determinants of Health     Financial Resource Strain: Low Risk  (2023)    Financial Resource Strain     Within the past 12 months, have you or your family members you live with been unable to get utilities (heat, electricity) when it was really needed?: No    Recent Concern: Financial Resource Strain - High Risk (11/3/2023)    Financial Resource Strain     Within the past 12 months, have you or your family members you live with been unable to get utilities (heat, electricity) when it was really needed?: Yes   Food Insecurity: Low Risk  (11/3/2023)    Food Insecurity     Within the past 12 months, did you worry that your food would run out before you got money to buy more?: No     Within the past 12 months, did the food you bought just not last and you didn t have money to get more?: No   Transportation Needs: Low Risk  (11/3/2023)    Transportation Needs     Within the past 12 months, has lack of transportation kept you from medical appointments, getting your medicines, non-medical meetings or appointments, work, or from getting things that you need?: No   Physical Activity: Not on file   Stress: Not on file   Social Connections: Not on file   Interpersonal Safety: Low Risk  (11/6/2023)    Interpersonal Safety     Do you feel physically and emotionally safe where you currently live?: Yes     Within the past 12 months, have you been hit, slapped, kicked or otherwise physically hurt by someone?: No     Within the past 12 months, have you been humiliated or emotionally abused in other ways by your partner or ex-partner?: No   Housing Stability: Low Risk  (11/3/2023)    Housing Stability     Do you have housing? : Yes     Are you worried about losing your housing?: No       Current Outpatient Medications   Medication Sig Dispense Refill    clindamycin (CLEOCIN T) 1 % external lotion APPLY TOPICALLY TO FACE TWICE DAILY FOR ACNE      clotrimazole (LOTRIMIN) 1 % external cream Use twice a day for 14 days when signs of yeast in skin folds. 113 g 1    diphenhydrAMINE (BENADRYL) 25 MG tablet Take 25 mg by mouth nightly as needed for sleep      fexofenadine (ALLEGRA) 180 MG tablet Take 180 mg by mouth as needed      fluconazole (DIFLUCAN) 150 MG tablet Take 1 tablet (150 mg) by  "mouth every 72 hours as needed 3 tablet 3    glucosamine 500 MG CAPS capsule Take 500 mg by mouth daily      insulin pen needle (B-D U/F) 31G X 5 MM miscellaneous USE DAILY OR AS DIRECTED 100 each 1    levonorgestrel (MIRENA) 20 MCG/DAY IUD 1 each (20 mcg) by Intrauterine route once      liraglutide - Weight Management (SAXENDA) 18 MG/3ML pen Inject 3 mg Subcutaneous daily 15 mL 2    losartan (COZAAR) 50 MG tablet Take 1 tablet (50 mg) by mouth daily 90 tablet 3    magnesium oxide (MAG-OX) 400 MG tablet Take 1 tablet by mouth daily      nystatin (NYSTOP) 420896 UNIT/GM external powder APPLY TO THE AFFECTED AREA UNDER THE BREASTS THREE TIMES DAILY AS NEEDED. 60 g 1    omeprazole (PRILOSEC) 20 MG DR capsule Take 1 capsule (20 mg) by mouth daily 90 capsule 3    phentermine (ADIPEX-P) 15 MG capsule Take 1 capsule (15 mg) by mouth every morning 30 capsule 5    rizatriptan (MAXALT-MLT) 5 MG ODT TAKE 1-2 TABLETS BY MOUTH AT ONSET OF HEADACHE MAY REPEAT IN 2 HOURS. MAX OF 6 TABLETS PER 24 HOURS 36 tablet 3    topiramate (TOPAMAX) 50 MG tablet Take 1 tablet (50 mg) by mouth daily 90 tablet 1    tirzepatide-Weight Management (ZEPBOUND) 2.5 MG/0.5ML prefilled pen Inject 0.5 mLs (2.5 mg) Subcutaneous every 7 days 2 mL 0    tirzepatide-Weight Management (ZEPBOUND) 5 MG/0.5ML prefilled pen Inject 0.5 mLs (5 mg) Subcutaneous every 7 days 2 mL 0       Medications and history reviewed    Physical exam:  Vitals: /74   Temp 98.6  F (37  C) (Temporal)   Ht 1.58 m (5' 2.21\")   Wt 110.7 kg (244 lb)   BMI 44.33 kg/m    BMI= Body mass index is 44.33 kg/m .    Constitutional: alert, non-distressed   Head: normo-cephalic, atraumatic   Cardiovascular: RRR  Respiratory: equal chest rise, good respiratory effort, no audible wheeze  Gastrointestinal: Soft, non-tender, non distended, hernia defect palpated in the epigastric area.  No skin changes  : deferred  Musculoskeletal: moves all extremities   Skin: no suspicious lesions or " miryam   Psychiatric: mentation appears normal, affect appropriate   Patient able to get up on table without difficulty.      Labs show:  Reviewed    Imaging shows:  Reviewed prior CT scan    Assessment:     ICD-10-CM    1. Ventral hernia without obstruction or gangrene  K43.9 Case Request: Robot-assisted laparoscopic incarcerated ventral incisional hernia repair with mesh     Case Request: Robot-assisted laparoscopic incarcerated ventral incisional hernia repair with mesh        Plan: I encouraged her to continue attempts at weight loss.  Uncomfortable at this point scheduling surgical repair of her hernia but will schedule it for 2 or 3 months out from now to allow her more time for weight loss.  She is fine with this.  We discussed hernia at length and her options.  Ultimately, I recommend robot-assisted laparoscopic incarcerated ventral incisional hernia repair with mesh. We discussed the procedure in detail. We also discussed the risks, benefits, alternatives and post-op care and restrictions. After our informed discussion we decided to proceed with the proposed surgery.    Risks of surgery discussed including, but not limited to bleeding, infection, recurrence, damage to nerves and what is in the hernia sac.  Risks of anesthesia also discussed..  Although mesh is a better long term repair if it gets infected it must be removed.  If there is evidence of an infection at time of surgery it will be cancelled and rescheduled for when infection has resolved.     Discussed massaging hernia back in and using ice if becomes more painful.  If not able to reduce then go to emergency room.      Aly Cai, DO

## 2024-01-17 NOTE — LETTER
"    1/17/2024         RE: Elaine Awad  [address] [facility] [address]        Dear Colleague,    Thank you for referring your patient, Elaine Awad, to the [facility]. Please see a copy of my visit note below.    General Surgery Follow Up    Pt returns for follow up visit for discussion of ventral hernia    HPI:  Patient returns to discuss hernia.  We met 2 months ago and I recommended around 20 pound weight loss prior to further discussion.  She has had some success, losing 10 pounds so far.  Nothing else is different with her medical history.  She does not have diabetes and is not a smoker.  She denies any significant pain however there are times when the hernia \"bothers her\".  She is also mostly concerned with incarceration and/or strangulation of the hernia.    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     GERD (gastroesophageal reflux disease)      Hypertension goal BP (blood pressure) < 140/90      Iron deficiency anemia      Menorrhagia      Migraine      Morbid obesity (H)      Nonallergic rhinitis 4/17/2019     Recurrent cold sores      Vulvar dermatitis 10/14/2010     Yeast infection of the vagina, recurrent         Past Surgical History:   Procedure Laterality Date     COLPOSCOPY,BX CERVIX/ENDOCERV CURR  7/1994     ESOPHAGOSCOPY, GASTROSCOPY, DUODENOSCOPY (EGD), COMBINED  1/2/2014    Procedure: COMBINED ESOPHAGOSCOPY, GASTROSCOPY, DUODENOSCOPY (EGD);;  Surgeon: Eden Stacy, " MD;  Location: [facility]     ESOPHAGOSCOPY, GASTROSCOPY, DUODENOSCOPY (EGD), COMBINED N/A 2017    Procedure: COMBINED ESOPHAGOSCOPY, GASTROSCOPY, DUODENOSCOPY (EGD);  Surgeon: Ciro Deras MD;  Location: [facility]     LAPAROSCOPIC GASTRIC ADJUSTABLE BANDING  2014    Procedure: LAPAROSCOPIC GASTRIC ADJUSTABLE BANDING;  Surgeon: Ciro Deras MD;  Location: [facility]     LAPAROSCOPIC HERNIORRHAPHY HIATAL  2014    Procedure: LAPAROSCOPIC HERNIORRHAPHY HIATAL;  Surgeon: Ciro Deras MD;  Location: [facility]     LAPAROSCOPIC REMOVAL GASTRIC ADJUSTABLE BAND N/A 2017    Procedure: LAPAROSCOPIC REMOVAL GASTRIC ADJUSTABLE BAND;  Surgeon: Ciro Deras MD;  Location: [facility]     LITHOTRIPSY       ZZC TREAT ECTOPIC PREG,RMV TUBE/OVARY      LT       Social History     Socioeconomic History     Marital status:      Spouse name: Mohinder     Number of children: 2     Years of education: 16     Highest education level: Not on file   Occupational History     Occupation: RN     Employer: [employer]   Tobacco Use     Smoking status: Former     Packs/day: 0.50     Years: 10.00     Additional pack years: 0.00     Total pack years: 5.00     Types: Cigarettes     Quit date: 1997     Years since quittin.1     Smokeless tobacco: Never   Vaping Use     Vaping Use: Never used   Substance and Sexual Activity     Alcohol use: Yes     Comment: occasional (infrequent)     Drug use: No     Sexual activity: Yes     Partners: Male     Birth control/protection: I.U.D.   Other Topics Concern     Parent/sibling w/ CABG, MI or angioplasty before 65F 55M? No      Service No     Blood Transfusions No     Caffeine Concern No     Occupational Exposure Yes     Comment: nurse     Hobby Hazards No     Sleep Concern No     Stress Concern No     Weight Concern No     Special Diet No     Back Care No     Exercise Yes     Bike Helmet Yes     Seat Belt Yes     Self-Exams Yes   Social History  Narrative     Not on file     Social Determinants of Health     Financial Resource Strain: Low Risk  (11/6/2023)    Financial Resource Strain      Within the past 12 months, have you or your family members you live with been unable to get utilities (heat, electricity) when it was really needed?: No   Recent Concern: Financial Resource Strain - High Risk (11/3/2023)    Financial Resource Strain      Within the past 12 months, have you or your family members you live with been unable to get utilities (heat, electricity) when it was really needed?: Yes   Food Insecurity: Low Risk  (11/3/2023)    Food Insecurity      Within the past 12 months, did you worry that your food would run out before you got money to buy more?: No      Within the past 12 months, did the food you bought just not last and you didn t have money to get more?: No   Transportation Needs: Low Risk  (11/3/2023)    Transportation Needs      Within the past 12 months, has lack of transportation kept you from medical appointments, getting your medicines, non-medical meetings or appointments, work, or from getting things that you need?: No   Physical Activity: Not on file   Stress: Not on file   Social Connections: Not on file   Interpersonal Safety: Low Risk  (11/6/2023)    Interpersonal Safety      Do you feel physically and emotionally safe where you currently live?: Yes      Within the past 12 months, have you been hit, slapped, kicked or otherwise physically hurt by someone?: No      Within the past 12 months, have you been humiliated or emotionally abused in other ways by your partner or ex-partner?: No   Housing Stability: Low Risk  (11/3/2023)    Housing Stability      Do you have housing? : Yes      Are you worried about losing your housing?: No       Current Outpatient Medications   Medication Sig Dispense Refill     clindamycin (CLEOCIN T) 1 % external lotion APPLY TOPICALLY TO FACE TWICE DAILY FOR ACNE       clotrimazole (LOTRIMIN) 1 % external  "cream Use twice a day for 14 days when signs of yeast in skin folds. 113 g 1     diphenhydrAMINE (BENADRYL) 25 MG tablet Take 25 mg by mouth nightly as needed for sleep       fexofenadine (ALLEGRA) 180 MG tablet Take 180 mg by mouth as needed       fluconazole (DIFLUCAN) 150 MG tablet Take 1 tablet (150 mg) by mouth every 72 hours as needed 3 tablet 3     glucosamine 500 MG CAPS capsule Take 500 mg by mouth daily       insulin pen needle (B-D U/F) 31G X 5 MM miscellaneous USE DAILY OR AS DIRECTED 100 each 1     levonorgestrel (MIRENA) 20 MCG/DAY IUD 1 each (20 mcg) by Intrauterine route once       liraglutide - Weight Management (SAXENDA) 18 MG/3ML pen Inject 3 mg Subcutaneous daily 15 mL 2     losartan (COZAAR) 50 MG tablet Take 1 tablet (50 mg) by mouth daily 90 tablet 3     magnesium oxide (MAG-OX) 400 MG tablet Take 1 tablet by mouth daily       nystatin (NYSTOP) 466278 UNIT/GM external powder APPLY TO THE AFFECTED AREA UNDER THE BREASTS THREE TIMES DAILY AS NEEDED. 60 g 1     omeprazole (PRILOSEC) 20 MG DR capsule Take 1 capsule (20 mg) by mouth daily 90 capsule 3     phentermine (ADIPEX-P) 15 MG capsule Take 1 capsule (15 mg) by mouth every morning 30 capsule 5     rizatriptan (MAXALT-MLT) 5 MG ODT TAKE 1-2 TABLETS BY MOUTH AT ONSET OF HEADACHE MAY REPEAT IN 2 HOURS. MAX OF 6 TABLETS PER 24 HOURS 36 tablet 3     topiramate (TOPAMAX) 50 MG tablet Take 1 tablet (50 mg) by mouth daily 90 tablet 1     tirzepatide-Weight Management (ZEPBOUND) 2.5 MG/0.5ML prefilled pen Inject 0.5 mLs (2.5 mg) Subcutaneous every 7 days 2 mL 0     tirzepatide-Weight Management (ZEPBOUND) 5 MG/0.5ML prefilled pen Inject 0.5 mLs (5 mg) Subcutaneous every 7 days 2 mL 0       Medications and history reviewed    Physical exam:  Vitals: /74   Temp 98.6  F (37  C) (Temporal)   Ht 1.58 m (5' 2.21\")   Wt 110.7 kg (244 lb)   BMI 44.33 kg/m    BMI= Body mass index is 44.33 kg/m .    Constitutional: alert, non-distressed   Head: " normo-cephalic, atraumatic   Cardiovascular: RRR  Respiratory: equal chest rise, good respiratory effort, no audible wheeze  Gastrointestinal: Soft, non-tender, non distended, hernia defect palpated in the epigastric area.  No skin changes  : deferred  Musculoskeletal: moves all extremities   Skin: no suspicious lesions or rashes   Psychiatric: mentation appears normal, affect appropriate   Patient able to get up on table without difficulty.      Labs show:  Reviewed    Imaging shows:  Reviewed prior CT scan    Assessment:     ICD-10-CM    1. Ventral hernia without obstruction or gangrene  K43.9 Case Request: Robot-assisted laparoscopic incarcerated ventral incisional hernia repair with mesh     Case Request: Robot-assisted laparoscopic incarcerated ventral incisional hernia repair with mesh        Plan: I encouraged her to continue attempts at weight loss.  Uncomfortable at this point scheduling surgical repair of her hernia but will schedule it for 2 or 3 months out from now to allow her more time for weight loss.  She is fine with this.  We discussed hernia at length and her options.  Ultimately, I recommend robot-assisted laparoscopic incarcerated ventral incisional hernia repair with mesh. We discussed the procedure in detail. We also discussed the risks, benefits, alternatives and post-op care and restrictions. After our informed discussion we decided to proceed with the proposed surgery.    Risks of surgery discussed including, but not limited to bleeding, infection, recurrence, damage to nerves and what is in the hernia sac.  Risks of anesthesia also discussed..  Although mesh is a better long term repair if it gets infected it must be removed.  If there is evidence of an infection at time of surgery it will be cancelled and rescheduled for when infection has resolved.     Discussed massaging hernia back in and using ice if becomes more painful.  If not able to reduce then go to emergency room.      Aly  ARLEY Cai, DO      Again, thank you for allowing me to participate in the care of your patient.        Sincerely,        Aly Cai, DO

## 2024-02-05 ENCOUNTER — ORDERS ONLY (AUTO-RELEASED) (OUTPATIENT)
Dept: FAMILY MEDICINE | Facility: CLINIC | Age: 54
End: 2024-02-05
Payer: COMMERCIAL

## 2024-02-05 DIAGNOSIS — Z12.11 SPECIAL SCREENING FOR MALIGNANT NEOPLASMS, COLON: ICD-10-CM

## 2024-02-17 DIAGNOSIS — E66.01 CLASS 3 SEVERE OBESITY WITH SERIOUS COMORBIDITY AND BODY MASS INDEX (BMI) OF 40.0 TO 44.9 IN ADULT, UNSPECIFIED OBESITY TYPE (H): ICD-10-CM

## 2024-02-17 DIAGNOSIS — E66.813 CLASS 3 SEVERE OBESITY WITH SERIOUS COMORBIDITY AND BODY MASS INDEX (BMI) OF 40.0 TO 44.9 IN ADULT, UNSPECIFIED OBESITY TYPE (H): ICD-10-CM

## 2024-02-21 RX ORDER — LIRAGLUTIDE 6 MG/ML
INJECTION, SOLUTION SUBCUTANEOUS
Qty: 15 ML | Refills: 2 | OUTPATIENT
Start: 2024-02-21

## 2024-02-22 NOTE — TELEPHONE ENCOUNTER
SAXENDA 18MG/3ML SOPN    Inject 3 mg Subcutaneous daily - Subcutaneous   Last Written Prescription Date:  9/21/23  Last Fill Quantity: 15 ml ,   # refills: 2  Last Office Visit : 12/13/23  Future Office visit:  5/23/24  RF per protocol    12/13/23>>Consider switching from saxenda to zepbound, sent to pharmacy to see if covered     Follow up 6 weeks MTM phone  Layla 3-4 months return MWM         Chart review:   tirzepatide-Weight Management (ZEPBOUND) 2.5 MG/0.5ML prefilled pen 2 mL 0 12/13/2023     I called insurance multiple times to get them to refax the denial letter to me. I called again and just asked for the denial and appeal information. They stated insurance wont cover weight loss medications and we can't appeal it.

## 2024-03-07 ENCOUNTER — OFFICE VISIT (OUTPATIENT)
Dept: DERMATOLOGY | Facility: CLINIC | Age: 54
End: 2024-03-07
Attending: FAMILY MEDICINE
Payer: COMMERCIAL

## 2024-03-07 DIAGNOSIS — D48.5 NEOPLASM OF UNCERTAIN BEHAVIOR OF SKIN: ICD-10-CM

## 2024-03-07 DIAGNOSIS — L29.9 ITCHING: Primary | ICD-10-CM

## 2024-03-07 DIAGNOSIS — L29.9 PRURITIC DISORDER: ICD-10-CM

## 2024-03-07 DIAGNOSIS — R76.8 POSITIVE ANA (ANTINUCLEAR ANTIBODY): ICD-10-CM

## 2024-03-07 LAB
ALBUMIN SERPL BCG-MCNC: 4.6 G/DL (ref 3.5–5.2)
ALP SERPL-CCNC: 84 U/L (ref 40–150)
ALT SERPL W P-5'-P-CCNC: 18 U/L (ref 0–50)
ANION GAP SERPL CALCULATED.3IONS-SCNC: 11 MMOL/L (ref 7–15)
AST SERPL W P-5'-P-CCNC: 18 U/L (ref 0–45)
BILIRUB SERPL-MCNC: 0.5 MG/DL
BUN SERPL-MCNC: 11.3 MG/DL (ref 6–20)
CALCIUM SERPL-MCNC: 10.2 MG/DL (ref 8.6–10)
CHLORIDE SERPL-SCNC: 105 MMOL/L (ref 98–107)
CREAT SERPL-MCNC: 0.92 MG/DL (ref 0.51–0.95)
DEPRECATED HCO3 PLAS-SCNC: 24 MMOL/L (ref 22–29)
EGFRCR SERPLBLD CKD-EPI 2021: 74 ML/MIN/1.73M2
ERYTHROCYTE [DISTWIDTH] IN BLOOD BY AUTOMATED COUNT: 12.9 % (ref 10–15)
GLUCOSE SERPL-MCNC: 126 MG/DL (ref 70–99)
HCT VFR BLD AUTO: 47 % (ref 35–47)
HGB BLD-MCNC: 15.1 G/DL (ref 11.7–15.7)
MCH RBC QN AUTO: 29.2 PG (ref 26.5–33)
MCHC RBC AUTO-ENTMCNC: 32.1 G/DL (ref 31.5–36.5)
MCV RBC AUTO: 91 FL (ref 78–100)
PLATELET # BLD AUTO: 287 10E3/UL (ref 150–450)
POTASSIUM SERPL-SCNC: 4.1 MMOL/L (ref 3.4–5.3)
PROT SERPL-MCNC: 7.2 G/DL (ref 6.4–8.3)
RBC # BLD AUTO: 5.17 10E6/UL (ref 3.8–5.2)
SODIUM SERPL-SCNC: 140 MMOL/L (ref 135–145)
WBC # BLD AUTO: 8.9 10E3/UL (ref 4–11)

## 2024-03-07 PROCEDURE — 80053 COMPREHEN METABOLIC PANEL: CPT | Performed by: PHYSICIAN ASSISTANT

## 2024-03-07 PROCEDURE — 88305 TISSUE EXAM BY PATHOLOGIST: CPT | Performed by: PATHOLOGY

## 2024-03-07 PROCEDURE — 86039 ANTINUCLEAR ANTIBODIES (ANA): CPT | Performed by: PHYSICIAN ASSISTANT

## 2024-03-07 PROCEDURE — 99203 OFFICE O/P NEW LOW 30 MIN: CPT | Mod: 25 | Performed by: PHYSICIAN ASSISTANT

## 2024-03-07 PROCEDURE — 85027 COMPLETE CBC AUTOMATED: CPT | Performed by: PHYSICIAN ASSISTANT

## 2024-03-07 PROCEDURE — 36415 COLL VENOUS BLD VENIPUNCTURE: CPT | Performed by: PHYSICIAN ASSISTANT

## 2024-03-07 PROCEDURE — 87340 HEPATITIS B SURFACE AG IA: CPT | Performed by: PHYSICIAN ASSISTANT

## 2024-03-07 PROCEDURE — 86235 NUCLEAR ANTIGEN ANTIBODY: CPT | Performed by: PHYSICIAN ASSISTANT

## 2024-03-07 PROCEDURE — 11102 TANGNTL BX SKIN SINGLE LES: CPT | Performed by: PHYSICIAN ASSISTANT

## 2024-03-07 PROCEDURE — 86225 DNA ANTIBODY NATIVE: CPT | Performed by: PHYSICIAN ASSISTANT

## 2024-03-07 PROCEDURE — 86038 ANTINUCLEAR ANTIBODIES: CPT | Performed by: PHYSICIAN ASSISTANT

## 2024-03-07 PROCEDURE — 86803 HEPATITIS C AB TEST: CPT | Performed by: PHYSICIAN ASSISTANT

## 2024-03-07 PROCEDURE — 88312 SPECIAL STAINS GROUP 1: CPT | Performed by: PATHOLOGY

## 2024-03-07 ASSESSMENT — PAIN SCALES - GENERAL: PAINLEVEL: NO PAIN (0)

## 2024-03-07 NOTE — NURSING NOTE
Elaine Awad's chief complaint for this visit includes:  Chief Complaint   Patient presents with    Derm Problem     Patient is here for a 2nd opinion for red patchy rash with itching. [facility] sees [facility]. Patient has tried triamcinolone cream  that was not effective and is using clindamycin lotion at night and Vaseline eczema calming and nystatin powder.      PCP: Fernanda Rodriguez    Referring Provider:  Fernanda Rodriguez MD  [address] [facility] [place]  [place] [address]    There were no vitals taken for this visit.  No Pain (0)        Allergies   Allergen Reactions    Doxycycline Rash     Full body rash    Ampicillin Swelling     Tongue swelling at end of treatment course         Do you need any medication refills at today's visit? No    Marycarmen Baez MA

## 2024-03-07 NOTE — PROGRESS NOTES
HPI:   Chief complaints: Elaine Awad is a pleasant 53 year old female who presents for evaluation of itching. For the past 5-10 years she has had persistent itching on the chest, all over her back and her shoulders. She will notice fine red bumps but no other rash. She is itchy daily, multiple times per day. The itching does have a burning quality occasionally. She has tried topical clindamycin, TAC cream without success. She is moisturizing daily and using all sensitive skin products.       PHYSICAL EXAM:    There were no vitals taken for this visit.  Skin exam performed as follows: Type 1 skin. Mood appropriate  Alert and Oriented X 3. Well developed, well nourished in no distress.  General appearance: Normal  Head including face: Normal  Eyes: conjunctiva and lids: Normal  Mouth: Lips, teeth, gums: Normal  Neck: Normal  Skin: Scalp and body hair: See below    Few small red papules on the chest and back  Rough perifollicular papules on bilateral upper arms      ASSESSMENT/PLAN:     Itching determine spong derm vs grovers vs other on the right chest. Shave biopsy performed.  Area cleaned and anesthetized with 1% lidocaine with epinephrine.  Dermablade used to remove the lesion and sent to pathology. Bleeding was cauterized. Pt tolerated procedure well with no complications.   --Check CBC, CMP, hepatitis b, c and ZHAO today  --start topcial gabapentin/amitriptyline/lidocaine compound and apply BID -TID PRN          Follow-up: pending path   CC:   Scribed By: Marian Castlelanos, MS, PAChanelleC

## 2024-03-07 NOTE — LETTER
3/7/2024         RE: Elaine Awad  [address] [facility] [address]        Dear Colleague,    Thank you for referring your patient, Elaine Awad, to the [facility]. Please see a copy of my visit note below.    HPI:   Chief complaints: Elaine Awad is a pleasant 53 year old female who presents for evaluation of itching. For the past 5-10 years she has had persistent itching on the chest, all over her back and her shoulders. She will notice fine red bumps but no other rash. She is itchy daily, multiple times per day. The itching does have a burning quality occasionally. She has tried topical clindamycin, TAC cream without success. She is moisturizing daily and using all sensitive skin products.       PHYSICAL EXAM:    There were no vitals taken for this visit.  Skin exam performed as follows: Type 1 skin. Mood appropriate  Alert and Oriented X 3. Well developed, well nourished in no distress.  General appearance: Normal  Head including face: Normal  Eyes: conjunctiva and lids: Normal  Mouth: Lips, teeth, gums: Normal  Neck: Normal  Skin: Scalp and body hair: See below    Few small red papules on the chest and back  Rough perifollicular papules on bilateral upper arms      ASSESSMENT/PLAN:     Itching determine spong derm vs grovers vs other on the right chest. Shave biopsy performed.  Area cleaned and anesthetized with 1% lidocaine with epinephrine.  Dermablade used to remove the lesion and sent to pathology. Bleeding was cauterized. Pt tolerated procedure well with no complications.   --Check CBC, CMP, hepatitis b, c and ZHAO today  --start topcial gabapentin/amitriptyline/lidocaine compound and apply BID -TID PRN          Follow-up: pending path   CC:   Scribed By: Marian Castellanos, MS, ROLANDO      Again, thank you for allowing me to participate in the care of your patient.        Sincerely,        Marian Castellanos PA-C

## 2024-03-07 NOTE — PATIENT INSTRUCTIONS
Wound Care Instructions     FOR SUPERFICIAL WOUNDS     [facility]  [phone]                 AFTER 24 HOURS YOU SHOULD REMOVE THE BANDAGE AND BEGIN DAILY DRESSING CHANGES AS FOLLOWS:     1) Remove Dressing.     2) Clean and dry the area with tap water using a Q-tip or sterile gauze pad.     3) Apply Vaseline, Aquaphor, Polysporin ointment or Bacitracin ointment over entire wound.  Do NOT use Neosporin ointment.     4) Cover the wound with a band-aid, or a sterile non-stick gauze pad and micropore paper tape    REPEAT THESE INSTRUCTIONS AT LEAST ONCE A DAY UNTIL THE WOUND HAS COMPLETELY HEALED.    It is an old wives tale that a wound heals better when it is exposed to air and allowed to dry out. The wound will heal faster with a better cosmetic result if it is kept moist with ointment and covered with a bandage.    **Do not let the wound dry out.**    Supplies Needed:      *Cotton tipped applicators (Q-tips)    *Vaseline, Aquaphor, Polysporin or Bacitracin Ointment (NOT NEOSPORIN)    *Band-aids or non-stick gauze pads and micropore paper tape.      PATIENT INFORMATION:    During the healing process you will notice a number of changes. All wounds develop a small halo of redness surrounding the wound.  This means healing is occurring. Severe itching with extensive redness usually indicates sensitivity to the ointment or bandage tape used to dress the wound.  You should call our office if this develops.      Swelling  and/or discoloration around your surgical site is common, particularly when performed around the eye.    All wounds normally drain.  The larger the wound the more drainage there will be.  After 7-10 days, you will notice the wound beginning to shrink and new skin will begin to grow.  The wound is healed when you can see skin has formed over the entire area.  A healed wound has a healthy, shiny look to the surface and is red to dark pink in color to normalize.  Wounds may  take approximately 4-6 weeks to heal.  Larger wounds may take 6-8 weeks.  After the wound is healed you may discontinue dressing changes.    You may experience a sensation of tightness as your wound heals. This is normal and will gradually subside.    Your healed wound may be sensitive to temperature changes. This sensitivity improves with time, but if you re having a lot of discomfort, try to avoid temperature extremes.    Patients frequently experience itching after their wound appears to have healed because of the continue healing under the skin.  Plain Vaseline will help relieve the itching.      POSSIBLE COMPLICATIONS    BLEEDING:    Leave the bandage in place.  Use tightly rolled up gauze or a cloth to apply direct pressure over the bandage for 30  minutes.  Reapply pressure for an additional 30 minutes if necessary  Use additional gauze and tape to maintain pressure once the bleeding has stopped.

## 2024-03-08 DIAGNOSIS — R76.8 POSITIVE ANA (ANTINUCLEAR ANTIBODY): Primary | ICD-10-CM

## 2024-03-08 LAB
ANA PAT SER IF-IMP: ABNORMAL
ANA SER QL IF: ABNORMAL
ANA TITR SER IF: ABNORMAL {TITER}
HBV SURFACE AG SERPL QL IA: NONREACTIVE
HCV AB SERPL QL IA: NONREACTIVE

## 2024-03-11 LAB
DSDNA AB SER-ACNC: <0.6 IU/ML
ENA SM IGG SER IA-ACNC: <0.7 U/ML
ENA SM IGG SER IA-ACNC: NEGATIVE
ENA SS-A AB SER IA-ACNC: <0.5 U/ML
ENA SS-A AB SER IA-ACNC: NEGATIVE
ENA SS-B IGG SER IA-ACNC: <0.6 U/ML
ENA SS-B IGG SER IA-ACNC: NEGATIVE
U1 SNRNP IGG SER IA-ACNC: 1.4 U/ML
U1 SNRNP IGG SER IA-ACNC: NEGATIVE

## 2024-03-12 LAB
NONINV COLON CA DNA+OCC BLD SCRN STL QL: NEGATIVE
PATH REPORT.COMMENTS IMP SPEC: NORMAL
PATH REPORT.FINAL DX SPEC: NORMAL
PATH REPORT.GROSS SPEC: NORMAL
PATH REPORT.MICROSCOPIC SPEC OTHER STN: NORMAL
PATH REPORT.RELEVANT HX SPEC: NORMAL

## 2024-03-13 ENCOUNTER — TELEPHONE (OUTPATIENT)
Dept: DERMATOLOGY | Facility: CLINIC | Age: 54
End: 2024-03-13
Payer: COMMERCIAL

## 2024-03-13 DIAGNOSIS — L11.1 GROVER'S DISEASE: Primary | ICD-10-CM

## 2024-03-13 RX ORDER — CLOBETASOL PROPIONATE 0.5 MG/G
CREAM TOPICAL
Qty: 60 G | Refills: 3 | Status: SHIPPED | OUTPATIENT
Start: 2024-03-13

## 2024-03-13 NOTE — TELEPHONE ENCOUNTER
Patient Contact    Attempt # 1    Was call answered?  Yes    Called patient. Informed her that the new prescription was sent.     Anntete Rowland LPN   [facility]   [phone]

## 2024-03-13 NOTE — TELEPHONE ENCOUNTER
Called patient and gave below test results. She wants to know if she should be using the triamcinolone on this- she feels that made it worse.    Thank you,    Aida MANCERARN BSN  [facility]- [phone]        Natalie Mendosa

## 2024-03-13 NOTE — TELEPHONE ENCOUNTER
I'll send in a higher potency topical steroid for her to try. Sent to pharmacy, please notify patient.

## 2024-03-13 NOTE — TELEPHONE ENCOUNTER
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 This is a condition where people develop itchy bumps on the trunk. We do not know what causes this but it can be self limited (meaning it goes away after 1-2 years). It is exacerbated by heat and sweat. Treatment is staying cool and topical steroids. Rarely we need to do other interventions if people are miserable.

## 2024-04-01 ENCOUNTER — OFFICE VISIT (OUTPATIENT)
Dept: FAMILY MEDICINE | Facility: CLINIC | Age: 54
End: 2024-04-01
Payer: COMMERCIAL

## 2024-04-01 VITALS
TEMPERATURE: 97.9 F | SYSTOLIC BLOOD PRESSURE: 105 MMHG | DIASTOLIC BLOOD PRESSURE: 73 MMHG | BODY MASS INDEX: 44.72 KG/M2 | RESPIRATION RATE: 16 BRPM | HEART RATE: 92 BPM | OXYGEN SATURATION: 98 % | HEIGHT: 62 IN | WEIGHT: 243 LBS

## 2024-04-01 DIAGNOSIS — E66.01 CLASS 3 SEVERE OBESITY WITH SERIOUS COMORBIDITY AND BODY MASS INDEX (BMI) OF 40.0 TO 44.9 IN ADULT, UNSPECIFIED OBESITY TYPE (H): ICD-10-CM

## 2024-04-01 DIAGNOSIS — I10 HYPERTENSION GOAL BP (BLOOD PRESSURE) < 140/90: ICD-10-CM

## 2024-04-01 DIAGNOSIS — Z01.818 PREOP GENERAL PHYSICAL EXAM: Primary | ICD-10-CM

## 2024-04-01 DIAGNOSIS — E66.813 CLASS 3 SEVERE OBESITY WITH SERIOUS COMORBIDITY AND BODY MASS INDEX (BMI) OF 40.0 TO 44.9 IN ADULT, UNSPECIFIED OBESITY TYPE (H): ICD-10-CM

## 2024-04-01 DIAGNOSIS — K43.9 VENTRAL HERNIA WITHOUT OBSTRUCTION OR GANGRENE: ICD-10-CM

## 2024-04-01 PROCEDURE — 99214 OFFICE O/P EST MOD 30 MIN: CPT | Performed by: FAMILY MEDICINE

## 2024-04-01 NOTE — RESULT ENCOUNTER NOTE
Linda,    Your Cologuard test results are normal. This means you are at low risk for colon cancer. This routine screening test should be completed every 3 years until age 75.     Fernanda Rodriguez MD

## 2024-04-01 NOTE — PROGRESS NOTES
Preoperative Evaluation  [facility]  [address] [place] [address]  [place] [address]  Phone: [phone]  Primary Provider: Anabela Rodriguez  Pre-op Performing Provider: ANABELA RODRIGUEZ  Apr 1, 2024        Linda is a 53 year old, presenting for the following:  Pre-Op Exam        4/1/2024     2:43 PM   Additional Questions   Roomed by Iona SANDERS CMA   Accompanied by Self     Surgical Information  Surgery/Procedure: Robot-assisted laparoscopic incarcerated ventral incisional hernia repair with mesh   Surgery Location: [facility]  Surgeon: Aly Cai DO   Surgery Date: 4/15/24  Time of Surgery: 7:30 AM  Where patient plans to recover: At home with family  Fax number for surgical facility: Note does not need to be faxed, will be available electronically in Epic.    Assessment & Plan     The proposed surgical procedure is considered INTERMEDIATE risk.    Preop general physical exam  Ventral hernia without obstruction or gangrene    Hypertension goal BP (blood pressure) < 140/90  Class 3 severe obesity with serious comorbidity and body mass index (BMI) of 40.0 to 44.9 in adult, unspecified obesity type (H)  Well controlled with medications without side effects.             - No identified additional risk factors other than previously addressed    Antiplatelet or Anticoagulation Medication Instructions   - Patient is on no antiplatelet or anticoagulation medications.    Additional Medication Instructions  Patient is to take all scheduled medications on the day of surgery EXCEPT for modifications listed below:   - ACE/ARB: HOLD on day of surgery (minimum 11 hours for general anesthesia).   - phentermine: HOLD 7 days prior to surgery.   - Herbal medications and vitamins: HOLD 14 days prior to surgery.  -Saxenda: Hold the day before and day of surgery   -maxalt and topamax: Hold the day of surgery     Recommendation  APPROVAL GIVEN to proceed with proposed procedure,  without further diagnostic evaluation.      Subjective     HPI related to upcoming procedure: patient has a hernia with plans for repair         3/26/2024     8:07 AM   Preop Questions   1. Have you ever had a heart attack or stroke? No   2. Have you ever had surgery on your heart or blood vessels, such as a stent placement, a coronary artery bypass, or surgery on an artery in your head, neck, heart, or legs? No   3. Do you have chest pain with activity? No   4. Do you have a history of  heart failure? No   5. Do you currently have a cold, bronchitis or symptoms of other infection? No   6. Do you have a cough, shortness of breath, or wheezing? No   7. Do you or anyone in your family have previous history of blood clots? No   8. Do you or does anyone in your family have a serious bleeding problem such as prolonged bleeding following surgeries or cuts? No   9. Have you ever had problems with anemia or been told to take iron pills? YES -     10. Have you had any abnormal blood loss such as black, tarry or bloody stools, or abnormal vaginal bleeding? UNKNOWN -     11. Have you ever had a blood transfusion? No   12. Are you willing to have a blood transfusion if it is medically needed before, during, or after your surgery? Yes   13. Have you or any of your relatives ever had problems with anesthesia? No   14. Do you have sleep apnea, excessive snoring or daytime drowsiness? No   15. Do you have any artifical heart valves or other implanted medical devices like a pacemaker, defibrillator, or continuous glucose monitor? No   16. Do you have artificial joints? No   17. Are you allergic to latex? No   18. Is there any chance that you may be pregnant? No       Health Care Directive  Patient does not have a Health Care Directive or Living Will: Discussed advance care planning with patient; however, patient declined at this time.    Preoperative Review of    reviewed - controlled substances reflected in medication list.        Status of Chronic Conditions:  See problem list for active medical problems.  Problems all longstanding and stable, except as noted/documented.  See ROS for pertinent symptoms related to these conditions.    Patient Active Problem List    Diagnosis Date Noted    Hypertension goal BP (blood pressure) < 140/90      Priority: High    Class 3 severe obesity with serious comorbidity and body mass index (BMI) of 40.0 to 44.9 in adult, unspecified obesity type (H)      Priority: High    Teratoma of left ovary 11/14/2023     Priority: Medium    IUD (intrauterine device) in place 11/14/2023     Priority: Medium    Ventral hernia without obstruction or gangrene 12/12/2022     Priority: Medium    Menorrhagia      Priority: Medium    Migraines      Priority: Medium    B12 deficiency      Priority: Low    History of removal of laparoscopic gastric banding device 07/30/2019     Priority: Low    Intertrigo 10/24/2018     Priority: Low    Primary osteoarthritis of right knee 09/13/2017     Priority: Low    Recurrent cold sores      Priority: Low    Yeast infection of the vagina, recurrent  05/21/2013     Priority: Low    GERD (gastroesophageal reflux disease) 05/05/2010     Priority: Low      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    GERD (gastroesophageal reflux disease)     Tyrel's disease     Hypertension goal BP (blood pressure) < 140/90     Iron deficiency anemia     Menorrhagia     Migraine     Morbid obesity (H)     Nonallergic rhinitis 04/17/2019    Recurrent cold sores     Vulvar dermatitis 10/14/2010    Yeast infection of the vagina, recurrent       Past Surgical History:   Procedure Laterality Date    COLPOSCOPY,BX CERVIX/ENDOCERV CURR  7/1994    ESOPHAGOSCOPY, GASTROSCOPY, DUODENOSCOPY (EGD), COMBINED  1/2/2014    Procedure: COMBINED ESOPHAGOSCOPY, GASTROSCOPY,  DUODENOSCOPY (EGD);;  Surgeon: Eden Stacy MD;  Location: [facility]    ESOPHAGOSCOPY, GASTROSCOPY, DUODENOSCOPY (EGD), COMBINED N/A 1/19/2017    Procedure: COMBINED ESOPHAGOSCOPY, GASTROSCOPY, DUODENOSCOPY (EGD);  Surgeon: Ciro Deras MD;  Location: [facility]    LAPAROSCOPIC GASTRIC ADJUSTABLE BANDING  4/28/2014    Procedure: LAPAROSCOPIC GASTRIC ADJUSTABLE BANDING;  Surgeon: Ciro Deras MD;  Location: [facility]    LAPAROSCOPIC HERNIORRHAPHY HIATAL  4/28/2014    Procedure: LAPAROSCOPIC HERNIORRHAPHY HIATAL;  Surgeon: Ciro Deras MD;  Location: [facility]    LAPAROSCOPIC REMOVAL GASTRIC ADJUSTABLE BAND N/A 1/19/2017    Procedure: LAPAROSCOPIC REMOVAL GASTRIC ADJUSTABLE BAND;  Surgeon: Ciro Deras MD;  Location: [facility]    LITHOTRIPSY      ZZC TREAT ECTOPIC PREG,RMV TUBE/OVARY      LT     Current Outpatient Medications   Medication Sig Dispense Refill    clindamycin (CLEOCIN T) 1 % external lotion APPLY TOPICALLY TO FACE TWICE DAILY FOR ACNE      clobetasol propionate (TEMOVATE) 0.05 % external cream Apply to AA BID x 1-2 weeks then PRN. Do not apply to face. 60 g 3    diphenhydrAMINE (BENADRYL) 25 MG tablet Take 25 mg by mouth nightly as needed for sleep      glucosamine 500 MG CAPS capsule Take 500 mg by mouth daily      insulin pen needle (B-D U/F) 31G X 5 MM miscellaneous USE DAILY OR AS DIRECTED 100 each 1    levonorgestrel (MIRENA) 20 MCG/DAY IUD 1 each (20 mcg) by Intrauterine route once      liraglutide - Weight Management (SAXENDA) 18 MG/3ML pen Inject 3 mg Subcutaneous daily 15 mL 2    losartan (COZAAR) 50 MG tablet Take 1 tablet (50 mg) by mouth daily 90 tablet 3    magnesium oxide (MAG-OX) 400 MG tablet Take 1 tablet by mouth daily      nystatin (NYSTOP) 989826 UNIT/GM external powder APPLY TO THE AFFECTED AREA UNDER THE BREASTS THREE TIMES DAILY AS NEEDED. 60 g 1    omeprazole (PRILOSEC) 20 MG DR capsule Take 1 capsule (20 mg) by mouth daily 90 capsule 3    phentermine  (ADIPEX-P) 15 MG capsule Take 1 capsule (15 mg) by mouth every morning 30 capsule 5    rizatriptan (MAXALT-MLT) 5 MG ODT TAKE 1-2 TABLETS BY MOUTH AT ONSET OF HEADACHE MAY REPEAT IN 2 HOURS. MAX OF 6 TABLETS PER 24 HOURS 36 tablet 3    topiramate (TOPAMAX) 50 MG tablet Take 1 tablet (50 mg) by mouth daily 90 tablet 1    clotrimazole (LOTRIMIN) 1 % external cream Use twice a day for 14 days when signs of yeast in skin folds. (Patient not taking: Reported on 3/7/2024) 113 g 1    COMPOUNDED NON-CONTROLLED SUBSTANCE (CMPD RX) - PHARMACY TO MIX COMPOUNDED MEDICATION Gabapentin 5%/amitriptyline 5%/Lidocaine 5% vanishing cream SIG apply to AA BID PRN (Patient not taking: Reported on 2024) 30 g 11    fexofenadine (ALLEGRA) 180 MG tablet Take 180 mg by mouth as needed (Patient not taking: Reported on 2024)      fluconazole (DIFLUCAN) 150 MG tablet Take 1 tablet (150 mg) by mouth every 72 hours as needed (Patient not taking: Reported on 2024) 3 tablet 3       Allergies   Allergen Reactions    Doxycycline Rash     Full body rash    Ampicillin Swelling     Tongue swelling at end of treatment course        Social History     Tobacco Use    Smoking status: Former     Packs/day: 0.50     Years: 10.00     Additional pack years: 0.00     Total pack years: 5.00     Types: Cigarettes     Quit date: 1997     Years since quittin.3    Smokeless tobacco: Never   Substance Use Topics    Alcohol use: Yes     Comment: occasional (infrequent)     Family History   Problem Relation Age of Onset    Diabetes Mother     C.A.D. Mother     Cancer Father 72        d. pancreatic, melanoma     Colon Polyps Father     Obesity Brother     Breast Cancer Maternal Grandmother     Heart Disease Maternal Grandfather         CHF    Heart Disease Paternal Grandmother         CHF    Colon Polyps Paternal Grandmother     Respiratory Paternal Grandfather         TB    Breast Cancer Maternal Aunt     Glaucoma No family hx of     Macular  "Degeneration No family hx of     Ovarian Cancer No family hx of      History   Drug Use No         Review of Systems    Review of Systems  CONSTITUTIONAL: NEGATIVE for fever, chills, change in weight  INTEGUMENTARY/SKIN: NEGATIVE for worrisome rashes, moles or lesions  ENT/MOUTH: NEGATIVE for ear, mouth and throat problems  RESP: NEGATIVE for significant cough or SOB  CV: NEGATIVE for chest pain, palpitations or peripheral edema    Objective    /73 (BP Location: Right arm, Patient Position: Sitting, Cuff Size: Adult Large)   Pulse 92   Temp 97.9  F (36.6  C) (Oral)   Resp 16   Ht 1.58 m (5' 2.21\")   Wt 110.2 kg (243 lb)   SpO2 98%   BMI 44.15 kg/m     Estimated body mass index is 44.15 kg/m  as calculated from the following:    Height as of this encounter: 1.58 m (5' 2.21\").    Weight as of this encounter: 110.2 kg (243 lb).  Physical Exam  GENERAL: alert and no distress  EYES: Eyes grossly normal to inspection, PERRL and conjunctivae and sclerae normal  HENT: ear canals and TM's normal, nose and mouth without ulcers or lesions  NECK: no adenopathy, no asymmetry, masses, or scars  RESP: lungs clear to auscultation - no rales, rhonchi or wheezes  CV: regular rate and rhythm, normal S1 S2, no S3 or S4, no murmur, click or rub, no peripheral edema  ABDOMEN: soft, nontender and bowel sounds normal  MS: no gross musculoskeletal defects noted, no edema  SKIN: no suspicious lesions or rashes  NEURO: Normal strength and tone, mentation intact and speech normal  PSYCH: mentation appears normal, affect normal/bright    Recent Labs   Lab Test 03/07/24  1528 11/06/23  1117   HGB 15.1  --      --     138   POTASSIUM 4.1 4.2   CR 0.92 0.89        Diagnostics  No labs were ordered during this visit.   No EKG required, no history of coronary heart disease, significant arrhythmia, peripheral arterial disease or other structural heart disease.    Revised Cardiac Risk Index (RCRI)  The patient has the " following serious cardiovascular risks for perioperative complications:   - No serious cardiac risks = 0 points     RCRI Interpretation: 0 points: Class I (very low risk - 0.4% complication rate)         Signed Electronically by: Fernanda Rodriguez MD  Copy of this evaluation report is provided to requesting physician.

## 2024-04-01 NOTE — PATIENT INSTRUCTIONS
Preparing for Your Surgery  Getting started  A nurse will call you to review your health history and instructions. They will give you an arrival time based on your scheduled surgery time. Please be ready to share:  Your doctor's clinic name and phone number  Your medical, surgical, and anesthesia history  A list of allergies and sensitivities  A list of medicines, including herbal treatments and over-the-counter drugs  Whether the patient has a legal guardian (ask how to send us the papers in advance)  Please tell us if you're pregnant--or if there's any chance you might be pregnant. Some surgeries may injure a fetus (unborn baby), so they require a pregnancy test. Surgeries that are safe for a fetus don't always need a test, and you can choose whether to have one.   If you have a child who's having surgery, please ask for a copy of Preparing for Your Child's Surgery.    Preparing for surgery  Within 10 to 30 days of surgery: Have a pre-op exam (sometimes called an H&P, or History and Physical). This can be done at a clinic or pre-operative center.  If you're having a , you may not need this exam. Talk to your care team.  At your pre-op exam, talk to your care team about all medicines you take. If you need to stop any medicines before surgery, ask when to start taking them again.  We do this for your safety. Many medicines can make you bleed too much during surgery. Some change how well surgery (anesthesia) drugs work.  Call your insurance company to let them know you're having surgery. (If you don't have insurance, call [phone].)  Call your clinic if there's any change in your health. This includes signs of a cold or flu (sore throat, runny nose, cough, rash, fever). It also includes a scrape or scratch near the surgery site.  If you have questions on the day of surgery, call your hospital or surgery center.  Eating and drinking guidelines  For your safety: Unless your surgeon tells you otherwise,  follow the guidelines below.  Eat and drink as usual until 8 hours before you arrive for surgery. After that, no food or milk.  Drink clear liquids until 2 hours before you arrive. These are liquids you can see through, like water, Gatorade, and Propel Water. They also include plain black coffee and tea (no cream or milk), candy, and breath mints. You can spit out gum when you arrive.  If you drink alcohol: Stop drinking it the night before surgery.  If your care team tells you to take medicine on the morning of surgery, it's okay to take it with a sip of water.  Preventing infection  Shower or bathe the night before and morning of your surgery. Follow the instructions your clinic gave you. (If no instructions, use regular soap.)  Don't shave or clip hair near your surgery site. We'll remove the hair if needed.  Don't smoke or vape the morning of surgery. You may chew nicotine gum up to 2 hours before surgery. A nicotine patch is okay.  Note: Some surgeries require you to completely quit smoking and nicotine. Check with your surgeon.  Your care team will make every effort to keep you safe from infection. We will:  Clean our hands often with soap and water (or an alcohol-based hand rub).  Clean the skin at your surgery site with a special soap that kills germs.  Give you a special gown to keep you warm. (Cold raises the risk of infection.)  Wear special hair covers, masks, gowns and gloves during surgery.  Give antibiotic medicine, if prescribed. Not all surgeries need antibiotics.  What to bring on the day of surgery  Photo ID and insurance card  Copy of your health care directive, if you have one  Glasses and hearing aids (bring cases)  You can't wear contacts during surgery  Inhaler and eye drops, if you use them (tell us about these when you arrive)  CPAP machine or breathing device, if you use them  A few personal items, if spending the night  If you have . . .  A pacemaker, ICD (cardiac defibrillator) or other  implant: Bring the ID card.  An implanted stimulator: Bring the remote control.  A legal guardian: Bring a copy of the certified (court-stamped) guardianship papers.  Please remove any jewelry, including body piercings. Leave jewelry and other valuables at home.  If you're going home the day of surgery  You must have a responsible adult drive you home. They should stay with you overnight as well.  If you don't have someone to stay with you, and you aren't safe to go home alone, we may keep you overnight. Insurance often won't pay for this.  After surgery  If it's hard to control your pain or you need more pain medicine, please call your surgeon's office.  Questions?   If you have any questions for your care team, list them here: _________________________________________________________________________________________________________________________________________________________________________ ____________________________________ ____________________________________ ____________________________________  For informational purposes only. Not to replace the advice of your health care provider. Copyright   2003, 2019 [facility]. All rights reserved. Clinically reviewed by Pamela Gamino MD. [employer] 746282 - REV 12/22.    How to Take Your Medication Before Surgery  - Take all of your medications before surgery except as noted below  - STOP taking all vitamins and herbal supplements 14 days before surgery.  - Stop taking phentermine 7 days before surgery   -STOP taking Saxenda the day before and day of surgery   -STOP taking topiramate the day of surgery  -STOP taking rizatriptan the day of surgery   STOP taking losartan the day of surgery

## 2024-04-09 DIAGNOSIS — B00.1 RECURRENT COLD SORES: ICD-10-CM

## 2024-04-09 RX ORDER — VALACYCLOVIR HYDROCHLORIDE 500 MG/1
TABLET, FILM COATED ORAL
Qty: 100 TABLET | Refills: 3 | Status: SHIPPED | OUTPATIENT
Start: 2024-04-09

## 2024-04-15 ENCOUNTER — HOSPITAL ENCOUNTER (OUTPATIENT)
Facility: CLINIC | Age: 54
Discharge: HOME | End: 2024-04-15
Attending: SURGERY | Admitting: SURGERY
Payer: COMMERCIAL

## 2024-04-15 ENCOUNTER — ANESTHESIA (OUTPATIENT)
Dept: SURGERY | Facility: CLINIC | Age: 54
End: 2024-04-15
Payer: COMMERCIAL

## 2024-04-15 ENCOUNTER — ANESTHESIA EVENT (OUTPATIENT)
Dept: SURGERY | Facility: CLINIC | Age: 54
End: 2024-04-15
Payer: COMMERCIAL

## 2024-04-15 VITALS
HEART RATE: 69 BPM | TEMPERATURE: 97.9 F | SYSTOLIC BLOOD PRESSURE: 115 MMHG | WEIGHT: 243 LBS | BODY MASS INDEX: 44.72 KG/M2 | HEIGHT: 62 IN | DIASTOLIC BLOOD PRESSURE: 76 MMHG | RESPIRATION RATE: 16 BRPM | OXYGEN SATURATION: 95 %

## 2024-04-15 DIAGNOSIS — Z87.19 S/P LAPAROSCOPIC HERNIA REPAIR: Primary | ICD-10-CM

## 2024-04-15 DIAGNOSIS — Z98.890 S/P LAPAROSCOPIC HERNIA REPAIR: Primary | ICD-10-CM

## 2024-04-15 PROCEDURE — 250N000009 HC RX 250: Performed by: SURGERY

## 2024-04-15 PROCEDURE — S2900 ROBOTIC SURGICAL SYSTEM: HCPCS | Performed by: SURGERY

## 2024-04-15 PROCEDURE — 250N000011 HC RX IP 250 OP 636: Performed by: SURGERY

## 2024-04-15 PROCEDURE — 999N000141 HC STATISTIC PRE-PROCEDURE NURSING ASSESSMENT: Performed by: SURGERY

## 2024-04-15 PROCEDURE — 258N000003 HC RX IP 258 OP 636: Performed by: NURSE ANESTHETIST, CERTIFIED REGISTERED

## 2024-04-15 PROCEDURE — 250N000011 HC RX IP 250 OP 636: Performed by: NURSE ANESTHETIST, CERTIFIED REGISTERED

## 2024-04-15 PROCEDURE — 49594 RPR AA HRN 1ST 3-10 NCR/STRN: CPT | Mod: AS | Performed by: PHYSICIAN ASSISTANT

## 2024-04-15 PROCEDURE — 250N000025 HC SEVOFLURANE, PER MIN: Performed by: SURGERY

## 2024-04-15 PROCEDURE — C1781 MESH (IMPLANTABLE): HCPCS | Performed by: SURGERY

## 2024-04-15 PROCEDURE — 710N000012 HC RECOVERY PHASE 2, PER MINUTE: Performed by: SURGERY

## 2024-04-15 PROCEDURE — 370N000017 HC ANESTHESIA TECHNICAL FEE, PER MIN: Performed by: SURGERY

## 2024-04-15 PROCEDURE — 272N000001 HC OR GENERAL SUPPLY STERILE: Performed by: SURGERY

## 2024-04-15 PROCEDURE — 250N000013 HC RX MED GY IP 250 OP 250 PS 637: Performed by: SURGERY

## 2024-04-15 PROCEDURE — 49594 RPR AA HRN 1ST 3-10 NCR/STRN: CPT | Performed by: SURGERY

## 2024-04-15 PROCEDURE — 360N000080 HC SURGERY LEVEL 7, PER MIN: Performed by: SURGERY

## 2024-04-15 PROCEDURE — 710N000010 HC RECOVERY PHASE 1, LEVEL 2, PER MIN: Performed by: SURGERY

## 2024-04-15 PROCEDURE — 250N000009 HC RX 250: Performed by: NURSE ANESTHETIST, CERTIFIED REGISTERED

## 2024-04-15 DEVICE — MESH VENTRALIGHT ST W/ECHO POS SYSTEM 6" CIRCLE 5955600: Type: IMPLANTABLE DEVICE | Site: ABDOMEN | Status: FUNCTIONAL

## 2024-04-15 RX ORDER — CLINDAMYCIN PHOSPHATE 900 MG/50ML
900 INJECTION, SOLUTION INTRAVENOUS
Status: DISCONTINUED | OUTPATIENT
Start: 2024-04-15 | End: 2024-04-15

## 2024-04-15 RX ORDER — PROPOFOL 10 MG/ML
INJECTION, EMULSION INTRAVENOUS PRN
Status: DISCONTINUED | OUTPATIENT
Start: 2024-04-15 | End: 2024-04-15

## 2024-04-15 RX ORDER — SODIUM CHLORIDE, SODIUM LACTATE, POTASSIUM CHLORIDE, CALCIUM CHLORIDE 600; 310; 30; 20 MG/100ML; MG/100ML; MG/100ML; MG/100ML
INJECTION, SOLUTION INTRAVENOUS CONTINUOUS
Status: DISCONTINUED | OUTPATIENT
Start: 2024-04-15 | End: 2024-04-15 | Stop reason: HOSPADM

## 2024-04-15 RX ORDER — OXYCODONE AND ACETAMINOPHEN 5; 325 MG/1; MG/1
1-2 TABLET ORAL EVERY 4 HOURS PRN
Qty: 12 TABLET | Refills: 0 | Status: SHIPPED | OUTPATIENT
Start: 2024-04-15 | End: 2024-05-01

## 2024-04-15 RX ORDER — CEFAZOLIN SODIUM/WATER 2 G/20 ML
2 SYRINGE (ML) INTRAVENOUS SEE ADMIN INSTRUCTIONS
Status: DISCONTINUED | OUTPATIENT
Start: 2024-04-15 | End: 2024-04-15 | Stop reason: HOSPADM

## 2024-04-15 RX ORDER — BUPIVACAINE HYDROCHLORIDE AND EPINEPHRINE 2.5; 5 MG/ML; UG/ML
INJECTION, SOLUTION EPIDURAL; INFILTRATION; INTRACAUDAL; PERINEURAL PRN
Status: DISCONTINUED | OUTPATIENT
Start: 2024-04-15 | End: 2024-04-15 | Stop reason: HOSPADM

## 2024-04-15 RX ORDER — CLINDAMYCIN PHOSPHATE 900 MG/50ML
900 INJECTION, SOLUTION INTRAVENOUS SEE ADMIN INSTRUCTIONS
Status: DISCONTINUED | OUTPATIENT
Start: 2024-04-15 | End: 2024-04-15

## 2024-04-15 RX ORDER — FENTANYL CITRATE 50 UG/ML
25 INJECTION, SOLUTION INTRAMUSCULAR; INTRAVENOUS EVERY 5 MIN PRN
Status: DISCONTINUED | OUTPATIENT
Start: 2024-04-15 | End: 2024-04-15 | Stop reason: HOSPADM

## 2024-04-15 RX ORDER — HYDROMORPHONE HYDROCHLORIDE 1 MG/ML
0.4 INJECTION, SOLUTION INTRAMUSCULAR; INTRAVENOUS; SUBCUTANEOUS EVERY 5 MIN PRN
Status: DISCONTINUED | OUTPATIENT
Start: 2024-04-15 | End: 2024-04-15 | Stop reason: HOSPADM

## 2024-04-15 RX ORDER — ONDANSETRON 2 MG/ML
INJECTION INTRAMUSCULAR; INTRAVENOUS PRN
Status: DISCONTINUED | OUTPATIENT
Start: 2024-04-15 | End: 2024-04-15

## 2024-04-15 RX ORDER — HYDROMORPHONE HYDROCHLORIDE 1 MG/ML
0.2 INJECTION, SOLUTION INTRAMUSCULAR; INTRAVENOUS; SUBCUTANEOUS EVERY 5 MIN PRN
Status: DISCONTINUED | OUTPATIENT
Start: 2024-04-15 | End: 2024-04-15 | Stop reason: HOSPADM

## 2024-04-15 RX ORDER — LIDOCAINE HYDROCHLORIDE 10 MG/ML
INJECTION, SOLUTION INFILTRATION; PERINEURAL PRN
Status: DISCONTINUED | OUTPATIENT
Start: 2024-04-15 | End: 2024-04-15

## 2024-04-15 RX ORDER — DEXAMETHASONE SODIUM PHOSPHATE 10 MG/ML
INJECTION, SOLUTION INTRAMUSCULAR; INTRAVENOUS PRN
Status: DISCONTINUED | OUTPATIENT
Start: 2024-04-15 | End: 2024-04-15

## 2024-04-15 RX ORDER — NALOXONE HYDROCHLORIDE 0.4 MG/ML
0.1 INJECTION, SOLUTION INTRAMUSCULAR; INTRAVENOUS; SUBCUTANEOUS
Status: DISCONTINUED | OUTPATIENT
Start: 2024-04-15 | End: 2024-04-15 | Stop reason: HOSPADM

## 2024-04-15 RX ORDER — ONDANSETRON 4 MG/1
4 TABLET, ORALLY DISINTEGRATING ORAL EVERY 30 MIN PRN
Status: DISCONTINUED | OUTPATIENT
Start: 2024-04-15 | End: 2024-04-15 | Stop reason: HOSPADM

## 2024-04-15 RX ORDER — FENTANYL CITRATE 50 UG/ML
INJECTION, SOLUTION INTRAMUSCULAR; INTRAVENOUS PRN
Status: DISCONTINUED | OUTPATIENT
Start: 2024-04-15 | End: 2024-04-15

## 2024-04-15 RX ORDER — KETOROLAC TROMETHAMINE 30 MG/ML
INJECTION, SOLUTION INTRAMUSCULAR; INTRAVENOUS PRN
Status: DISCONTINUED | OUTPATIENT
Start: 2024-04-15 | End: 2024-04-15

## 2024-04-15 RX ORDER — CEFAZOLIN SODIUM/WATER 2 G/20 ML
2 SYRINGE (ML) INTRAVENOUS
Status: COMPLETED | OUTPATIENT
Start: 2024-04-15 | End: 2024-04-15

## 2024-04-15 RX ORDER — ONDANSETRON 2 MG/ML
4 INJECTION INTRAMUSCULAR; INTRAVENOUS EVERY 30 MIN PRN
Status: DISCONTINUED | OUTPATIENT
Start: 2024-04-15 | End: 2024-04-15 | Stop reason: HOSPADM

## 2024-04-15 RX ORDER — OXYCODONE AND ACETAMINOPHEN 5; 325 MG/1; MG/1
2 TABLET ORAL
Status: COMPLETED | OUTPATIENT
Start: 2024-04-15 | End: 2024-04-15

## 2024-04-15 RX ORDER — FENTANYL CITRATE 50 UG/ML
50 INJECTION, SOLUTION INTRAMUSCULAR; INTRAVENOUS EVERY 5 MIN PRN
Status: DISCONTINUED | OUTPATIENT
Start: 2024-04-15 | End: 2024-04-15 | Stop reason: HOSPADM

## 2024-04-15 RX ORDER — LIDOCAINE 40 MG/G
CREAM TOPICAL
Status: DISCONTINUED | OUTPATIENT
Start: 2024-04-15 | End: 2024-04-15 | Stop reason: HOSPADM

## 2024-04-15 RX ADMIN — PROPOFOL 70 MCG/KG/MIN: 10 INJECTION, EMULSION INTRAVENOUS at 07:41

## 2024-04-15 RX ADMIN — ROCURONIUM BROMIDE 20 MG: 50 INJECTION, SOLUTION INTRAVENOUS at 08:32

## 2024-04-15 RX ADMIN — MIDAZOLAM 2 MG: 1 INJECTION INTRAMUSCULAR; INTRAVENOUS at 07:27

## 2024-04-15 RX ADMIN — PROPOFOL 200 MG: 10 INJECTION, EMULSION INTRAVENOUS at 07:38

## 2024-04-15 RX ADMIN — OXYCODONE HYDROCHLORIDE AND ACETAMINOPHEN 2 TABLET: 5; 325 TABLET ORAL at 10:35

## 2024-04-15 RX ADMIN — SUGAMMADEX 200 MG: 100 INJECTION, SOLUTION INTRAVENOUS at 08:59

## 2024-04-15 RX ADMIN — Medication 2 G: at 07:27

## 2024-04-15 RX ADMIN — KETOROLAC TROMETHAMINE 15 MG: 30 INJECTION, SOLUTION INTRAMUSCULAR at 08:59

## 2024-04-15 RX ADMIN — FENTANYL CITRATE 25 MCG: 50 INJECTION, SOLUTION INTRAMUSCULAR; INTRAVENOUS at 09:40

## 2024-04-15 RX ADMIN — SODIUM CHLORIDE, POTASSIUM CHLORIDE, SODIUM LACTATE AND CALCIUM CHLORIDE 10 ML/HR: 600; 310; 30; 20 INJECTION, SOLUTION INTRAVENOUS at 06:32

## 2024-04-15 RX ADMIN — ONDANSETRON 4 MG: 2 INJECTION INTRAMUSCULAR; INTRAVENOUS at 08:02

## 2024-04-15 RX ADMIN — Medication 160 MG: at 07:38

## 2024-04-15 RX ADMIN — ROCURONIUM BROMIDE 5 MG: 50 INJECTION, SOLUTION INTRAVENOUS at 07:38

## 2024-04-15 RX ADMIN — LIDOCAINE HYDROCHLORIDE 50 MG: 10 INJECTION, SOLUTION INFILTRATION; PERINEURAL at 07:37

## 2024-04-15 RX ADMIN — HYDROMORPHONE HYDROCHLORIDE 0.5 MG: 1 INJECTION, SOLUTION INTRAMUSCULAR; INTRAVENOUS; SUBCUTANEOUS at 08:13

## 2024-04-15 RX ADMIN — ROCURONIUM BROMIDE 40 MG: 50 INJECTION, SOLUTION INTRAVENOUS at 07:47

## 2024-04-15 RX ADMIN — HYDROMORPHONE HYDROCHLORIDE 0.5 MG: 1 INJECTION, SOLUTION INTRAMUSCULAR; INTRAVENOUS; SUBCUTANEOUS at 09:02

## 2024-04-15 RX ADMIN — FENTANYL CITRATE 25 MCG: 50 INJECTION, SOLUTION INTRAMUSCULAR; INTRAVENOUS at 09:56

## 2024-04-15 RX ADMIN — DEXAMETHASONE SODIUM PHOSPHATE 5 MG: 10 INJECTION, SOLUTION INTRAMUSCULAR; INTRAVENOUS at 08:02

## 2024-04-15 RX ADMIN — FENTANYL CITRATE 50 MCG: 50 INJECTION INTRAMUSCULAR; INTRAVENOUS at 07:51

## 2024-04-15 RX ADMIN — SODIUM CHLORIDE, POTASSIUM CHLORIDE, SODIUM LACTATE AND CALCIUM CHLORIDE: 600; 310; 30; 20 INJECTION, SOLUTION INTRAVENOUS at 09:08

## 2024-04-15 RX ADMIN — FENTANYL CITRATE 50 MCG: 50 INJECTION INTRAMUSCULAR; INTRAVENOUS at 07:35

## 2024-04-15 ASSESSMENT — ACTIVITIES OF DAILY LIVING (ADL)
ADLS_ACUITY_SCORE: 36
ADLS_ACUITY_SCORE: 35
ADLS_ACUITY_SCORE: 35
ADLS_ACUITY_SCORE: 36
ADLS_ACUITY_SCORE: 35

## 2024-04-15 ASSESSMENT — LIFESTYLE VARIABLES: TOBACCO_USE: 1

## 2024-04-15 NOTE — ANESTHESIA CARE TRANSFER NOTE
Patient: Elaine Awad    Procedure: Procedure(s):  Robot-assisted laparoscopic incarcerated ventral incisional hernia repair with mesh       Diagnosis: Ventral hernia without obstruction or gangrene [K43.9]  Diagnosis Additional Information: No value filed.    Anesthesia Type:   General     Note:    Oropharynx: spontaneously breathing and oropharynx clear of all foreign objects  Level of Consciousness: awake  Oxygen Supplementation: face mask  Level of Supplemental Oxygen (L/min / FiO2): 4  Independent Airway: airway patency satisfactory and stable  Dentition: dentition unchanged  Vital Signs Stable: post-procedure vital signs reviewed and stable  Report to RN Given: handoff report given  Patient transferred to: PACU    Handoff Report: Identifed the Patient, Identified the Reponsible Provider, Reviewed the pertinent medical history, Discussed the surgical course, Reviewed Intra-OP anesthesia mangement and issues during anesthesia, Set expectations for post-procedure period and Allowed opportunity for questions and acknowledgement of understanding      Vitals:  Vitals Value Taken Time   /86    Temp 96.1    Pulse 61    Resp 12    SpO2 96        Electronically Signed By: KYLE Sparrow CRNA  April 15, 2024  9:20 AM

## 2024-04-15 NOTE — ANESTHESIA POSTPROCEDURE EVALUATION
Patient: Elaine Awad    Procedure: Procedure(s):  Robot-assisted laparoscopic incarcerated ventral incisional hernia repair with mesh       Anesthesia Type:  General    Note:  Disposition: Outpatient   Postop Pain Control: Uneventful            Sign Out: Well controlled pain   PONV: No   Neuro/Psych: Uneventful            Sign Out: Acceptable/Baseline neuro status   Airway/Respiratory: Uneventful            Sign Out: Acceptable/Baseline resp. status   CV/Hemodynamics: Uneventful            Sign Out: Acceptable CV status; No obvious hypovolemia; No obvious fluid overload   Other NRE: NONE   DID A NON-ROUTINE EVENT OCCUR? No           Last vitals:  Vitals Value Taken Time   /85 04/15/24 1015   Temp 97.5  F (36.4  C) 04/15/24 1005   Pulse 60 04/15/24 1017   Resp 12 04/15/24 1017   SpO2 97 % 04/15/24 1017   Vitals shown include unfiled device data.    Electronically Signed By: KYLE Sparrow CRNA  April 15, 2024  1:17 PM

## 2024-04-15 NOTE — OP NOTE
Procedure Date: 4/15/2024     PROCEDURE:            Robot-assisted laparoscopic incarcerated ventral incisional hernia repair with mesh                                         PREOPERATIVE DIAGNOSIS: Incarcerated ventral incisional hernia     POSTOPERATIVE DIAGNOSIS: Incarcerated ventral incisional hernia     SURGEON:  Aly Cai DO     ASSISTANT:  Mariana Pickett PA-C; Assistance was utilized for port placement, instrument exchange, mesh fixation, and incision closure.     ANESTHESIA:  General endotracheal anesthesia.     SPECIMENS:  None     ESTIMATED BLOOD LOSS:  5 mL.      COMPLICATIONS:  None immediately apparent.     OPERATIVE FINDINGS:  A 5.5 cm ventral incisional l hernia defect with pre-peritoneal fat and falciform incarceration     INDICATIONS FOR PROCEDURE: The patient is a 53 year old female whom I met in the surgical clinic with complaint of painful supraumbilical l bulge.  Physical exam corroborated the history and robot-assisted laparoscopic incarcerated ventral incisional hernia repair with mesh was recommended.  We discussed the procedure in detail and after the discussion, agreed to proceed with surgery.     DESCRIPTION OF PROCEDURE:  After the informed consent was obtained, the patient was brought from the preoperative holding area to the operating room and placed in the supine position.  Anesthesia was induced. They were prepped and draped in the normal sterile fashion.  Timeout was performed.  After the correct patient and correct procedure were verified, we began by making an 5 mm incision in the low midline.  Using a Visiport we directly enter the abdomen using a laparoscopic 5 mm camera without issue.  The abdomen was insufflated to 15 mmHg.. Camera was inserted.  General survey of the abdomen revealed large epigastric incisional hernia defect.  2 additional robotic trocars were placed in the right and left lower quadrants both under direct visualization.   We chose a 6 inch round echo  Ventralight ST mesh for the repair.  This was placed in the peritoneal cavity through the left lower quadrant incision.  Two 2-0 Stratafix sutures and an 0 Stratafix suture were placed in the peritoneal cavity as well.  The robot was docked.  We used a Cadiere grasper in the left arm and a monopolar scissors in the right arm.  We began by incising the peritoneum superiorly several centimeters below the defect.  The peritoneum and preperitoneal fat were dissected away from the posterior sheath all the way up to and through the hernia.  Hernia sac was reduced in the peritoneal cavity with the preperitoneal fat hernia contents.  Once an appropriate space had been cleared for the mesh, we used a needle  to close the hernia defect using the 0 Stratafix suture in running fashion.  Then, a small nick incision was made supraumbilically and a PMI grasper was used to grasp the catheter at the center of the mesh and pulled through the abdominal wall.  The scaffolding was insufflated and clamped.  This mesh was secured in place using 2-0 Stratafix suture in a running fashion circumferentially.  The catheter was then cut externally and the scaffolding was removed from the mesh.  The mesh appeared to be in good position.  The scaffolding was removed from the peritoneal cavity as well as all 3 needles and sutures intact without issue.  Another survey of the abdomen revealed no operative complications and no ongoing bleeding.  The robot was then undocked.  The abdomen was then desufflated while the ports were removed under direct visualization.  The skin was instilled with 0.25% Marcaine with epinephrine for local anesthesia.  Skin was closed with inverted interrupted 4-0 Monocryl sutures and topical adhesive dressing was applied.  At the completion of the case all instruments, needles, and sponges were accounted for after a correct count.  The patient was then awoken from anesthesia and brought to recovery room in stable  condition.     Aly Cai DO, FACS

## 2024-04-15 NOTE — ANESTHESIA PREPROCEDURE EVALUATION
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    GERD (gastroesophageal reflux disease)     Tyrel's disease     Hypertension goal BP (blood pressure) < 140/90     Iron deficiency anemia     Menorrhagia     Migraine     Morbid obesity (H)     Nonallergic rhinitis 2019    Recurrent cold sores     Vulvar dermatitis 10/14/2010    Yeast infection of the vagina, recurrent        Past Surgical History:   Procedure Laterality Date    COLPOSCOPY,BX CERVIX/ENDOCERV CURR  1994    ESOPHAGOSCOPY, GASTROSCOPY, DUODENOSCOPY (EGD), COMBINED  2014    Procedure: COMBINED ESOPHAGOSCOPY, GASTROSCOPY, DUODENOSCOPY (EGD);;  Surgeon: Eden Stacy MD;  Location:  GI    ESOPHAGOSCOPY, GASTROSCOPY, DUODENOSCOPY (EGD), COMBINED N/A 2017    Procedure: COMBINED ESOPHAGOSCOPY, GASTROSCOPY, DUODENOSCOPY (EGD);  Surgeon: Ciro Deras MD;  [facility]    LAPAROSCOPIC GASTRIC ADJUSTABLE BANDING  2014    Procedure: LAPAROSCOPIC GASTRIC ADJUSTABLE BANDING;  Surgeon: Ciro Deras MD;  [facility]    LAPAROSCOPIC HERNIORRHAPHY HIATAL  2014    Procedure: LAPAROSCOPIC HERNIORRHAPHY HIATAL;  Surgeon: Ciro Deras MD;  [facility]    LAPAROSCOPIC REMOVAL GASTRIC ADJUSTABLE BAND N/A 2017    Procedure: LAPAROSCOPIC REMOVAL GASTRIC ADJUSTABLE BAND;  Surgeon: Ciro Deras MD;  [facility]    LITHOTRIPSY      ZZC TREAT ECTOPIC PREG,RMV TUBE/OVARY      LT      Allergies   Allergen Reactions    Doxycycline Rash     Full body rash    Ampicillin Swelling     Tongue swelling at end of treatment course      Social  History     Tobacco Use    Smoking status: Former     Current packs/day: 0.00     Average packs/day: 0.5 packs/day for 10.0 years (5.0 ttl pk-yrs)     Types: Cigarettes     Start date: 1987     Quit date: 1997     Years since quittin.3    Smokeless tobacco: Never   Substance Use Topics    Alcohol use: Yes     Comment: occasional (infrequent)      Wt Readings from Last 1 Encounters:   24 110.2 kg (243 lb)        Anesthesia Evaluation   Pt has had prior anesthetic. Type: MAC and General.        ROS/MED HX  ENT/Pulmonary:     (+)           allergic rhinitis,     tobacco use, Past use,  5  Pack-Year Hx,                      Neurologic:     (+)      migraines,                          Cardiovascular:     (+)  hypertension- -   -  - -                                      METS/Exercise Tolerance:     Hematologic:  - neg hematologic  ROS     Musculoskeletal:   (+)  arthritis,             GI/Hepatic:     (+) GERD, Symptomatic,                  Renal/Genitourinary:  - neg Renal ROS     Endo: Comment: On GLP-1 agonist.    (+)               Obesity,       Psychiatric/Substance Use:  - neg psychiatric ROS     Infectious Disease:  - neg infectious disease ROS     Malignancy:  - neg malignancy ROS     Other:  - neg other ROS          Physical Exam    Airway  airway exam normal      Mallampati: II   TM distance: > 3 FB   Neck ROM: full   Mouth opening: > 3 cm    Respiratory Devices and Support         Dental       (+) Minor Abnormalities - some fillings, tiny chips    B=Bridge, C=Chipped, L=Loose, M=Missing    Cardiovascular   cardiovascular exam normal       Rhythm and rate: regular and normal     Pulmonary   pulmonary exam normal        breath sounds clear to auscultation           OUTSIDE LABS:  CBC:   Lab Results   Component Value Date    WBC 8.9 2024    WBC 7.1 2021    HGB 15.1 2024    HGB 14.8 2021    HCT 47.0 2024    HCT 47.0 2021     2024      "04/26/2021     BMP:   Lab Results   Component Value Date     03/07/2024     11/06/2023    POTASSIUM 4.1 03/07/2024    POTASSIUM 4.2 11/06/2023    CHLORIDE 105 03/07/2024    CHLORIDE 105 11/06/2023    CO2 24 03/07/2024    CO2 24 11/06/2023    BUN 11.3 03/07/2024    BUN 7.1 11/06/2023    CR 0.92 03/07/2024    CR 0.89 11/06/2023     (H) 03/07/2024    GLC 90 11/06/2023     COAGS: No results found for: \"PTT\", \"INR\", \"FIBR\"  POC:   Lab Results   Component Value Date    HCG Negative 03/13/2020     HEPATIC:   Lab Results   Component Value Date    ALBUMIN 4.6 03/07/2024    PROTTOTAL 7.2 03/07/2024    ALT 18 03/07/2024    AST 18 03/07/2024    ALKPHOS 84 03/07/2024    BILITOTAL 0.5 03/07/2024     OTHER:   Lab Results   Component Value Date    A1C 5.0 04/26/2021    TAMEKA 10.2 (H) 03/07/2024    PHOS 2.6 04/29/2014    MAG 2.0 04/29/2014    TSH 0.98 04/26/2021    T4 1.05 01/04/2010       Anesthesia Plan    ASA Status:  3    NPO Status:  NPO Appropriate    Anesthesia Type: General.     - Airway: ETT   Induction: Propofol, RSI.   Maintenance: Balanced.        Consents    Anesthesia Plan(s) and associated risks, benefits, and realistic alternatives discussed. Questions answered and patient/representative(s) expressed understanding.     - Discussed:     - Discussed with:  Patient      - Extended Intubation/Ventilatory Support Discussed: No.      - Patient is DNR/DNI Status: No     Use of blood products discussed: No .     Postoperative Care    Pain management: IV analgesics, Oral pain medications, Multi-modal analgesia.   PONV prophylaxis: Ondansetron (or other 5HT-3), Dexamethasone or Solumedrol     Comments:               KYLE Sparrow CRNA    I have reviewed the pertinent notes and labs in the chart from the past 30 days and (re)examined the patient.  Any updates or changes from those notes are reflected in this note.              # Severe Obesity: Estimated body mass index is 44.15 kg/m  as calculated from " "the following:    Height as of 4/1/24: 1.58 m (5' 2.21\").    Weight as of 4/1/24: 110.2 kg (243 lb).      "

## 2024-04-25 ENCOUNTER — OFFICE VISIT (OUTPATIENT)
Dept: SURGERY | Facility: OTHER | Age: 54
End: 2024-04-25
Payer: COMMERCIAL

## 2024-04-25 VITALS
BODY MASS INDEX: 44.72 KG/M2 | HEIGHT: 62 IN | DIASTOLIC BLOOD PRESSURE: 72 MMHG | WEIGHT: 243 LBS | SYSTOLIC BLOOD PRESSURE: 120 MMHG | TEMPERATURE: 98 F

## 2024-04-25 DIAGNOSIS — Z98.890 S/P LAPAROSCOPIC HERNIA REPAIR: Primary | ICD-10-CM

## 2024-04-25 DIAGNOSIS — Z87.19 S/P LAPAROSCOPIC HERNIA REPAIR: Primary | ICD-10-CM

## 2024-04-25 PROCEDURE — 99212 OFFICE O/P EST SF 10 MIN: CPT | Performed by: SURGERY

## 2024-04-25 ASSESSMENT — PAIN SCALES - GENERAL: PAINLEVEL: MILD PAIN (2)

## 2024-04-25 NOTE — PROGRESS NOTES
General Surgery Follow Up    Pt returns for follow up visit s/p robot VIHR w mesh    HPI:  Doing well.  No issues with bowels or bladder.  Minimal discomfort as expected.  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    GERD (gastroesophageal reflux disease)     Mullins's disease     Hypertension goal BP (blood pressure) < 140/90     Iron deficiency anemia     Menorrhagia     Migraine     Morbid obesity (H)     Nonallergic rhinitis 04/17/2019    Recurrent cold sores     Vulvar dermatitis 10/14/2010    Yeast infection of the vagina, recurrent         Past Surgical History:   Procedure Laterality Date    COLPOSCOPY,BX CERVIX/ENDOCERV CURR  7/1994    DAVINCI XI HERNIORRHAPHY VENTRAL N/A 4/15/2024    Procedure: Robot-assisted laparoscopic incarcerated ventral incisional hernia repair with mesh;  Surgeon: Aly Cai DO;  [facility]    ESOPHAGOSCOPY, GASTROSCOPY, DUODENOSCOPY (EGD), COMBINED  1/2/2014    Procedure: COMBINED ESOPHAGOSCOPY, GASTROSCOPY, DUODENOSCOPY (EGD);;  Surgeon: Eden Stacy MD;  Location:  GI    ESOPHAGOSCOPY, GASTROSCOPY, DUODENOSCOPY (EGD), COMBINED N/A 1/19/2017    Procedure: COMBINED ESOPHAGOSCOPY, GASTROSCOPY, DUODENOSCOPY (EGD);  Surgeon: Ciro Deras MD;  [facility]    LAPAROSCOPIC GASTRIC ADJUSTABLE BANDING  4/28/2014    Procedure: LAPAROSCOPIC GASTRIC ADJUSTABLE BANDING;  Surgeon: Ciro Deras MD;  [facility]    LAPAROSCOPIC HERNIORRHAPHY HIATAL  4/28/2014    Procedure: LAPAROSCOPIC HERNIORRHAPHY HIATAL;  Surgeon: Ciro Deras MD;  [facility]    LAPAROSCOPIC REMOVAL GASTRIC ADJUSTABLE BAND N/A 1/19/2017    Procedure: LAPAROSCOPIC REMOVAL GASTRIC ADJUSTABLE BAND;  Surgeon: Ciro Deras MD;  Location:  OR    LITHOTRIPSY      ZZC TREAT ECTOPIC PREG,RMV  TUBE/OVARY      LT       Social History     Socioeconomic History    Marital status:      Spouse name: Chip    Number of children: 2    Years of education: 16    Highest education level: Not on file   Occupational History    Occupation: RN     Employer: [employer]   Tobacco Use    Smoking status: Former     Current packs/day: 0.00     Average packs/day: 0.5 packs/day for 10.0 years (5.0 ttl pk-yrs)     Types: Cigarettes     Start date: 1987     Quit date: 1997     Years since quittin.4    Smokeless tobacco: Never   Vaping Use    Vaping status: Never Used   Substance and Sexual Activity    Alcohol use: Yes     Comment: occasional (infrequent)    Drug use: No    Sexual activity: Yes     Partners: Male     Birth control/protection: I.U.D.   Other Topics Concern    Parent/sibling w/ CABG, MI or angioplasty before 65F 55M? No     Service No    Blood Transfusions No    Caffeine Concern No    Occupational Exposure Yes     Comment: nurse    Hobby Hazards No    Sleep Concern No    Stress Concern No    Weight Concern No    Special Diet No    Back Care No    Exercise Yes    Bike Helmet Yes    Seat Belt Yes    Self-Exams Yes   Social History Narrative    Not on file     Social Determinants of Health     Financial Resource Strain: Low Risk  (2023)    Financial Resource Strain     Within the past 12 months, have you or your family members you live with been unable to get utilities (heat, electricity) when it was really needed?: No   Recent Concern: Financial Resource Strain - High Risk (11/3/2023)    Financial Resource Strain     Within the past 12 months, have you or your family members you live with been unable to get utilities (heat, electricity) when it was really needed?: Yes   Food Insecurity: Low Risk  (11/3/2023)    Food Insecurity     Within the past 12 months, did you worry that your food would run out before you got money to buy more?: No     Within the past 12  months, did the food you bought just not last and you didn t have money to get more?: No   Transportation Needs: Low Risk  (11/3/2023)    Transportation Needs     Within the past 12 months, has lack of transportation kept you from medical appointments, getting your medicines, non-medical meetings or appointments, work, or from getting things that you need?: No   Physical Activity: Not on file   Stress: Not on file   Social Connections: Not on file   Interpersonal Safety: Low Risk  (11/6/2023)    Interpersonal Safety     Do you feel physically and emotionally safe where you currently live?: Yes     Within the past 12 months, have you been hit, slapped, kicked or otherwise physically hurt by someone?: No     Within the past 12 months, have you been humiliated or emotionally abused in other ways by your partner or ex-partner?: No   Housing Stability: Low Risk  (11/3/2023)    Housing Stability     Do you have housing? : Yes     Are you worried about losing your housing?: No       Current Outpatient Medications   Medication Sig Dispense Refill    clindamycin (CLEOCIN T) 1 % external lotion APPLY TOPICALLY TO FACE TWICE DAILY FOR ACNE      clobetasol propionate (TEMOVATE) 0.05 % external cream Apply to AA BID x 1-2 weeks then PRN. Do not apply to face. 60 g 3    diphenhydrAMINE (BENADRYL) 25 MG tablet Take 25 mg by mouth nightly as needed for sleep      glucosamine 500 MG CAPS capsule Take 500 mg by mouth daily      insulin pen needle (B-D U/F) 31G X 5 MM miscellaneous USE DAILY OR AS DIRECTED 100 each 1    levonorgestrel (MIRENA) 20 MCG/DAY IUD 1 each (20 mcg) by Intrauterine route once      losartan (COZAAR) 50 MG tablet Take 1 tablet (50 mg) by mouth daily 90 tablet 3    magnesium oxide (MAG-OX) 400 MG tablet Take 1 tablet by mouth daily      nystatin (NYSTOP) 528089 UNIT/GM external powder APPLY TO THE AFFECTED AREA UNDER THE BREASTS THREE TIMES DAILY AS NEEDED. 60 g 1    omeprazole (PRILOSEC) 20 MG DR capsule Take 1  "capsule (20 mg) by mouth daily 90 capsule 3    oxyCODONE-acetaminophen (PERCOCET) 5-325 MG tablet Take 1-2 tablets by mouth every 4 hours as needed for pain 12 tablet 0    rizatriptan (MAXALT-MLT) 5 MG ODT TAKE 1-2 TABLETS BY MOUTH AT ONSET OF HEADACHE MAY REPEAT IN 2 HOURS. MAX OF 6 TABLETS PER 24 HOURS 36 tablet 3    topiramate (TOPAMAX) 50 MG tablet Take 1 tablet (50 mg) by mouth daily 90 tablet 1    valACYclovir (VALTREX) 500 MG tablet TAKE 1 TABLET BY MOUTH DAILY. INCREASE TO 4 TABLET BY MOUTH 2 TIMES DAILY AT ONSET OF OUTBREAK 100 tablet 3    clotrimazole (LOTRIMIN) 1 % external cream Use twice a day for 14 days when signs of yeast in skin folds. (Patient not taking: Reported on 3/7/2024) 113 g 1    COMPOUNDED NON-CONTROLLED SUBSTANCE (CMPD RX) - PHARMACY TO MIX COMPOUNDED MEDICATION Gabapentin 5%/amitriptyline 5%/Lidocaine 5% vanishing cream SIG apply to AA BID PRN (Patient not taking: Reported on 4/1/2024) 30 g 11    fexofenadine (ALLEGRA) 180 MG tablet Take 180 mg by mouth as needed (Patient not taking: Reported on 4/1/2024)      fluconazole (DIFLUCAN) 150 MG tablet Take 1 tablet (150 mg) by mouth every 72 hours as needed (Patient not taking: Reported on 4/1/2024) 3 tablet 3    liraglutide - Weight Management (SAXENDA) 18 MG/3ML pen Inject 3 mg Subcutaneous daily (Patient not taking: Reported on 4/25/2024) 15 mL 2    phentermine (ADIPEX-P) 15 MG capsule Take 1 capsule (15 mg) by mouth every morning (Patient not taking: Reported on 4/25/2024) 30 capsule 5       Medications and history reviewed    Physical exam:  Vitals: /72   Temp 98  F (36.7  C) (Temporal)   Ht 1.58 m (5' 2.21\")   Wt 110.2 kg (243 lb)   BMI 44.15 kg/m    BMI= Body mass index is 44.15 kg/m .    HEART: RRR, no new murmurs  LUNGS: CTAB, equal chest rise, good effort  ABD: soft, non tender, non distended  INCISIONS: Clean dry intact, mild ecchymosis.  No seroma palpated  EXT: CRUZ, no deformities    PATHOLOGY:  None    Assessment:     " ICD-10-CM    1. S/P laparoscopic hernia repair  Z98.890     Z87.19         Plan: Doing well.  Restrictions reviewed, IntraOp photos reviewed and questions answered.  Follow-up as needed.    15 minutes spent by me on the date of the encounter doing chart review, history and exam, documentation and further activities per the note    Aly Cai, DO

## 2024-04-25 NOTE — LETTER
4/25/2024         RE: Elaine Awad  [address] [facility] [address]        Dear Colleague,    Thank you for referring your patient, Elaine Awad, to the [facility]. Please see a copy of my visit note below.    General Surgery Follow Up    Pt returns for follow up visit s/p robot VIHR w mesh    HPI:  Doing well.  No issues with bowels or bladder.  Minimal discomfort as expected.  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     GERD (gastroesophageal reflux disease)      [place]'s disease      Hypertension goal BP (blood pressure) < 140/90      Iron deficiency anemia      Menorrhagia      Migraine      Morbid obesity (H)      Nonallergic rhinitis 04/17/2019     Recurrent cold sores      Vulvar dermatitis 10/14/2010     Yeast infection of the vagina, recurrent         Past Surgical History:   Procedure Laterality Date     COLPOSCOPY,BX CERVIX/ENDOCERV CURR  7/1994     DAVINCI XI HERNIORRHAPHY VENTRAL N/A 4/15/2024    Procedure: Robot-assisted laparoscopic incarcerated ventral incisional hernia repair with mesh;  Surgeon: Aly Cai DO;  [facility]     ESOPHAGOSCOPY, GASTROSCOPY, DUODENOSCOPY (EGD), COMBINED  1/2/2014    Procedure: COMBINED ESOPHAGOSCOPY, GASTROSCOPY, DUODENOSCOPY (EGD);;  Surgeon: Eden Stacy MD;  Location:  GI     ESOPHAGOSCOPY, GASTROSCOPY, DUODENOSCOPY (EGD), COMBINED N/A 1/19/2017    Procedure: COMBINED ESOPHAGOSCOPY, GASTROSCOPY, DUODENOSCOPY (EGD);  Surgeon: Ciro Deras MD;  [facility]     LAPAROSCOPIC GASTRIC ADJUSTABLE BANDING  4/28/2014    Procedure: LAPAROSCOPIC GASTRIC ADJUSTABLE BANDING;  Surgeon: Ciro Deras MD;  Location:  OR     LAPAROSCOPIC HERNIORRHAPHY HIATAL  4/28/2014    Procedure: LAPAROSCOPIC HERNIORRHAPHY HIATAL;   Surgeon: Ciro Deras MD;  [facility]     LAPAROSCOPIC REMOVAL GASTRIC ADJUSTABLE BAND N/A 2017    Procedure: LAPAROSCOPIC REMOVAL GASTRIC ADJUSTABLE BAND;  Surgeon: Ciro Deras MD;  [facility]     LITHOTRIPSY       ZZC TREAT ECTOPIC PREG,RMV TUBE/OVARY      LT       Social History     Socioeconomic History     Marital status:      Spouse name: [facility]     Number of children: 2     Years of education: 16     Highest education level: Not on file   Occupational History     Occupation: RN     Employer: [employer]   Tobacco Use     Smoking status: Former     Current packs/day: 0.00     Average packs/day: 0.5 packs/day for 10.0 years (5.0 ttl pk-yrs)     Types: Cigarettes     Start date: 1987     Quit date: 1997     Years since quittin.4     Smokeless tobacco: Never   Vaping Use     Vaping status: Never Used   Substance and Sexual Activity     Alcohol use: Yes     Comment: occasional (infrequent)     Drug use: No     Sexual activity: Yes     Partners: Male     Birth control/protection: I.U.D.   Other Topics Concern     Parent/sibling w/ CABG, MI or angioplasty before 65F 55M? No      Service No     Blood Transfusions No     Caffeine Concern No     Occupational Exposure Yes     Comment: nurse     Hobby Hazards No     Sleep Concern No     Stress Concern No     Weight Concern No     Special Diet No     Back Care No     Exercise Yes     Bike Helmet Yes     Seat Belt Yes     Self-Exams Yes   Social History Narrative     Not on file     Social Determinants of Health     Financial Resource Strain: Low Risk  (2023)    Financial Resource Strain      Within the past 12 months, have you or your family members you live with been unable to get utilities (heat, electricity) when it was really needed?: No   Recent Concern: Financial Resource Strain - High Risk (11/3/2023)    Financial Resource Strain      Within the past 12 months, have you or your family  members you live with been unable to get utilities (heat, electricity) when it was really needed?: Yes   Food Insecurity: Low Risk  (11/3/2023)    Food Insecurity      Within the past 12 months, did you worry that your food would run out before you got money to buy more?: No      Within the past 12 months, did the food you bought just not last and you didn t have money to get more?: No   Transportation Needs: Low Risk  (11/3/2023)    Transportation Needs      Within the past 12 months, has lack of transportation kept you from medical appointments, getting your medicines, non-medical meetings or appointments, work, or from getting things that you need?: No   Physical Activity: Not on file   Stress: Not on file   Social Connections: Not on file   Interpersonal Safety: Low Risk  (11/6/2023)    Interpersonal Safety      Do you feel physically and emotionally safe where you currently live?: Yes      Within the past 12 months, have you been hit, slapped, kicked or otherwise physically hurt by someone?: No      Within the past 12 months, have you been humiliated or emotionally abused in other ways by your partner or ex-partner?: No   Housing Stability: Low Risk  (11/3/2023)    Housing Stability      Do you have housing? : Yes      Are you worried about losing your housing?: No       Current Outpatient Medications   Medication Sig Dispense Refill     clindamycin (CLEOCIN T) 1 % external lotion APPLY TOPICALLY TO FACE TWICE DAILY FOR ACNE       clobetasol propionate (TEMOVATE) 0.05 % external cream Apply to AA BID x 1-2 weeks then PRN. Do not apply to face. 60 g 3     diphenhydrAMINE (BENADRYL) 25 MG tablet Take 25 mg by mouth nightly as needed for sleep       glucosamine 500 MG CAPS capsule Take 500 mg by mouth daily       insulin pen needle (B-D U/F) 31G X 5 MM miscellaneous USE DAILY OR AS DIRECTED 100 each 1     levonorgestrel (MIRENA) 20 MCG/DAY IUD 1 each (20 mcg) by Intrauterine route once       losartan (COZAAR) 50  "MG tablet Take 1 tablet (50 mg) by mouth daily 90 tablet 3     magnesium oxide (MAG-OX) 400 MG tablet Take 1 tablet by mouth daily       nystatin (NYSTOP) 758118 UNIT/GM external powder APPLY TO THE AFFECTED AREA UNDER THE BREASTS THREE TIMES DAILY AS NEEDED. 60 g 1     omeprazole (PRILOSEC) 20 MG DR capsule Take 1 capsule (20 mg) by mouth daily 90 capsule 3     oxyCODONE-acetaminophen (PERCOCET) 5-325 MG tablet Take 1-2 tablets by mouth every 4 hours as needed for pain 12 tablet 0     rizatriptan (MAXALT-MLT) 5 MG ODT TAKE 1-2 TABLETS BY MOUTH AT ONSET OF HEADACHE MAY REPEAT IN 2 HOURS. MAX OF 6 TABLETS PER 24 HOURS 36 tablet 3     topiramate (TOPAMAX) 50 MG tablet Take 1 tablet (50 mg) by mouth daily 90 tablet 1     valACYclovir (VALTREX) 500 MG tablet TAKE 1 TABLET BY MOUTH DAILY. INCREASE TO 4 TABLET BY MOUTH 2 TIMES DAILY AT ONSET OF OUTBREAK 100 tablet 3     clotrimazole (LOTRIMIN) 1 % external cream Use twice a day for 14 days when signs of yeast in skin folds. (Patient not taking: Reported on 3/7/2024) 113 g 1     COMPOUNDED NON-CONTROLLED SUBSTANCE (CMPD RX) - PHARMACY TO MIX COMPOUNDED MEDICATION Gabapentin 5%/amitriptyline 5%/Lidocaine 5% vanishing cream SIG apply to AA BID PRN (Patient not taking: Reported on 4/1/2024) 30 g 11     fexofenadine (ALLEGRA) 180 MG tablet Take 180 mg by mouth as needed (Patient not taking: Reported on 4/1/2024)       fluconazole (DIFLUCAN) 150 MG tablet Take 1 tablet (150 mg) by mouth every 72 hours as needed (Patient not taking: Reported on 4/1/2024) 3 tablet 3     liraglutide - Weight Management (SAXENDA) 18 MG/3ML pen Inject 3 mg Subcutaneous daily (Patient not taking: Reported on 4/25/2024) 15 mL 2     phentermine (ADIPEX-P) 15 MG capsule Take 1 capsule (15 mg) by mouth every morning (Patient not taking: Reported on 4/25/2024) 30 capsule 5       Medications and history reviewed    Physical exam:  Vitals: /72   Temp 98  F (36.7  C) (Temporal)   Ht 1.58 m (5' 2.21\") "   Wt 110.2 kg (243 lb)   BMI 44.15 kg/m    BMI= Body mass index is 44.15 kg/m .    HEART: RRR, no new murmurs  LUNGS: CTAB, equal chest rise, good effort  ABD: soft, non tender, non distended  INCISIONS: Clean dry intact, mild ecchymosis.  No seroma palpated  EXT: CURZ, no deformities    PATHOLOGY:  None    Assessment:     ICD-10-CM    1. S/P laparoscopic hernia repair  Z98.890     Z87.19         Plan: Doing well.  Restrictions reviewed, IntraOp photos reviewed and questions answered.  Follow-up as needed.    15 minutes spent by me on the date of the encounter doing chart review, history and exam, documentation and further activities per the note    Aly Cai DO      Again, thank you for allowing me to participate in the care of your patient.        Sincerely,        Aly Cai DO

## 2024-04-25 NOTE — LETTER
[facility]  [address]  [place] [facility] [place] [address]  Phone: [phone]    April 25, 2024        Elaine ANNMARIE Awad  [address] [place] [id number]          To whom it may concern:    RE: Elaine ANNMARIE Awad    Patient may return to work Monday April 29, 2024 with the following:  Avoid excessive repetitive movements such as bending, twisting, pushing pulling for 4 weeks following surgery.    Please contact me for questions or concerns.      Sincerely,      Aly Cai

## 2024-05-01 ENCOUNTER — OFFICE VISIT (OUTPATIENT)
Dept: OBGYN | Facility: CLINIC | Age: 54
End: 2024-05-01
Payer: COMMERCIAL

## 2024-05-01 VITALS
BODY MASS INDEX: 44.98 KG/M2 | HEART RATE: 88 BPM | SYSTOLIC BLOOD PRESSURE: 127 MMHG | WEIGHT: 247.6 LBS | OXYGEN SATURATION: 98 % | DIASTOLIC BLOOD PRESSURE: 88 MMHG

## 2024-05-01 DIAGNOSIS — Z23 NEED FOR SHINGLES VACCINE: ICD-10-CM

## 2024-05-01 DIAGNOSIS — D27.1 CYST, OVARY, DERMOID, LEFT: Primary | ICD-10-CM

## 2024-05-01 PROCEDURE — 90750 HZV VACC RECOMBINANT IM: CPT | Performed by: OBSTETRICS & GYNECOLOGY

## 2024-05-01 PROCEDURE — 90471 IMMUNIZATION ADMIN: CPT | Performed by: OBSTETRICS & GYNECOLOGY

## 2024-05-01 PROCEDURE — 99213 OFFICE O/P EST LOW 20 MIN: CPT | Mod: 25 | Performed by: OBSTETRICS & GYNECOLOGY

## 2024-05-01 NOTE — PROGRESS NOTES
"  Assessment & Plan     Cyst, ovary, dermoid, left  Reviewed ultrasound  EMS 3 mm with IUD in correct position.  Right ovary still with active follicles  Recommend ultrasound in November, if stable can go 1-2 years.   - US Transvaginal Pelvic Non-OB; Future    Need for shingles vaccine    - ZOSTER RECOMBINANT ADJUVANTED (SHINGRIX)    Review of the result(s) of each unique test - ultrasound  Ordering of each unique test        BMI  Estimated body mass index is 44.98 kg/m  as calculated from the following:    Height as of 4/25/24: 1.58 m (5' 2.21\").    Weight as of this encounter: 112.3 kg (247 lb 9.6 oz).             No follow-ups on file.    Teodora Mckeon is a 53 year old, presenting for the following health issues:  Follow Up    HPI   Presents for follow-up ovarian cyst  Has been doing well  Occasional spotting with IUD.     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    GERD (gastroesophageal reflux disease)     Palatine's disease     Hypertension goal BP (blood pressure) < 140/90     Iron deficiency anemia     Menorrhagia     Migraine     Morbid obesity (H)     Nonallergic rhinitis 04/17/2019    Recurrent cold sores     Vulvar dermatitis 10/14/2010    Yeast infection of the vagina, recurrent         Past Surgical History:   Procedure Laterality Date    COLPOSCOPY,BX CERVIX/ENDOCERV CURR  7/1994    DAVINCI XI HERNIORRHAPHY VENTRAL N/A 4/15/2024    Procedure: Robot-assisted laparoscopic incarcerated ventral incisional hernia repair with mesh;  Surgeon: Aly Cai DO;  Location:  OR    ESOPHAGOSCOPY, GASTROSCOPY, DUODENOSCOPY (EGD), COMBINED  1/2/2014    Procedure: COMBINED ESOPHAGOSCOPY, GASTROSCOPY, DUODENOSCOPY (EGD);;  Surgeon: Eden Stacy MD;  Location:  GI    ESOPHAGOSCOPY, GASTROSCOPY, DUODENOSCOPY (EGD), COMBINED N/A 1/19/2017    " Procedure: COMBINED ESOPHAGOSCOPY, GASTROSCOPY, DUODENOSCOPY (EGD);  Surgeon: Ciro Deras MD;  Location: [facility]    LAPAROSCOPIC GASTRIC ADJUSTABLE BANDING  2014    Procedure: LAPAROSCOPIC GASTRIC ADJUSTABLE BANDING;  Surgeon: Ciro Deras MD;  Location: [facility]    LAPAROSCOPIC HERNIORRHAPHY HIATAL  2014    Procedure: LAPAROSCOPIC HERNIORRHAPHY HIATAL;  Surgeon: Ciro Deras MD;  Location: [facility]    LAPAROSCOPIC REMOVAL GASTRIC ADJUSTABLE BAND N/A 2017    Procedure: LAPAROSCOPIC REMOVAL GASTRIC ADJUSTABLE BAND;  Surgeon: Ciro Deras MD;  Location: [facility] OR    LITHOTRIPSY      ZZC TREAT ECTOPIC PREG,RMV TUBE/OVARY      LT       Family History   Problem Relation Age of Onset    Diabetes Mother     C.A.D. Mother     Cancer Father 72        d. pancreatic, melanoma     Colon Polyps Father     Obesity Brother     Breast Cancer Maternal Grandmother     Heart Disease Maternal Grandfather         CHF    Heart Disease Paternal Grandmother         CHF    Colon Polyps Paternal Grandmother     Respiratory Paternal Grandfather         TB    Breast Cancer Maternal Aunt     Glaucoma No family hx of     Macular Degeneration No family hx of     Ovarian Cancer No family hx of        Social History     Socioeconomic History    Marital status:      Spouse name: [facility]    Number of children: 2    Years of education: 16    Highest education level: Not on file   Occupational History    Occupation: RN     Employer: [employer]   Tobacco Use    Smoking status: Former     Current packs/day: 0.00     Average packs/day: 0.5 packs/day for 10.0 years (5.0 ttl pk-yrs)     Types: Cigarettes     Start date: 1987     Quit date: 1997     Years since quittin.4    Smokeless tobacco: Never   Vaping Use    Vaping status: Never Used   Substance and Sexual Activity    Alcohol use: Yes     Comment: occasional (infrequent)    Drug use: No    Sexual activity: Yes     Partners: Male      Birth control/protection: I.U.D.   Other Topics Concern    Parent/sibling w/ CABG, MI or angioplasty before 65F 55M? No     Service No    Blood Transfusions No    Caffeine Concern No    Occupational Exposure Yes     Comment: nurse    Hobby Hazards No    Sleep Concern No    Stress Concern No    Weight Concern No    Special Diet No    Back Care No    Exercise Yes    Bike Helmet Yes    Seat Belt Yes    Self-Exams Yes   Social History Narrative    Not on file     Social Determinants of Health     Financial Resource Strain: Low Risk  (11/6/2023)    Financial Resource Strain     Within the past 12 months, have you or your family members you live with been unable to get utilities (heat, electricity) when it was really needed?: No   Recent Concern: Financial Resource Strain - High Risk (11/3/2023)    Financial Resource Strain     Within the past 12 months, have you or your family members you live with been unable to get utilities (heat, electricity) when it was really needed?: Yes   Food Insecurity: Low Risk  (11/3/2023)    Food Insecurity     Within the past 12 months, did you worry that your food would run out before you got money to buy more?: No     Within the past 12 months, did the food you bought just not last and you didn t have money to get more?: No   Transportation Needs: Low Risk  (11/3/2023)    Transportation Needs     Within the past 12 months, has lack of transportation kept you from medical appointments, getting your medicines, non-medical meetings or appointments, work, or from getting things that you need?: No   Physical Activity: Not on file   Stress: Not on file   Social Connections: Not on file   Interpersonal Safety: Low Risk  (11/6/2023)    Interpersonal Safety     Do you feel physically and emotionally safe where you currently live?: Yes     Within the past 12 months, have you been hit, slapped, kicked or otherwise physically hurt by someone?: No     Within the past 12 months, have you been  humiliated or emotionally abused in other ways by your partner or ex-partner?: No   Housing Stability: Low Risk  (11/3/2023)    Housing Stability     Do you have housing? : Yes     Are you worried about losing your housing?: No       Current Outpatient Medications   Medication Sig Dispense Refill    clindamycin (CLEOCIN T) 1 % external lotion APPLY TOPICALLY TO FACE TWICE DAILY FOR ACNE      clobetasol propionate (TEMOVATE) 0.05 % external cream Apply to AA BID x 1-2 weeks then PRN. Do not apply to face. 60 g 3    clotrimazole (LOTRIMIN) 1 % external cream Use twice a day for 14 days when signs of yeast in skin folds. 113 g 1    COMPOUNDED NON-CONTROLLED SUBSTANCE (CMPD RX) - PHARMACY TO MIX COMPOUNDED MEDICATION Gabapentin 5%/amitriptyline 5%/Lidocaine 5% vanishing cream SIG apply to AA BID PRN 30 g 11    diphenhydrAMINE (BENADRYL) 25 MG tablet Take 25 mg by mouth nightly as needed for sleep      fexofenadine (ALLEGRA) 180 MG tablet Take 180 mg by mouth as needed      fluconazole (DIFLUCAN) 150 MG tablet Take 1 tablet (150 mg) by mouth every 72 hours as needed 3 tablet 3    glucosamine 500 MG CAPS capsule Take 500 mg by mouth daily      insulin pen needle (B-D U/F) 31G X 5 MM miscellaneous USE DAILY OR AS DIRECTED 100 each 1    levonorgestrel (MIRENA) 20 MCG/DAY IUD 1 each (20 mcg) by Intrauterine route once      liraglutide - Weight Management (SAXENDA) 18 MG/3ML pen Inject 3 mg Subcutaneous daily 15 mL 2    losartan (COZAAR) 50 MG tablet Take 1 tablet (50 mg) by mouth daily 90 tablet 3    magnesium oxide (MAG-OX) 400 MG tablet Take 1 tablet by mouth daily      nystatin (NYSTOP) 985221 UNIT/GM external powder APPLY TO THE AFFECTED AREA UNDER THE BREASTS THREE TIMES DAILY AS NEEDED. 60 g 1    omeprazole (PRILOSEC) 20 MG DR capsule Take 1 capsule (20 mg) by mouth daily 90 capsule 3    phentermine (ADIPEX-P) 15 MG capsule Take 1 capsule (15 mg) by mouth every morning 30 capsule 5    rizatriptan (MAXALT-MLT) 5 MG ODT  TAKE 1-2 TABLETS BY MOUTH AT ONSET OF HEADACHE MAY REPEAT IN 2 HOURS. MAX OF 6 TABLETS PER 24 HOURS 36 tablet 3    topiramate (TOPAMAX) 50 MG tablet Take 1 tablet (50 mg) by mouth daily 90 tablet 1    valACYclovir (VALTREX) 500 MG tablet TAKE 1 TABLET BY MOUTH DAILY. INCREASE TO 4 TABLET BY MOUTH 2 TIMES DAILY AT ONSET OF OUTBREAK 100 tablet 3    oxyCODONE-acetaminophen (PERCOCET) 5-325 MG tablet Take 1-2 tablets by mouth every 4 hours as needed for pain 12 tablet 0     No current facility-administered medications for this visit.          Allergies   Allergen Reactions    Doxycycline Rash     Full body rash    Ampicillin Swelling     Tongue swelling at end of treatment course           Review of Systems  Constitutional, HEENT, cardiovascular, pulmonary, gi and gu systems are negative, except as otherwise noted.      Objective    /88 (BP Location: Left arm, Patient Position: Sitting, Cuff Size: Adult Large)   Pulse 88   Wt 112.3 kg (247 lb 9.6 oz)   SpO2 98%   BMI 44.98 kg/m    Body mass index is 44.98 kg/m .  Physical Exam   GENERAL: alert and no distress  MS: no gross musculoskeletal defects noted, no edema  PSYCH: mentation appears normal, affect normal/bright    EXAMINATION: US PELVIC TRANSABDOMINAL AND TRANSVAGINAL 11/14/2023 9:06  AM       CLINICAL HISTORY: Left salpingectomy due to ectopic pregnancy.  Left ovarian cyst     COMPARISON: Pelvic ultrasound 3/31/2023, CT 1/16/2023     PROCEDURE COMMENT: Both transabdominal and transvaginal imaging  performed of the pelvis with color Doppler.     FINDINGS:  The uterus measures  4.3 cm x 9.1 cm x 4.6 cm. No focal myometrial  mass. Nabothian cysts.     The endometrial stripe measures 3 mm. There is an intrauterine device  within the endometrial canal.     The right ovary measures 1.9 cm x 3.3 cm x 2.9 cm. The left ovary  measures 2.3 cm x 3.4 cm x 2.7 cm. In the right ovary, there are  dominant follicles, the largest measures 1.8 cm. The known left  ovarian  teratoma measures 2.5 x 2.3 x 1.6 cm.      There is no simple free fluid in the pelvis.  No adnexal mass.                                                                      IMPRESSION:  1. The intrauterine device is in expected position.  2. Benign right ovarian follicles.  3. Left ovarian teratoma, 2.5 cm.     EMILIANA JULIAN MD         Signed Electronically by: Maral Mathur MD

## 2024-05-08 ENCOUNTER — OFFICE VISIT (OUTPATIENT)
Dept: OPTOMETRY | Facility: CLINIC | Age: 54
End: 2024-05-08
Payer: COMMERCIAL

## 2024-05-08 DIAGNOSIS — H35.412 LATTICE DEGENERATION OF LEFT RETINA: ICD-10-CM

## 2024-05-08 DIAGNOSIS — H52.13 MYOPIA OF BOTH EYES: ICD-10-CM

## 2024-05-08 DIAGNOSIS — H52.4 PRESBYOPIA: ICD-10-CM

## 2024-05-08 DIAGNOSIS — H52.223 REGULAR ASTIGMATISM OF BOTH EYES: ICD-10-CM

## 2024-05-08 DIAGNOSIS — H33.322 RETINAL HOLE OF LEFT EYE: ICD-10-CM

## 2024-05-08 DIAGNOSIS — H10.13 ALLERGIC CONJUNCTIVITIS OF BOTH EYES: ICD-10-CM

## 2024-05-08 DIAGNOSIS — Z01.00 EXAMINATION OF EYES AND VISION: Primary | ICD-10-CM

## 2024-05-08 PROCEDURE — 92004 COMPRE OPH EXAM NEW PT 1/>: CPT | Performed by: OPTOMETRIST

## 2024-05-08 PROCEDURE — 92015 DETERMINE REFRACTIVE STATE: CPT | Performed by: OPTOMETRIST

## 2024-05-08 RX ORDER — OLOPATADINE HYDROCHLORIDE 2 MG/ML
1 SOLUTION/ DROPS OPHTHALMIC DAILY
Qty: 2.5 ML | Refills: 11 | Status: SHIPPED | OUTPATIENT
Start: 2024-05-08 | End: 2025-05-08

## 2024-05-08 ASSESSMENT — CONF VISUAL FIELD
OS_SUPERIOR_TEMPORAL_RESTRICTION: 0
OD_INFERIOR_NASAL_RESTRICTION: 0
OD_SUPERIOR_NASAL_RESTRICTION: 0
OD_SUPERIOR_TEMPORAL_RESTRICTION: 0
OD_INFERIOR_TEMPORAL_RESTRICTION: 0
OS_NORMAL: 1
OS_INFERIOR_NASAL_RESTRICTION: 0
OS_INFERIOR_TEMPORAL_RESTRICTION: 0
OS_SUPERIOR_NASAL_RESTRICTION: 0
OD_NORMAL: 1

## 2024-05-08 ASSESSMENT — SLIT LAMP EXAM - LIDS
COMMENTS: NORMAL
COMMENTS: NORMAL

## 2024-05-08 ASSESSMENT — REFRACTION_WEARINGRX
SPECS_TYPE: DOES NOT WEAR
OD_AXIS: 095
OS_CYLINDER: +0.25
OS_ADD: +1.75
OD_SPHERE: -0.75
OS_SPHERE: -0.75
OS_AXIS: 045
OD_ADD: +1.75
OD_CYLINDER: +0.25

## 2024-05-08 ASSESSMENT — REFRACTION_MANIFEST
OD_ADD: +2.00
OS_ADD: +2.00
OD_SPHERE: -0.75
OS_CYLINDER: +0.25
OS_AXIS: 045
OD_AXIS: 095
OS_SPHERE: -1.00
OD_CYLINDER: +0.25

## 2024-05-08 ASSESSMENT — CUP TO DISC RATIO
OD_RATIO: 0.4
OS_RATIO: 0.4

## 2024-05-08 ASSESSMENT — KERATOMETRY
OD_K2POWER_DIOPTERS: 44.75
OS_K1POWER_DIOPTERS: 43.75
OS_AXISANGLE_DEGREES: 075
OS_AXISANGLE2_DEGREES: 165
OD_K1POWER_DIOPTERS: 43.50
OD_AXISANGLE2_DEGREES: 002
OS_K2POWER_DIOPTERS: 45.00
OD_AXISANGLE_DEGREES: 092

## 2024-05-08 ASSESSMENT — VISUAL ACUITY
OS_SC+: -1
OS_SC: 20/40
OS_SC: 20/20
OD_SC: 20/20
METHOD: SNELLEN - LINEAR
OD_SC+: -2
OD_SC: 20/20

## 2024-05-08 ASSESSMENT — TONOMETRY
OS_IOP_MMHG: 16
OD_IOP_MMHG: 16
IOP_METHOD: APPLANATION

## 2024-05-08 ASSESSMENT — EXTERNAL EXAM - RIGHT EYE: OD_EXAM: NORMAL

## 2024-05-08 ASSESSMENT — EXTERNAL EXAM - LEFT EYE: OS_EXAM: NORMAL

## 2024-05-08 NOTE — PROGRESS NOTES
Chief Complaint   Patient presents with    Annual Eye Exam       Last Eye Exam: 11-  Dilated Previously: Yes    What are you currently using to see?  does not use glasses or contacts,has them but does not wear them       Distance Vision Acuity: Noticed gradual change in both eyes    Near Vision Acuity: Satisfied with vision while reading  unaided    Eye Comfort: itchy with allergies for 3 days   Do you use eye drops? : Yes: visine  Occupation or Hobbies: public health nurse     Stable lattice with couple atrophic holes left eye. Saw Dr. ADRIEL Berumen in 2020     Kacey Uriarte Optometric Assistant, A.B.OJelenaC.      Medical, surgical and family histories reviewed and updated 5/8/2024.       OBJECTIVE: See Ophthalmology exam    ASSESSMENT:    ICD-10-CM    1. Examination of eyes and vision  Z01.00 EYE EXAM (SIMPLE-NONBILLABLE)      2. Myopia of both eyes  H52.13 REFRACTION      3. Regular astigmatism of both eyes  H52.223 REFRACTION      4. Presbyopia  H52.4 REFRACTION      5. Lattice degeneration of left retina  H35.412 EYE EXAM (SIMPLE-NONBILLABLE)      6. Retinal hole of left eye  H33.322 EYE EXAM (SIMPLE-NONBILLABLE)      7. Allergic conjunctivitis of both eyes  H10.13 EYE EXAM (SIMPLE-NONBILLABLE)     olopatadine (PATADAY) 0.2 % ophthalmic solution          PLAN:     Patient Instructions   Eyeglass prescription given.    The signs of a retinal detachment are flashes of light or a curtain covering the vision.  If you should notice any of these changes let me know right away.  If I am not available you should be seen immediately for an eye evaluation. f I am not available this is an emergency type situation that you would need to be seen. I recommend the [facility] Emergency Room or call [phone].       Pataday to be used once daily for itchy eyes.  Use as needed. Contact your pharmacy for refills.   Patanol, Zaditor, or Optivar- ok substitute- 1 drop both eyes 2 x day as needed.     Return in 1  year for a complete eye exam or sooner if needed.    Eliezer Nava, OD

## 2024-05-08 NOTE — LETTER
5/8/2024         RE: Elaine Awad  [address] [address] [address]        Dear Colleague,    Thank you for referring your patient, Elaine Awad, to the [facility]. Please see a copy of my visit note below.    Chief Complaint   Patient presents with     Annual Eye Exam       Last Eye Exam: 11-  Dilated Previously: Yes    What are you currently using to see?  does not use glasses or contacts,has them but does not wear them       Distance Vision Acuity: Noticed gradual change in both eyes    Near Vision Acuity: Satisfied with vision while reading  unaided    Eye Comfort: itchy with allergies for 3 days   Do you use eye drops? : Yes: visine  Occupation or Hobbies: public health nurse     Stable lattice with couple atrophic holes left eye. Saw Dr. ADRIEL Berumen in 2020     Kacey Uriarte Optometric Assistant, XIOMARA.      Medical, surgical and family histories reviewed and updated 5/8/2024.       OBJECTIVE: See Ophthalmology exam    ASSESSMENT:    ICD-10-CM    1. Examination of eyes and vision  Z01.00 EYE EXAM (SIMPLE-NONBILLABLE)      2. Myopia of both eyes  H52.13 REFRACTION      3. Regular astigmatism of both eyes  H52.223 REFRACTION      4. Presbyopia  H52.4 REFRACTION      5. Lattice degeneration of left retina  H35.412 EYE EXAM (SIMPLE-NONBILLABLE)      6. Retinal hole of left eye  H33.322 EYE EXAM (SIMPLE-NONBILLABLE)      7. Allergic conjunctivitis of both eyes  H10.13 EYE EXAM (SIMPLE-NONBILLABLE)     olopatadine (PATADAY) 0.2 % ophthalmic solution          PLAN:     Patient Instructions   Eyeglass prescription given.    The signs of a retinal detachment are flashes of light or a curtain covering the vision.  If you should notice any of these changes let me know right away.  If I am not available you should be seen immediately for an eye evaluation. f I am not available this is an emergency type situation that you would need to be seen. I recommend the [facility]  [place] [facility] Room or call [phone].       Pataday to be used once daily for itchy eyes.  Use as needed. Contact your pharmacy for refills.   Patanol, Zaditor, or Optivar- ok substitute- 1 drop both eyes 2 x day as needed.     Return in 1 year for a complete eye exam or sooner if needed.    Eliezer Nava, OD           Again, thank you for allowing me to participate in the care of your patient.        Sincerely,        Eliezer Nava, OD

## 2024-05-08 NOTE — PATIENT INSTRUCTIONS
Eyeglass prescription given.    The signs of a retinal detachment are flashes of light or a curtain covering the vision.  If you should notice any of these changes let me know right away.  If I am not available you should be seen immediately for an eye evaluation. f I am not available this is an emergency type situation that you would need to be seen. I recommend the [facility] Emergency Room or call [phone].       Pataday to be used once daily for itchy eyes.  Use as needed. Contact your pharmacy for refills.   Patanol, Zaditor, or Optivar- ok substitute- 1 drop both eyes 2 x day as needed.     Visine or Clear Eyes can irritate the eyes and cause a rebound effect where the eyes become more red and you end up using more drops.  The only way to stop this is to discontinue the drops.  It is normal that your eyes may become more red the first few weeks after discontinuing the drops.  OTC Lumify as needed to take the redness out.     Return in 1 year for a complete eye exam or sooner if needed.    Eliezer Nava, OD    The affects of the dilating drops last for 4- 6 hours.  You will be more sensitive to light and vision will be blurry up close.  Do not drive if you do not feel comfortable.  Mydriatic sunglasses were given if needed.      Optometry Providers       Clinic Locations                                 Telephone Number   Dr. Aida [place]   [place]  [place]  [place] [phone]     [place] [facility] Hours:                [place] [facility] Hours:       [facility] Hours:   [address]   [address] [address]     [address]  [place] [address]   [place] [address]    [place] [address]  Phone: [phone]                    Phone: [phone]     Phone: [phone]                      Monday 8:00-6:00                           Monday 8:00-6:00                          Monday 8:00-6:00              Tuesday 8:00-6:00                          Tuesday 8:00-6:00                          Tuesday 8:00-6:00              Wednesday 8:00-6:00                  Wednesday 8:00-6:00                   Wednesday 8:00-6:00      Thursday 8:00-6:00                        Thursday 8:00-6:00                         Thursday 8:00-6:00            Friday 8:00-5:00                              Friday 8:00-5:00                              Friday 8:00-5:00    [facility] Hours:   [address] [place] [address]  [phone]    Monday 9:00-6:00  Tuesday 9:00-6:00  Wednesday 9:00-6:00  Thursday 9:00-6:00  Friday 9:00-5:00  As always, Thank you for trusting us with your health care needs!

## 2024-05-23 ENCOUNTER — VIRTUAL VISIT (OUTPATIENT)
Dept: ENDOCRINOLOGY | Facility: CLINIC | Age: 54
End: 2024-05-23
Payer: COMMERCIAL

## 2024-05-23 ENCOUNTER — TELEPHONE (OUTPATIENT)
Dept: SURGERY | Facility: CLINIC | Age: 54
End: 2024-05-23

## 2024-05-23 VITALS — BODY MASS INDEX: 42.34 KG/M2 | WEIGHT: 248 LBS | HEIGHT: 64 IN

## 2024-05-23 DIAGNOSIS — E66.813 CLASS 3 SEVERE OBESITY WITH SERIOUS COMORBIDITY AND BODY MASS INDEX (BMI) OF 40.0 TO 44.9 IN ADULT, UNSPECIFIED OBESITY TYPE (H): ICD-10-CM

## 2024-05-23 DIAGNOSIS — E66.01 CLASS 3 SEVERE OBESITY WITH SERIOUS COMORBIDITY AND BODY MASS INDEX (BMI) OF 40.0 TO 44.9 IN ADULT, UNSPECIFIED OBESITY TYPE (H): ICD-10-CM

## 2024-05-23 PROCEDURE — G2211 COMPLEX E/M VISIT ADD ON: HCPCS | Mod: 95 | Performed by: PHYSICIAN ASSISTANT

## 2024-05-23 PROCEDURE — 99212 OFFICE O/P EST SF 10 MIN: CPT | Mod: 95 | Performed by: PHYSICIAN ASSISTANT

## 2024-05-23 RX ORDER — SEMAGLUTIDE 0.5 MG/.5ML
0.5 INJECTION, SOLUTION SUBCUTANEOUS WEEKLY
Qty: 2 ML | Refills: 0 | Status: SHIPPED | OUTPATIENT
Start: 2024-06-20 | End: 2024-07-15

## 2024-05-23 RX ORDER — PHENTERMINE HYDROCHLORIDE 15 MG/1
15 CAPSULE ORAL EVERY MORNING
Qty: 30 CAPSULE | Refills: 5 | Status: SHIPPED | OUTPATIENT
Start: 2024-05-23

## 2024-05-23 RX ORDER — SEMAGLUTIDE 0.25 MG/.5ML
0.25 INJECTION, SOLUTION SUBCUTANEOUS WEEKLY
Qty: 2 ML | Refills: 0 | Status: SHIPPED | OUTPATIENT
Start: 2024-05-23 | End: 2024-07-15

## 2024-05-23 ASSESSMENT — PAIN SCALES - GENERAL: PAINLEVEL: NO PAIN (0)

## 2024-05-23 NOTE — TELEPHONE ENCOUNTER
PA Initiation    Medication: WEGOVY 0.25 MG/0.5ML SC SOAJ  Insurance Company: Fel[employer] ([facility]) - Phone [phone] Fax [phone]  Pharmacy Filling the Rx:    Filling Pharmacy Phone:    Filling Pharmacy Fax:    Start Date: 5/23/2024    Key:[id number]

## 2024-05-23 NOTE — LETTER
2024       RE: Elaine Awad  [address] [address] [address]     Dear Colleague,    Thank you for referring your patient, Elaine Awad, to the [facility] [place] [facility]. Please see a copy of my visit note below.      Return Medical Weight Management Note     Elaine Awad  MRN:  [id number]  :  1970  [place]:  2024    Dear Fernanda Rodriguez MD,    I had the pleasure of seeing your patient Elaine Awad. She is a 53 year old female who I am continuing to see for treatment of obesity related to:        2019    10:46 PM   --   I have the following health issues associated with obesity GERD (Reflux)    Weight Bearing Joint Pain   I have the following symptoms associated with obesity Knee Pain    GERD (Reflux)       Assessment & Plan  Problem List Items Addressed This Visit          Endocrine Diagnoses    Class 3 severe obesity with serious comorbidity and body mass index (BMI) of 40.0 to 44.9 in adult, unspecified obesity type (H)    Relevant Medications    phentermine 15 MG capsule    Semaglutide-Weight Management (WEGOVY) 0.25 MG/0.5ML pen    Semaglutide-Weight Management (WEGOVY) 0.5 MG/0.5ML pen (Start on 2024)        Hx of lap band removal. Possible interest in sleeve but not ready yet.   Lap hernia repair last month and went well.   Weight up 5 lbs since surgery.  Was told to stop phentermine and saxenda preop  Still taking topiramate but wants to taper off due to not feeling it is effective     Taper off topiramate, take 50mg every other day and then ok to stop  Restart phentermine 15mg daily, last /72 at office visit last month  Wants to restart GLP1, was on saxenda prior but saxenda no longer available.   Will start Wegovy 0.25mg x 4 weeks then 0.5mg     Follow up   MTM 6 weeks  Layla 4-5 months return       INTERVAL HISTORY:    Anti-obesity medication history    Current:    none    CURRENT WEIGHT:   248 lbs 0 oz    Initial Weight (lbs): 239.4 lbs  Last Visits Weight: 112.3 kg (247 lb 9.6 oz)  Cumulative weight loss (lbs): -8.6  Weight Loss Percentage: -3.59%        5/20/2024     9:28 AM   Changes and Difficulties   I have made the following changes to my diet since my last visit: .   With regards to my diet, I am still struggling with: .   I have made the following changes to my activity/exercise since my last visit: .   With regards to my activity/exercise, I am still struggling with: ,         MEDICATIONS:   Current Outpatient Medications   Medication Sig Dispense Refill    clindamycin (CLEOCIN T) 1 % external lotion APPLY TOPICALLY TO FACE TWICE DAILY FOR ACNE      clobetasol propionate (TEMOVATE) 0.05 % external cream Apply to AA BID x 1-2 weeks then PRN. Do not apply to face. 60 g 3    clotrimazole (LOTRIMIN) 1 % external cream Use twice a day for 14 days when signs of yeast in skin folds. 113 g 1    diphenhydrAMINE (BENADRYL) 25 MG tablet Take 25 mg by mouth nightly as needed for sleep      fexofenadine (ALLEGRA) 180 MG tablet Take 180 mg by mouth as needed      glucosamine 500 MG CAPS capsule Take 500 mg by mouth daily      levonorgestrel (MIRENA) 20 MCG/DAY IUD 1 each (20 mcg) by Intrauterine route once      losartan (COZAAR) 50 MG tablet Take 1 tablet (50 mg) by mouth daily 90 tablet 3    magnesium oxide (MAG-OX) 400 MG tablet Take 1 tablet by mouth daily      nystatin (NYSTOP) 377830 UNIT/GM external powder APPLY TO THE AFFECTED AREA UNDER THE BREASTS THREE TIMES DAILY AS NEEDED. 60 g 1    olopatadine (PATADAY) 0.2 % ophthalmic solution Place 0.05 mLs (1 drop) into both eyes daily 2.5 mL 11    omeprazole (PRILOSEC) 20 MG DR capsule Take 1 capsule (20 mg) by mouth daily 90 capsule 3    phentermine 15 MG capsule Take 1 capsule (15 mg) by mouth every morning 30 capsule 5    rizatriptan (MAXALT-MLT) 5 MG ODT TAKE 1-2 TABLETS BY MOUTH AT ONSET OF HEADACHE MAY REPEAT IN 2 HOURS.  "MAX OF 6 TABLETS PER 24 HOURS 36 tablet 3    Semaglutide-Weight Management (WEGOVY) 0.25 MG/0.5ML pen Inject 0.25 mg Subcutaneous once a week 2 mL 0    [START ON 6/20/2024] Semaglutide-Weight Management (WEGOVY) 0.5 MG/0.5ML pen Inject 0.5 mg Subcutaneous once a week 2 mL 0    COMPOUNDED NON-CONTROLLED SUBSTANCE (CMPD RX) - PHARMACY TO MIX COMPOUNDED MEDICATION Gabapentin 5%/amitriptyline 5%/Lidocaine 5% vanishing cream SIG apply to AA BID PRN 30 g 11    fluconazole (DIFLUCAN) 150 MG tablet Take 1 tablet (150 mg) by mouth every 72 hours as needed 3 tablet 3    insulin pen needle (B-D U/F) 31G X 5 MM miscellaneous USE DAILY OR AS DIRECTED 100 each 1    liraglutide - Weight Management (SAXENDA) 18 MG/3ML pen Inject 3 mg Subcutaneous daily 15 mL 2    valACYclovir (VALTREX) 500 MG tablet TAKE 1 TABLET BY MOUTH DAILY. INCREASE TO 4 TABLET BY MOUTH 2 TIMES DAILY AT ONSET OF OUTBREAK 100 tablet 3           5/20/2024     9:28 AM   Weight Loss Medication History Reviewed With Patient   Which weight loss medications are you currently taking on a regular basis? Topamax (topiramate)   If you are not taking a weight loss medication that was prescribed to you, please indicate why: Other       Orders Only on 03/08/2024   Component Date Value Ref Range Status    DNA (ds) Antibody 03/07/2024 <0.6  <10.0 IU/mL Final    Negative           PHYSICAL EXAM:  Objective   Ht 1.626 m (5' 4\")   Wt 112.5 kg (248 lb)   BMI 42.57 kg/m      Vitals - Patient Reported  Pain Score: No Pain (0)        GENERAL: alert and no distress  EYES: Eyes grossly normal to inspection.  No discharge or erythema, or obvious scleral/conjunctival abnormalities.  RESP: No audible wheeze, cough, or visible cyanosis.    SKIN: Visible skin clear. No significant rash, abnormal pigmentation or lesions.  NEURO: Cranial nerves grossly intact.  Mentation and speech appropriate for age.  PSYCH: Appropriate affect, tone, and pace of words        Sincerely,    Layla Call " ROLANDO Cyr      10 minutes spent by me on the date of the encounter doing chart review, history and exam, documentation and further activities per the note    Virtual Visit Details    Type of service:  Video Visit     Originating Location (pt. Location): Home    Distant Location (provider location):  Off-site  Platform used for Video Visit: [employer]    The longitudinal plan of care for the diagnosis(es)/condition(s) as documented were addressed during this visit. Due to the added complexity in care, I will continue to support Linda in the subsequent management and with ongoing continuity of care.

## 2024-05-23 NOTE — PROGRESS NOTES
Return Medical Weight Management Note     Elaine Awad  MRN:  [id number]  :  1970  [place]:  2024    Dear Fernanda Rodriguez MD,    I had the pleasure of seeing your patient Elaine Awad. She is a 53 year old female who I am continuing to see for treatment of obesity related to:        2019    10:46 PM   --   I have the following health issues associated with obesity GERD (Reflux)    Weight Bearing Joint Pain   I have the following symptoms associated with obesity Knee Pain    GERD (Reflux)       Assessment & Plan   Problem List Items Addressed This Visit          Endocrine Diagnoses    Class 3 severe obesity with serious comorbidity and body mass index (BMI) of 40.0 to 44.9 in adult, unspecified obesity type (H)    Relevant Medications    phentermine 15 MG capsule    Semaglutide-Weight Management (WEGOVY) 0.25 MG/0.5ML pen    Semaglutide-Weight Management (WEGOVY) 0.5 MG/0.5ML pen (Start on 2024)        Hx of lap band removal. Possible interest in sleeve but not ready yet.   Lap hernia repair last month and went well.   Weight up 5 lbs since surgery.  Was told to stop phentermine and saxenda preop  Still taking topiramate but wants to taper off due to not feeling it is effective     Taper off topiramate, take 50mg every other day and then ok to stop  Restart phentermine 15mg daily, last /72 at office visit last month  Wants to restart GLP1, was on saxenda prior but saxenda no longer available.   Will start Wegovy 0.25mg x 4 weeks then 0.5mg     Follow up   MTM 6 weeks  Layla 4-5 months return       INTERVAL HISTORY:    Anti-obesity medication history    Current:   none    CURRENT WEIGHT:   248 lbs 0 oz    Initial Weight (lbs): 239.4 lbs  Last Visits Weight: 112.3 kg (247 lb 9.6 oz)  Cumulative weight loss (lbs): -8.6  Weight Loss Percentage: -3.59%        2024     9:28 AM   Changes and Difficulties   I have made the following changes to my diet since my last visit: .   With  regards to my diet, I am still struggling with: .   I have made the following changes to my activity/exercise since my last visit: .   With regards to my activity/exercise, I am still struggling with: ,         MEDICATIONS:   Current Outpatient Medications   Medication Sig Dispense Refill    clindamycin (CLEOCIN T) 1 % external lotion APPLY TOPICALLY TO FACE TWICE DAILY FOR ACNE      clobetasol propionate (TEMOVATE) 0.05 % external cream Apply to AA BID x 1-2 weeks then PRN. Do not apply to face. 60 g 3    clotrimazole (LOTRIMIN) 1 % external cream Use twice a day for 14 days when signs of yeast in skin folds. 113 g 1    diphenhydrAMINE (BENADRYL) 25 MG tablet Take 25 mg by mouth nightly as needed for sleep      fexofenadine (ALLEGRA) 180 MG tablet Take 180 mg by mouth as needed      glucosamine 500 MG CAPS capsule Take 500 mg by mouth daily      levonorgestrel (MIRENA) 20 MCG/DAY IUD 1 each (20 mcg) by Intrauterine route once      losartan (COZAAR) 50 MG tablet Take 1 tablet (50 mg) by mouth daily 90 tablet 3    magnesium oxide (MAG-OX) 400 MG tablet Take 1 tablet by mouth daily      nystatin (NYSTOP) 328381 UNIT/GM external powder APPLY TO THE AFFECTED AREA UNDER THE BREASTS THREE TIMES DAILY AS NEEDED. 60 g 1    olopatadine (PATADAY) 0.2 % ophthalmic solution Place 0.05 mLs (1 drop) into both eyes daily 2.5 mL 11    omeprazole (PRILOSEC) 20 MG DR capsule Take 1 capsule (20 mg) by mouth daily 90 capsule 3    phentermine 15 MG capsule Take 1 capsule (15 mg) by mouth every morning 30 capsule 5    rizatriptan (MAXALT-MLT) 5 MG ODT TAKE 1-2 TABLETS BY MOUTH AT ONSET OF HEADACHE MAY REPEAT IN 2 HOURS. MAX OF 6 TABLETS PER 24 HOURS 36 tablet 3    Semaglutide-Weight Management (WEGOVY) 0.25 MG/0.5ML pen Inject 0.25 mg Subcutaneous once a week 2 mL 0    [START ON 6/20/2024] Semaglutide-Weight Management (WEGOVY) 0.5 MG/0.5ML pen Inject 0.5 mg Subcutaneous once a week 2 mL 0    COMPOUNDED NON-CONTROLLED SUBSTANCE (CMPD RX)  "- PHARMACY TO MIX COMPOUNDED MEDICATION Gabapentin 5%/amitriptyline 5%/Lidocaine 5% vanishing cream SIG apply to AA BID PRN 30 g 11    fluconazole (DIFLUCAN) 150 MG tablet Take 1 tablet (150 mg) by mouth every 72 hours as needed 3 tablet 3    insulin pen needle (B-D U/F) 31G X 5 MM miscellaneous USE DAILY OR AS DIRECTED 100 each 1    liraglutide - Weight Management (SAXENDA) 18 MG/3ML pen Inject 3 mg Subcutaneous daily 15 mL 2    valACYclovir (VALTREX) 500 MG tablet TAKE 1 TABLET BY MOUTH DAILY. INCREASE TO 4 TABLET BY MOUTH 2 TIMES DAILY AT ONSET OF OUTBREAK 100 tablet 3           5/20/2024     9:28 AM   Weight Loss Medication History Reviewed With Patient   Which weight loss medications are you currently taking on a regular basis? Topamax (topiramate)   If you are not taking a weight loss medication that was prescribed to you, please indicate why: Other       Orders Only on 03/08/2024   Component Date Value Ref Range Status    DNA (ds) Antibody 03/07/2024 <0.6  <10.0 IU/mL Final    Negative           PHYSICAL EXAM:  Objective    Ht 1.626 m (5' 4\")   Wt 112.5 kg (248 lb)   BMI 42.57 kg/m      Vitals - Patient Reported  Pain Score: No Pain (0)        GENERAL: alert and no distress  EYES: Eyes grossly normal to inspection.  No discharge or erythema, or obvious scleral/conjunctival abnormalities.  RESP: No audible wheeze, cough, or visible cyanosis.    SKIN: Visible skin clear. No significant rash, abnormal pigmentation or lesions.  NEURO: Cranial nerves grossly intact.  Mentation and speech appropriate for age.  PSYCH: Appropriate affect, tone, and pace of words        Sincerely,    Layla Cyr PA-C      10 minutes spent by me on the date of the encounter doing chart review, history and exam, documentation and further activities per the note    Virtual Visit Details    Type of service:  Video Visit     Originating Location (pt. Location): Home    Distant Location (provider location):  Off-site  Platform " used for Video Visit: Sourav    The longitudinal plan of care for the diagnosis(es)/condition(s) as documented were addressed during this visit. Due to the added complexity in care, I will continue to support Linda in the subsequent management and with ongoing continuity of care.

## 2024-05-23 NOTE — NURSING NOTE
Is the patient currently in the state of [place]? YES    Visit mode:VIDEO    If the visit is dropped, the patient can be reconnected by: VIDEO VISIT: Text to cell phone:   Telephone Information:   Mobile [phone]       Will anyone else be joining the visit? NO  (If patient encounters technical issues they should call [phone]    How would you like to obtain your AVS? MyChart    Are changes needed to the allergy or medication list? No    Are refills needed on medications prescribed by this physician? NO    Reason for visit: RECHECK    Van ORTIZ

## 2024-05-29 ENCOUNTER — TELEPHONE (OUTPATIENT)
Dept: ENDOCRINOLOGY | Facility: CLINIC | Age: 54
End: 2024-05-29
Payer: COMMERCIAL

## 2024-05-29 NOTE — TELEPHONE ENCOUNTER
Patient Contacted regarding  the following:    Appointment type: NEW MTM   Provider: Next Avail Pharmacist  Return date: 6 weeks   Specialty phone number: [phone]  Additional appointment(s) needed: n/a  Additonal Notes: Spoke with pt in regards to scheduling, pt declined at this time, gave pt MTM Call Center number, pt will call back to schedule on their own

## 2024-05-30 DIAGNOSIS — L30.4 INTERTRIGO: ICD-10-CM

## 2024-05-30 RX ORDER — NYSTATIN 100000 [USP'U]/G
POWDER TOPICAL
Qty: 60 G | Refills: 5 | Status: SHIPPED | OUTPATIENT
Start: 2024-05-30

## 2024-06-23 DIAGNOSIS — E66.01 CLASS 3 SEVERE OBESITY WITH SERIOUS COMORBIDITY AND BODY MASS INDEX (BMI) OF 40.0 TO 44.9 IN ADULT, UNSPECIFIED OBESITY TYPE (H): ICD-10-CM

## 2024-06-23 DIAGNOSIS — E66.813 CLASS 3 SEVERE OBESITY WITH SERIOUS COMORBIDITY AND BODY MASS INDEX (BMI) OF 40.0 TO 44.9 IN ADULT, UNSPECIFIED OBESITY TYPE (H): ICD-10-CM

## 2024-07-03 RX ORDER — FLURBIPROFEN SODIUM 0.3 MG/ML
SOLUTION/ DROPS OPHTHALMIC
Qty: 100 EACH | Refills: 3 | Status: SHIPPED | OUTPATIENT
Start: 2024-07-03 | End: 2024-09-11

## 2024-07-03 NOTE — TELEPHONE ENCOUNTER
Last Clinic Visit:   5/23/2024  [facility] [place]

## 2024-07-13 ENCOUNTER — HOSPITAL ENCOUNTER (EMERGENCY)
Facility: CLINIC | Age: 54
Discharge: HOME | End: 2024-07-13
Attending: EMERGENCY MEDICINE | Admitting: EMERGENCY MEDICINE
Payer: COMMERCIAL

## 2024-07-13 ENCOUNTER — OFFICE VISIT (OUTPATIENT)
Dept: URGENT CARE | Facility: URGENT CARE | Age: 54
End: 2024-07-13
Payer: COMMERCIAL

## 2024-07-13 ENCOUNTER — APPOINTMENT (OUTPATIENT)
Dept: GENERAL RADIOLOGY | Facility: CLINIC | Age: 54
End: 2024-07-13
Attending: EMERGENCY MEDICINE
Payer: COMMERCIAL

## 2024-07-13 VITALS
HEART RATE: 125 BPM | DIASTOLIC BLOOD PRESSURE: 84 MMHG | TEMPERATURE: 98.2 F | HEIGHT: 64 IN | RESPIRATION RATE: 16 BRPM | SYSTOLIC BLOOD PRESSURE: 119 MMHG | OXYGEN SATURATION: 100 % | BODY MASS INDEX: 42.4 KG/M2

## 2024-07-13 VITALS
BODY MASS INDEX: 42.4 KG/M2 | DIASTOLIC BLOOD PRESSURE: 83 MMHG | OXYGEN SATURATION: 97 % | SYSTOLIC BLOOD PRESSURE: 128 MMHG | TEMPERATURE: 98.8 F | HEART RATE: 106 BPM | RESPIRATION RATE: 15 BRPM | WEIGHT: 247 LBS

## 2024-07-13 DIAGNOSIS — H53.8 BLURRED VISION: Primary | ICD-10-CM

## 2024-07-13 DIAGNOSIS — H57.12 PAIN OF LEFT EYE: ICD-10-CM

## 2024-07-13 DIAGNOSIS — H20.9 UVEITIS: ICD-10-CM

## 2024-07-13 DIAGNOSIS — M25.50 MULTIPLE JOINT PAIN: ICD-10-CM

## 2024-07-13 LAB
RHEUMATOID FACT SERPL-ACNC: <10 IU/ML
T PALLIDUM AB SER QL: NONREACTIVE

## 2024-07-13 PROCEDURE — 82164 ANGIOTENSIN I ENZYME TEST: CPT | Performed by: EMERGENCY MEDICINE

## 2024-07-13 PROCEDURE — 86200 CCP ANTIBODY: CPT

## 2024-07-13 PROCEDURE — 86780 TREPONEMA PALLIDUM: CPT | Performed by: EMERGENCY MEDICINE

## 2024-07-13 PROCEDURE — 81374 HLA I TYPING 1 ANTIGEN LR: CPT | Performed by: EMERGENCY MEDICINE

## 2024-07-13 PROCEDURE — 71046 X-RAY EXAM CHEST 2 VIEWS: CPT

## 2024-07-13 PROCEDURE — 99283 EMERGENCY DEPT VISIT LOW MDM: CPT | Mod: 25 | Performed by: EMERGENCY MEDICINE

## 2024-07-13 PROCEDURE — 71046 X-RAY EXAM CHEST 2 VIEWS: CPT | Mod: 26 | Performed by: STUDENT IN AN ORGANIZED HEALTH CARE EDUCATION/TRAINING PROGRAM

## 2024-07-13 PROCEDURE — 99284 EMERGENCY DEPT VISIT MOD MDM: CPT | Mod: 4UV | Performed by: OPHTHALMOLOGY

## 2024-07-13 PROCEDURE — 86431 RHEUMATOID FACTOR QUANT: CPT | Performed by: EMERGENCY MEDICINE

## 2024-07-13 PROCEDURE — 86038 ANTINUCLEAR ANTIBODIES: CPT | Performed by: EMERGENCY MEDICINE

## 2024-07-13 PROCEDURE — 87476 LYME DIS DNA AMP PROBE: CPT | Mod: XU | Performed by: EMERGENCY MEDICINE

## 2024-07-13 PROCEDURE — 36415 COLL VENOUS BLD VENIPUNCTURE: CPT | Performed by: EMERGENCY MEDICINE

## 2024-07-13 PROCEDURE — 99284 EMERGENCY DEPT VISIT MOD MDM: CPT | Performed by: EMERGENCY MEDICINE

## 2024-07-13 PROCEDURE — 99207 PR NO CHARGE LOS: CPT | Performed by: PHYSICIAN ASSISTANT

## 2024-07-13 PROCEDURE — 86481 TB AG RESPONSE T-CELL SUSP: CPT | Performed by: EMERGENCY MEDICINE

## 2024-07-13 PROCEDURE — 85549 MURAMIDASE: CPT | Performed by: EMERGENCY MEDICINE

## 2024-07-13 RX ORDER — CYCLOPENTOLATE HYDROCHLORIDE 10 MG/ML
1 SOLUTION/ DROPS OPHTHALMIC ONCE
Status: DISCONTINUED | OUTPATIENT
Start: 2024-07-13 | End: 2024-07-13 | Stop reason: HOSPADM

## 2024-07-13 RX ORDER — PREDNISOLONE SODIUM PHOSPHATE 10 MG/ML
1 SOLUTION/ DROPS OPHTHALMIC ONCE
Status: DISCONTINUED | OUTPATIENT
Start: 2024-07-13 | End: 2024-07-13 | Stop reason: HOSPADM

## 2024-07-13 RX ORDER — PREDNISOLONE ACETATE 10 MG/ML
1-2 SUSPENSION/ DROPS OPHTHALMIC
Qty: 10 ML | Refills: 0 | Status: SHIPPED | OUTPATIENT
Start: 2024-07-13 | End: 2024-07-18

## 2024-07-13 RX ORDER — PROPARACAINE HYDROCHLORIDE 5 MG/ML
1 SOLUTION/ DROPS OPHTHALMIC ONCE
Status: DISCONTINUED | OUTPATIENT
Start: 2024-07-13 | End: 2024-07-13 | Stop reason: HOSPADM

## 2024-07-13 RX ORDER — CYCLOPENTOLATE HYDROCHLORIDE 10 MG/ML
1 SOLUTION/ DROPS OPHTHALMIC 3 TIMES DAILY
Qty: 10 ML | Refills: 0 | Status: SHIPPED | OUTPATIENT
Start: 2024-07-13 | End: 2024-07-18

## 2024-07-13 ASSESSMENT — ACTIVITIES OF DAILY LIVING (ADL)
ADLS_ACUITY_SCORE: 34
ADLS_ACUITY_SCORE: 36

## 2024-07-13 ASSESSMENT — COLUMBIA-SUICIDE SEVERITY RATING SCALE - C-SSRS
6. HAVE YOU EVER DONE ANYTHING, STARTED TO DO ANYTHING, OR PREPARED TO DO ANYTHING TO END YOUR LIFE?: NO
2. HAVE YOU ACTUALLY HAD ANY THOUGHTS OF KILLING YOURSELF IN THE PAST MONTH?: NO
1. IN THE PAST MONTH, HAVE YOU WISHED YOU WERE DEAD OR WISHED YOU COULD GO TO SLEEP AND NOT WAKE UP?: NO

## 2024-07-13 ASSESSMENT — VISUAL ACUITY
OD: 20/25
OS: 20/100

## 2024-07-13 NOTE — ED TRIAGE NOTES
Pt ambulatory from home with L eye pain and blurry vision. Pt states symptoms started Monday. Denies loss of vision.     Triage Assessment (Adult)       Row Name 07/13/24 1224          Triage Assessment    Airway WDL WDL        Respiratory WDL    Respiratory WDL WDL        Cardiac WDL    Cardiac WDL WDL        Peripheral/Neurovascular WDL    Peripheral Neurovascular WDL WDL        Cognitive/Neuro/Behavioral WDL    Cognitive/Neuro/Behavioral WDL WDL

## 2024-07-13 NOTE — CONSULTS
OPHTHALMOLOGY CONSULT NOTE  07/13/24    Patient: Elaine Awad  Consulted by: Aly Motley PA-C  Reason for Consult: Red Eye, Left    HISTORY OF PRESENTING ILLNESS:     Elaine Awad is a 53 year old female who presents with approximately 1 week of left eye irritation and redness.  She has history of seasonal eye allergies and has attempted to treat with allergy eye drops, claritin, and benadryl without improvement.    She went to urgent care this morning who referred to ED.    She denies prior history and treatment of/for uveitis. Denies prior history of autoimmune disease. Denies recent contact with TB patients. Works as a public health nurse.    No recent fever, chills, weight loss. No new joint pain, back pain, or skin rashes. No cough. No recent travel. No tick exposure. No recent illness. No double vision. No renal or liver issues. History of other autoimmune conditions. No Family history of uveitis.     Review of systems were otherwise negative except for that which has been stated above.      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    GERD (gastroesophageal reflux disease)     Tyrel's disease     Hypertension goal BP (blood pressure) < 140/90     Iron deficiency anemia     Menorrhagia     Migraine     Morbid obesity (H)     Nonallergic rhinitis 04/17/2019    Recurrent cold sores     Vulvar dermatitis 10/14/2010    Yeast infection of the vagina, recurrent         Past Surgical History:   Past Surgical History:   Procedure  Laterality Date    COLPOSCOPY,BX CERVIX/ENDOCERV CURR  7/1994    DAVINCI XI HERNIORRHAPHY VENTRAL N/A 4/15/2024    Procedure: Robot-assisted laparoscopic incarcerated ventral incisional hernia repair with mesh;  Surgeon: Aly Cai DO;  Location: [facility]    ESOPHAGOSCOPY, GASTROSCOPY, DUODENOSCOPY (EGD), COMBINED  1/2/2014    Procedure: COMBINED ESOPHAGOSCOPY, GASTROSCOPY, DUODENOSCOPY (EGD);;  Surgeon: Eden tSacy MD;  Location: [facility]    ESOPHAGOSCOPY, GASTROSCOPY, DUODENOSCOPY (EGD), COMBINED N/A 1/19/2017    Procedure: COMBINED ESOPHAGOSCOPY, GASTROSCOPY, DUODENOSCOPY (EGD);  Surgeon: Ciro Deras MD;  [facility]    LAPAROSCOPIC GASTRIC ADJUSTABLE BANDING  4/28/2014    Procedure: LAPAROSCOPIC GASTRIC ADJUSTABLE BANDING;  Surgeon: Ciro Deras MD;  [facility]    LAPAROSCOPIC HERNIORRHAPHY HIATAL  4/28/2014    Procedure: LAPAROSCOPIC HERNIORRHAPHY HIATAL;  Surgeon: Ciro Deras MD;  [facility]    LAPAROSCOPIC REMOVAL GASTRIC ADJUSTABLE BAND N/A 1/19/2017    Procedure: LAPAROSCOPIC REMOVAL GASTRIC ADJUSTABLE BAND;  Surgeon: Ciro Deras MD;  [facility]    LITHOTRIPSY      ZZC TREAT ECTOPIC PREG,RMV TUBE/OVARY      LT       Family History:  Macular degeneration: Paternal Grandmother  No known glaucoma  Father eye removed for eye cancer - melanoma? Age 48    Social History:  No tobacco use      EXAMINATION:     Base Eye Exam       Visual Acuity (Snellen - Linear)         Right Left    Near sc 20/20 20/40    Near cc  NI              Tonometry (Tonopen, 1:44 PM)         Right Left    Pressure 21 16              Pupils         Pupils Shape React APD    Right PERRL Round Brisk None    Left PERRL Round Brisk None              Visual Fields         Left Right     Full Full              Extraocular Movement         Right Left     Full, Ortho Full, Ortho              Neuro/Psych       Oriented x3: Yes    Mood/Affect: Normal              Dilation        Both eyes: 1.0% Mydriacyl, 2.5% Rodney Synephrine @ 1:47 PM                  Additional Tests       Color         Right Left    Ishihara 16 16                  Slit Lamp and Fundus Exam       External Exam         Right Left    External dermatochalasis dermatochalasis, brow ptosis              Slit Lamp Exam         Right Left    Lids/Lashes Normal Normal    Conjunctiva/Sclera White and quiet 2+ Injection 360 with ciliary flush    Cornea Clear Inferior endothelial deposits, scattered PEE, slight haze throughout    Anterior Chamber Deep and quiet 4+ mixed Cell, Flare    Iris Round and reactive, Pigmented nevus at 4 o'clock 360 synechiae    Lens 1+ Nuclear sclerosis, 1 central spot of pigment on anterior lens 1+ Nuclear sclerosis with anterior pigment deposits    Anterior Vitreous Normal Normal              Fundus Exam         Right Left    Disc Normal No view    C/D Ratio 0.3     Macula Normal sharp reflex     Vessels Normal     Periphery Normal                     Labs/Studies/Imaging Performed:  US left eye: Retina attached, no vitreous debris, no obvious T sign     ASSESSMENT/PLAN:     # Uveitis, Left Eye  Elaine Awad is a 53 year old female who presents with a painful left eye for the past week. Exam findings are consistent with a new diagnosis of uveitis. Pertinent positive exam findings include: 2+ injection with ciliary flush, inferior endothelial mutton fat deposits, corneal haze, 4+ mixed cell with flare, and 360 synechiae.     Eye is tender to palpation, but no corkscrew vessels observed on exam and no T sign on US.     RECOMMENDATIONS:  - Labs: RPR, TB, ACE, Lysozyme, ZHAO, HLA-B27, Lyme, Rheumatoid factor  - CXR (screen for sarcoidosis)  - Pred Forte left eye, [id number] while awake  - Cyclopentolate left eye TID  - Ophthalmology will call patient to coordinate follow up outpatient.    It is our pleasure to participate in this patient's care and treatment. Ophthalmology will sign off.  Please contact us  with any further questions or concerns.      Patient seen and discussed with Dr. Sarah Patton.    Adam Vaughn MD  Resident Physician, PGY-2  Department of Ophthalmology

## 2024-07-13 NOTE — DISCHARGE INSTRUCTIONS
Take the medications as prescribed. Follow up with ophthalmology in the next week for reevaluation. They will call you to set up an appointment.    Take the Cyclopentolate drops three times daily in the Left eye  Take the Pred Forte drops every 2 hours in the Left eye while awake.

## 2024-07-13 NOTE — PROGRESS NOTES
Chief Complaint:    Chief Complaint   Patient presents with    Urgent Care     c/o redness with swelling, irritation and pain of right eye that started yesterday. (No known Injury).   c/o neck pain that started on Wednesday. (No known injury).  c/o right knee pain that started last night. (No known injury).     Medical Decision Making:    Vital signs reviewed by Aly Motley PA-C  /83 (BP Location: Right arm, Patient Position: Sitting, Cuff Size: Adult Large)   Pulse 106   Temp 98.8  F (37.1  C) (Tympanic)   Resp 15   Wt 112 kg (247 lb)   SpO2 97%   BMI 42.40 kg/m      Differential Diagnosis:  { Differential Choices:266255}      ASSESSMENT:     No diagnosis found.       PLAN:     Patient is in no acute distress.  She is afebrile with stable vital signs.    Patient instructed to follow up with PCP in 1 week if symptoms are not improving.  Sooner if symptoms worsen.  Worrisome symptoms discussed with instructions to go to the ED.  Patient verbalized understanding and agreed with this plan.    Labs:     No results found for any visits on 07/13/24.    Current Meds:    Current Outpatient Medications:     clindamycin (CLEOCIN T) 1 % external lotion, APPLY TOPICALLY TO FACE TWICE DAILY FOR ACNE, Disp: , Rfl:     clobetasol propionate (TEMOVATE) 0.05 % external cream, Apply to AA BID x 1-2 weeks then PRN. Do not apply to face., Disp: 60 g, Rfl: 3    COMPOUNDED NON-CONTROLLED SUBSTANCE (CMPD RX) - PHARMACY TO MIX COMPOUNDED MEDICATION, Gabapentin 5%/amitriptyline 5%/Lidocaine 5% vanishing cream SIG apply to AA BID PRN, Disp: 30 g, Rfl: 11    diphenhydrAMINE (BENADRYL) 25 MG tablet, Take 25 mg by mouth nightly as needed for sleep, Disp: , Rfl:     fexofenadine (ALLEGRA) 180 MG tablet, Take 180 mg by mouth as needed, Disp: , Rfl:     fluconazole (DIFLUCAN) 150 MG tablet, Take 1 tablet (150 mg) by mouth every 72 hours as needed, Disp: 3 tablet, Rfl: 3    glucosamine 500 MG CAPS capsule, Take 500 mg by mouth  daily, Disp: , Rfl:     insulin pen needle (B-D U/F) 31G X 5 MM miscellaneous, USE DAILY OR AS DIRECTED, Disp: 100 each, Rfl: 3    levonorgestrel (MIRENA) 20 MCG/DAY IUD, 1 each (20 mcg) by Intrauterine route once, Disp: , Rfl:     liraglutide - Weight Management (SAXENDA) 18 MG/3ML pen, Inject 3 mg Subcutaneous daily (Patient taking differently: Inject 2.4 mg subcutaneously daily), Disp: 15 mL, Rfl: 2    losartan (COZAAR) 50 MG tablet, Take 1 tablet (50 mg) by mouth daily, Disp: 90 tablet, Rfl: 3    magnesium oxide (MAG-OX) 400 MG tablet, Take 1 tablet by mouth daily, Disp: , Rfl:     nystatin (NYSTOP) 187498 UNIT/GM external powder, APPLY TO THE AFFECTED AREA UNDER THE BREASTS THREE TIMES DAILY AS NEEDED., Disp: 60 g, Rfl: 5    olopatadine (PATADAY) 0.2 % ophthalmic solution, Place 0.05 mLs (1 drop) into both eyes daily, Disp: 2.5 mL, Rfl: 11    omeprazole (PRILOSEC) 20 MG DR capsule, Take 1 capsule (20 mg) by mouth daily, Disp: 90 capsule, Rfl: 3    phentermine 15 MG capsule, Take 1 capsule (15 mg) by mouth every morning, Disp: 30 capsule, Rfl: 5    rizatriptan (MAXALT-MLT) 5 MG ODT, TAKE 1-2 TABLETS BY MOUTH AT ONSET OF HEADACHE MAY REPEAT IN 2 HOURS. MAX OF 6 TABLETS PER 24 HOURS, Disp: 36 tablet, Rfl: 3    clotrimazole (LOTRIMIN) 1 % external cream, Use twice a day for 14 days when signs of yeast in skin folds. (Patient not taking: Reported on 7/13/2024), Disp: 113 g, Rfl: 1    Semaglutide-Weight Management (WEGOVY) 0.25 MG/0.5ML pen, Inject 0.25 mg Subcutaneous once a week (Patient not taking: Reported on 7/13/2024), Disp: 2 mL, Rfl: 0    Semaglutide-Weight Management (WEGOVY) 0.5 MG/0.5ML pen, Inject 0.5 mg Subcutaneous once a week (Patient not taking: Reported on 7/13/2024), Disp: 2 mL, Rfl: 0    valACYclovir (VALTREX) 500 MG tablet, TAKE 1 TABLET BY MOUTH DAILY. INCREASE TO 4 TABLET BY MOUTH 2 TIMES DAILY AT ONSET OF OUTBREAK (Patient not taking: Reported on 7/13/2024), Disp: 100 tablet, Rfl:  3    Allergies:  Allergies   Allergen Reactions    Doxycycline Rash     Full body rash    Ampicillin Swelling     Tongue swelling at end of treatment course       SUBJECTIVE    HPI: Elaine Awad is an 53 year old female who presents for evaluation and treatment of ***.    ROS:      Review of Systems     Family History   Family History   Problem Relation Age of Onset    Diabetes Mother     C.A.D. Mother     Cancer Father 72        d. pancreatic, melanoma     Colon Polyps Father     Obesity Brother     Breast Cancer Maternal Grandmother     Heart Disease Maternal Grandfather         CHF    Heart Disease Paternal Grandmother         CHF    Colon Polyps Paternal Grandmother     Respiratory Paternal Grandfather         TB    Breast Cancer Maternal Aunt     Glaucoma No family hx of     Macular Degeneration No family hx of     Ovarian Cancer No family hx of        Social History  Social History     Socioeconomic History    Marital status:      Spouse name: [employer]    Number of children: 2    Years of education: 16    Highest education level: Not on file   Occupational History    Occupation: RN     Employer: [employer]   Tobacco Use    Smoking status: Former     Current packs/day: 0.00     Average packs/day: 0.5 packs/day for 10.0 years (5.0 ttl pk-yrs)     Types: Cigarettes     Start date: 1987     Quit date: 1997     Years since quittin.6    Smokeless tobacco: Never   Vaping Use    Vaping status: Never Used   Substance and Sexual Activity    Alcohol use: Yes     Comment: occasional (infrequent)    Drug use: No    Sexual activity: Yes     Partners: Male     Birth control/protection: I.U.D.   Other Topics Concern    Parent/sibling w/ CABG, MI or angioplasty before 65F 55M? No     Service No    Blood Transfusions No    Caffeine Concern No    Occupational Exposure Yes     Comment: nurse    Hobby Hazards No    Sleep Concern No    Stress Concern No    Weight Concern No    Special  Diet No    Back Care No    Exercise Yes    Bike Helmet Yes    Seat Belt Yes    Self-Exams Yes   Social History Narrative    Not on file     Social Determinants of Health     Financial Resource Strain: Low Risk  (11/6/2023)    Financial Resource Strain     Within the past 12 months, have you or your family members you live with been unable to get utilities (heat, electricity) when it was really needed?: No   Recent Concern: Financial Resource Strain - High Risk (11/3/2023)    Financial Resource Strain     Within the past 12 months, have you or your family members you live with been unable to get utilities (heat, electricity) when it was really needed?: Yes   Food Insecurity: Low Risk  (11/3/2023)    Food Insecurity     Within the past 12 months, did you worry that your food would run out before you got money to buy more?: No     Within the past 12 months, did the food you bought just not last and you didn t have money to get more?: No   Transportation Needs: Low Risk  (11/3/2023)    Transportation Needs     Within the past 12 months, has lack of transportation kept you from medical appointments, getting your medicines, non-medical meetings or appointments, work, or from getting things that you need?: No   Physical Activity: Not on file   Stress: Not on file   Social Connections: Not on file   Interpersonal Safety: Low Risk  (11/6/2023)    Interpersonal Safety     Do you feel physically and emotionally safe where you currently live?: Yes     Within the past 12 months, have you been hit, slapped, kicked or otherwise physically hurt by someone?: No     Within the past 12 months, have you been humiliated or emotionally abused in other ways by your partner or ex-partner?: No   Housing Stability: Low Risk  (11/3/2023)    Housing Stability     Do you have housing? : Yes     Are you worried about losing your housing?: No        Surgical History:  Past Surgical History:   Procedure Laterality Date    COLPOSCOPY,BX  CERVIX/ENDOCERV CURR  7/1994    DAVINCI XI HERNIORRHAPHY VENTRAL N/A 4/15/2024    Procedure: Robot-assisted laparoscopic incarcerated ventral incisional hernia repair with mesh;  Surgeon: Aly Cai DO;  [facility]    ESOPHAGOSCOPY, GASTROSCOPY, DUODENOSCOPY (EGD), COMBINED  1/2/2014    Procedure: COMBINED ESOPHAGOSCOPY, GASTROSCOPY, DUODENOSCOPY (EGD);;  Surgeon: Eden Stacy MD;  [facility]    ESOPHAGOSCOPY, GASTROSCOPY, DUODENOSCOPY (EGD), COMBINED N/A 1/19/2017    Procedure: COMBINED ESOPHAGOSCOPY, GASTROSCOPY, DUODENOSCOPY (EGD);  Surgeon: Ciro Deras MD;  [facility]    LAPAROSCOPIC GASTRIC ADJUSTABLE BANDING  4/28/2014    Procedure: LAPAROSCOPIC GASTRIC ADJUSTABLE BANDING;  Surgeon: Ciro Deras MD;  [facility]    LAPAROSCOPIC HERNIORRHAPHY HIATAL  4/28/2014    Procedure: LAPAROSCOPIC HERNIORRHAPHY HIATAL;  Surgeon: Ciro Deras MD;  [facility]    LAPAROSCOPIC REMOVAL GASTRIC ADJUSTABLE BAND N/A 1/19/2017    Procedure: LAPAROSCOPIC REMOVAL GASTRIC ADJUSTABLE BAND;  Surgeon: Ciro Deras MD;  [facility]    LITHOTRIPSY      ZZC TREAT ECTOPIC PREG,RMV TUBE/OVARY      LT        Problem List:  Patient Active Problem List   Diagnosis    GERD (gastroesophageal reflux disease)    Migraines    Yeast infection of the vagina, recurrent     Recurrent cold sores    Class 3 severe obesity with serious comorbidity and body mass index (BMI) of 40.0 to 44.9 in adult, unspecified obesity type (H)    Primary osteoarthritis of right knee    Intertrigo    History of removal of laparoscopic gastric banding device    B12 deficiency    Menorrhagia    Hypertension goal BP (blood pressure) < 140/90    Ventral hernia without obstruction or gangrene    Teratoma of left ovary    IUD (intrauterine device) in place        OBJECTIVE:     Vital signs noted and reviewed by Aly Motley PA-C  /83 (BP Location: Right arm, Patient Position: Sitting,  Cuff Size: Adult Large)   Pulse 106   Temp 98.8  F (37.1  C) (Tympanic)   Resp 15   Wt 112 kg (247 lb)   SpO2 97%   BMI 42.40 kg/m       PEFR:    Physical Exam        Aly Motley PA-C  7/13/2024, 10:18 AM

## 2024-07-13 NOTE — ED PROVIDER NOTES
ED Provider Note  [facility]      History     Chief Complaint   Patient presents with    Eye Pain     HPI  Elaine Awad is a 53 year old female PMH of HTN, migraines who presents to the ER for evaluation of eye pain.    Pt noticed some redness in her eye on Monday which has worsened. She began feeling irritation yesterday. She went to UC today for blurry vision and was sent to ER after being told that it cannot be examined in UC. She also notes slight neck pain that may be unrelated. She denies any involvement of the R eye. She notes the skin around the eye is irritated as well and she feels as though she has been punched in the eye. She states it is more comfortable shut, and opening it and light worsens it. She denies any chance of a foreign body. She states it is very tearful. She does not wear contacts, says she has glasses but doesn't wear them all the time. She notes that she has allergy problems and has to use drops in her eyes sometimes but states that the drops are not giving her relief.           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    GERD (gastroesophageal reflux disease)     Tyrel's disease     Hypertension goal BP (blood pressure) < 140/90     Iron deficiency anemia     Menorrhagia     Migraine     Morbid obesity (H)     Nonallergic rhinitis 04/17/2019    Recurrent cold sores     Vulvar dermatitis 10/14/2010    Yeast infection of the vagina, recurrent       Past Surgical History:   Procedure Laterality Date    COLPOSCOPY,BX CERVIX/ENDOCERV CURR  7/1994    DAVINCI XI HERNIORRHAPHY VENTRAL N/A 4/15/2024    Procedure: Robot-assisted laparoscopic incarcerated ventral incisional hernia repair with mesh;  Surgeon: Aly Cai DO;  Location: [facility]    ESOPHAGOSCOPY, GASTROSCOPY, DUODENOSCOPY (EGD),  COMBINED  1/2/2014    Procedure: COMBINED ESOPHAGOSCOPY, GASTROSCOPY, DUODENOSCOPY (EGD);;  Surgeon: Eden Stacy MD;  [facility]    ESOPHAGOSCOPY, GASTROSCOPY, DUODENOSCOPY (EGD), COMBINED N/A 1/19/2017    Procedure: COMBINED ESOPHAGOSCOPY, GASTROSCOPY, DUODENOSCOPY (EGD);  Surgeon: Ciro Deras MD;  [facility]    LAPAROSCOPIC GASTRIC ADJUSTABLE BANDING  4/28/2014    Procedure: LAPAROSCOPIC GASTRIC ADJUSTABLE BANDING;  Surgeon: Ciro Deras MD;  [facility]    LAPAROSCOPIC HERNIORRHAPHY HIATAL  4/28/2014    Procedure: LAPAROSCOPIC HERNIORRHAPHY HIATAL;  Surgeon: Ciro Deras MD;  [facility]    LAPAROSCOPIC REMOVAL GASTRIC ADJUSTABLE BAND N/A 1/19/2017    Procedure: LAPAROSCOPIC REMOVAL GASTRIC ADJUSTABLE BAND;  Surgeon: Ciro Deras MD;  [facility]    LITHOTRIPSY      ZZC TREAT ECTOPIC PREG,RMV TUBE/OVARY      LT     cyclopentolate (CYCLOGYL) 1 % ophthalmic solution  prednisoLONE acetate (PRED FORTE) 1 % ophthalmic suspension  clindamycin (CLEOCIN T) 1 % external lotion  clobetasol propionate (TEMOVATE) 0.05 % external cream  clotrimazole (LOTRIMIN) 1 % external cream  COMPOUNDED NON-CONTROLLED SUBSTANCE (CMPD RX) - PHARMACY TO MIX COMPOUNDED MEDICATION  diphenhydrAMINE (BENADRYL) 25 MG tablet  fexofenadine (ALLEGRA) 180 MG tablet  fluconazole (DIFLUCAN) 150 MG tablet  glucosamine 500 MG CAPS capsule  insulin pen needle (B-D U/F) 31G X 5 MM miscellaneous  levonorgestrel (MIRENA) 20 MCG/DAY IUD  liraglutide - Weight Management (SAXENDA) 18 MG/3ML pen  losartan (COZAAR) 50 MG tablet  magnesium oxide (MAG-OX) 400 MG tablet  nystatin (NYSTOP) 790662 UNIT/GM external powder  olopatadine (PATADAY) 0.2 % ophthalmic solution  omeprazole (PRILOSEC) 20 MG DR capsule  phentermine 15 MG capsule  rizatriptan (MAXALT-MLT) 5 MG ODT  Semaglutide-Weight Management (WEGOVY) 0.25 MG/0.5ML pen  Semaglutide-Weight Management (WEGOVY) 0.5 MG/0.5ML pen  valACYclovir  "(VALTREX) 500 MG tablet      Allergies   Allergen Reactions    Doxycycline Rash     Full body rash    Ampicillin Swelling     Tongue swelling at end of treatment course     Family History  Family History   Problem Relation Age of Onset    Diabetes Mother     C.A.D. Mother     Cancer Father 72        d. pancreatic, melanoma     Colon Polyps Father     Obesity Brother     Breast Cancer Maternal Grandmother     Heart Disease Maternal Grandfather         CHF    Heart Disease Paternal Grandmother         CHF    Colon Polyps Paternal Grandmother     Respiratory Paternal Grandfather         TB    Breast Cancer Maternal Aunt     Glaucoma No family hx of     Macular Degeneration No family hx of     Ovarian Cancer No family hx of      Social History   Social History     Tobacco Use    Smoking status: Former     Current packs/day: 0.00     Average packs/day: 0.5 packs/day for 10.0 years (5.0 ttl pk-yrs)     Types: Cigarettes     Start date: 1987     Quit date: 1997     Years since quittin.6    Smokeless tobacco: Never   Vaping Use    Vaping status: Never Used   Substance Use Topics    Alcohol use: Yes     Comment: occasional (infrequent)    Drug use: No      A medically appropriate review of systems was performed with pertinent positives and negatives noted in the HPI, and all other systems negative.    Physical Exam   BP: 119/84  Pulse: (!) 125  Temp: 98.2  F (36.8  C)  Resp: 16  Height: 162.6 cm (5' 4\")  SpO2: 100 %  Physical Exam  Vitals and nursing note reviewed.   Constitutional:       General: She is not in acute distress.     Appearance: Normal appearance. She is not diaphoretic.   HENT:      Head: Atraumatic.      Right Ear: Tympanic membrane normal.      Left Ear: Tympanic membrane normal.      Nose: Nose normal.      Mouth/Throat:      Mouth: Mucous membranes are moist.      Pharynx: Oropharynx is clear.   Eyes:      General: No scleral icterus.     Extraocular Movements: Extraocular movements intact. "      Conjunctiva/sclera:      Right eye: Right conjunctiva is not injected. No chemosis.     Left eye: Left conjunctiva is injected. Chemosis present.      Pupils: Pupils are equal, round, and reactive to light.      Comments: Upper and lower L eyelid swelling, no erythema. Increased tearing on the L.   Cardiovascular:      Rate and Rhythm: Normal rate.      Heart sounds: Normal heart sounds.   Pulmonary:      Effort: No respiratory distress.      Breath sounds: Normal breath sounds.   Abdominal:      General: Abdomen is flat.      Tenderness: There is no abdominal tenderness.   Musculoskeletal:         General: Normal range of motion.      Cervical back: Neck supple.   Skin:     General: Skin is warm.      Findings: No rash.   Neurological:      General: No focal deficit present.      Mental Status: She is alert and oriented to person, place, and time.      Cranial Nerves: No cranial nerve deficit.      Motor: No weakness.   Psychiatric:         Mood and Affect: Mood normal.         Behavior: Behavior normal.           ED Course, Procedures, & Data      Procedures       A consult was attained from the ophthalmology service. The case was discussed with the senior resident from that service.       Results for orders placed or performed during the hospital encounter of 07/13/24   XR Chest 2 Views     Status: None    Narrative    EXAM: XR CHEST 2 VIEWS  7/13/2024 4:00 PM      HISTORY: r/o sarcoid    COMPARISON: None.    FINDINGS:   Two views of the chest. Trachea is midline. Cardiac silhouette is  within normal limits. No focal consolidation. No pleural effusion or  pneumothorax. Visualized upper abdomen is unremarkable. No acute  osseous abnormalities.      Impression    IMPRESSION:   No focal airspace disease. No bulky lymphadenopathy, however cannot  definitively rule out sarcoid based on radiograph.    I have personally reviewed the examination and initial interpretation  and I agree with the findings.    DEBBIE  DO MIREYA         SYSTEM ID:  [id number]   Quantiferon-TB Gold Plus     Status: None ()    Specimen: Arm, Right; Blood    Narrative    The following orders were created for panel order Quantiferon-TB Gold Plus.  Procedure                               Abnormality         Status                     ---------                               -----------         ------                     Quantiferon TB Gold Plus...[[id number]]                                                 Quantiferon TB Gold Plus...[[id number]]                                                 Quantiferon TB Gold Plus...[[id number]]                                                 Quantiferon TB Gold Plus...[[id number]]                                                   Please view results for these tests on the individual orders.     Medications   proparacaine (ALCAINE) 0.5 % ophthalmic solution 1 drop (has no administration in time range)   fluorescein (FUL-SAMANTHA) ophthalmic strip 1 strip (has no administration in time range)   prednisoLONE sodium phosphate (INFLAMASE FORTE) 1 % ophthalmic solution 1 drop (has no administration in time range)   cyclopentolate (CYCLOGYL) 1 % ophthalmic solution 1 drop (has no administration in time range)     Labs Ordered and Resulted from Time of ED Arrival to Time of ED Departure - No data to display  XR Chest 2 Views   Final Result   IMPRESSION:    No focal airspace disease. No bulky lymphadenopathy, however cannot   definitively rule out sarcoid based on radiograph.      I have personally reviewed the examination and initial interpretation   and I agree with the findings.      DEBBIE GOMES DO            SYSTEM ID:  [id number]             Critical care was not performed.     Medical Decision Making  The patient's presentation was of high complexity (an acute health issue posing potential threat to life or bodily function).    The patient's evaluation involved:  ordering and/or review of 1 test(s) in this encounter (see separate  area of note for details)  discussion of management or test interpretation with another health professional (see separate area of note for details)    The patient's management necessitated moderate risk (prescription drug management including medications given in the ED).    Assessment & Plan    54 yo female here with L eye irritation, redness and blurry vision with increased tearing x5 days. She has a history of allergic conjunctivitis but this feels different.    Case was discussed with ophthalmology who evaluated the pt and are recommending adding a CXR to r/o sarcoid and multiple specific labs that they will follow up on. She will be discharged to home on predforte and cyclopentolate drops per ophtho and will follow up with them next week.    I have reviewed the nursing notes. I have reviewed the findings, diagnosis, plan and need for follow up with the patient.    Discharge Medication List as of 7/13/2024  5:37 PM        START taking these medications    Details   cyclopentolate (CYCLOGYL) 1 % ophthalmic solution Place 1 drop Into the left eye 3 times daily for 3 days, Disp-10 mL, R-0, Local Print      prednisoLONE acetate (PRED FORTE) 1 % ophthalmic suspension Place 1-2 drops Into the left eye every 2 hours for 7 days, Disp-10 mL, R-0, Local Print             Final diagnoses:   Uveitis     I, Marifer Mcgovern, am serving as a trained medical scribe to document services personally performed by Alejo Madison MD, based on the provider's statements to me.     IAlejo MD, was physically present and have reviewed and verified the accuracy of this note documented by Marifer Mcgovern.    Alejo Madison MD  [facility] EMERGENCY DEPARTMENT  7/13/2024     Alejo Madison MD  07/13/24 [id number]

## 2024-07-15 ENCOUNTER — PATIENT OUTREACH (OUTPATIENT)
Dept: FAMILY MEDICINE | Facility: CLINIC | Age: 54
End: 2024-07-15

## 2024-07-15 ENCOUNTER — ANCILLARY PROCEDURE (OUTPATIENT)
Dept: GENERAL RADIOLOGY | Facility: CLINIC | Age: 54
End: 2024-07-15
Attending: FAMILY MEDICINE
Payer: COMMERCIAL

## 2024-07-15 ENCOUNTER — TELEPHONE (OUTPATIENT)
Dept: OPHTHALMOLOGY | Facility: CLINIC | Age: 54
End: 2024-07-15

## 2024-07-15 ENCOUNTER — OFFICE VISIT (OUTPATIENT)
Dept: FAMILY MEDICINE | Facility: CLINIC | Age: 54
End: 2024-07-15
Payer: COMMERCIAL

## 2024-07-15 VITALS
BODY MASS INDEX: 44.12 KG/M2 | DIASTOLIC BLOOD PRESSURE: 84 MMHG | SYSTOLIC BLOOD PRESSURE: 126 MMHG | WEIGHT: 249 LBS | RESPIRATION RATE: 22 BRPM | OXYGEN SATURATION: 100 % | HEIGHT: 63 IN | HEART RATE: 117 BPM | TEMPERATURE: 97.8 F

## 2024-07-15 DIAGNOSIS — M25.50 MULTIPLE JOINT PAIN: ICD-10-CM

## 2024-07-15 DIAGNOSIS — M17.11 PRIMARY OSTEOARTHRITIS OF RIGHT KNEE: ICD-10-CM

## 2024-07-15 DIAGNOSIS — H20.9 UVEITIS: Primary | ICD-10-CM

## 2024-07-15 LAB
ACE SERPL-CCNC: 38 U/L
ANA SER QL IF: NEGATIVE
ERYTHROCYTE [SEDIMENTATION RATE] IN BLOOD BY WESTERGREN METHOD: 35 MM/HR (ref 0–30)
GAMMA INTERFERON BACKGROUND BLD IA-ACNC: 0.02 IU/ML
M TB IFN-G BLD-IMP: NEGATIVE
M TB IFN-G CD4+ BCKGRND COR BLD-ACNC: 7.72 IU/ML
MITOGEN IGNF BCKGRD COR BLD-ACNC: 0 IU/ML
MITOGEN IGNF BCKGRD COR BLD-ACNC: 0.02 IU/ML
QUANTIFERON MITOGEN: 7.74 IU/ML
QUANTIFERON NIL TUBE: 0.02 IU/ML
QUANTIFERON TB1 TUBE: 0.02 IU/ML
QUANTIFERON TB2 TUBE: 0.04

## 2024-07-15 PROCEDURE — G2211 COMPLEX E/M VISIT ADD ON: HCPCS | Performed by: FAMILY MEDICINE

## 2024-07-15 PROCEDURE — 73562 X-RAY EXAM OF KNEE 3: CPT | Mod: TC | Performed by: RADIOLOGY

## 2024-07-15 PROCEDURE — 72040 X-RAY EXAM NECK SPINE 2-3 VW: CPT | Mod: TC | Performed by: RADIOLOGY

## 2024-07-15 PROCEDURE — 99214 OFFICE O/P EST MOD 30 MIN: CPT | Performed by: FAMILY MEDICINE

## 2024-07-15 PROCEDURE — 36415 COLL VENOUS BLD VENIPUNCTURE: CPT | Performed by: FAMILY MEDICINE

## 2024-07-15 PROCEDURE — 85652 RBC SED RATE AUTOMATED: CPT | Performed by: FAMILY MEDICINE

## 2024-07-15 RX ORDER — HYDROCODONE BITARTRATE AND ACETAMINOPHEN 5; 325 MG/1; MG/1
1 TABLET ORAL EVERY 6 HOURS PRN
Qty: 8 TABLET | Refills: 0 | Status: SHIPPED | OUTPATIENT
Start: 2024-07-15

## 2024-07-15 NOTE — PROGRESS NOTES
"  Assessment & Plan     Uveitis  - ESR: Erythrocyte sedimentation rate; Future  - Cyclic Citrullinated Peptide Antibody IgG; Future  - Adult Eye  Referral; Future  - HYDROcodone-acetaminophen (NORCO) 5-325 MG tablet; Take 1 tablet by mouth every 6 hours as needed for moderate to severe pain  - ESR: Erythrocyte sedimentation rate    Multiple joint pain  No further refills until follow-up appointment   - ESR: Erythrocyte sedimentation rate; Future  - Cyclic Citrullinated Peptide Antibody IgG; Future  - XR Knee Right 3 Views; Future  - XR Cervical Spine 2/3 Views; Future  - HYDROcodone-acetaminophen (NORCO) 5-325 MG tablet; Take 1 tablet by mouth every 6 hours as needed for moderate to severe pain  - ESR: Erythrocyte sedimentation rate    Primary osteoarthritis of right knee  See result note   - XR Knee Right 3 Views; Future    The longitudinal plan of care for the diagnosis(es)/condition(s) as documented were addressed during this visit. Due to the added complexity in care, I will continue to support Linda in the subsequent management and with ongoing continuity of care.    MED REC REQUIRED  Post Medication Reconciliation Status: discharge medications reconciled and changed, per note/orders  BMI  Estimated body mass index is 43.46 kg/m  as calculated from the following:    Height as of this encounter: 1.612 m (5' 3.47\").    Weight as of this encounter: 112.9 kg (249 lb).   Weight management plan: Patient referred to endocrine and/or weight management specialty      Return in about 17 weeks (around 11/11/2024) for physical.     Subjective   Linda is a 53 year old, presenting for the following health issues:  ER F/U ([facility] 7/13/24)        7/15/2024     1:57 PM   Additional Questions   Roomed by Iona SANDERS CMA   Accompanied by Self     HPI       ED/UC Followup:    [facility]   Date of visit: 7/13/24  Reason for visit: Pain left eye  Current Status: Vision is no better. Having a lot of knee pain and neck " "pain. 8/10 for pain while walking. Hard to get in and out of the car.       Elaine Awad is a 53 year old female who presents with left eye redness, pain and visual disturbance. Was seen in ED and diagnosed with uveitis and prescribed medications by eye doctor. Follow-up appointment not scheduled yet. Symptom onset has been sudden, improving for a time period of 3 days. Severity is described as moderate. Course of her symptoms over time is improving.     She also has severe right knee pain and neck pain. No radicular symptoms.           Objective    /84 (BP Location: Left arm, Patient Position: Sitting, Cuff Size: Adult Large)   Pulse 117   Temp 97.8  F (36.6  C) (Oral)   Resp 22   Ht 1.612 m (5' 3.47\")   Wt 112.9 kg (249 lb)   SpO2 100%   BMI 43.46 kg/m    Body mass index is 43.46 kg/m .  Physical Exam   GENERAL: alert and no distress  EYES: PERRL and conjunctiva/corneas- conjunctival injection OS  HENT: ear canals and TM's normal, nose and mouth without ulcers or lesions  NECK: no adenopathy, no asymmetry, masses, or scars  RESP: lungs clear to auscultation - no rales, rhonchi or wheezes  CV: regular rate and rhythm, normal S1 S2, no S3 or S4, no murmur, click or rub, no peripheral edema  MS: no gross musculoskeletal defects noted, no edema  PSYCH: mentation appears normal, affect normal/bright    Admission on 07/13/2024, Discharged on 07/13/2024   Component Date Value Ref Range Status    Syphilis Antibody 07/13/2024 Nonreactive  Nonreactive Final    ZHAO interpretation 07/13/2024 Negative  Negative Final      Negative:              <1:40  Borderline Positive:   1:40 - 1:80  Positive:              >1:80    Rheumatoid Factor 07/13/2024 <10  <14 IU/mL Final    Quantiferon Nil Tube 07/13/2024 0.02  IU/mL Final    Quantiferon TB1 Tube 07/13/2024 0.02  IU/mL Final    Quantiferon TB2 Tube 07/13/2024 0.04   Final    Quantiferon Mitogen 07/13/2024 7.74  IU/mL Final    Quantiferon-TB Gold Plus 07/13/2024 " Negative  Negative Final    No interferon gamma response to M.tuberculosis antigens was detected. Infection with M.tuberculosis is unlikely, however a single negative result does not exclude infection. In patients at high risk for infection, a second test should be considered in accordance with the 2017 ATS/IDSA/CDC Clinical Pract  ice Guidelines for Diagnosis of Tuberculosis in Adults and Children     TB1 Ag minus Nil Value 07/13/2024 0.00  IU/mL Final    TB2 Ag minus Nil Value 07/13/2024 0.02  IU/mL Final    Mitogen minus Nil Result 07/13/2024 7.72  IU/mL Final    Nil Result 07/13/2024 0.02  IU/mL Final     Results for orders placed or performed in visit on 07/15/24 (from the past 24 hour(s))   ESR: Erythrocyte sedimentation rate   Result Value Ref Range    Erythrocyte Sedimentation Rate 35 (H) 0 - 30 mm/hr           Signed Electronically by: Fernanda Rodriguez MD

## 2024-07-15 NOTE — RESULT ENCOUNTER NOTE
Lnida,    Your neck xray shows arthritis. This is the degenerative type. We can consider physical therapy for treatment. Call or return to clinic as needed if these symptoms worsen or fail to improve.     Fernanda Rodriguez MD

## 2024-07-15 NOTE — RESULT ENCOUNTER NOTE
Linda,    You have advanced knee arthritis. We can discuss treatment options such as referral to orthopedic surgery.     Fernanda Rodriguez MD

## 2024-07-15 NOTE — TELEPHONE ENCOUNTER
[facility] Call Center    Phone Message    May a detailed message be left on voicemail: yes     Reason for Call: Appointment Intake    Referring Provider Name: Fernanda Rodriguez MD in  FAMILY PRACTICE  Diagnosis and/or Symptoms: Uveitis [H20.9]     Emergency: 1-2 Days    Patient said August was to far out and needed to be seen ASAP    Thank you,    Action Taken: Message routed to:  [facility]    Travel Screening: Not Applicable     Date of Service:

## 2024-07-15 NOTE — TELEPHONE ENCOUNTER
Transitions of Care Outreach  No chief complaint on file.      Most Recent Admission Date: 7/13/2024   Most Recent Admission Diagnosis:      Most Recent Discharge Date: 7/13/2024   Most Recent Discharge Diagnosis: Uveitis - H20.9     Transitions of Care Assessment    Discharge Assessment  How are you doing now that you are home?: Not better  How are your symptoms? (Red Flag symptoms escalate to triage hotline per guidelines): Unchanged  Do you know how to contact your clinic care team if you have future questions or changes to your health status? : Yes  Does the patient have their discharge instructions? : Yes (Has eye drops to use every 2 hours. They didn't address the pain she is having. Taking Ibuprofen and Tylenol for pain with no relief. Is also having joint pain. Right knee and neck.)  Does the patient have questions regarding their discharge instructions? : No  Were you started on any new medications or were there changes to any of your previous medications? : Yes  Does the patient have all of their medications?: Yes  Do you have questions regarding any of your medications? : No  Do you have all of your needed medical supplies or equipment (DME)?  (i.e. oxygen tank, CPAP, cane, etc.): Yes    Follow up Plan     Discharge Follow-Up  Discharge follow up appointment scheduled in alignment with recommended follow up timeframe or Transitions of Risk Category? (Low = within 30 days; Moderate= within 14 days; High= within 7 days): Yes  Discharge Follow Up Appointment Date: 07/15/24  Discharge Follow Up Appointment Scheduled with?: Primary Care Provider    Future Appointments   Date Time Provider Department Center   7/15/2024  2:00 PM Fernanda Rodriguez MD [facility]   8/15/2024  3:15 PM Marian Castellanos PA-C [facility]   10/24/2024 11:00 AM Layla Cyr PA-C [facility]   11/11/2024  5:00 PM Fernanda Rodriguez MD [facility]       Outpatient Plan as outlined on AVS  reviewed with patient.    For any urgent concerns, please contact our 24 hour nurse triage line: [phone] ([phone])       Venecia Alonso RN

## 2024-07-16 ENCOUNTER — TELEPHONE (OUTPATIENT)
Dept: CALL CENTER | Age: 54
End: 2024-07-16
Payer: COMMERCIAL

## 2024-07-16 LAB
CCP AB SER IA-ACNC: 0.7 U/ML
LYSOZYME SERPL-MCNC: 0.88 UG/ML

## 2024-07-16 NOTE — TELEPHONE ENCOUNTER
Reviewed by eye team.    Pt to follow up in Acute eye clinic July 24th at 8 AM with Dr. Hinds to staff.    Tentatively scheduled the appointment.    Note to patient communicator to assist in review/confirming appointment.    Kenneth Donato RN 3:53 PM 07/16/24

## 2024-07-16 NOTE — TELEPHONE ENCOUNTER
[facility] Call Center    Phone Message    May a detailed message be left on voicemail: yes     Reason for Call: Appointment Intake    Referring Provider Name: Fernanda Ni  Diagnosis and/or Symptoms: Uveitis- hospital follow up. There was a previous encounter regarding patient needing to be seen per urgent referral. Patient states no one has contacted her yet.  Please call to advise.     Action Taken: Message routed to:  [facility]    Travel Screening: Not Applicable

## 2024-07-16 NOTE — TELEPHONE ENCOUNTER
Pt seen 7-13 in Emergency department by on call eye provider.    I will review recommendation for follow up with provider and clinic will reach out after review.    Kenneth Donato RN 10:00 AM 07/16/24

## 2024-07-16 NOTE — TELEPHONE ENCOUNTER
Called and spoke to kathy     She is requesting to be seen this week and not next week     She is going to be out of drops and her eye is hurting -     Can we have her see DR. Mueller this week?     Thanks,     V

## 2024-07-16 NOTE — TELEPHONE ENCOUNTER
Pt requesting earlier exam than next week per other message    Almost out of eye drops and continued pain.    I will review earlier scheduling options tomorrow when eye providers back in clinic.    Kenneth Donato RN 5:28 PM 07/16/24

## 2024-07-17 ENCOUNTER — OFFICE VISIT (OUTPATIENT)
Dept: OPHTHALMOLOGY | Facility: CLINIC | Age: 54
End: 2024-07-17
Attending: OPHTHALMOLOGY
Payer: COMMERCIAL

## 2024-07-17 DIAGNOSIS — H43.392 VITREOUS OPACITIES OF LEFT EYE: ICD-10-CM

## 2024-07-17 DIAGNOSIS — H20.00 ACUTE ANTERIOR UVEITIS OF LEFT EYE: Primary | ICD-10-CM

## 2024-07-17 DIAGNOSIS — H21.542 POSTERIOR SYNECHIAE, LEFT EYE: ICD-10-CM

## 2024-07-17 DIAGNOSIS — Z15.89 HLA B27 (HLA B27 POSITIVE): ICD-10-CM

## 2024-07-17 LAB
B BURGDOR DNA SPEC QL NAA+PROBE: NOT DETECTED
B LOCUS: NORMAL
B27TEST METHOD: NORMAL

## 2024-07-17 PROCEDURE — 76512 OPH US DX B-SCAN: CPT | Mod: 26 | Performed by: OPHTHALMOLOGY

## 2024-07-17 PROCEDURE — 99214 OFFICE O/P EST MOD 30 MIN: CPT | Mod: GC | Performed by: OPHTHALMOLOGY

## 2024-07-17 PROCEDURE — 99213 OFFICE O/P EST LOW 20 MIN: CPT

## 2024-07-17 PROCEDURE — 76512 OPH US DX B-SCAN: CPT

## 2024-07-17 RX ORDER — ATROPINE SULFATE 10 MG/ML
1-2 SOLUTION/ DROPS OPHTHALMIC 2 TIMES DAILY
Qty: 5 ML | Refills: 1 | Status: SHIPPED | OUTPATIENT
Start: 2024-07-17 | End: 2024-09-03

## 2024-07-17 RX ORDER — PREDNISONE 10 MG/1
TABLET ORAL
Qty: 30 TABLET | Refills: 0 | Status: SHIPPED | OUTPATIENT
Start: 2024-07-17 | End: 2024-07-19

## 2024-07-17 RX ORDER — PREDNISOLONE ACETATE 10 MG/ML
1-2 SUSPENSION/ DROPS OPHTHALMIC
Qty: 15 ML | Refills: 1 | Status: SHIPPED | OUTPATIENT
Start: 2024-07-17 | End: 2024-07-19

## 2024-07-17 RX ORDER — DIFLUPREDNATE OPHTHALMIC 0.5 MG/ML
1 EMULSION OPHTHALMIC 4 TIMES DAILY
Qty: 5 ML | Refills: 1 | Status: SHIPPED | OUTPATIENT
Start: 2024-07-17 | End: 2024-09-10

## 2024-07-17 RX ORDER — VALACYCLOVIR HYDROCHLORIDE 1 G/1
1000 TABLET, FILM COATED ORAL 3 TIMES DAILY
Qty: 30 TABLET | Refills: 0 | Status: SHIPPED | OUTPATIENT
Start: 2024-07-17 | End: 2024-08-02

## 2024-07-17 ASSESSMENT — TONOMETRY
OD_IOP_MMHG: 16
OS_IOP_MMHG: 17
IOP_METHOD: TONOPEN

## 2024-07-17 ASSESSMENT — CONF VISUAL FIELD
OD_INFERIOR_TEMPORAL_RESTRICTION: 0
METHOD: COUNTING FINGERS
OS_SUPERIOR_TEMPORAL_RESTRICTION: 1
OS_INFERIOR_TEMPORAL_RESTRICTION: 1
OD_SUPERIOR_TEMPORAL_RESTRICTION: 0
OD_NORMAL: 1
OS_SUPERIOR_NASAL_RESTRICTION: 1
OS_INFERIOR_NASAL_RESTRICTION: 1
OD_INFERIOR_NASAL_RESTRICTION: 0
OD_SUPERIOR_NASAL_RESTRICTION: 0

## 2024-07-17 ASSESSMENT — VISUAL ACUITY
OD_SC: 20/20
OD_SC+: -1
METHOD: SNELLEN - LINEAR
OS_SC: HM

## 2024-07-17 ASSESSMENT — SLIT LAMP EXAM - LIDS
COMMENTS: UL EDEMA
COMMENTS: NORMAL

## 2024-07-17 NOTE — TELEPHONE ENCOUNTER
Home/mobile-- [phone]    Called times two    Worsening symptoms and vision worsening since visit last week.    Pt not able to clock on Monday/Tuesday and today reporting light only    Pt with no improvement in pain symptoms and worsening.    Scheduled in acute eye clinic today    Pt scheduled at 1230 and aware of date/time/location at [facility]    Kenneth Donato RN 9:21 AM 07/17/24

## 2024-07-17 NOTE — Clinical Note
Dr. Vaughn and Dr. Patton: See note for updates. Few teaching points: Please be sure all meds are prescribed appropriately (cyclopentolate was prescribed to only be used for 3 days). Next, if a patient has 4+ cell, steroid drops should be given every hour and follow up scheduled in 2-4 days not 10-11 days. Next, be sure to comment on depth/shallowness of anterior chamber. Finally, the cold sore history could signal possibility of viral uveitis. Dr. Vaughn, please read up on this condition.  Thanks both for seeing her.

## 2024-07-17 NOTE — TELEPHONE ENCOUNTER
Tentatively scheduled this Friday in acute clinic.    I will review with eye team to confirm plan and reach out to pt.    Kenneth Donato RN 8:11 AM 07/17/24

## 2024-07-17 NOTE — PROGRESS NOTES
Chief Complaint/Presenting Concern: Urgent visit after ER    History of Present Illness:   Elaine Awad is a 53 year old patient who presents for urgent visit after ER visit for uveitis.     Ms. Awad began developed issues with her left eye starting around 7/8/2024. She says that initially, her left eye appeared red and felt irritated, but there were no vision changes. She initially attributed this to ocular allergies, which she has often. However, she tried oral Allegra and topical olopatadine, and these did not help. These symptoms seemed to fluctuate throughout the week. She began to notice some blurring of vision in the left eye on 7/12/24. All of these symptoms became worse on 7/13/24, prompting her presentation to the emergency department.      Evaluation by the ophthalmology consult service diagnosed acute anterior uveitis of the left eye. T sign was absent on bedside ultrasound. Laboratory evaluation and chest x-ray were performed. She was started on Pred Forte once every two hours while awake in the left eye and cyclopentolate three times per day in the left eye and follow up scheduled in 10 days. Ms. Awad has not had much improvement and noted pain, tearing, and vision have worsened prompting this visit today.    Additional Ocular History:   No eye trauma or any recent surgeries.   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 No prior laser  Allergic conjunctivitis of both eyes    Relevant Past Medical/Family/Social History:  Medical:  Hypertension  Gastroesophageal reflux disease  Intermittent cold sores  Got shingles vaccine on 5/1/2024  Recent labs showed HLA B27 positivity but no known inflammatory bowel disease, rheumatoid arthritis, Lupus, or other autoimmune disease.  Chronic red, bumpy rash of her chest. She had a biopsy for this and limited systemic workup in March 2024. The workup was remarkable for borderline positive ZHAO.  Dermatopathology evaluation of a chest skin scraping showed epidermal hyperplasia with focal acantholysis, suggestive of Tyrel's disease.    Family:  No family history of inflammatory disease, including uveitis. Paternal grandmother has had macular degeneration. Her father had an eye removed because of cancer, possibly melanoma, at age 48. No family history of glaucoma.     Social:  Here with her .She works as a public health nurse but does not think she has had sick contacts. Denies TB exposure, cats, tick bites, recent travel    Relevant Review of Systems:  Recent right knee and neck pain. She saw her primary care provider for these pains on 7/15/24. The primary care provider added ESR, CRP, and XR imaging of the knee and cervical spine. There was effusion of the right knee in the setting of osteoarthritis, but there was no other evidence of inflammatory disease on these x-rays. She denies shock-like sensations.   No fever, chills, cough, weight loss, back pain, night sweats    Laboratory Testing  Abnormal: Sedimentation rate (35, borderline elevated); HLA-B27 positive  Normal/negative: ACE, CCP, lysozyme, rheumatoid factor, Quantiferon Gold, syphilis antibody , hepatitis B surface antigen, hepatitis C antibody, anti-dsDNA, RNP antibody, Smith antibody, SSA, SSB.     Chest x-ray: July 13, 2024   No focal airspace disease. No bulky lymphadenopathy, however cannot definitively rule out sarcoid based on radiograph.    Current eye related medications: Prednisolone once every two hours left eye; (stopped cyclopentolate few days ago)    Retina/Uveitis Imaging:  B-scan: July 17, 2024  Left eye: rare vitreous opacities, retinal detachment, choroidal detachment, or sub-tenon fluid. Lateral rectus enlargement noted.     Assessment:/Plan:  1. Acute anterior uveitis of left eye  2. Posterior synechiae, left eye  3. Vitreous opacities, left eye  4. HLA B27 (HLA B27 positive)  Discussed findings with patient and her  .There is now hypopyon uveitis in the left eye with fibrin overlying lens and no view to the posterior segment (not dilated today) but no significant vitreous inflammation nor choroidal or retinal detachments. acute anterior uveitis of the left eye without systemic symptoms or involvement of the right eye.    Laboratory workup to date is remarkable for HLA B27 positivity. She also has a history of cold sores and osteoarthritis. The remainder of her review of systems and laboratory workup have been unremarkable. Potential etiologies may include HSV, HLA-B27, or idiopathic. Although HSV is possible, the pressure is normal and hypopyon is atypical.Fortunately, she does not have recent ocular trauma or surgery and her right eye is unaffected, suggesting against endogenous or exogenous endophthalmitis.     Recommendations:  Eye pressure is normal and can be monitored   The right eye is without inflammation and can be monitored without drops  Recommend additional diagnostic testing: none at this time. Considered AC tap, but deferred as some characteristics of her uveitis are less typical such as hypopyon and normal eye pressure. We  will treat empirically with Valtrex and add oral prednisone  No need to restart cyclopentolate, will use more potent steroid dilating drop called Atropine 2x/day in the left eye.   Stop prednisolone and start stronger steroid drop (Difluprednate) Durezol 4x/day in the left eye. If not covered use prednisolone every 1-2 hours left eye  Add oral steroid prednisone 30 mg each morning  Start oral antiviral Valtrex 1,000 mg three times per day    RTC Friday, July 19, 2024 Lizet. Tonopen dilate left eye no testing    Jovanni Pan MD  Resident Physician, PGY-2  Department of Ophthalmology  07/17/2024 2:06 PM     Attending Physician Attestation:  Complete documentation of historical and exam elements from today's encounter can be found in the full encounter summary report (not  reduplicated in this progress note). I reviewed the chief complaint(s) and history of present illness, and  confirmed and edited as necessary the review of systems, past medical/surgical history, family history, social history, and examination findings as documented by others and the treating Resident or Fellow Physician.    I examined the patient myself, discussed the findings, reviewed all ancillary testing data and modified these results and reports along with the assessment and plan with the Treating Resident or Fellow Physician. I agree with the note as detailed above.   Adam Hinds M.D.  Uveitis and Medical Retina  July 17, 2024

## 2024-07-17 NOTE — PATIENT INSTRUCTIONS
Start taking valtrex three times per day  Start taking three prednisone tablets every morning with breakfast  Start using Durezol (difluprednate) four times per day in the left eye  If you are unable to obtain Durezol, then use prednisolone once every two hours in the left eye while awake  If you are able to obtain Durezol, then do not use prednisolone eye drops  Start using atropine twice per day in the left eye   Return to clinic on Friday, July 19th for follow-up

## 2024-07-17 NOTE — NURSING NOTE
Chief Complaints and History of Present Illnesses   Patient presents with    Uveitis Follow-Up     Uveitis Follow-up from ED visit.     Chief Complaint(s) and History of Present Illness(es)       Uveitis Follow-Up              Associated symptoms: eye pain, tearing, photophobia, itching and foreign body sensation.  Negative for dryness, flashes, floaters and discharge    Pain scale: 5/10    Comments: Uveitis Follow-up from ED visit.              Comments    Pt states vision is worse since ED visit last Saturday with more pain.  Pain 5/10.  No flashes/floaters.  Pt states vision is a blur, not able to make out shapes.  Slight FBS, itching, burning, and light sensitivity.  No DM.    Pred LE every 2 hours while awake.  Stopped cyclopentolate drops LE yesterday.    TL Anderson July 17, 2024 12:20 PM                       
No

## 2024-07-18 ASSESSMENT — CUP TO DISC RATIO: OD_RATIO: 0.4

## 2024-07-18 ASSESSMENT — EXTERNAL EXAM - RIGHT EYE: OD_EXAM: NORMAL

## 2024-07-18 ASSESSMENT — EXTERNAL EXAM - LEFT EYE: OS_EXAM: MILD BROW PTOSIS

## 2024-07-19 ENCOUNTER — MYC MEDICAL ADVICE (OUTPATIENT)
Dept: FAMILY MEDICINE | Facility: CLINIC | Age: 54
End: 2024-07-19

## 2024-07-19 ENCOUNTER — OFFICE VISIT (OUTPATIENT)
Dept: OPHTHALMOLOGY | Facility: CLINIC | Age: 54
End: 2024-07-19
Attending: OPHTHALMOLOGY
Payer: COMMERCIAL

## 2024-07-19 DIAGNOSIS — H21.542 POSTERIOR SYNECHIAE, LEFT EYE: ICD-10-CM

## 2024-07-19 DIAGNOSIS — Z15.89 HLA B27 (HLA B27 POSITIVE): ICD-10-CM

## 2024-07-19 DIAGNOSIS — H20.00 ACUTE ANTERIOR UVEITIS OF LEFT EYE: Primary | ICD-10-CM

## 2024-07-19 DIAGNOSIS — M17.11 PRIMARY OSTEOARTHRITIS OF RIGHT KNEE: Primary | ICD-10-CM

## 2024-07-19 PROCEDURE — 99214 OFFICE O/P EST MOD 30 MIN: CPT | Performed by: OPHTHALMOLOGY

## 2024-07-19 PROCEDURE — 99213 OFFICE O/P EST LOW 20 MIN: CPT | Performed by: OPHTHALMOLOGY

## 2024-07-19 RX ORDER — PREDNISONE 10 MG/1
TABLET ORAL
Qty: 60 TABLET | Refills: 1 | Status: SHIPPED | OUTPATIENT
Start: 2024-07-19 | End: 2024-08-02

## 2024-07-19 ASSESSMENT — VISUAL ACUITY
METHOD: SNELLEN - LINEAR
OD_SC: 20/20
OS_SC: CF@2'

## 2024-07-19 ASSESSMENT — CUP TO DISC RATIO: OD_RATIO: 0.4

## 2024-07-19 ASSESSMENT — EXTERNAL EXAM - RIGHT EYE: OD_EXAM: NORMAL

## 2024-07-19 ASSESSMENT — CONF VISUAL FIELD
OD_SUPERIOR_TEMPORAL_RESTRICTION: 0
METHOD: COUNTING FINGERS
OD_INFERIOR_TEMPORAL_RESTRICTION: 0
OD_NORMAL: 1
OS_SUPERIOR_TEMPORAL_RESTRICTION: 3
OS_SUPERIOR_NASAL_RESTRICTION: 3
OS_INFERIOR_TEMPORAL_RESTRICTION: 3
OS_INFERIOR_NASAL_RESTRICTION: 3
OD_SUPERIOR_NASAL_RESTRICTION: 0
OD_INFERIOR_NASAL_RESTRICTION: 0

## 2024-07-19 ASSESSMENT — SLIT LAMP EXAM - LIDS
COMMENTS: COMFORTABLE
COMMENTS: NORMAL

## 2024-07-19 ASSESSMENT — EXTERNAL EXAM - LEFT EYE: OS_EXAM: MILD BROW PTOSIS

## 2024-07-19 ASSESSMENT — TONOMETRY
OS_IOP_MMHG: 11
OD_IOP_MMHG: 16
IOP_METHOD: TONOPEN

## 2024-07-19 NOTE — PROGRESS NOTES
Chief Complaint/Presenting Concern:  Uveitis follow up    Interval History of Present Ocular Illness:  Elaine Awad is a 53 year old patient who returns for follow up of her acute anterior uveitis of the left eye. She was last seen on 7/17/24 at which time we elected to modify steroid and dilating.     Ms. Awad started the Durezol yesterday afternoon, prednisone and Valtrex on 7/17/24. Ms. Awad reports the vision is perhaps a little better, less tearing, and less red for the left eye. Right eye still fine.     Interval Updates to Medical/Family/Social History:  No updates    Relevant Review of Systems Updates:  Right foot is swollen this morning and knee is still sore    Labs: None since last visit     Current eye related medications: Atropine 2x/day left eye, Durezol 4x/day left eye, Valtrex 1000 mg 3x/day, Prednisone 30 mg daily (two doses so far)    No imaging today    Assessment:     1. Acute anterior uveitis of left eye  Improving     2. Posterior synechiae, left eye  Improving     3. HLA B27 (HLA B27 positive)  With some joint pain, although no known systemic associations.    Plan/Recommendations:    Discussed findings with patient and her . There is already improvement on the treatment course. The hypopyon has resolved and fibrin is layering, pupil is more dilated and the is more comfortable. We will continue most treatments unchanged.  Eye pressure is normal in each eye, so this can be monitored without pressure lowering drops.   Recommend additional testing: None at this time. Regarding right knee and right ankle pain, try to elevate the right ankle when sitting for periods of time.   Continue these medications unchanged:: Atropine (red top) dilating drop 2x/day left eye and the difluprednate (Durezol) 4x/day in the left eye  For the Valtrex, please keep taking 1000 mg three times daily until it runs and then stop   For the prednisone, keep taking 30 mg each AM with food until Sunday, 7/28/24. Then  starting on Monday, 7/29/24, take 20 mg each AM with food until next visit. Let me know if you have any issues.   No drops right eye   No new treatments    RTC Week of 7/29 tonopen, no dilation, no testing    Physician Attestation     Attending Physician Attestation:  Complete documentation of historical and exam elements from today's encounter can be found in the full encounter summary report (not reduplicated in this progress note). I personally obtained the chief complaint(s) and history of present illness. I confirmed and edited as necessary the review of systems, past medical/surgical history, family history, social history, and examination findings as documented by others; and I examined the patient myself. I personally reviewed the relevant tests, images, and reports as documented above. I formulated and edited as necessary the assessment and plan and discussed the findings and management plan with the patient and family members present at the time of this visit.  Adam Hinds M.D., Uveitis and Medical Retina, July 19, 2024

## 2024-07-19 NOTE — LETTER
7/19/2024       RE: Elaine Awad  [id number] [facility] [id number]     Dear Colleague,    Thank you for referring your patient, Elaine Awad, to the [facility] [place] at [facility]. Please see a copy of my visit note below.    Chief Complaint/Presenting Concern:  Uveitis follow up    Interval History of Present Ocular Illness:  Elaine Awad is a 53 year old patient who returns for follow up of her acute anterior uveitis of the left eye. She was last seen on 7/17/24 at which time we elected to modify steroid and dilating.     Ms. Awad started the Durezol yesterday afternoon, prednisone and Valtrex on 7/17/24. Ms. Awad reports the vision is perhaps a little better, less tearing, and less red for the left eye. Right eye still fine.     Interval Updates to Medical/Family/Social History:  No updates    Relevant Review of Systems Updates:  Right foot is swollen this morning and knee is still sore    Labs: None since last visit     Current eye related medications: Atropine 2x/day left eye, Durezol 4x/day left eye, Valtrex 1000 mg 3x/day, Prednisone 30 mg daily (two doses so far)    No imaging today    Assessment:     1. Acute anterior uveitis of left eye  Improving     2. Posterior synechiae, left eye  Improving     3. HLA B27 (HLA B27 positive)  With some joint pain, although no known systemic associations.    Plan/Recommendations:    Discussed findings with patient and her . There is already improvement on the treatment course. The hypopyon has resolved and fibrin is layering, pupil is more dilated and the is more comfortable. We will continue most treatments unchanged.  Eye pressure is normal in each eye, so this can be monitored without pressure lowering drops.   Recommend additional testing: None at this time. Regarding right knee and right ankle pain, try to elevate the right ankle when sitting for periods of time.   Continue these  medications unchanged:: Atropine (red top) dilating drop 2x/day left eye and the difluprednate (Durezol) 4x/day in the left eye  For the Valtrex, please keep taking 1000 mg three times daily until it runs and then stop   For the prednisone, keep taking 30 mg each AM with food until Sunday, 7/28/24. Then starting on Monday, 7/29/24, take 20 mg each AM with food until next visit. Let me know if you have any issues.   No drops right eye   No new treatments    RTC Week of 7/29 tonopen, no dilation, no testing    Physician Attestation    Attending Physician Attestation:  Complete documentation of historical and exam elements from today's encounter can be found in the full encounter summary report (not reduplicated in this progress note). I personally obtained the chief complaint(s) and history of present illness. I confirmed and edited as necessary the review of systems, past medical/surgical history, family history, social history, and examination findings as documented by others; and I examined the patient myself. I personally reviewed the relevant tests, images, and reports as documented above. I formulated and edited as necessary the assessment and plan and discussed the findings and management plan with the patient and family members present at the time of this visit.  Adam Hinds M.D., Uveitis and Medical Retina, July 19, 2024     Again, thank you for allowing me to participate in the care of your patient.      Sincerely,    Adam Hinds MD  [facility] Dept of Ophthalmology  Uveitis and Medical Retina

## 2024-07-19 NOTE — LETTER
July 19, 2024      Re: Elaine Awad   1970    To Whom It May Concern:    This is to confirm that the above patient was seen on 7/13/24, 7/17/24, 7/19/2024.  Elaine Awad has a new eye condition requiring frequent visits and treatment. She may need intermittent time off as she continues to improve and will do her best to work when possible.     Thank you for your cooperation in this matter.  Please do not hesitate to contact me if you have any further questions.    Sincerely,      REBEKAH HARDING

## 2024-07-19 NOTE — NURSING NOTE
Chief Complaints and History of Present Illnesses   Patient presents with    Uveitis Evaluation     Acute anterior uveitis of left eye     Chief Complaint(s) and History of Present Illness(es)       Uveitis Evaluation              Laterality: left eye    Associated symptoms: photophobia and floaters.  Negative for dryness, eye pain and flashes    Pain scale: 0/10    Comments: Acute anterior uveitis of left eye              Comments    Pt states vision is a little better.  No pain.  No flashes.  No change to floaters.    Atropine LE 2x/day  Durezol LE 4x/day, started yesterday.  Valtrex 1000 mg 3x/day.    TL Anderson July 19, 2024 7:17 AM

## 2024-07-19 NOTE — PATIENT INSTRUCTIONS
Continue these medications unchanged:: Atropine (red top) dilating drop 2x/day left eye and the difluprednate (Durezol) 4x/day in the left eye  For the Valtrex, please keep taking 1000 mg three times daily until it runs and then stop   For the prednisone, keep taking 30 mg each AM with food until Sunday, 7/28/24. Then starting on Monday, 7/29/24, take 20 mg each AM with food until next visit. Let me know if you have any issues.   No drops right eye   No new treatments

## 2024-07-29 ENCOUNTER — OFFICE VISIT (OUTPATIENT)
Dept: ORTHOPEDICS | Facility: CLINIC | Age: 54
End: 2024-07-29
Payer: COMMERCIAL

## 2024-07-29 VITALS
TEMPERATURE: 97.7 F | SYSTOLIC BLOOD PRESSURE: 126 MMHG | WEIGHT: 253.5 LBS | DIASTOLIC BLOOD PRESSURE: 84 MMHG | BODY MASS INDEX: 44.25 KG/M2

## 2024-07-29 DIAGNOSIS — M17.11 PRIMARY OSTEOARTHRITIS OF RIGHT KNEE: Primary | ICD-10-CM

## 2024-07-29 PROCEDURE — 99203 OFFICE O/P NEW LOW 30 MIN: CPT | Performed by: PHYSICIAN ASSISTANT

## 2024-07-29 ASSESSMENT — PAIN SCALES - GENERAL: PAINLEVEL: MODERATE PAIN (4)

## 2024-07-29 NOTE — LETTER
7/29/2024      Elaine Awad  [address] [facility] [address]      Dear Colleague,    Thank you for referring your patient, Elaine Awad, to the [facility]. Please see a copy of my visit note below.    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  She enjoys walking and going to [place] and swimming and reading..      She is being seen for   Chief Complaint   Patient presents with     Knee Pain     Right     Consult         History of Present Illness:   Mechanism of Injury: No specific injury fall or trauma  Location: Right anterior medial knee  Duration of Pain: 2 to 3 weeks  Rating of Pain: 4 out of 10  Pain Quality: Aching  Pain is better with: Aleve  Pain is worse with: Activity  Treatment so far consists of: Patient was seen previously by Dr. Rivas for right knee pain and diagnostics with primary osteoarthritis.  This was on 9 11/20/2017 and at that time they talked about activity modification as well as taking Aleve.  Also gave information on conservative osteoarthritis treatments and discussed possible injection in the future or even an arthroscopy if they got an MRI before then.  Most recently patient saw family practice on 7/15/2024 and patient was given Norco for multi joint pain.  X-rays were done of right knee.  Patient also has done prednisone which she is taking for her eye which has been helping her knee.  She has done a cane which helps.  She has done Tylenol 2 tablets of arthritis in the p.m. which helps.  Patient does Aleve times 2 AM and p.m. and that does help.  Patient has tried Bengay and IcyHot which gives her relief but only for a small amount of time.  Patient has tried bracing without much luck.  Patient is taking glucosamine at night.  Patient has not done any formal therapy room and has not had any steroids injected  Associated Features: Denies numbness or tingling shooting  burning electric pain.  She does morning stiffness.  She has not had any catching or locking but she has noticed some swelling.  Prior history of related problems: No previous surgery or trauma or fractures to the right knee  Pain is Limiting: Heavy use of the right knee  Here to: Orthopedic consultation  The Pain Has: Been about the same and intermittent  Additional History: Patient also was complaining of multiple joint pain so she had a workup not that long ago checking on autoimmune conditions and rheumatoid arthritis and all those lab values she states were negative.      Patient's past medical, surgical, social and family histories reviewed.     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     GERD (gastroesophageal reflux disease)      Tyrel's disease      Hypertension goal BP (blood pressure) < 140/90      Iron deficiency anemia      Menorrhagia      Migraine      Morbid obesity (H)      Nonallergic rhinitis 04/17/2019     Recurrent cold sores      Vulvar dermatitis 10/14/2010     Yeast infection of the vagina, recurrent          Past Surgical History:   Procedure Laterality Date     COLPOSCOPY,BX CERVIX/ENDOCERV CURR  07/1994     DAVINCI XI HERNIORRHAPHY VENTRAL N/A 04/15/2024    Procedure: Robot-assisted laparoscopic incarcerated ventral incisional hernia repair with mesh;  Surgeon: Aly Cai DO;  Location:  OR     ESOPHAGOSCOPY, GASTROSCOPY, DUODENOSCOPY (EGD), COMBINED  01/02/2014    Procedure: COMBINED ESOPHAGOSCOPY, GASTROSCOPY, DUODENOSCOPY (EGD);;  Surgeon: Eden Stacy MD;  Location:  GI     ESOPHAGOSCOPY, GASTROSCOPY, DUODENOSCOPY (EGD), COMBINED N/A 01/19/2017    Procedure: COMBINED ESOPHAGOSCOPY, GASTROSCOPY, DUODENOSCOPY (EGD);  Surgeon: Ciro Deras MD;  Location: [facility] OR     LAPAROSCOPIC GASTRIC ADJUSTABLE  BANDING  04/28/2014    Procedure: LAPAROSCOPIC GASTRIC ADJUSTABLE BANDING;  Surgeon: Ciro Deras MD;  Location: [facility] OR     LAPAROSCOPIC HERNIORRHAPHY HIATAL  04/28/2014    Procedure: LAPAROSCOPIC HERNIORRHAPHY HIATAL;  Surgeon: Ciro Deras MD;  Location: [facility] OR     LAPAROSCOPIC REMOVAL GASTRIC ADJUSTABLE BAND N/A 01/19/2017    Procedure: LAPAROSCOPIC REMOVAL GASTRIC ADJUSTABLE BAND;  Surgeon: Ciro Deras MD;  Location: [facility] OR     LITHOTRIPSY       ZZC TREAT ECTOPIC PREG,RMV TUBE/OVARY      LT       Medications:  Current Outpatient Medications   Medication Sig Dispense Refill     atropine 1 % ophthalmic solution Place 1-2 drops Into the left eye 2 times daily 5 mL 1     clindamycin (CLEOCIN T) 1 % external lotion APPLY TOPICALLY TO FACE TWICE DAILY FOR ACNE       clobetasol propionate (TEMOVATE) 0.05 % external cream Apply to AA BID x 1-2 weeks then PRN. Do not apply to face. 60 g 3     COMPOUNDED NON-CONTROLLED SUBSTANCE (CMPD RX) - PHARMACY TO MIX COMPOUNDED MEDICATION Gabapentin 5%/amitriptyline 5%/Lidocaine 5% vanishing cream SIG apply to AA BID PRN 30 g 11     difluprednate (DUREZOL) 0.05 % ophthalmic emulsion Place 1 drop Into the left eye 4 times daily 5 mL 1     diphenhydrAMINE (BENADRYL) 25 MG tablet Take 25 mg by mouth nightly as needed for sleep       fexofenadine (ALLEGRA) 180 MG tablet Take 180 mg by mouth as needed       fluconazole (DIFLUCAN) 150 MG tablet Take 1 tablet (150 mg) by mouth every 72 hours as needed 3 tablet 3     glucosamine 500 MG CAPS capsule Take 500 mg by mouth daily       HYDROcodone-acetaminophen (NORCO) 5-325 MG tablet Take 1 tablet by mouth every 6 hours as needed for moderate to severe pain 8 tablet 0     insulin pen needle (B-D U/F) 31G X 5 MM miscellaneous USE DAILY OR AS DIRECTED 100 each 3     levonorgestrel (MIRENA) 20 MCG/DAY IUD 1 each (20 mcg) by Intrauterine route once       liraglutide - Weight Management (SAXENDA) 18 MG/3ML pen Inject 3 mg  Subcutaneous daily (Patient taking differently: Inject 2.4 mg subcutaneously daily) 15 mL 2     losartan (COZAAR) 50 MG tablet Take 1 tablet (50 mg) by mouth daily 90 tablet 3     magnesium oxide (MAG-OX) 400 MG tablet Take 1 tablet by mouth daily       nystatin (NYSTOP) 948359 UNIT/GM external powder APPLY TO THE AFFECTED AREA UNDER THE BREASTS THREE TIMES DAILY AS NEEDED. 60 g 5     olopatadine (PATADAY) 0.2 % ophthalmic solution Place 0.05 mLs (1 drop) into both eyes daily 2.5 mL 11     omeprazole (PRILOSEC) 20 MG DR capsule Take 1 capsule (20 mg) by mouth daily 90 capsule 3     phentermine 15 MG capsule Take 1 capsule (15 mg) by mouth every morning 30 capsule 5     predniSONE (DELTASONE) 10 MG tablet Take 3 tablets (30 mg) each morning with breakfast until next breakfast 60 tablet 1     rizatriptan (MAXALT-MLT) 5 MG ODT TAKE 1-2 TABLETS BY MOUTH AT ONSET OF HEADACHE MAY REPEAT IN 2 HOURS. MAX OF 6 TABLETS PER 24 HOURS 36 tablet 3     valACYclovir (VALTREX) 1000 mg tablet Take 1 tablet (1,000 mg) by mouth 3 times daily 30 tablet 0     valACYclovir (VALTREX) 500 MG tablet TAKE 1 TABLET BY MOUTH DAILY. INCREASE TO 4 TABLET BY MOUTH 2 TIMES DAILY AT ONSET OF OUTBREAK 100 tablet 3     No current facility-administered medications for this visit.       Allergies   Allergen Reactions     Doxycycline Rash     Full body rash     Ampicillin Swelling     Tongue swelling at end of treatment course       Social History     Occupational History     Occupation: RN     Employer: [employer]   Tobacco Use     Smoking status: Former     Current packs/day: 0.00     Average packs/day: 0.5 packs/day for 10.0 years (5.0 ttl pk-yrs)     Types: Cigarettes     Start date: 1987     Quit date: 1997     Years since quittin.6     Smokeless tobacco: Never   Vaping Use     Vaping status: Never Used   Substance and Sexual Activity     Alcohol use: Yes     Comment: occasional (infrequent)     Drug use: No      Sexual activity: Yes     Partners: Male     Birth control/protection: I.U.D.       Family History   Problem Relation Age of Onset     Diabetes Mother      C.A.D. Mother      Cancer Father 72        d. pancreatic, melanoma      Colon Polyps Father      Obesity Brother      Breast Cancer Maternal Grandmother      Heart Disease Maternal Grandfather         CHF     Heart Disease Paternal Grandmother         CHF     Colon Polyps Paternal Grandmother      Respiratory Paternal Grandfather         TB     Breast Cancer Maternal Aunt      Glaucoma No family hx of      Macular Degeneration No family hx of      Ovarian Cancer No family hx of        REVIEW OF SYSTEMS  10 point review systems performed otherwise negative as noted as per history of present illness.    Physical Exam:  Vitals: /84 (BP Location: Right arm, Patient Position: Sitting, Cuff Size: Adult Large)   Temp 97.7  F (36.5  C) (Temporal)   Wt 115 kg (253 lb 8 oz)   BMI 44.25 kg/m    BMI= Body mass index is 44.25 kg/m .    Constitutional: healthy, alert and no acute distress   Psychiatric: mentation appears normal and affect normal/bright  NEURO: no focal deficits, CMS intact right lower extremity   RESP: Normal with easy respirations and no use of accessory muscles to breathe, no audible wheezing or retractions  CV: Calf soft and nontender to palpation, leg warm   SKIN: No erythema, rashes, excoriation, or breakdown. No evidence of infection.   MUSCULOSKELETAL:  INSPECTION of right knee: No gross deformities, erythema, edema, ecchymosis, atrophy or fasciculations.   PALPATION: No tenderness medial, lateral, anterior and posterior portion of the knee. No specific joint line tenderness.  No prepatella bursa tenderness or pes bursa tenderness. No increased warmth.  No effusion.   ROM: Passive: Extension full and actually 2 degrees of hyperextension, flexion to 110 . All range of motion without catching, locking or pain.     STRENGTH: 5 out of 5 quad and  hamstring without pain.   SPECIAL TEST: Patient has a negative Lachman's negative drawer sign. Patient's knee is stable to varus and valgus stress at 30  of flexion. Patient has a negative Bhavesh's.   GAIT: non-antalgic  Lymph: no palpable lymph nodes    Diagnostic Modalities:  XR Knee Right 3 Views    Narrative    XR KNEE RIGHT 3 VIEWS   7/15/2024 2:57 PM     HISTORY: Multiple joint pain; Primary osteoarthritis of right knee  COMPARISON: None.       Impression    IMPRESSION:     There is tricompartmental right knee osteoarthritis, most pronounced  in the medial compartment where there is advanced joint space  narrowing. There is a joint effusion. No displaced fracture.    AILEEN BASHIR MD         SYSTEM ID:  [id number]     I agree with the above reading.  I would say severe medial osteoarthritis as well as osteophytes noted on the medial side.  Patellofemoral syndrome is well-preserved with mild joint space narrowing.    Independent visualization of the images was performed.    Impression: 1.  Right knee primary osteoarthritis, medial severe and mild patellofemoral    Plan:  All of the above pertinent physical exam and imaging modalities findings was reviewed with Elaine.    FOCUSED PLAN:  Patient has significant osteoarthritis in the medial compartment but not that much under the kneecap thus I do feel a offloading brace might benefit her.  She is going to check with her insurance to figure out the cost of it we put in an orthotics order.  We discussed steroid injection but are going to hold off for now as her pain is getting slightly better.  I did order formal physical therapy for an osteoarthritis program.  Patient did get an AVS with osteoarthritis treatments.  We discussed Mobic and Voltaren gel but she is good to hold off on that for now.  Discussed weight loss with swimming which she enjoys and is going to start swimming soon.  She can continue her NSAIDs taking Aleve as needed.  She also had a recent  workup for Rheumatoid arthritis and autoimmune conditions which was negative.  Patient can follow-up on an as-needed basis.      Re-x-ray on return: No      This note was dictated with [employer].    Bora Pickard PA-C        Again, thank you for allowing me to participate in the care of your patient.        Sincerely,        Bora Pickard PA-C

## 2024-07-29 NOTE — PATIENT INSTRUCTIONS
Encounter Diagnosis   Name Primary?    Primary osteoarthritis of right knee Yes     Below is a bunch of information and treatments for osteoarthritis.  We did talk about a steroid injection but we can hold on that and see if you continue to get better but if you needed that you could always make an appointment just for that.  Make sure you call the insurance about that offloading brace but I do think that might benefit you also.  Therapy orders placed also.    Rest, ice and elevate above heart level as needed for pain control  1.  Activity modification: I recommend no impact activity, avoid stairs and ladders and kneeling.  Biking and elliptical and swimming are good activities.  2.  Formal therapy: I do an osteoarthritis program where they show you exercises and teach how to manage her arthritis.  It is 1-2 sessions.  3.  Oral Over-The-Counter Medication: Tylenol or Ibuprofen can help.  You can alternate these medications.  When the pain is really bad I recommend doing Ibuprofen 800mg 3 times a day x4 to 5 days to give you a burst of anti-inflammatory medication.    4.  Oral Prescription Medication: Mobic or meloxicam Anti-inflammatory medication that is as powerful as 800 of ibuprofen 3 times a day but a little easier on her stomach and kidneys.  If that medication does not work well there is always Voltaren or diclofenac that is another version of this medication.  5.  Topical Medication: Voltaren gel is an anti-inflammatory topical medication we can order for you also.    6. Steroid injection: These can be done every 3 months if needed but we encourage doing them only when you need to.    7. Hyaluronic acid injection: These are basically injections that have the gel and on that help lubricate the knee.  We have to get this approved from your insurance before you can do it to prevent getting any sort of bill.  These injections can do this every 6 months.  8.  Offloading brace: These braces do not work for  everyone, it is going there is only cartilage wear in 1 compartment right either the inside of the outside of the knee and not under the kneecap.  If you qualified for this brace, we would have you check with your insurance before doing this because it can be expensive.  We have this done in orthotics and it pushes in the weight onto the side of the knee that has more cartilage.    9.  Weight loss: Even losing just 5 pounds can make a big difference for the knee.  This can help decrease pain, irritation wear and tear.  Often times we encourage nutritional consult and exercise that does not cause irritation.  This would be nonimpact activity such as walking, elliptical, biking, and swimming.  10. Swimming: This is one of the best workouts when your joints hurt as it takes weight off the joints and a second full body workout.  There is a pool in [place] at the [employer] ([address]), also [place] [employer] has a pool ([address]) and also a pool in [place] at [employer].    [employer] and [facility] may offer reliable information regarding your diagnosis and treatment plan.    Knee Arthritis: Care Instructions  Overview     Knee arthritis is a breakdown of the cartilage that cushions your knee joint. When the cartilage wears down, your bones rub against each other. This causes pain and stiffness. Knee arthritis tends to get worse with time.  Treatment for knee arthritis involves reducing pain, making the leg muscles stronger, and staying at a healthy body weight. The treatment usually does not improve the health of the cartilage, but it can reduce pain and improve how well your knee works.  You can take simple measures to protect your knee joints, ease your pain, and help you stay active.  Follow-up care is a key part of your treatment and safety. Be sure to make and go to all appointments, and call your doctor if you are having problems. It's also a good idea to know your test results and  keep a list of the medicines you take.  How can you care for yourself at home?  Know that knee arthritis will cause more pain on some days than on others.  Stay at a healthy weight. Lose weight if you are overweight. When you stand up, the pressure on your knees from every pound of body weight is multiplied four times. So if you lose 10 pounds, you will reduce the pressure on your knees by 40 pounds.  Talk to your doctor or physical therapist about exercises that will help ease joint pain.  Stretch to help prevent stiffness and to prevent injury before you exercise. You may enjoy gentle forms of yoga to help keep your knee joints and muscles flexible.  Walk instead of jog.  Ride a bike. This makes your thigh muscles stronger and takes pressure off your knee.  Wear well-fitting and comfortable shoes.  Exercise in chest-deep water. This can help you exercise longer with less pain.  Avoid exercises that include squatting or kneeling. They can put a lot of strain on your knees.  Talk to your doctor to make sure that the exercise you do is not making the arthritis worse.  Do not sit for long periods of time. Try to walk once in a while to keep your knee from getting stiff.  Ask your doctor or physical therapist whether shoe inserts may reduce your arthritis pain.  If you can afford it, get new athletic shoes at least every year. This can help reduce the strain on your knees.  Use a device to help you do everyday activities.  A cane or walking stick can help you keep your balance when you walk. Hold the cane or walking stick in the hand opposite the painful knee.  If you feel like you may fall when you walk, try using crutches or a front-wheeled walker. These can prevent falls that could cause more damage to your knee.  A knee brace may help keep your knee stable and prevent pain.  You also can use other things to make life easier, such as a higher toilet seat and handrails in the bathtub or shower.  Take pain medicines  "exactly as directed.  Do not wait until you are in severe pain. You will get better results if you take it sooner.  If you are not taking a prescription pain medicine, take an over-the-counter medicine such as acetaminophen (Tylenol), ibuprofen (Advil, Motrin), or naproxen (Aleve). Read and follow all instructions on the label.  Do not take two or more pain medicines at the same time unless the doctor told you to. Many pain medicines have acetaminophen, which is Tylenol. Too much acetaminophen (Tylenol) can be harmful.  Tell your doctor if you take a blood thinner, have diabetes, or have allergies to shellfish.  Ask your doctor if you might benefit from a shot of steroid medicine into your knee. This may provide pain relief for several months.  Many people take the supplements glucosamine and chondroitin for osteoarthritis. Some people feel they help, but the medical research does not show that they work. Talk to your doctor before you take these supplements.  When should you call for help?   Call your doctor now or seek immediate medical care if:    You have sudden swelling, warmth, or pain in your knee.     You have knee pain and a fever or rash.     You have such bad pain that you cannot use your knee.   Watch closely for changes in your health, and be sure to contact your doctor if you have any problems.  Where can you learn more?  Go to https://www.[employer].net/patiented  Enter W187 in the search box to learn more about \"Knee Arthritis: Care Instructions.\"  Current as of: November 9, 2022               Content Version: 13.7    6590-5161 [employer].   Care instructions adapted under license by your healthcare professional. If you have questions about a medical condition or this instruction, always ask your healthcare professional. [employer] disclaims any warranty or liability for your use of this information.     Knee Arthritis: Nonsurgical Treatments (01:51)  Your health " professional recommends that you watch this short online health video.  Learn about different treatments for knee arthritis other than surgery.  Purpose:  Educates patients about nonsurgical treatment options for knee osteoarthritis.  Goal:  Users will learn about nonsurgical treatment options for knee osteoarthritis.      How to watch the video     Scan the QR code   OR Visit the website    https://hwi.se/r/[email or web address]   Current as of: November 9, 2022               Content Version: 13.7    [id number] [employer].   Care instructions adapted under license by your healthcare professional. If you have questions about a medical condition or this instruction, always ask your healthcare professional. [employer] disclaims any warranty or liability for your use of this information.       THANK YOU for coming in today. If you receive a survey via [employer] or mail please let us know if there was anything you especially appreciated today or if there is any way we can improve our clinic. We appreciate your input.    GENERAL INFORMATION:  My hours are:  Monday: Clinic 720am-440pm  Tuesday: Clinic 8am-440pm  Wednesday: Surgery  Thursday: Admin  Friday: Surgery    [facility] for any issues or concerns: [phone]    I am not in the office Thursdays. Therefore non- urgent calls and medical messages received on Thursday will be addressed when we are back in the office on Wednesday. Urgent matters will be reviewed and addressed by one of our partners in the office as needed.    If lab work was done today as part of your evaluation you will generally be contacted via [employer], mail, or phone with the results within 1-5 days. If there is an alarming result we will contact you by phone. Lab results come back at varying times, I generally wait until all labs are resulted before making comments on results. Please note labs are automatically released to [employer] (if you have signed up  for it) once available-at times you may see these prior to my having a chance to review them as well.    If you need refills please contact your pharmacist. They will send a refill request to me to review. Please allow 3 business days for us to process all refill requests. All narcotic refills should be handled in the clinic at the time of your visit.

## 2024-07-29 NOTE — PROGRESS NOTES
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  She enjoys walking and going to [place] and swimming and reading..      She is being seen for   Chief Complaint   Patient presents with    Knee Pain     Right    Consult         History of Present Illness:   Mechanism of Injury: No specific injury fall or trauma  Location: Right anterior medial knee  Duration of Pain: 2 to 3 weeks  Rating of Pain: 4 out of 10  Pain Quality: Aching  Pain is better with: Aleve  Pain is worse with: Activity  Treatment so far consists of: Patient was seen previously by Dr. Rivas for right knee pain and diagnostics with primary osteoarthritis.  This was on 9 11/20/2017 and at that time they talked about activity modification as well as taking Aleve.  Also gave information on conservative osteoarthritis treatments and discussed possible injection in the future or even an arthroscopy if they got an MRI before then.  Most recently patient saw family practice on 7/15/2024 and patient was given Norco for multi joint pain.  X-rays were done of right knee.  Patient also has done prednisone which she is taking for her eye which has been helping her knee.  She has done a cane which helps.  She has done Tylenol 2 tablets of arthritis in the p.m. which helps.  Patient does Aleve times 2 AM and p.m. and that does help.  Patient has tried Bengay and IcyHot which gives her relief but only for a small amount of time.  Patient has tried bracing without much luck.  Patient is taking glucosamine at night.  Patient has not done any formal therapy room and has not had any steroids injected  Associated Features: Denies numbness or tingling shooting burning electric pain.  She does morning stiffness.  She has not had any catching or locking but she has noticed some swelling.  Prior history of related problems: No previous surgery or trauma or fractures to the right knee  Pain is  Limiting: Heavy use of the right knee  Here to: Orthopedic consultation  The Pain Has: Been about the same and intermittent  Additional History: Patient also was complaining of multiple joint pain so she had a workup not that long ago checking on autoimmune conditions and rheumatoid arthritis and all those lab values she states were negative.      Patient's past medical, surgical, social and family histories reviewed.     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    GERD (gastroesophageal reflux disease)     Royalston's disease     Hypertension goal BP (blood pressure) < 140/90     Iron deficiency anemia     Menorrhagia     Migraine     Morbid obesity (H)     Nonallergic rhinitis 04/17/2019    Recurrent cold sores     Vulvar dermatitis 10/14/2010    Yeast infection of the vagina, recurrent          Past Surgical History:   Procedure Laterality Date    COLPOSCOPY,BX CERVIX/ENDOCERV CURR  07/1994    DAVINCI XI HERNIORRHAPHY VENTRAL N/A 04/15/2024    Procedure: Robot-assisted laparoscopic incarcerated ventral incisional hernia repair with mesh;  Surgeon: Aly Cai DO;  Location:  OR    ESOPHAGOSCOPY, GASTROSCOPY, DUODENOSCOPY (EGD), COMBINED  01/02/2014    Procedure: COMBINED ESOPHAGOSCOPY, GASTROSCOPY, DUODENOSCOPY (EGD);;  Surgeon: Eden Stacy MD;  Location:  GI    ESOPHAGOSCOPY, GASTROSCOPY, DUODENOSCOPY (EGD), COMBINED N/A 01/19/2017    Procedure: COMBINED ESOPHAGOSCOPY, GASTROSCOPY, DUODENOSCOPY (EGD);  Surgeon: Ciro Deras MD;  Location:  OR    LAPAROSCOPIC GASTRIC ADJUSTABLE BANDING  04/28/2014    Procedure: LAPAROSCOPIC GASTRIC ADJUSTABLE BANDING;  Surgeon: Ciro Deras MD;  Location:  OR    LAPAROSCOPIC HERNIORRHAPHY HIATAL  04/28/2014    Procedure: LAPAROSCOPIC HERNIORRHAPHY HIATAL;  Surgeon: Ciro Deras MD;   Location: [facility] OR    LAPAROSCOPIC REMOVAL GASTRIC ADJUSTABLE BAND N/A 01/19/2017    Procedure: LAPAROSCOPIC REMOVAL GASTRIC ADJUSTABLE BAND;  Surgeon: Ciro Deras MD;  Location: [facility] OR    LITHOTRIPSY      ZZC TREAT ECTOPIC PREG,RMV TUBE/OVARY      LT       Medications:  Current Outpatient Medications   Medication Sig Dispense Refill    atropine 1 % ophthalmic solution Place 1-2 drops Into the left eye 2 times daily 5 mL 1    clindamycin (CLEOCIN T) 1 % external lotion APPLY TOPICALLY TO FACE TWICE DAILY FOR ACNE      clobetasol propionate (TEMOVATE) 0.05 % external cream Apply to AA BID x 1-2 weeks then PRN. Do not apply to face. 60 g 3    COMPOUNDED NON-CONTROLLED SUBSTANCE (CMPD RX) - PHARMACY TO MIX COMPOUNDED MEDICATION Gabapentin 5%/amitriptyline 5%/Lidocaine 5% vanishing cream SIG apply to AA BID PRN 30 g 11    difluprednate (DUREZOL) 0.05 % ophthalmic emulsion Place 1 drop Into the left eye 4 times daily 5 mL 1    diphenhydrAMINE (BENADRYL) 25 MG tablet Take 25 mg by mouth nightly as needed for sleep      fexofenadine (ALLEGRA) 180 MG tablet Take 180 mg by mouth as needed      fluconazole (DIFLUCAN) 150 MG tablet Take 1 tablet (150 mg) by mouth every 72 hours as needed 3 tablet 3    glucosamine 500 MG CAPS capsule Take 500 mg by mouth daily      HYDROcodone-acetaminophen (NORCO) 5-325 MG tablet Take 1 tablet by mouth every 6 hours as needed for moderate to severe pain 8 tablet 0    insulin pen needle (B-D U/F) 31G X 5 MM miscellaneous USE DAILY OR AS DIRECTED 100 each 3    levonorgestrel (MIRENA) 20 MCG/DAY IUD 1 each (20 mcg) by Intrauterine route once      liraglutide - Weight Management (SAXENDA) 18 MG/3ML pen Inject 3 mg Subcutaneous daily (Patient taking differently: Inject 2.4 mg subcutaneously daily) 15 mL 2    losartan (COZAAR) 50 MG tablet Take 1 tablet (50 mg) by mouth daily 90 tablet 3    magnesium oxide (MAG-OX) 400 MG tablet Take 1 tablet by mouth daily      nystatin (NYSTOP) 260344 UNIT/GM  external powder APPLY TO THE AFFECTED AREA UNDER THE BREASTS THREE TIMES DAILY AS NEEDED. 60 g 5    olopatadine (PATADAY) 0.2 % ophthalmic solution Place 0.05 mLs (1 drop) into both eyes daily 2.5 mL 11    omeprazole (PRILOSEC) 20 MG DR capsule Take 1 capsule (20 mg) by mouth daily 90 capsule 3    phentermine 15 MG capsule Take 1 capsule (15 mg) by mouth every morning 30 capsule 5    predniSONE (DELTASONE) 10 MG tablet Take 3 tablets (30 mg) each morning with breakfast until next breakfast 60 tablet 1    rizatriptan (MAXALT-MLT) 5 MG ODT TAKE 1-2 TABLETS BY MOUTH AT ONSET OF HEADACHE MAY REPEAT IN 2 HOURS. MAX OF 6 TABLETS PER 24 HOURS 36 tablet 3    valACYclovir (VALTREX) 1000 mg tablet Take 1 tablet (1,000 mg) by mouth 3 times daily 30 tablet 0    valACYclovir (VALTREX) 500 MG tablet TAKE 1 TABLET BY MOUTH DAILY. INCREASE TO 4 TABLET BY MOUTH 2 TIMES DAILY AT ONSET OF OUTBREAK 100 tablet 3     No current facility-administered medications for this visit.       Allergies   Allergen Reactions    Doxycycline Rash     Full body rash    Ampicillin Swelling     Tongue swelling at end of treatment course       Social History     Occupational History    Occupation: RN     Employer: [employer]   Tobacco Use    Smoking status: Former     Current packs/day: 0.00     Average packs/day: 0.5 packs/day for 10.0 years (5.0 ttl pk-yrs)     Types: Cigarettes     Start date: 1987     Quit date: 1997     Years since quittin.6    Smokeless tobacco: Never   Vaping Use    Vaping status: Never Used   Substance and Sexual Activity    Alcohol use: Yes     Comment: occasional (infrequent)    Drug use: No    Sexual activity: Yes     Partners: Male     Birth control/protection: I.U.D.       Family History   Problem Relation Age of Onset    Diabetes Mother     C.A.D. Mother     Cancer Father 72        d. pancreatic, melanoma     Colon Polyps Father     Obesity Brother     Breast Cancer Maternal Grandmother      Heart Disease Maternal Grandfather         CHF    Heart Disease Paternal Grandmother         CHF    Colon Polyps Paternal Grandmother     Respiratory Paternal Grandfather         TB    Breast Cancer Maternal Aunt     Glaucoma No family hx of     Macular Degeneration No family hx of     Ovarian Cancer No family hx of        REVIEW OF SYSTEMS  10 point review systems performed otherwise negative as noted as per history of present illness.    Physical Exam:  Vitals: /84 (BP Location: Right arm, Patient Position: Sitting, Cuff Size: Adult Large)   Temp 97.7  F (36.5  C) (Temporal)   Wt 115 kg (253 lb 8 oz)   BMI 44.25 kg/m    BMI= Body mass index is 44.25 kg/m .    Constitutional: healthy, alert and no acute distress   Psychiatric: mentation appears normal and affect normal/bright  NEURO: no focal deficits, CMS intact right lower extremity   RESP: Normal with easy respirations and no use of accessory muscles to breathe, no audible wheezing or retractions  CV: Calf soft and nontender to palpation, leg warm   SKIN: No erythema, rashes, excoriation, or breakdown. No evidence of infection.   MUSCULOSKELETAL:  INSPECTION of right knee: No gross deformities, erythema, edema, ecchymosis, atrophy or fasciculations.   PALPATION: No tenderness medial, lateral, anterior and posterior portion of the knee. No specific joint line tenderness.  No prepatella bursa tenderness or pes bursa tenderness. No increased warmth.  No effusion.   ROM: Passive: Extension full and actually 2 degrees of hyperextension, flexion to 110 . All range of motion without catching, locking or pain.     STRENGTH: 5 out of 5 quad and hamstring without pain.   SPECIAL TEST: Patient has a negative Lachman's negative drawer sign. Patient's knee is stable to varus and valgus stress at 30  of flexion. Patient has a negative Bhavesh's.   GAIT: non-antalgic  Lymph: no palpable lymph nodes    Diagnostic Modalities:  XR Knee Right 3 Views    Narrative    XR  KNEE RIGHT 3 VIEWS   7/15/2024 2:57 PM     HISTORY: Multiple joint pain; Primary osteoarthritis of right knee  COMPARISON: None.       Impression    IMPRESSION:     There is tricompartmental right knee osteoarthritis, most pronounced  in the medial compartment where there is advanced joint space  narrowing. There is a joint effusion. No displaced fracture.    AILEEN BASHIR MD         SYSTEM ID:  [id number]     I agree with the above reading.  I would say severe medial osteoarthritis as well as osteophytes noted on the medial side.  Patellofemoral syndrome is well-preserved with mild joint space narrowing.    Independent visualization of the images was performed.    Impression: 1.  Right knee primary osteoarthritis, medial severe and mild patellofemoral    Plan:  All of the above pertinent physical exam and imaging modalities findings was reviewed with Elaine.    FOCUSED PLAN:  Patient has significant osteoarthritis in the medial compartment but not that much under the kneecap thus I do feel a offloading brace might benefit her.  She is going to check with her insurance to figure out the cost of it we put in an orthotics order.  We discussed steroid injection but are going to hold off for now as her pain is getting slightly better.  I did order formal physical therapy for an osteoarthritis program.  Patient did get an AVS with osteoarthritis treatments.  We discussed Mobic and Voltaren gel but she is good to hold off on that for now.  Discussed weight loss with swimming which she enjoys and is going to start swimming soon.  She can continue her NSAIDs taking Aleve as needed.  She also had a recent workup for Rheumatoid arthritis and autoimmune conditions which was negative.  Patient can follow-up on an as-needed basis.      Re-x-ray on return: No      This note was dictated with [employer].    Bora Pickard PA-C

## 2024-07-30 ASSESSMENT — ACTIVITIES OF DAILY LIVING (ADL)
GO UP STAIRS: ACTIVITY IS SOMEWHAT DIFFICULT
WEAKNESS: I HAVE THE SYMPTOM BUT IT DOES NOT AFFECT MY ACTIVITY
GO DOWN STAIRS: ACTIVITY IS SOMEWHAT DIFFICULT
SIT WITH YOUR KNEE BENT: ACTIVITY IS SOMEWHAT DIFFICULT
SQUAT: I AM UNABLE TO DO THE ACTIVITY
GIVING WAY, BUCKLING OR SHIFTING OF KNEE: I HAVE THE SYMPTOM BUT IT DOES NOT AFFECT MY ACTIVITY
HOW_WOULD_YOU_RATE_THE_OVERALL_FUNCTION_OF_YOUR_KNEE_DURING_YOUR_USUAL_DAILY_ACTIVITIES?: NEARLY NORMAL
RISE FROM A CHAIR: ACTIVITY IS MINIMALLY DIFFICULT
PAIN: THE SYMPTOM AFFECTS MY ACTIVITY SLIGHTLY
AS_A_RESULT_OF_YOUR_KNEE_INJURY,_HOW_WOULD_YOU_RATE_YOUR_CURRENT_LEVEL_OF_DAILY_ACTIVITY?: NEARLY NORMAL
SIT WITH YOUR KNEE BENT: ACTIVITY IS SOMEWHAT DIFFICULT
WEAKNESS: I HAVE THE SYMPTOM BUT IT DOES NOT AFFECT MY ACTIVITY
HOW_WOULD_YOU_RATE_THE_CURRENT_FUNCTION_OF_YOUR_KNEE_DURING_YOUR_USUAL_DAILY_ACTIVITIES_ON_A_SCALE_FROM_0_TO_100_WITH_100_BEING_YOUR_LEVEL_OF_KNEE_FUNCTION_PRIOR_TO_YOUR_INJURY_AND_0_BEING_THE_INABILITY_TO_PERFORM_ANY_OF_YOUR_USUAL_DAILY_ACTIVITIES?: 20
KNEE_ACTIVITY_OF_DAILY_LIVING_SUM: 37
SWELLING: THE SYMPTOM AFFECTS MY ACTIVITY SLIGHTLY
PAIN: THE SYMPTOM AFFECTS MY ACTIVITY SLIGHTLY
KNEEL ON THE FRONT OF YOUR KNEE: I AM UNABLE TO DO THE ACTIVITY
STIFFNESS: THE SYMPTOM AFFECTS MY ACTIVITY MODERATELY
STIFFNESS: THE SYMPTOM AFFECTS MY ACTIVITY MODERATELY
PLEASE_INDICATE_YOR_PRIMARY_REASON_FOR_REFERRAL_TO_THERAPY:: KNEE
RISE FROM A CHAIR: ACTIVITY IS MINIMALLY DIFFICULT
KNEE_ACTIVITY_OF_DAILY_LIVING_SCORE: 52.86
RAW_SCORE: 37
SQUAT: I AM UNABLE TO DO THE ACTIVITY
HOW_WOULD_YOU_RATE_THE_OVERALL_FUNCTION_OF_YOUR_KNEE_DURING_YOUR_USUAL_DAILY_ACTIVITIES?: NEARLY NORMAL
AS_A_RESULT_OF_YOUR_KNEE_INJURY,_HOW_WOULD_YOU_RATE_YOUR_CURRENT_LEVEL_OF_DAILY_ACTIVITY?: NEARLY NORMAL
SWELLING: THE SYMPTOM AFFECTS MY ACTIVITY SLIGHTLY
HOW_WOULD_YOU_RATE_THE_CURRENT_FUNCTION_OF_YOUR_KNEE_DURING_YOUR_USUAL_DAILY_ACTIVITIES_ON_A_SCALE_FROM_0_TO_100_WITH_100_BEING_YOUR_LEVEL_OF_KNEE_FUNCTION_PRIOR_TO_YOUR_INJURY_AND_0_BEING_THE_INABILITY_TO_PERFORM_ANY_OF_YOUR_USUAL_DAILY_ACTIVITIES?: 20
GO UP STAIRS: ACTIVITY IS SOMEWHAT DIFFICULT
KNEEL ON THE FRONT OF YOUR KNEE: I AM UNABLE TO DO THE ACTIVITY
WALK: ACTIVITY IS SOMEWHAT DIFFICULT
STAND: ACTIVITY IS SOMEWHAT DIFFICULT
WALK: ACTIVITY IS SOMEWHAT DIFFICULT
LIMPING: THE SYMPTOM AFFECTS MY ACTIVITY MODERATELY
GIVING WAY, BUCKLING OR SHIFTING OF KNEE: I HAVE THE SYMPTOM BUT IT DOES NOT AFFECT MY ACTIVITY
STAND: ACTIVITY IS SOMEWHAT DIFFICULT
GO DOWN STAIRS: ACTIVITY IS SOMEWHAT DIFFICULT
LIMPING: THE SYMPTOM AFFECTS MY ACTIVITY MODERATELY

## 2024-07-31 ENCOUNTER — THERAPY VISIT (OUTPATIENT)
Dept: PHYSICAL THERAPY | Facility: CLINIC | Age: 54
End: 2024-07-31
Attending: PHYSICIAN ASSISTANT
Payer: COMMERCIAL

## 2024-07-31 ENCOUNTER — MYC MEDICAL ADVICE (OUTPATIENT)
Dept: ORTHOPEDICS | Facility: CLINIC | Age: 54
End: 2024-07-31

## 2024-07-31 DIAGNOSIS — M25.561 ACUTE PAIN OF RIGHT KNEE: Primary | ICD-10-CM

## 2024-07-31 DIAGNOSIS — M17.11 PRIMARY OSTEOARTHRITIS OF RIGHT KNEE: Primary | ICD-10-CM

## 2024-07-31 DIAGNOSIS — M17.11 PRIMARY OSTEOARTHRITIS OF RIGHT KNEE: ICD-10-CM

## 2024-07-31 PROCEDURE — 97110 THERAPEUTIC EXERCISES: CPT | Mod: GP | Performed by: PHYSICAL THERAPIST

## 2024-07-31 PROCEDURE — 97161 PT EVAL LOW COMPLEX 20 MIN: CPT | Mod: GP | Performed by: PHYSICAL THERAPIST

## 2024-07-31 NOTE — PROGRESS NOTES
PHYSICAL THERAPY EVALUATION  Type of Visit: Evaluation       Fall Risk Screen:  Fall screen completed by: PT  Have you fallen 2 or more times in the past year?: No  Have you fallen and had an injury in the past year?: No  Is patient a fall risk?: No    Subjective       Presenting condition or subjective complaint: Pain, swelling, stiffness of right knee   Sldo stiff neck and ankle pain.  Sx for 2-3 weeks with knee.  Possibly due to systemic inflammatory response?    Date of onset: 07/10/24 (approx)    Relevant medical history: Vision problems   Dates & types of surgery:      Prior diagnostic imaging/testing results: X-ray   There is tricompartmental right knee osteoarthritis, most pronounced  in the medial compartment where there is advanced joint space  narrowing. There is a joint effusion. No displaced fracture.  Prior therapy history for the same diagnosis, illness or injury: No      Prior Level of Function  Transfers: Independent  Ambulation: Independent  ADL: Independent  IADL:     Living Environment  Social support: With a significant other or spouse   Type of home: House   Stairs to enter the home: Yes 2 Is there a railing: No     Ramp: No   Stairs inside the home: Yes 20 Is there a railing: Yes     Help at home: None  Equipment owned: Straight Cane     Employment: Yes RN  Hobbies/Interests: Reading, [employer], [employer] visits    Patient goals for therapy: Walk, non limp    Pain assessment:      Objective   KNEE EVALUATION  PAIN: Pain Level at Rest: 1/10  Pain Level with Use: 7/10  Pain Location: knee  Pain Quality: Aching, Burning, and Sharp  Pain Frequency: constant  Pain is Worst: daytime  Pain is Exacerbated By: stairs, walk in grass, AM  Pain is Relieved By: prednisone for eye;  ice;  brace;  using cane  Pain Progression: slight improvement lately  INTEGUMENTARY (edema, incisions): WNL  POSTURE:   GAIT:  Weightbearing Status: WBAT  Assistive Device(s): None  Gait Deviations: WNL  BALANCE/PROPRIOCEPTION:    WEIGHTBEARING ALIGNMENT:   NON-WEIGHTBEARING ALIGNMENT:   ROM:   (Degrees) Left AROM Left PROM  Right AROM Right PROM   Knee Flexion 120  110    Knee Extension       Pain:   End feel:     STRENGTH: WNL  FLEXIBILITY:   SPECIAL TESTS: WNL  FUNCTIONAL TESTS: Double Leg Squat: Anterior knee translation, Knee valgus, Hip internal rotation, and Improper use of glutes/hips  PALPATION:   + Tenderness At Location Left Right   Medial Joint Line  +   Lateral Joint Line  -   Patellar Tendon  +   IT Band     Incisional     Popliteal     Biceps Femoris     Semitendinosis     Semimembranosis     Glut Medius     Patellar Medial     Patellar Lateral     Patellar Superior     Patellar Inferior        JOINT MOBILITY:     Assessment & Plan   CLINICAL IMPRESSIONS  Medical Diagnosis: primary OA R knee    Treatment Diagnosis: R knee pain   Impression/Assessment: Patient is a 53 year old female with R knee complaints.  The following significant findings have been identified: Pain, Decreased ROM/flexibility, Decreased strength, Impaired muscle performance, and Decreased activity tolerance. These impairments interfere with their ability to perform self care tasks, work tasks, recreational activities, household chores, driving , household mobility, and community mobility as compared to previous level of function.     Clinical Decision Making (Complexity):  Clinical Presentation: Stable/Uncomplicated  Clinical Presentation Rationale: based on medical and personal factors listed in PT evaluation  Clinical Decision Making (Complexity): Low complexity    PLAN OF CARE  Treatment Interventions:  Interventions: Manual Therapy, Neuromuscular Re-education, Therapeutic Activity, Therapeutic Exercise, Orthotic Fitting/Training    Long Term Goals     PT Goal 1  Goal Identifier: R knee  Goal Description: Ascend/ descend stairs with 2/10 pain in reciprocal giat  Rationale: to maximize safety and independence with performance of ADLs and functional  tasks;to maximize safety and independence within the home;to maximize safety and independence within the community;to maximize safety and independence with transportation;to maximize safety and independence with self cares  Target Date: 10/31/24      Frequency of Treatment: 2x/ month  Duration of Treatment: 3 month    Recommended Referrals to Other Professionals:   Education Assessment:   Learner/Method: Patient;Demonstration;Pictures/Video    Risks and benefits of evaluation/treatment have been explained.   Patient/Family/caregiver agrees with Plan of Care.     Evaluation Time:     PT Eval, Low Complexity Minutes (34296): 15       Signing Clinician: Jake Bourgeois PT

## 2024-08-02 ENCOUNTER — OFFICE VISIT (OUTPATIENT)
Dept: OPHTHALMOLOGY | Facility: CLINIC | Age: 54
End: 2024-08-02
Attending: OPHTHALMOLOGY
Payer: COMMERCIAL

## 2024-08-02 DIAGNOSIS — H21.542 POSTERIOR SYNECHIAE, LEFT EYE: ICD-10-CM

## 2024-08-02 DIAGNOSIS — H43.392 VITREOUS OPACITIES OF LEFT EYE: ICD-10-CM

## 2024-08-02 DIAGNOSIS — H40.052 OCULAR HYPERTENSION, LEFT EYE: ICD-10-CM

## 2024-08-02 DIAGNOSIS — H20.00 ACUTE ANTERIOR UVEITIS OF LEFT EYE: Primary | ICD-10-CM

## 2024-08-02 DIAGNOSIS — H35.81 COTTON WOOL SPOTS: ICD-10-CM

## 2024-08-02 PROCEDURE — 99214 OFFICE O/P EST MOD 30 MIN: CPT | Performed by: OPHTHALMOLOGY

## 2024-08-02 PROCEDURE — 99213 OFFICE O/P EST LOW 20 MIN: CPT | Performed by: OPHTHALMOLOGY

## 2024-08-02 RX ORDER — TIMOLOL MALEATE 5 MG/ML
1 SOLUTION/ DROPS OPHTHALMIC DAILY
Qty: 5 ML | Refills: 3 | Status: SHIPPED | OUTPATIENT
Start: 2024-08-02

## 2024-08-02 RX ORDER — PREDNISONE 10 MG/1
TABLET ORAL
Qty: 60 TABLET | Refills: 1 | Status: SHIPPED | OUTPATIENT
Start: 2024-08-02 | End: 2024-09-10

## 2024-08-02 ASSESSMENT — CONF VISUAL FIELD
OD_NORMAL: 1
OS_SUPERIOR_NASAL_RESTRICTION: 0
OD_SUPERIOR_TEMPORAL_RESTRICTION: 0
OS_INFERIOR_NASAL_RESTRICTION: 0
OS_INFERIOR_TEMPORAL_RESTRICTION: 0
OD_SUPERIOR_NASAL_RESTRICTION: 0
METHOD: COUNTING FINGERS
OS_SUPERIOR_TEMPORAL_RESTRICTION: 0
OD_INFERIOR_TEMPORAL_RESTRICTION: 0
OS_NORMAL: 1
OD_INFERIOR_NASAL_RESTRICTION: 0

## 2024-08-02 ASSESSMENT — EXTERNAL EXAM - RIGHT EYE: OD_EXAM: NORMAL

## 2024-08-02 ASSESSMENT — VISUAL ACUITY
OD_SC: 20/20
OS_SC+: -2
METHOD: SNELLEN - LINEAR
OS_SC: 20/40

## 2024-08-02 ASSESSMENT — TONOMETRY
OD_IOP_MMHG: 21
IOP_METHOD: TONOPEN
OS_IOP_MMHG: 22

## 2024-08-02 ASSESSMENT — CUP TO DISC RATIO
OS_RATIO: 0.4
OD_RATIO: 0.4

## 2024-08-02 ASSESSMENT — SLIT LAMP EXAM - LIDS
COMMENTS: NORMAL
COMMENTS: NORMAL

## 2024-08-02 NOTE — PROGRESS NOTES
Chief Complaint/Presenting Concern:  Uveitis follow up    Interval History of Present Ocular Illness:  Elaine Awad is a 53 year old patient who returns for follow up of her acute anterior uveitis of the left eye. Last visit, we continued prednisone.     Ms. Awad notes it took until this week for things to get better for the left eye. Now down on 20 mg daily of prednisone, drops as below, and now off Valtrex pills. Right eye still fine    Interval Updates to Medical/Family/Social History:    Went to [place] and returned safely    Relevant Review of Systems Updates:  No prednisone side effects, no illnesses when travelling. Joints doing better.    Labs: None since last visit     Current eye related medications: Durezol 4x/day left eye, Atropine 2x/day left eye, Prednisone 20 mg daily, no Valtrex    Retina/Uveitis Imaging: None today    Assessment:     1. Acute anterior uveitis of left eye  Much improved!    2. Posterior synechiae, left eye  Improved on atropine    3. Vitreous opacities of left eye  Improving view to retina    4. Cotton wool spots - Left Eye  One small one inferotemporal, no occlusions    5. Ocular hypertension, left eye  Just one point above normal    Plan/Recommendations:    Discussed findings with patient and her . The left eye is much improved. There is still a mild degree of inflammation in the front and some haziness in the back. We saw one small blood vessel change in the left eye with a better view today, but this can be monitored. We will modify treatment as below.   Eye pressure is 21,22. This is just above normal left eye. Given plans for more time on steroid drops and pill, we will add a drop for pressure lowering  Recommend additional testing: None needed  For the pink top steroid drop, Durezol left eye, let's reduce to 3x/day for two weeks (until 8/16/24), then reduce to 2x/day until next vist  For the red top, Atropine, reduce to just once daily in the left eye   For  Prednisone pills, continue 2 pills (20 mg) each AM for two more weeks until 8/16/24, then 1 pill (10 mg) each AM until next visit  No Valtrex pills, no drops needed right eye     RTC End Aug-early Sept tonopen, no dilation, no testing    Physician Attestation     Attending Physician Attestation:  Complete documentation of historical and exam elements from today's encounter can be found in the full encounter summary report (not reduplicated in this progress note). I personally obtained the chief complaint(s) and history of present illness. I confirmed and edited as necessary the review of systems, past medical/surgical history, family history, social history, and examination findings as documented by others; and I examined the patient myself. I personally reviewed the relevant tests, images, and reports as documented above. I formulated and edited as necessary the assessment and plan and discussed the findings and management plan with the patient and family members present at the time of this visit.  Adam Hinds M.D., Uveitis and Medical Retina, August 2, 2024      96

## 2024-08-02 NOTE — NURSING NOTE
Chief Complaints and History of Present Illnesses   Patient presents with    Uveitis Follow-Up     Chief Complaint(s) and History of Present Illness(es)       Uveitis Follow-Up              Laterality: left eye    Onset: gradual    Onset: months ago    Quality: Feels the va has improved a bit    Severity: moderate    Frequency: intermittently    Associated symptoms: flashes and floaters (have increased)    Pain scale: 0/10              Comments    Here for acute anterior uveitis of left eye  Durezol QID left eye   Atropine BID left eye  Prednisone 20 mg  Bronwyn Reyna COT 7:13 AM August 2, 2024

## 2024-08-02 NOTE — LETTER
8/2/2024       RE: Elaine Awad  [id number] [facility] [id number]     Dear Colleague,    Thank you for referring your patient, Elaine Awad, to the [facility] at [facility]. Please see a copy of my visit note below.    Chief Complaint/Presenting Concern:  Uveitis follow up.    Interval History of Present Ocular Illness:  Elaine Awad is a 53 year old patient who returns for follow up of her acute anterior uveitis of the left eye. Last visit, we continued prednisone.     Ms. Awad notes it took until this week for things to get better for the left eye. Now down on 20 mg daily of prednisone, drops as below, and now off Valtrex pills. Right eye still fine.    Interval Updates to Medical/Family/Social History:    Went to [place] and returned safely    Relevant Review of Systems Updates:  No prednisone side effects, no illnesses when travelling. Joints doing better.    Labs: None since last visit     Current eye related medications: Durezol 4x/day left eye, Atropine 2x/day left eye, Prednisone 20 mg daily, no Valtrex    Retina/Uveitis Imaging: None today    Assessment:     1. Acute anterior uveitis of left eye  Much improved!  2. Posterior synechiae, left eye  Improved on atropine    3. Vitreous opacities of left eye  Improving view to retina    4. Cotton wool spots - Left Eye  One small one inferotemporal, no occlusions    5. Ocular hypertension, left eye  Just one point above normal    Plan/Recommendations:    Discussed findings with patient and her . The left eye is much improved. There is still a mild degree of inflammation in the front and some haziness in the back. We saw one small blood vessel change in the left eye with a better view today, but this can be monitored. We will modify treatment as below.   Eye pressure is 21,22. This is just above normal left eye. Given plans for more time on steroid drops and pill, we will  add a drop for pressure lowering.  Recommend additional testing: None needed.  For the pink top steroid drop, Durezol left eye, let's reduce to 3x/day for two weeks (until 8/16/24), then reduce to 2x/day until next visit.  For the red top, Atropine, reduce to just once daily in the left eye.   For Prednisone pills, continue 2 pills (20 mg) each AM for two more weeks until 8/16/24, then 1 pill (10 mg) each AM until next visit.  No Valtrex pills, no drops needed right eye.     RTC End Aug-early Sept tonopen, no dilation, no testing    Physician Attestation    Attending Physician Attestation:  Complete documentation of historical and exam elements from today's encounter can be found in the full encounter summary report (not reduplicated in this progress note). I personally obtained the chief complaint(s) and history of present illness. I confirmed and edited as necessary the review of systems, past medical/surgical history, family history, social history, and examination findings as documented by others; and I examined the patient myself. I personally reviewed the relevant tests, images, and reports as documented above. I formulated and edited as necessary the assessment and plan and discussed the findings and management plan with the patient and family members present at the time of this visit.  Adam Hinds M.D., Uveitis and Medical Retina, August 2, 2024       Again, thank you for allowing me to participate in the care of your patient.      Sincerely,    Adam Hinds MD  Uveitis and Medical Retina    Department of Ophthalmology & Visual Neurosciences  [facility]

## 2024-08-02 NOTE — PATIENT INSTRUCTIONS
For the pink top steroid drop, Durezol left eye, let's reduce to 3x/day for two weeks (until 8/16/24), then reduce to 2x/day until next vist  For the red top, Atropine, reduce to just once daily in the left eye   For Prednisone pills, continue 2 pills (20 mg) each AM for two more weeks until 8/16/24, then 1 pill (10 mg) each AM until next visit  No Valtrex pills, no drops needed right eye

## 2024-08-09 ENCOUNTER — MYC MEDICAL ADVICE (OUTPATIENT)
Dept: ORTHOPEDICS | Facility: CLINIC | Age: 54
End: 2024-08-09
Payer: COMMERCIAL

## 2024-08-13 ENCOUNTER — MYC MEDICAL ADVICE (OUTPATIENT)
Dept: OPHTHALMOLOGY | Facility: CLINIC | Age: 54
End: 2024-08-13
Payer: COMMERCIAL

## 2024-08-13 ENCOUNTER — OFFICE VISIT (OUTPATIENT)
Dept: OPHTHALMOLOGY | Facility: CLINIC | Age: 54
End: 2024-08-13
Attending: OPHTHALMOLOGY
Payer: COMMERCIAL

## 2024-08-13 DIAGNOSIS — H20.00 ACUTE ANTERIOR UVEITIS OF LEFT EYE: Primary | ICD-10-CM

## 2024-08-13 DIAGNOSIS — H35.81 COTTON WOOL SPOTS: ICD-10-CM

## 2024-08-13 DIAGNOSIS — H40.052 OCULAR HYPERTENSION, LEFT EYE: ICD-10-CM

## 2024-08-13 DIAGNOSIS — H43.392 VITREOUS OPACITIES OF LEFT EYE: ICD-10-CM

## 2024-08-13 PROCEDURE — 99213 OFFICE O/P EST LOW 20 MIN: CPT | Performed by: OPHTHALMOLOGY

## 2024-08-13 PROCEDURE — 99214 OFFICE O/P EST MOD 30 MIN: CPT | Performed by: OPHTHALMOLOGY

## 2024-08-13 ASSESSMENT — CUP TO DISC RATIO
OS_RATIO: 0.4
OD_RATIO: 0.4

## 2024-08-13 ASSESSMENT — TONOMETRY
OS_IOP_MMHG: 16
IOP_METHOD: TONOPEN
OD_IOP_MMHG: 17

## 2024-08-13 ASSESSMENT — CONF VISUAL FIELD
OS_NORMAL: 1
METHOD: COUNTING FINGERS
OS_INFERIOR_TEMPORAL_RESTRICTION: 0
OD_SUPERIOR_TEMPORAL_RESTRICTION: 0
OD_INFERIOR_TEMPORAL_RESTRICTION: 0
OD_NORMAL: 1
OD_INFERIOR_NASAL_RESTRICTION: 0
OD_SUPERIOR_NASAL_RESTRICTION: 0
OS_INFERIOR_NASAL_RESTRICTION: 0
OS_SUPERIOR_NASAL_RESTRICTION: 0
OS_SUPERIOR_TEMPORAL_RESTRICTION: 0

## 2024-08-13 ASSESSMENT — VISUAL ACUITY
OS_PH_SC: 20/50
OD_SC+: +1
OS_PH_SC+: 2
OD_SC: 20/25
METHOD: SNELLEN - LINEAR
OS_SC: 20/70

## 2024-08-13 ASSESSMENT — EXTERNAL EXAM - RIGHT EYE: OD_EXAM: NORMAL

## 2024-08-13 ASSESSMENT — SLIT LAMP EXAM - LIDS
COMMENTS: NORMAL
COMMENTS: NORMAL

## 2024-08-13 NOTE — PATIENT INSTRUCTIONS
For the pink top steroid drop, Durezol left eye, continue 3x/day  (until 8/16/24), then reduce to 2x/day until next vist  For the red top, Atropine, continue once daily in the left eye until 8/16/24. Then we can do just twice a week (Monday/Thursday)  For Prednisone pills, continue 2 pills (20 mg) each AM until 8/16/24, then 1 pill (10 mg) each AM until  8/31/24. Then beginning 9/1/24, reduce to half pill (5 mg) each AM until our next visit  Let us know if you see any more spots that are there all the time in various lighting conditions

## 2024-08-13 NOTE — TELEPHONE ENCOUNTER
Ok per facilitator/team for appt today    Pt scheduled at 1430 and aware of date/time/location at [facility].    Kenneth Donato RN 12:30 PM 08/13/24

## 2024-08-13 NOTE — PROGRESS NOTES
Chief Complaint/Presenting Concern:  Uveitis add on visit    Interval History of Present Ocular Illness:  Elaine Awad is a 53 year old patient who returns for follow up of her uveitis of the left eye. Last visit things were improving so we modified drops and pills.    Ms. Awad noted some white spots on the left eye last night. There is some discomfort but no more than normal. Right eye still fine    Interval Updates to Medical/Family/Social History:  No changes    Relevant Review of Systems Updates:  No updates    Current eye related medications: Durezol 3x/day left eye, Atropine every day left eye, Timolol every day left eye,Prednisone 20 mg     Retina/Uveitis Imaging: None today     Assessment:     1. Acute anterior uveitis of left eye  Few cells    2. Vitreous opacities of left eye  Improving haze    3. Cotton wool spots - Left Eye  Resolving    4. Ocular hypertension, left eye  Improved     Plan/Recommendations:    Discussed findings with patient and her . The left eye is doing well. It's possible there was a reflection of something on the eye but there are no new changes, no new types of inflammation or other issues. Inside the eye, things are better. WE can continue our tapering plan  Eye pressure is improved. WE can continue timolol left eye   No new treatments  For the pink top steroid drop, Durezol left eye, continue 3x/day  (until 8/16/24), then reduce to 2x/day until next vist  For the red top, Atropine, continue once daily in the left eye until 8/16/24. Then we can do just twice a week (Monday/Thursday)  For Prednisone pills, continue 2 pills (20 mg) each AM until 8/16/24, then 1 pill (10 mg) each AM until  8/31/24. Then beginning 9/1/24, reduce to half pill (5 mg) each AM until our next visit  Let us know if you see any more spots that are there all the time in various lighting conditions    RTC 9/10/24 as scheduled    Physician Attestation     Attending Physician Attestation:  Complete  documentation of historical and exam elements from today's encounter can be found in the full encounter summary report (not reduplicated in this progress note). I personally obtained the chief complaint(s) and history of present illness. I confirmed and edited as necessary the review of systems, past medical/surgical history, family history, social history, and examination findings as documented by others; and I examined the patient myself. I personally reviewed the relevant tests, images, and reports as documented above. I formulated and edited as necessary the assessment and plan and discussed the findings and management plan with the patient and family members present at the time of this visit.  Adam Hinds M.D., Uveitis and Medical Retina, August 13, 2024

## 2024-08-13 NOTE — LETTER
8/13/2024       RE: Elaine Awad  [id number] [address] [id number]     Dear Colleague,    Thank you for referring your patient, Elaine Awad, to the [facility] [place] at [facility]. Please see a copy of my visit note below.    Chief Complaint/Presenting Concern:  Uveitis add on visit.    Interval History of Present Ocular Illness:  Elaine Awad is a 53 year old patient who returns for follow up of her uveitis of the left eye. Last visit things were improving so we modified drops and pills.    Ms. Awad noted some white spots on the left eye last night. There is some discomfort but no more than normal. Right eye still fine.    Interval Updates to Medical/Family/Social History:  No changes    Relevant Review of Systems Updates:  No updates    Current eye related medications: Durezol 3x/day left eye, Atropine every day left eye, Timolol every day left eye,Prednisone 20 mg     Retina/Uveitis Imaging: None today     Assessment:     1. Acute anterior uveitis of left eye  Few cells    2. Vitreous opacities of left eye  Improving haze    3. Cotton wool spots - Left Eye  Resolving    4. Ocular hypertension, left eye  Improved     Plan/Recommendations:    Discussed findings with patient and her . The left eye is doing well. It's possible there was a reflection of something on the eye but there are no new changes, no new types of inflammation or other issues. Inside the eye, things are better. We can continue our tapering plan.  Eye pressure is improved. We can continue timolol left eye.   No new treatments.  For the pink top steroid drop, Durezol left eye, continue 3x/day  (until 8/16/24), then reduce to 2x/day until next visit.  For the red top, Atropine, continue once daily in the left eye until 8/16/24. Then we can do just twice a week (Monday/Thursday).  For Prednisone pills, continue 2 pills (20 mg) each AM until 8/16/24, then 1 pill (10  mg) each AM until  8/31/24. Then beginning 9/1/24, reduce to half pill (5 mg) each AM until our next visit.  Let us know if you see any more spots that are there all the time in various lighting conditions.    RTC 9/10/24 as scheduled    Physician Attestation    Attending Physician Attestation:  Complete documentation of historical and exam elements from today's encounter can be found in the full encounter summary report (not reduplicated in this progress note). I personally obtained the chief complaint(s) and history of present illness. I confirmed and edited as necessary the review of systems, past medical/surgical history, family history, social history, and examination findings as documented by others; and I examined the patient myself. I personally reviewed the relevant tests, images, and reports as documented above. I formulated and edited as necessary the assessment and plan and discussed the findings and management plan with the patient and family members present at the time of this visit.  Adam Hinds M.D., Uveitis and Medical Retina, August 13, 2024       Again, thank you for allowing me to participate in the care of your patient.      Sincerely,    Adam Hinds MD  Uveitis and Medical Retina    Department of Ophthalmology & Visual Neurosciences  [facility]

## 2024-08-13 NOTE — NURSING NOTE
Chief Complaints and History of Present Illnesses   Patient presents with    Uveitis Follow-Up     Chief Complaint(s) and History of Present Illness(es)       Uveitis Follow-Up              Laterality: left eye    Onset: gradual    Onset: months ago    Quality: States va is the same since last visit      Severity: moderate    Frequency: intermittently    Associated symptoms: photophobia, flashes and floaters    Treatments tried: eye drops    Pain scale: 0/10              Comments    Here for acute anterior uveitis of left eye  State that last night Linda can see white spots on the eye  Durezol TID left eye   Atropine every day left eye   Timolol every day left eye   Prednisone 20 mg   Bronwyn Reyna COT 2:13 PM August 13, 2024

## 2024-08-13 NOTE — TELEPHONE ENCOUNTER
Spoke to pt after images received.    Cloudy area noted in image and more on iris per pt vs surface/cornea-- noted yesterday and does appear worse today.    No new eye pain  No new vision changes  No redness on white part of eye    I will review with Dr. Hinds and call back.    Kenneth Donaot RN 12:22 PM 08/13/24

## 2024-08-13 NOTE — LETTER
August 13, 2024      Re: Elaine Awad   1970    To Whom It May Concern:    This is to confirm that the above patient was seen on 8/13/2024 for an emergency eye appointment.  Elaine Awad is able to return to work tomorrow without restrictions.    Thank you for your cooperation in this matter.  Please do not hesitate to contact me if you have any further questions.    Sincerely,      REBEKAH HARDING

## 2024-08-13 NOTE — LETTER
August 13, 2024        To Whom It May Concern:      This is a note for Mohinder Awad. He brought his Wife, Linda for an emergency eye clinic visit today. Please kindly excuse his time off work today. He can return when appropriate.    Thank you very much.       Adam Hinds M.D.  Uveitis and Medical Retina  [facility] Department of Ophthalmology and Visual Neurosciences

## 2024-08-15 ENCOUNTER — OFFICE VISIT (OUTPATIENT)
Dept: DERMATOLOGY | Facility: CLINIC | Age: 54
End: 2024-08-15
Payer: COMMERCIAL

## 2024-08-15 DIAGNOSIS — H20.9 UVEITIS: Primary | ICD-10-CM

## 2024-08-15 DIAGNOSIS — L29.9 SKIN PRURITUS: ICD-10-CM

## 2024-08-15 DIAGNOSIS — L11.1 GROVER'S DISEASE: ICD-10-CM

## 2024-08-15 PROCEDURE — 99213 OFFICE O/P EST LOW 20 MIN: CPT | Performed by: PHYSICIAN ASSISTANT

## 2024-08-15 ASSESSMENT — PAIN SCALES - GENERAL: PAINLEVEL: NO PAIN (0)

## 2024-08-15 NOTE — LETTER
8/15/2024      Elaine Awad  [address] [address] [address]      Dear Colleague,    Thank you for referring your patient, Elaine Awad, to the [facility]. Please see a copy of my visit note below.    HPI:   Chief complaints: Elaine Awad is a pleasant 53 year old female who presents for recheck of itching. 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 She reports she is doing very well. Initially she used the compound frequently but now she only needs it a few times per month. She has been using clobetasol on her chest every other day. She is not itchy on her chest any longer. She does have a new dx of uveitis. She has not seen rheumatology. 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       PHYSICAL EXAM:    There were no vitals taken for this visit.  Skin exam performed as follows: Type 1 skin. Mood appropriate  Alert and Oriented X 3. Well developed, well nourished in no distress.  General appearance: Normal  Head including face: Normal  Eyes: conjunctiva and lids: Normal  Mouth: Lips, teeth, gums: Normal  Neck: Normal  Skin: Scalp and body hair: See below    Skin clear    ASSESSMENT/PLAN:     Skin pruritus, grovers disease doing great on topicals    --Continue topcial gabapentin/amitriptyline/lidocaine compound and apply BID -TID PRN  --Decrease to using clobetasol only when needed    2. Uveitis - will refer to rheum        Follow-up: yearly/PRN sooner  CC:   Scribed By: Marian Castellanos, MS, PAADRIAN      Again, thank you for allowing me to participate in the care of your patient.        Sincerely,        Marian Castellanos PA-C

## 2024-08-15 NOTE — PROGRESS NOTES
HPI:   Chief complaints: Elaine Awad is a pleasant 53 year old female who presents for recheck of itching. 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 She reports she is doing very well. Initially she used the compound frequently but now she only needs it a few times per month. She has been using clobetasol on her chest every other day. She is not itchy on her chest any longer. She does have a new dx of uveitis. She has not seen rheumatology. 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       PHYSICAL EXAM:    There were no vitals taken for this visit.  Skin exam performed as follows: Type 1 skin. Mood appropriate  Alert and Oriented X 3. Well developed, well nourished in no distress.  General appearance: Normal  Head including face: Normal  Eyes: conjunctiva and lids: Normal  Mouth: Lips, teeth, gums: Normal  Neck: Normal  Skin: Scalp and body hair: See below    Skin clear    ASSESSMENT/PLAN:     Skin pruritus, grovers disease doing great on topicals    --Continue topcial gabapentin/amitriptyline/lidocaine compound and apply BID -TID PRN  --Decrease to using clobetasol only when needed    2. Uveitis - will refer to rheum        Follow-up: yearly/PRN sooner  CC:   Scribed By: Marian Castellanos, MS, PAADRIAN

## 2024-08-15 NOTE — NURSING NOTE
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 The compound cream has helped with itching and has been doing ok.      Patient has a spot on the back of her neck that she would like to rule out psoriasis. Patient is having concerns with her eyes and the condition can trigger psoriasis.      PCP: Fernanda Rodriguez    Referring Provider:  Marian Castellanos PA-C  [address] [place] [address]    There were no vitals taken for this visit.  No Pain (0)        Allergies   Allergen Reactions    Doxycycline Rash     Full body rash    Ampicillin Swelling     Tongue swelling at end of treatment course         Do you need any medication refills at today's visit? No    Marycarmen Baez MA

## 2024-09-01 DIAGNOSIS — H20.00 ACUTE ANTERIOR UVEITIS OF LEFT EYE: ICD-10-CM

## 2024-09-01 DIAGNOSIS — H21.542 POSTERIOR SYNECHIAE, LEFT EYE: ICD-10-CM

## 2024-09-03 RX ORDER — ATROPINE SULFATE 10 MG/ML
SOLUTION/ DROPS OPHTHALMIC
Qty: 5 ML | Refills: 1 | Status: SHIPPED | OUTPATIENT
Start: 2024-09-03 | End: 2024-09-10

## 2024-09-03 NOTE — TELEPHONE ENCOUNTER
atropine 1 % ophthalmic solution 5 mL 1 7/17/2024     Last Office Visit: 8/13/24  Future Office visit:  9/10/24    For the red top, Atropine, continue once daily in the left eye until 8/16/24. Then we can do just twice a week (Monday/Thursday)       Sending to clinic to verify change in directions above before refilling      Lucy Ricci RN  [facility] Central Nursing/Red Flag Triage & Med Refill Team

## 2024-09-10 ENCOUNTER — OFFICE VISIT (OUTPATIENT)
Dept: OPHTHALMOLOGY | Facility: CLINIC | Age: 54
End: 2024-09-10
Attending: OPHTHALMOLOGY
Payer: COMMERCIAL

## 2024-09-10 DIAGNOSIS — Z15.89 HLA B27 (HLA B27 POSITIVE): ICD-10-CM

## 2024-09-10 DIAGNOSIS — H21.542 POSTERIOR SYNECHIAE, LEFT EYE: ICD-10-CM

## 2024-09-10 DIAGNOSIS — H43.392 VITREOUS OPACITIES OF LEFT EYE: ICD-10-CM

## 2024-09-10 DIAGNOSIS — H40.052 OCULAR HYPERTENSION, LEFT EYE: ICD-10-CM

## 2024-09-10 DIAGNOSIS — H20.00 ACUTE ANTERIOR UVEITIS OF LEFT EYE: Primary | ICD-10-CM

## 2024-09-10 PROCEDURE — 99211 OFF/OP EST MAY X REQ PHY/QHP: CPT | Performed by: OPHTHALMOLOGY

## 2024-09-10 PROCEDURE — 99213 OFFICE O/P EST LOW 20 MIN: CPT | Performed by: OPHTHALMOLOGY

## 2024-09-10 RX ORDER — PREDNISONE 5 MG/1
5 TABLET ORAL DAILY
Qty: 30 TABLET | Refills: 1 | Status: SHIPPED | OUTPATIENT
Start: 2024-09-10

## 2024-09-10 RX ORDER — DIFLUPREDNATE OPHTHALMIC 0.5 MG/ML
EMULSION OPHTHALMIC
Qty: 5 ML | Refills: 1 | Status: SHIPPED | OUTPATIENT
Start: 2024-09-10

## 2024-09-10 ASSESSMENT — CONF VISUAL FIELD
OS_NORMAL: 1
OS_INFERIOR_NASAL_RESTRICTION: 0
OD_SUPERIOR_TEMPORAL_RESTRICTION: 0
OD_SUPERIOR_NASAL_RESTRICTION: 0
OS_SUPERIOR_TEMPORAL_RESTRICTION: 0
OS_SUPERIOR_NASAL_RESTRICTION: 0
OD_INFERIOR_TEMPORAL_RESTRICTION: 0
OD_NORMAL: 1
OD_INFERIOR_NASAL_RESTRICTION: 0
OS_INFERIOR_TEMPORAL_RESTRICTION: 0
METHOD: COUNTING FINGERS

## 2024-09-10 ASSESSMENT — VISUAL ACUITY
OS_SC+: -1
OS_PH_SC: 20/30
OS_PH_SC+: -2
OD_SC: 20/25
OD_SC+: -2
OS_SC: 20/60
METHOD: SNELLEN - LINEAR

## 2024-09-10 ASSESSMENT — TONOMETRY
IOP_METHOD: TONOPEN
OD_IOP_MMHG: 16
OS_IOP_MMHG: 12

## 2024-09-10 ASSESSMENT — SLIT LAMP EXAM - LIDS
COMMENTS: NORMAL
COMMENTS: NORMAL

## 2024-09-10 ASSESSMENT — CUP TO DISC RATIO
OD_RATIO: 0.4
OS_RATIO: 0.4

## 2024-09-10 ASSESSMENT — EXTERNAL EXAM - LEFT EYE: OS_EXAM: NORMAL

## 2024-09-10 ASSESSMENT — EXTERNAL EXAM - RIGHT EYE: OD_EXAM: NORMAL

## 2024-09-10 NOTE — LETTER
9/10/2024       RE: Elaine Awad  [address] [address] [address]     Dear Colleague,    Thank you for referring your patient, Elaine Awad, to the [facility] [place] at [facility]. Please see a copy of my visit note below.    Chief Complaint/Presenting Concern:  Uveitis follow up    Interval History of Present Ocular Illness:  Elaine Awad is a 53 year old patient who returns for follow up of her uveitis. Last visit we saw each other about something on the eye but inflammation was improving. We made tapering plans for medicines.     Ms. Awad feels things are about the same. The right eye is dry but stable.    Interval Updates to Medical/Family/Social History:    Ms. Awad saw Dermatology a few days after our last visit. They discussed creams but no other additions and recheck planned in 1 year.     Relevant Review of Systems Updates:  No updates    Current eye related medications: Durezol 2x/day left eye, Atropine twice a week (Monday/Thursday), Timolol daily left eye, Prednisone half pill (5 mg) each AM (since 9/1/24)    Retina/Uveitis Imaging: None today    Assessment:     1. Acute anterior uveitis of left eye  Nearly resolved    2. Posterior synechiae, left eye  None on Atropine    3. Vitreous opacities of left eye  Minimal     4. Ocular hypertension, left eye  Controlled on Timolol    Plan/Recommendations:    Discussed findings with patient. The left eye continues to get better with less medication. We will taper again.  The right eye remains without inflammation  Eye pressure is 16 right eye and 12 left eye. We will continue Timolol while tapering medications  Recommend additional testing: None  For the pink Top (Durezol), let's slowly taper: Use 1 drop 2x/day until 9/17/24. Then 1 drop daily until 9/30/24. Then 1 drop every other day beginning 10/1/24 until next visit  Continue Timolol once daily left eye until 9/30/24, then 1 drop  every other day until next visit  Stop Atropine left eye   For the Prednisone pills, keep taking 1/2 tablet (equivalent of 5 mg) Each AM until bottle runs out in one week then stop. If doing fine, no need for more prednisone. If gets blurry again, okay to restart 5 mg daily (this was sent to your pharmacy).  We placed a consult to [facility] given HLA B27 positivity and joint pain  No other treatments. We discussed given HLA B27 positivity that there may be recurrences in the future, but things are going well now and we anticipate being able to get off treatments next visit.    RTC Week of October 14 tonopen, Refract if interested, no dilation, no testing    Physician Attestation    Attending Physician Attestation:  Complete documentation of historical and exam elements from today's encounter can be found in the full encounter summary report (not reduplicated in this progress note). I personally obtained the chief complaint(s) and history of present illness. I confirmed and edited as necessary the review of systems, past medical/surgical history, family history, social history, and examination findings as documented by others; and I examined the patient myself. I personally reviewed the relevant tests, images, and reports as documented above. I formulated and edited as necessary the assessment and plan and discussed the findings and management plan with the patient and family members present at the time of this visit.  Adam Hinds M.D., Uveitis and Medical Retina, September 10, 2024     Again, thank you for allowing me to participate in the care of your patient.      Sincerely,    Adam Hinds MD  Uveitis and Medical Retina    Department of Ophthalmology & Visual Neurosciences  [facility]

## 2024-09-10 NOTE — PROGRESS NOTES
Chief Complaint/Presenting Concern:  Uveitis follow up    Interval History of Present Ocular Illness:  Elaine Awad is a 53 year old patient who returns for follow up of her uveitis. Last visit we saw each other about something on the eye but inflammation was improving. We made tapering plans for medicines.     Ms. Awad feels things are about the same. The right eye is dry but stable.    Interval Updates to Medical/Family/Social History:    Ms. Awad saw Dermatology a few days after our last visit. They discussed creams but no other additions and recheck planned in 1 year.     Relevant Review of Systems Updates:  No updates    Current eye related medications: Durezol 2x/day left eye, Atropine twice a week (Monday/Thursday), Timolol daily left eye, Prednisone half pill (5 mg) each AM (since 9/1/24)    Retina/Uveitis Imaging: None today    Assessment:     1. Acute anterior uveitis of left eye  Nearly resolved    2. Posterior synechiae, left eye  None on atropine    3. Vitreous opacities of left eye  Minimal     4. Ocular hypertension, left eye  Controlled on Timolol    Plan/Recommendations:    Discussed findings with patient. The left eye continues to get better with less medication. We will taper again.  The right eye remains without inflammation  Eye pressure is 16 right eye and 12 left eye. We will continue Timolol while tapering medications  Recommend additional testing: None  For the pink Top (Durezol), let's slowly taper: Use 1 drop 2x/day until 9/17/24. Then 1 drop daily until 9/30/24. Then 1 drop every other day beginning 10/1/24 until next visit  Continue Timolol once daily left eye until 9/30/24, then 1 drop every other day until next visit  Stop Atropine left eye   For the prednisone pills, keep taking 1/2 tablet (equivalent of 5 mg) Each AM until bottle runs out in one week then stop. If doing fine, no need for more prednisone. If gets blurry again, okay to restart 5 mg daily (this was sent to your  pharmacy).  We placed a consult to [facility] given HLA B27 positivity and joint pain  No other treatments. We discussed given HLA B27 positivity that there may be recurrences in the future, but things are going well now and we anticipate being able to get off treatments next visit.    [facility] Week of October 14 Joyce diaz if interested, no dilation, no testing    Physician Attestation     Attending Physician Attestation:  Complete documentation of historical and exam elements from today's encounter can be found in the full encounter summary report (not reduplicated in this progress note). I personally obtained the chief complaint(s) and history of present illness. I confirmed and edited as necessary the review of systems, past medical/surgical history, family history, social history, and examination findings as documented by others; and I examined the patient myself. I personally reviewed the relevant tests, images, and reports as documented above. I formulated and edited as necessary the assessment and plan and discussed the findings and management plan with the patient and family members present at the time of this visit.  Adam Hinds M.D., Uveitis and Medical Retina, September 10, 2024

## 2024-09-10 NOTE — NURSING NOTE
Chief Complaints and History of Present Illnesses   Patient presents with    Uveitis Follow-Up     Chief Complaint(s) and History of Present Illness(es)       Uveitis Follow-Up               Comments    Patient states that her vision is the same sine she was here last. No pain. She states that she has episodes of discomfort that come and go. No changes in floaters but they are always present. No flashes of light.     Ocular Meds: durezol BID in the LE   Atropine twice a week in the LE  Timolol daily in the left eye   Prednisone half a tab daily     Lavon DE SOUZA, September 10, 2024 7:48 AM

## 2024-09-10 NOTE — PATIENT INSTRUCTIONS
For the pink Top (Durezol), let's slowly taper: Use 1 drop 2x/day until 9/17/24. Then 1 drop daily until 9/30/24. Then 1 drop every other day beginning 10/1/24 until next visit  Continue Timolol once daily left eye until 9/30/24, then 1 drop every other day until next visit  Stop Atropine left eye   For the prednisone pills, keep taking 1/2 tablet (equivalent of 5 mg) Each AM until bottle runs out in one week then stop. If doing fine, no need for more prednisone. If gets blurry again, okay to restart 5 mg daily (this was sent to your pharmacy).  No other treatments

## 2024-09-11 ENCOUNTER — MYC REFILL (OUTPATIENT)
Dept: FAMILY MEDICINE | Facility: CLINIC | Age: 54
End: 2024-09-11
Payer: COMMERCIAL

## 2024-09-11 ENCOUNTER — MYC REFILL (OUTPATIENT)
Dept: ENDOCRINOLOGY | Facility: CLINIC | Age: 54
End: 2024-09-11
Payer: COMMERCIAL

## 2024-09-11 DIAGNOSIS — E66.813 CLASS 3 SEVERE OBESITY WITH SERIOUS COMORBIDITY AND BODY MASS INDEX (BMI) OF 40.0 TO 44.9 IN ADULT, UNSPECIFIED OBESITY TYPE (H): ICD-10-CM

## 2024-09-11 DIAGNOSIS — E66.01 CLASS 3 SEVERE OBESITY WITH SERIOUS COMORBIDITY AND BODY MASS INDEX (BMI) OF 40.0 TO 44.9 IN ADULT, UNSPECIFIED OBESITY TYPE (H): ICD-10-CM

## 2024-09-12 RX ORDER — CLINDAMYCIN PHOSPHATE 10 UG/ML
LOTION TOPICAL 2 TIMES DAILY
OUTPATIENT
Start: 2024-09-12

## 2024-09-12 RX ORDER — FLURBIPROFEN SODIUM 0.3 MG/ML
SOLUTION/ DROPS OPHTHALMIC
Qty: 100 EACH | Refills: 3 | Status: SHIPPED | OUTPATIENT
Start: 2024-09-12

## 2024-10-14 ENCOUNTER — OFFICE VISIT (OUTPATIENT)
Dept: OPHTHALMOLOGY | Facility: CLINIC | Age: 54
End: 2024-10-14
Attending: OPHTHALMOLOGY
Payer: COMMERCIAL

## 2024-10-14 DIAGNOSIS — H43.392 VITREOUS OPACITIES OF LEFT EYE: ICD-10-CM

## 2024-10-14 DIAGNOSIS — H40.052 OCULAR HYPERTENSION, LEFT EYE: ICD-10-CM

## 2024-10-14 DIAGNOSIS — H20.00 ACUTE ANTERIOR UVEITIS OF LEFT EYE: Primary | ICD-10-CM

## 2024-10-14 DIAGNOSIS — H21.542 POSTERIOR SYNECHIAE, LEFT EYE: ICD-10-CM

## 2024-10-14 DIAGNOSIS — H35.81 COTTON WOOL SPOTS: ICD-10-CM

## 2024-10-14 PROCEDURE — 99213 OFFICE O/P EST LOW 20 MIN: CPT | Performed by: OPHTHALMOLOGY

## 2024-10-14 RX ORDER — TIMOLOL MALEATE 5 MG/ML
1 SOLUTION/ DROPS OPHTHALMIC DAILY
Qty: 5 ML | Refills: 3 | Status: SHIPPED | OUTPATIENT
Start: 2024-10-14

## 2024-10-14 ASSESSMENT — VISUAL ACUITY
OS_PH_SC: 20/25
OS_CC: 20/25
OD_SC: 20/20
METHOD: SNELLEN - LINEAR
OS_SC: 20/70
OD_CC: 20/20

## 2024-10-14 ASSESSMENT — CONF VISUAL FIELD
OS_INFERIOR_TEMPORAL_RESTRICTION: 0
OS_SUPERIOR_TEMPORAL_RESTRICTION: 0
OS_SUPERIOR_NASAL_RESTRICTION: 0
OD_NORMAL: 1
OS_INFERIOR_NASAL_RESTRICTION: 0
OD_INFERIOR_TEMPORAL_RESTRICTION: 0
OD_SUPERIOR_TEMPORAL_RESTRICTION: 0
OD_INFERIOR_NASAL_RESTRICTION: 0
OD_SUPERIOR_NASAL_RESTRICTION: 0
METHOD: COUNTING FINGERS
OS_NORMAL: 1

## 2024-10-14 ASSESSMENT — SLIT LAMP EXAM - LIDS
COMMENTS: NORMAL
COMMENTS: NORMAL

## 2024-10-14 ASSESSMENT — TONOMETRY
OD_IOP_MMHG: 18
IOP_METHOD: TONOPEN
OS_IOP_MMHG: 18

## 2024-10-14 ASSESSMENT — EXTERNAL EXAM - LEFT EYE: OS_EXAM: NORMAL

## 2024-10-14 ASSESSMENT — REFRACTION_WEARINGRX
OD_SPHERE: -0.75
OD_AXIS: 095
OS_CYLINDER: +0.25
OS_AXIS: 045
OS_ADD: +2.00
OD_ADD: +2.00
OD_CYLINDER: +0.25
OS_SPHERE: -1.00

## 2024-10-14 ASSESSMENT — CUP TO DISC RATIO
OS_RATIO: 0.4
OD_RATIO: 0.4

## 2024-10-14 ASSESSMENT — EXTERNAL EXAM - RIGHT EYE: OD_EXAM: NORMAL

## 2024-10-14 NOTE — LETTER
10/14/2024       RE: Elaine Awad  [address] [facility] [address]     Dear Colleague,    Thank you for referring your patient, Elaine Awad, to the [facility] [place] at [facility]. Please see a copy of my visit note below.    Chief Complaint/Presenting Concern:  Uveitis follow up    Interval History of Present Ocular Illness:  Elaine Awad is a 53 year old patient who returns for follow up of her uveitis of the left eye. Last visit things were doing well and we elected to taper off prednisone and stop atropine    Ms. Awad reports things are about the same, still some light sensitivity and flashes but no increase in these for the left eye. Right eye still fine    Interval Updates to Medical/Family/Social History:  No updates    Relevant Review of Systems Updates:  No changes    Current eye related medications: Timolol every other day left eye, Durezol every other day left eye, off Atropine and off prednisone    Retina/Uveitis Imaging: None today     Assessment:     1. Acute anterior uveitis of left eye  Persistent but stable even on less medicine    2. Posterior synechiae, left eye  Improving       3. Vitreous opacities of left eye  None new    4. Cotton wool spots - Left Eye  None new    5. Ocular hypertension, left eye  Remains normal on Timolol every other day     Plan/Recommendations:    Discussed findings with patient. There is still some residual inflammation and the pupil is still a bit larger than the right eye but overall stable on less medication. We discussed continuing drops every other day   Eye pressure is 18,18 on Timolol every other day in the left eye   Recommend additional testing: None   Continue pink top (Difluprednate) every other day in the left eye  Continue yellow top (Timolol) every other day in the left eye   No other treatments, no pills or other drops needed left eye  No drops right eye   No specific need to  update glasses    RTC 6 weeks tonopen, no dilation, no testing    Physician Attestation    Attending Physician Attestation:  Complete documentation of historical and exam elements from today's encounter can be found in the full encounter summary report (not reduplicated in this progress note). I personally obtained the chief complaint(s) and history of present illness. I confirmed and edited as necessary the review of systems, past medical/surgical history, family history, social history, and examination findings as documented by others; and I examined the patient myself. I personally reviewed the relevant tests, images, and reports as documented above. I formulated and edited as necessary the assessment and plan and discussed the findings and management plan with the patient and family members present at the time of this visit.  Adam Hinds M.D., Uveitis and Medical Retina, October 14, 2024     Again, thank you for allowing me to participate in the care of your patient.      Sincerely,    Adam Hinds MD  Uveitis and Medical Retina    Department of Ophthalmology & Visual Neurosciences  [facility]

## 2024-10-14 NOTE — PATIENT INSTRUCTIONS
Continue pink top (Difluprednate) every other day in the left eye  Continue yellow top (Timolol) every other day in the left eye   No other treatments, no pills or other drops  No specific need to update glasses

## 2024-10-14 NOTE — NURSING NOTE
Chief Complaints and History of Present Illnesses   Patient presents with    Uveitis Follow-Up     Chief Complaint(s) and History of Present Illness(es)       Uveitis Follow-Up              Laterality: left eye    Onset: gradual    Onset: months ago    Quality: Feels the va is a little blurry    Severity: moderate    Frequency: intermittently    Associated symptoms: photophobia, flashes and floaters    Pain scale: 0/10              Comments    Here for acute anterior uveitis of left eye   Yellow and Pink top every other day  Bronwyn Reyna COT 8:29 AM October 14, 2024

## 2024-10-14 NOTE — PROGRESS NOTES
Chief Complaint/Presenting Concern:  Uveitis follow up    Interval History of Present Ocular Illness:  Elaine Awad is a 53 year old patient who returns for follow up of her uveitis of the left eye. Last visit things were doing well and we elected to taper off prednisone and stop atropine    Ms. Awad reports things are about the same, still some light sensitivity and flashes but no increase in these for the left eye. Right eye still fine    Interval Updates to Medical/Family/Social History:  No updates    Relevant Review of Systems Updates:  No changes    Current eye related medications: Timolol every other day left eye, Durezol every other day left eye, off Atropine and off prednisone    Retina/Uveitis Imaging: None today     Assessment:     1. Acute anterior uveitis of left eye  Persistent but stable even on less medicine    2. Posterior synechiae, left eye  Improving     3. Vitreous opacities of left eye  None new    4. Cotton wool spots - Left Eye  None new    5. Ocular hypertension, left eye  Remains normal on Timolol every other day     Plan/Recommendations:    Discussed findings with patient. There is still some residual inflammation and the pupil is still a bit larger than the right eye but overall stable on less medication. We discussed continuing drops every other day   Eye pressure is 18,18 on Timolol every other day in the left eye   Recommend additional testing: None   Continue pink top (Difluprednate) every other day in the left eye  Continue yellow top (Timolol) every other day in the left eye   No other treatments, no pills or other drops needed left eye  No drops right eye   No specific need to update glasses    RTC 6 weeks tonopen, no dilation, no testing    Physician Attestation     Attending Physician Attestation:  Complete documentation of historical and exam elements from today's encounter can be found in the full encounter summary report (not reduplicated in this progress note). I personally  obtained the chief complaint(s) and history of present illness. I confirmed and edited as necessary the review of systems, past medical/surgical history, family history, social history, and examination findings as documented by others; and I examined the patient myself. I personally reviewed the relevant tests, images, and reports as documented above. I formulated and edited as necessary the assessment and plan and discussed the findings and management plan with the patient and family members present at the time of this visit.  Adam Hinds M.D., Uveitis and Medical Retina, October 14, 2024

## 2024-10-15 ENCOUNTER — VIRTUAL VISIT (OUTPATIENT)
Dept: URGENT CARE | Facility: CLINIC | Age: 54
End: 2024-10-15
Payer: COMMERCIAL

## 2024-10-15 DIAGNOSIS — R05.2 SUBACUTE COUGH: Primary | ICD-10-CM

## 2024-10-15 DIAGNOSIS — R06.2 WHEEZING: ICD-10-CM

## 2024-10-15 PROCEDURE — 99213 OFFICE O/P EST LOW 20 MIN: CPT | Mod: 95 | Performed by: EMERGENCY MEDICINE

## 2024-10-15 RX ORDER — PREDNISONE 20 MG/1
TABLET ORAL
Qty: 20 TABLET | Refills: 0 | Status: SHIPPED | OUTPATIENT
Start: 2024-10-15 | End: 2024-11-11

## 2024-10-15 RX ORDER — BENZONATATE 200 MG/1
200 CAPSULE ORAL 3 TIMES DAILY PRN
Qty: 30 CAPSULE | Refills: 0 | Status: SHIPPED | OUTPATIENT
Start: 2024-10-15 | End: 2024-11-11

## 2024-10-15 RX ORDER — ALBUTEROL SULFATE 90 UG/1
2 INHALANT RESPIRATORY (INHALATION) EVERY 4 HOURS PRN
Qty: 18 G | Refills: 0 | Status: SHIPPED | OUTPATIENT
Start: 2024-10-15

## 2024-10-15 RX ORDER — AZITHROMYCIN 250 MG/1
TABLET, FILM COATED ORAL
Qty: 6 TABLET | Refills: 0 | Status: SHIPPED | OUTPATIENT
Start: 2024-10-15 | End: 2024-10-20

## 2024-10-15 NOTE — PROGRESS NOTES
Video visit:  Start time: 1:59 PM  Stop time: 2:06 PM  Duration: 7 minutes  Patient location: At home  Provider location: [facility] [employer] virtual provider (remote).  Platform used for video visit: Kiersten        CHIEF COMPLAINT: Persistent cough, wheezing      HPI: Patient is a 53-year-old female whose had a persistent cough for at least 2 weeks.  She has had intermittent wheezing.  No history of asthma.  No recent fever.  She is having problems sleeping is the biggest issue.      ROS: See HPI otherwise normal    Allergies   Allergen Reactions    Doxycycline Rash     Full body rash    Ampicillin Swelling     Tongue swelling at end of treatment course      Current Outpatient Medications   Medication Sig Dispense Refill    albuterol (PROAIR HFA/PROVENTIL HFA/VENTOLIN HFA) 108 (90 Base) MCG/ACT inhaler Inhale 2 puffs into the lungs every 4 hours as needed. 18 g 0    azithromycin (ZITHROMAX) 250 MG tablet Take 2 tablets (500 mg) by mouth daily for 1 day, THEN 1 tablet (250 mg) daily for 4 days. 6 tablet 0    benzonatate (TESSALON) 200 MG capsule Take 1 capsule (200 mg) by mouth 3 times daily as needed. 30 capsule 0    predniSONE (DELTASONE) 20 MG tablet Take 3 tabs by mouth daily x 3 days, then 2 tabs daily x 3 days, then 1 tab daily x 3 days, then 1/2 tab daily x 3 days. 20 tablet 0    clindamycin (CLEOCIN T) 1 % external lotion APPLY TOPICALLY TO FACE TWICE DAILY FOR ACNE      clobetasol propionate (TEMOVATE) 0.05 % external cream Apply to AA BID x 1-2 weeks then PRN. Do not apply to face. 60 g 3    COMPOUNDED NON-CONTROLLED SUBSTANCE (CMPD RX) - PHARMACY TO MIX COMPOUNDED MEDICATION Gabapentin 5%/amitriptyline 5%/Lidocaine 5% vanishing cream SIG apply to AA BID PRN 30 g 11    difluprednate (DUREZOL) 0.05 % ophthalmic emulsion Left eye: 1 drop 2x/day until 9/17/24. Then 1 drop daily until 9/30/24. Then 1 drop every other day beginning 10/1/24 until next visit 5 mL 1    diphenhydrAMINE (BENADRYL) 25 MG tablet Take 25  mg by mouth nightly as needed for sleep      fexofenadine (ALLEGRA) 180 MG tablet Take 180 mg by mouth as needed      fluconazole (DIFLUCAN) 150 MG tablet Take 1 tablet (150 mg) by mouth every 72 hours as needed 3 tablet 3    glucosamine 500 MG CAPS capsule Take 500 mg by mouth daily      levonorgestrel (MIRENA) 20 MCG/DAY IUD 1 each (20 mcg) by Intrauterine route once      losartan (COZAAR) 50 MG tablet Take 1 tablet (50 mg) by mouth daily 90 tablet 3    magnesium oxide (MAG-OX) 400 MG tablet Take 1 tablet by mouth daily      nystatin (NYSTOP) 579421 UNIT/GM external powder APPLY TO THE AFFECTED AREA UNDER THE BREASTS THREE TIMES DAILY AS NEEDED. 60 g 5    olopatadine (PATADAY) 0.2 % ophthalmic solution Place 0.05 mLs (1 drop) into both eyes daily 2.5 mL 11    omeprazole (PRILOSEC) 20 MG DR capsule Take 1 capsule (20 mg) by mouth daily 90 capsule 3    phentermine 15 MG capsule Take 1 capsule (15 mg) by mouth every morning 30 capsule 5    rizatriptan (MAXALT-MLT) 5 MG ODT TAKE 1-2 TABLETS BY MOUTH AT ONSET OF HEADACHE MAY REPEAT IN 2 HOURS. MAX OF 6 TABLETS PER 24 HOURS 36 tablet 3    timolol maleate (TIMOPTIC) 0.5 % ophthalmic solution Place 1 drop Into the left eye daily. 5 mL 3    valACYclovir (VALTREX) 500 MG tablet TAKE 1 TABLET BY MOUTH DAILY. INCREASE TO 4 TABLET BY MOUTH 2 TIMES DAILY AT ONSET OF OUTBREAK 100 tablet 3         PE: No acute distress.  Frequent dry cough noted.  Alert and oriented.  Nondyspneic appearing speaking in full sentences.        TREATMENT: None.      ASSESSMENT: Protracted cough with some component of reactive airway symptoms      DIAGNOSIS: Cough.  Wheezing.      PLAN: Z-Matt, prednisone, albuterol, Tessalon.  Be seen in 1 week if symptoms persist, sooner if worse

## 2024-10-24 ENCOUNTER — VIRTUAL VISIT (OUTPATIENT)
Dept: ENDOCRINOLOGY | Facility: CLINIC | Age: 54
End: 2024-10-24
Payer: COMMERCIAL

## 2024-10-24 VITALS — BODY MASS INDEX: 45.36 KG/M2 | WEIGHT: 256 LBS | HEIGHT: 63 IN

## 2024-10-24 DIAGNOSIS — E66.01 CLASS 3 SEVERE OBESITY WITH SERIOUS COMORBIDITY AND BODY MASS INDEX (BMI) OF 40.0 TO 44.9 IN ADULT, UNSPECIFIED OBESITY TYPE (H): Primary | ICD-10-CM

## 2024-10-24 DIAGNOSIS — E66.813 CLASS 3 SEVERE OBESITY WITH SERIOUS COMORBIDITY AND BODY MASS INDEX (BMI) OF 40.0 TO 44.9 IN ADULT, UNSPECIFIED OBESITY TYPE (H): Primary | ICD-10-CM

## 2024-10-24 PROCEDURE — 99213 OFFICE O/P EST LOW 20 MIN: CPT | Mod: 95 | Performed by: PHYSICIAN ASSISTANT

## 2024-10-24 PROCEDURE — G2211 COMPLEX E/M VISIT ADD ON: HCPCS | Performed by: PHYSICIAN ASSISTANT

## 2024-10-24 RX ORDER — SEMAGLUTIDE 0.5 MG/.5ML
0.5 INJECTION, SOLUTION SUBCUTANEOUS WEEKLY
Qty: 2 ML | Refills: 3 | Status: SHIPPED | OUTPATIENT
Start: 2024-11-21

## 2024-10-24 RX ORDER — SEMAGLUTIDE 1 MG/.5ML
1 INJECTION, SOLUTION SUBCUTANEOUS WEEKLY
Qty: 2 ML | Refills: 3 | Status: SHIPPED | OUTPATIENT
Start: 2024-11-21

## 2024-10-24 RX ORDER — PHENTERMINE HYDROCHLORIDE 15 MG/1
15 CAPSULE ORAL EVERY MORNING
Qty: 30 CAPSULE | Refills: 5 | Status: SHIPPED | OUTPATIENT
Start: 2024-10-24

## 2024-10-24 ASSESSMENT — PAIN SCALES - GENERAL: PAINLEVEL_OUTOF10: MILD PAIN (2)

## 2024-10-24 NOTE — NURSING NOTE
Current patient location: home    Is the patient currently in the state of [place]? YES    Visit mode:VIDEO    If the visit is dropped, the patient can be reconnected by: VIDEO VISIT: Text to cell phone:   Telephone Information:   Mobile [phone]       Will anyone else be joining the visit? NO  (If patient encounters technical issues they should call [phone])    Are changes needed to the allergy or medication list? Pt stated no changes to allergies and Pt stated no med changes    Are refills needed on medications prescribed by this physician? Discuss with provider    Rooming Documentation:  Questionnaire(s) completed      Reason for visit: RECHECK    Wt other than 24 hrs:    Pain more than one location: chronic 2   Maine PUTNAMF

## 2024-10-24 NOTE — LETTER
10/24/2024       RE: Elaine Awad  [address] [facility] [address]     Dear Colleague,    Thank you for referring your patient, Elaine Awad, to the [facility] WE[facility]NAGEMENT [facility] [place] at [facility]. Please see a copy of my visit note below.      Return Medical Weight Management Note     Elaine Awad  MRN:  [id number]  :  1970  [place]:  10/24/2024    Dear Fernanda Rodriguez MD,    I had the pleasure of seeing your patient Elaine Awad. She is a 53 year old female who I am continuing to see for treatment of obesity related to:        2019    10:46 PM   --   I have the following health issues associated with obesity GERD (Reflux)    Weight Bearing Joint Pain   I have the following symptoms associated with obesity Knee Pain    GERD (Reflux)       Assessment & Plan  Problem List Items Addressed This Visit          Endocrine Diagnoses    Class 3 severe obesity with serious comorbidity and body mass index (BMI) of 40.0 to 44.9 in adult, unspecified obesity type (H) - Primary    Relevant Medications    Semaglutide-Weight Management (WEGOVY) 0.5 MG/0.5ML pen (Start on 2024)    Semaglutide-Weight Management (WEGOVY) 1 MG/0.5ML pen (Start on 2024)    phentermine 15 MG capsule      Hx of lap band removal   Lap hernia repair 2024 and went well  Weight up from last visit from 248 to 256    Issues with eye (uveitis) and taking prednisone still which has been making weight loss difficult    Last visit May 2024 we tapered off topiramate due to not feeling it was effective.  Still taking phentermine 15mg daily    Started Wegovy this month (changed from Saxenda).  Taking Wegovy 0.25mg and tolerating well.     Follow up plan:  MTM 6 weeks phone  Layla 3-4 months return MWM video ok to use new sanket  Layla 6-8 mo return MWM video     CURRENT WEIGHT:   256 lbs 0 oz    Initial Weight (lbs): 239.4 lbs     Cumulative weight loss (lbs):  -16.6  Weight Loss Percentage: -6.93%        10/23/2024     4:15 PM   Changes and Difficulties   With regards to my activity/exercise, I am still struggling with: Pain         MEDICATIONS:   Current Outpatient Medications   Medication Sig Dispense Refill     phentermine 15 MG capsule Take 1 capsule (15 mg) by mouth every morning. 30 capsule 5     [START ON 11/21/2024] Semaglutide-Weight Management (WEGOVY) 0.5 MG/0.5ML pen Inject 0.5 mg subcutaneously once a week. 2 mL 3     [START ON 11/21/2024] Semaglutide-Weight Management (WEGOVY) 1 MG/0.5ML pen Inject 1 mg subcutaneously once a week. 2 mL 3     albuterol (PROAIR HFA/PROVENTIL HFA/VENTOLIN HFA) 108 (90 Base) MCG/ACT inhaler Inhale 2 puffs into the lungs every 4 hours as needed. 18 g 0     benzonatate (TESSALON) 200 MG capsule Take 1 capsule (200 mg) by mouth 3 times daily as needed. 30 capsule 0     clindamycin (CLEOCIN T) 1 % external lotion APPLY TOPICALLY TO FACE TWICE DAILY FOR ACNE       clobetasol propionate (TEMOVATE) 0.05 % external cream Apply to AA BID x 1-2 weeks then PRN. Do not apply to face. 60 g 3     COMPOUNDED NON-CONTROLLED SUBSTANCE (CMPD RX) - PHARMACY TO MIX COMPOUNDED MEDICATION Gabapentin 5%/amitriptyline 5%/Lidocaine 5% vanishing cream SIG apply to AA BID PRN 30 g 11     difluprednate (DUREZOL) 0.05 % ophthalmic emulsion Left eye: 1 drop 2x/day until 9/17/24. Then 1 drop daily until 9/30/24. Then 1 drop every other day beginning 10/1/24 until next visit 5 mL 1     diphenhydrAMINE (BENADRYL) 25 MG tablet Take 25 mg by mouth nightly as needed for sleep       fexofenadine (ALLEGRA) 180 MG tablet Take 180 mg by mouth as needed       fluconazole (DIFLUCAN) 150 MG tablet Take 1 tablet (150 mg) by mouth every 72 hours as needed 3 tablet 3     glucosamine 500 MG CAPS capsule Take 500 mg by mouth daily       levonorgestrel (MIRENA) 20 MCG/DAY IUD 1 each (20 mcg) by Intrauterine route once       losartan (COZAAR) 50 MG tablet Take 1 tablet (50 mg)  "by mouth daily 90 tablet 3     magnesium oxide (MAG-OX) 400 MG tablet Take 1 tablet by mouth daily       nystatin (NYSTOP) 913465 UNIT/GM external powder APPLY TO THE AFFECTED AREA UNDER THE BREASTS THREE TIMES DAILY AS NEEDED. 60 g 5     olopatadine (PATADAY) 0.2 % ophthalmic solution Place 0.05 mLs (1 drop) into both eyes daily 2.5 mL 11     omeprazole (PRILOSEC) 20 MG DR capsule Take 1 capsule (20 mg) by mouth daily 90 capsule 3     predniSONE (DELTASONE) 20 MG tablet Take 3 tabs by mouth daily x 3 days, then 2 tabs daily x 3 days, then 1 tab daily x 3 days, then 1/2 tab daily x 3 days. 20 tablet 0     rizatriptan (MAXALT-MLT) 5 MG ODT TAKE 1-2 TABLETS BY MOUTH AT ONSET OF HEADACHE MAY REPEAT IN 2 HOURS. MAX OF 6 TABLETS PER 24 HOURS 36 tablet 3     timolol maleate (TIMOPTIC) 0.5 % ophthalmic solution Place 1 drop Into the left eye daily. 5 mL 3     valACYclovir (VALTREX) 500 MG tablet TAKE 1 TABLET BY MOUTH DAILY. INCREASE TO 4 TABLET BY MOUTH 2 TIMES DAILY AT ONSET OF OUTBREAK 100 tablet 3           10/23/2024     4:15 PM   Weight Loss Medication History Reviewed With Patient   Which weight loss medications are you currently taking on a regular basis? Phentermine    Wegovy   Are you having any side effects from the weight loss medication that we have prescribed you? Yes   If you are having side effects please describe: Gi       Office Visit on 07/15/2024   Component Date Value Ref Range Status     Erythrocyte Sedimentation Rate 07/15/2024 35 (H)  0 - 30 mm/hr Final       PHYSICAL EXAM:  Objective   Ht 1.6 m (5' 3\")   Wt 116.1 kg (256 lb)   BMI 45.35 kg/m      Vitals - Patient Reported  Pain Score: Mild Pain (2)  Pain Loc:  (chronic)        GENERAL: alert and no distress  EYES: Eyes grossly normal to inspection.  No discharge or erythema, or obvious scleral/conjunctival abnormalities.  RESP: No audible wheeze, cough, or visible cyanosis.    SKIN: Visible skin clear. No significant rash, abnormal pigmentation " or lesions.  NEURO: Cranial nerves grossly intact.  Mentation and speech appropriate for age.  PSYCH: Appropriate affect, tone, and pace of words        Sincerely,    Layla Cyr PA-C      10 minutes spent by me on the date of the encounter doing chart review, history and exam, documentation and further activities per the noteVirtual Visit Details    Type of service:  Video Visit     Originating Location (pt. Location): Home    Distant Location (provider location):  Off-site  Platform used for Video Visit: [facility]    The longitudinal plan of care for the diagnosis(es)/condition(s) as documented were addressed during this visit. Due to the added complexity in care, I will continue to support Linda in the subsequent management and with ongoing continuity of care.      Again, thank you for allowing me to participate in the care of your patient.      Sincerely,    Layla Cyr PA-C

## 2024-10-24 NOTE — PROGRESS NOTES
Return Medical Weight Management Note     Elaine Awad  MRN:  [id number]  :  1970  KEAGAN:  10/24/2024    Dear Fernanda Rodriguez MD,    I had the pleasure of seeing your patient Elaine Awad. She is a 53 year old female who I am continuing to see for treatment of obesity related to:        2019    10:46 PM   --   I have the following health issues associated with obesity GERD (Reflux)    Weight Bearing Joint Pain   I have the following symptoms associated with obesity Knee Pain    GERD (Reflux)       Assessment & Plan   Problem List Items Addressed This Visit          Endocrine Diagnoses    Class 3 severe obesity with serious comorbidity and body mass index (BMI) of 40.0 to 44.9 in adult, unspecified obesity type (H) - Primary    Relevant Medications    Semaglutide-Weight Management (WEGOVY) 0.5 MG/0.5ML pen (Start on 2024)    Semaglutide-Weight Management (WEGOVY) 1 MG/0.5ML pen (Start on 2024)    phentermine 15 MG capsule      Hx of lap band removal   Lap hernia repair 2024 and went well  Weight up from last visit from 248 to 256    Issues with eye (uveitis) and taking prednisone still which has been making weight loss difficult    Last visit May 2024 we tapered off topiramate due to not feeling it was effective.  Still taking phentermine 15mg daily    Started Wegovy this month (changed from Saxenda).  Taking Wegovy 0.25mg and tolerating well.     Follow up plan:  MTM 6 weeks phone  Layla 3-4 months return MWM video ok to use new sanket  Layla 6-8 mo return MWM video     CURRENT WEIGHT:   256 lbs 0 oz    Initial Weight (lbs): 239.4 lbs     Cumulative weight loss (lbs): -16.6  Weight Loss Percentage: -6.93%        10/23/2024     4:15 PM   Changes and Difficulties   With regards to my activity/exercise, I am still struggling with: Pain         MEDICATIONS:   Current Outpatient Medications   Medication Sig Dispense Refill    phentermine 15 MG capsule Take 1 capsule (15 mg) by mouth  every morning. 30 capsule 5    [START ON 11/21/2024] Semaglutide-Weight Management (WEGOVY) 0.5 MG/0.5ML pen Inject 0.5 mg subcutaneously once a week. 2 mL 3    [START ON 11/21/2024] Semaglutide-Weight Management (WEGOVY) 1 MG/0.5ML pen Inject 1 mg subcutaneously once a week. 2 mL 3    albuterol (PROAIR HFA/PROVENTIL HFA/VENTOLIN HFA) 108 (90 Base) MCG/ACT inhaler Inhale 2 puffs into the lungs every 4 hours as needed. 18 g 0    benzonatate (TESSALON) 200 MG capsule Take 1 capsule (200 mg) by mouth 3 times daily as needed. 30 capsule 0    clindamycin (CLEOCIN T) 1 % external lotion APPLY TOPICALLY TO FACE TWICE DAILY FOR ACNE      clobetasol propionate (TEMOVATE) 0.05 % external cream Apply to AA BID x 1-2 weeks then PRN. Do not apply to face. 60 g 3    COMPOUNDED NON-CONTROLLED SUBSTANCE (CMPD RX) - PHARMACY TO MIX COMPOUNDED MEDICATION Gabapentin 5%/amitriptyline 5%/Lidocaine 5% vanishing cream SIG apply to AA BID PRN 30 g 11    difluprednate (DUREZOL) 0.05 % ophthalmic emulsion Left eye: 1 drop 2x/day until 9/17/24. Then 1 drop daily until 9/30/24. Then 1 drop every other day beginning 10/1/24 until next visit 5 mL 1    diphenhydrAMINE (BENADRYL) 25 MG tablet Take 25 mg by mouth nightly as needed for sleep      fexofenadine (ALLEGRA) 180 MG tablet Take 180 mg by mouth as needed      fluconazole (DIFLUCAN) 150 MG tablet Take 1 tablet (150 mg) by mouth every 72 hours as needed 3 tablet 3    glucosamine 500 MG CAPS capsule Take 500 mg by mouth daily      levonorgestrel (MIRENA) 20 MCG/DAY IUD 1 each (20 mcg) by Intrauterine route once      losartan (COZAAR) 50 MG tablet Take 1 tablet (50 mg) by mouth daily 90 tablet 3    magnesium oxide (MAG-OX) 400 MG tablet Take 1 tablet by mouth daily      nystatin (NYSTOP) 484320 UNIT/GM external powder APPLY TO THE AFFECTED AREA UNDER THE BREASTS THREE TIMES DAILY AS NEEDED. 60 g 5    olopatadine (PATADAY) 0.2 % ophthalmic solution Place 0.05 mLs (1 drop) into both eyes daily  "2.5 mL 11    omeprazole (PRILOSEC) 20 MG DR capsule Take 1 capsule (20 mg) by mouth daily 90 capsule 3    predniSONE (DELTASONE) 20 MG tablet Take 3 tabs by mouth daily x 3 days, then 2 tabs daily x 3 days, then 1 tab daily x 3 days, then 1/2 tab daily x 3 days. 20 tablet 0    rizatriptan (MAXALT-MLT) 5 MG ODT TAKE 1-2 TABLETS BY MOUTH AT ONSET OF HEADACHE MAY REPEAT IN 2 HOURS. MAX OF 6 TABLETS PER 24 HOURS 36 tablet 3    timolol maleate (TIMOPTIC) 0.5 % ophthalmic solution Place 1 drop Into the left eye daily. 5 mL 3    valACYclovir (VALTREX) 500 MG tablet TAKE 1 TABLET BY MOUTH DAILY. INCREASE TO 4 TABLET BY MOUTH 2 TIMES DAILY AT ONSET OF OUTBREAK 100 tablet 3           10/23/2024     4:15 PM   Weight Loss Medication History Reviewed With Patient   Which weight loss medications are you currently taking on a regular basis? Phentermine    Wegovy   Are you having any side effects from the weight loss medication that we have prescribed you? Yes   If you are having side effects please describe: Gi       Office Visit on 07/15/2024   Component Date Value Ref Range Status    Erythrocyte Sedimentation Rate 07/15/2024 35 (H)  0 - 30 mm/hr Final       PHYSICAL EXAM:  Objective    Ht 1.6 m (5' 3\")   Wt 116.1 kg (256 lb)   BMI 45.35 kg/m      Vitals - Patient Reported  Pain Score: Mild Pain (2)  Pain Loc:  (chronic)        GENERAL: alert and no distress  EYES: Eyes grossly normal to inspection.  No discharge or erythema, or obvious scleral/conjunctival abnormalities.  RESP: No audible wheeze, cough, or visible cyanosis.    SKIN: Visible skin clear. No significant rash, abnormal pigmentation or lesions.  NEURO: Cranial nerves grossly intact.  Mentation and speech appropriate for age.  PSYCH: Appropriate affect, tone, and pace of words        Sincerely,    Layla Cyr PA-C      10 minutes spent by me on the date of the encounter doing chart review, history and exam, documentation and further activities per the " noteVirtual Visit Details    Type of service:  Video Visit     Originating Location (pt. Location): Home    Distant Location (provider location):  Off-site  Platform used for Video Visit: Sourav    The longitudinal plan of care for the diagnosis(es)/condition(s) as documented were addressed during this visit. Due to the added complexity in care, I will continue to support Linda in the subsequent management and with ongoing continuity of care.

## 2024-10-30 PROBLEM — M25.561 ACUTE PAIN OF RIGHT KNEE: Status: RESOLVED | Noted: 2024-07-31 | Resolved: 2024-10-30

## 2024-11-05 DIAGNOSIS — I10 HYPERTENSION GOAL BP (BLOOD PRESSURE) < 140/90: ICD-10-CM

## 2024-11-05 RX ORDER — LOSARTAN POTASSIUM 50 MG/1
50 TABLET ORAL DAILY
Qty: 90 TABLET | Refills: 3 | Status: SHIPPED | OUTPATIENT
Start: 2024-11-05

## 2024-11-06 SDOH — HEALTH STABILITY: PHYSICAL HEALTH: ON AVERAGE, HOW MANY DAYS PER WEEK DO YOU ENGAGE IN MODERATE TO STRENUOUS EXERCISE (LIKE A BRISK WALK)?: 2 DAYS

## 2024-11-06 SDOH — HEALTH STABILITY: PHYSICAL HEALTH: ON AVERAGE, HOW MANY MINUTES DO YOU ENGAGE IN EXERCISE AT THIS LEVEL?: 20 MIN

## 2024-11-06 ASSESSMENT — SOCIAL DETERMINANTS OF HEALTH (SDOH): HOW OFTEN DO YOU GET TOGETHER WITH FRIENDS OR RELATIVES?: TWICE A WEEK

## 2024-11-08 DIAGNOSIS — G43.909 MIGRAINE WITHOUT STATUS MIGRAINOSUS, NOT INTRACTABLE, UNSPECIFIED MIGRAINE TYPE: ICD-10-CM

## 2024-11-09 DIAGNOSIS — K21.9 GASTROESOPHAGEAL REFLUX DISEASE WITHOUT ESOPHAGITIS: ICD-10-CM

## 2024-11-09 RX ORDER — RIZATRIPTAN BENZOATE 5 MG/1
TABLET, ORALLY DISINTEGRATING ORAL
Qty: 36 TABLET | Refills: 3 | Status: SHIPPED | OUTPATIENT
Start: 2024-11-09

## 2024-11-11 ENCOUNTER — OFFICE VISIT (OUTPATIENT)
Dept: FAMILY MEDICINE | Facility: CLINIC | Age: 54
End: 2024-11-11
Attending: FAMILY MEDICINE
Payer: COMMERCIAL

## 2024-11-11 VITALS
BODY MASS INDEX: 46.78 KG/M2 | HEIGHT: 63 IN | OXYGEN SATURATION: 98 % | DIASTOLIC BLOOD PRESSURE: 82 MMHG | RESPIRATION RATE: 16 BRPM | WEIGHT: 264 LBS | SYSTOLIC BLOOD PRESSURE: 136 MMHG | TEMPERATURE: 97.7 F | HEART RATE: 95 BPM

## 2024-11-11 DIAGNOSIS — Z13.220 ENCOUNTER FOR LIPID SCREENING FOR CARDIOVASCULAR DISEASE: ICD-10-CM

## 2024-11-11 DIAGNOSIS — E66.01 CLASS 3 SEVERE OBESITY WITH SERIOUS COMORBIDITY AND BODY MASS INDEX (BMI) OF 40.0 TO 44.9 IN ADULT, UNSPECIFIED OBESITY TYPE (H): ICD-10-CM

## 2024-11-11 DIAGNOSIS — I10 HYPERTENSION GOAL BP (BLOOD PRESSURE) < 140/90: ICD-10-CM

## 2024-11-11 DIAGNOSIS — E66.813 CLASS 3 SEVERE OBESITY WITH SERIOUS COMORBIDITY AND BODY MASS INDEX (BMI) OF 40.0 TO 44.9 IN ADULT, UNSPECIFIED OBESITY TYPE (H): ICD-10-CM

## 2024-11-11 DIAGNOSIS — Z00.00 ROUTINE GENERAL MEDICAL EXAMINATION AT A HEALTH CARE FACILITY: Primary | ICD-10-CM

## 2024-11-11 DIAGNOSIS — Z13.6 ENCOUNTER FOR LIPID SCREENING FOR CARDIOVASCULAR DISEASE: ICD-10-CM

## 2024-11-11 DIAGNOSIS — K21.9 GASTROESOPHAGEAL REFLUX DISEASE WITHOUT ESOPHAGITIS: ICD-10-CM

## 2024-11-11 PROCEDURE — 36415 COLL VENOUS BLD VENIPUNCTURE: CPT | Performed by: FAMILY MEDICINE

## 2024-11-11 PROCEDURE — 80048 BASIC METABOLIC PNL TOTAL CA: CPT | Performed by: FAMILY MEDICINE

## 2024-11-11 PROCEDURE — 99213 OFFICE O/P EST LOW 20 MIN: CPT | Mod: 25 | Performed by: FAMILY MEDICINE

## 2024-11-11 PROCEDURE — 80061 LIPID PANEL: CPT | Performed by: FAMILY MEDICINE

## 2024-11-11 PROCEDURE — 99396 PREV VISIT EST AGE 40-64: CPT | Performed by: FAMILY MEDICINE

## 2024-11-11 ASSESSMENT — PAIN SCALES - GENERAL: PAINLEVEL_OUTOF10: MILD PAIN (3)

## 2024-11-11 NOTE — PATIENT INSTRUCTIONS
Patient Education   Preventive Care Advice   This is general advice given by our system to help you stay healthy. However, your care team may have specific advice just for you. Please talk to your care team about your preventive care needs.  Nutrition  Eat 5 or more servings of fruits and vegetables each day.  Try wheat bread, brown rice and whole grain pasta (instead of white bread, rice, and pasta).  Get enough calcium and vitamin D. Check the label on foods and aim for 100% of the RDA (recommended daily allowance).  Lifestyle  Exercise at least 150 minutes each week  (30 minutes a day, 5 days a week).  Do muscle strengthening activities 2 days a week. These help control your weight and prevent disease.  No smoking.  Wear sunscreen to prevent skin cancer.  Have a dental exam and cleaning every 6 months.  Yearly exams  See your health care team every year to talk about:  Any changes in your health.  Any medicines your care team has prescribed.  Preventive care, family planning, and ways to prevent chronic diseases.  Shots (vaccines)   HPV shots (up to age 26), if you've never had them before.  Hepatitis B shots (up to age 59), if you've never had them before.  COVID-19 shot: Get this shot when it's due.  Flu shot: Get a flu shot every year.  Tetanus shot: Get a tetanus shot every 10 years.  Pneumococcal, hepatitis A, and RSV shots: Ask your care team if you need these based on your risk.  Shingles shot (for age 50 and up)  General health tests  Diabetes screening:  Starting at age 35, Get screened for diabetes at least every 3 years.  If you are younger than age 35, ask your care team if you should be screened for diabetes.  Cholesterol test: At age 39, start having a cholesterol test every 5 years, or more often if advised.  Bone density scan (DEXA): At age 50, ask your care team if you should have this scan for osteoporosis (brittle bones).  Hepatitis C: Get tested at least once in your life.  STIs (sexually  transmitted infections)  Before age 24: Ask your care team if you should be screened for STIs.  After age 24: Get screened for STIs if you're at risk. You are at risk for STIs (including HIV) if:  You are sexually active with more than one person.  You don't use condoms every time.  You or a partner was diagnosed with a sexually transmitted infection.  If you are at risk for HIV, ask about PrEP medicine to prevent HIV.  Get tested for HIV at least once in your life, whether you are at risk for HIV or not.  Cancer screening tests  Cervical cancer screening: If you have a cervix, begin getting regular cervical cancer screening tests starting at age 21.  Breast cancer scan (mammogram): If you've ever had breasts, begin having regular mammograms starting at age 40. This is a scan to check for breast cancer.  Colon cancer screening: It is important to start screening for colon cancer at age 45.  Have a colonoscopy test every 10 years (or more often if you're at risk) Or, ask your provider about stool tests like a FIT test every year or Cologuard test every 3 years.  To learn more about your testing options, visit:   .  For help making a decision, visit:   https://bit.ly/kv88665.  Prostate cancer screening test: If you have a prostate, ask your care team if a prostate cancer screening test (PSA) at age 55 is right for you.  Lung cancer screening: If you are a current or former smoker ages 50 to 80, ask your care team if ongoing lung cancer screenings are right for you.  For informational purposes only. Not to replace the advice of your health care provider. Copyright   2023 [facility]. All rights reserved. Clinically reviewed by the [facility] Transitions Program. [employer] 486804 - REV 01/24.  Preventing Falls: Care Instructions  Injuries and health problems such as trouble walking or poor eyesight can increase your risk of falling. So can some medicines. But there are things you can do to help  "prevent falls. You can exercise to get stronger. You can also arrange your home to make it safer.    Talk to your doctor about the medicines you take. Ask if any of them increase the risk of falls and whether they can be changed or stopped.   Try to exercise regularly. It can help improve your strength and balance. This can help lower your risk of falling.         Practice fall safety and prevention.   Wear low-heeled shoes that fit well and give your feet good support. Talk to your doctor if you have foot problems that make this hard.  Carry a cellphone or wear a medical alert device that you can use to call for help.  Use stepladders instead of chairs to reach high objects. Don't climb if you're at risk for falls. Ask for help, if needed.  Wear the correct eyeglasses, if you need them.        Make your home safer.   Remove rugs, cords, clutter, and furniture from walkways.  Keep your house well lit. Use night-lights in hallways and bathrooms.  Install and use sturdy handrails on stairways.  Wear nonskid footwear, even inside. Don't walk barefoot or in socks without shoes.        Be safe outside.   Use handrails, curb cuts, and ramps whenever possible.  Keep your hands free by using a shoulder bag or backpack.  Try to walk in well-lit areas. Watch out for uneven ground, changes in pavement, and debris.  Be careful in the winter. Walk on the grass or gravel when sidewalks are slippery. Use de-icer on steps and walkways. Add non-slip devices to shoes.    Put grab bars and nonskid mats in your shower or tub and near the toilet. Try to use a shower chair or bath bench when bathing.   Get into a tub or shower by putting in your weaker leg first. Get out with your strong side first. Have a phone or medical alert device in the bathroom with you.   Where can you learn more?  Go to https://www.[employer].net/patiented  Enter G117 in the search box to learn more about \"Preventing Falls: Care Instructions.\"  Current as of: " July 17, 2023  Content Version: 14.2 2024 [facility] [employer].   Care instructions adapted under license by your healthcare professional. If you have questions about a medical condition or this instruction, always ask your healthcare professional. [employer] disclaims any warranty or liability for your use of this information.

## 2024-11-11 NOTE — PROGRESS NOTES
Preventive Care Visit  [facility] ARCADIO Rodriguez MD, Family Medicine  Nov 11, 2024      Assessment & Plan     Routine general medical examination at a health care facility  Encounter for lipid screening for cardiovascular disease  - Lipid panel reflex to direct LDL Non-fasting; Future    Class 3 severe obesity with serious comorbidity and body mass index (BMI) of 40.0 to 44.9 in adult, unspecified obesity type (H)  Hypertension goal BP (blood pressure) < 140/90  Well controlled with medications without side effects. Also under care of bariatrics   - Basic metabolic panel  (Ca, Cl, CO2, Creat, Gluc, K, Na, BUN); Future  - Lipid panel reflex to direct LDL Non-fasting; Future    Gastroesophageal reflux disease without esophagitis  - omeprazole (PRILOSEC) 20 MG DR capsule; Take 1 capsule (20 mg) by mouth daily.            Counseling  Appropriate preventive services were addressed with this patient via screening, questionnaire, or discussion as appropriate for fall prevention, nutrition, physical activity, Tobacco-use cessation, social engagement, weight loss and cognition.  Checklist reviewing preventive services available has been given to the patient.  Reviewed patient's diet, addressing concerns and/or questions.   She is at risk for lack of exercise and has been provided with information to increase physical activity for the benefit of her well-being.       Follow-up yearly     Subjective   Linda is a 53 year old, presenting for the following:  Physical        11/11/2024     4:31 PM   Additional Questions   Roomed by Iona SANDERS CMA   Accompanied by Self         11/11/2024     4:31 PM   Patient Reported Additional Medications   Patient reports taking the following new medications None          HPI  Hypertension well controlled on current medications without side effects, chest pain, or dyspnea. Also presents for follow-up of GERD controlled with medication.         Health Care Directive  Patient  does not have a Health Care Directive: Discussed advance care planning with patient; however, patient declined at this time.      11/6/2024   General Health   How would you rate your overall physical health? (!) FAIR   Feel stress (tense, anxious, or unable to sleep) To some extent      (!) STRESS CONCERN      11/6/2024   Nutrition   Three or more servings of calcium each day? Yes   Diet: Low salt   How many servings of fruit and vegetables per day? (!) 2-3   How many sweetened beverages each day? 0-1            11/6/2024   Exercise   Days per week of moderate/strenous exercise 2 days   Average minutes spent exercising at this level 20 min      (!) EXERCISE CONCERN      11/6/2024   Social Factors   Frequency of gathering with friends or relatives Twice a week   Worry food won't last until get money to buy more No   Food not last or not have enough money for food? No   Do you have housing? (Housing is defined as stable permanent housing and does not include staying ouside in a car, in a tent, in an abandoned building, in an overnight shelter, or couch-surfing.) Yes   Are you worried about losing your housing? No   Lack of transportation? No   Unable to get utilities (heat,electricity)? No            11/11/2024   Fall Risk   Gait Speed Test (Document in seconds) 4.97   Gait Speed Test Interpretation Less than or equal to 5.00 seconds - PASS             11/6/2024   Dental   Dentist two times every year? Yes                 Today's PHQ-2 Score:       11/11/2024     4:41 PM   PHQ-2 ( 1999 [employer])   Q1: Little interest or pleasure in doing things 0   Q2: Feeling down, depressed or hopeless 0   PHQ-2 Score 0         11/6/2024   Substance Use   Alcohol more than 3/day or more than 7/wk No   Do you use any other substances recreationally? No        Social History     Tobacco Use    Smoking status: Former     Current packs/day: 0.00     Average packs/day: 0.5 packs/day for 10.0 years (5.0 ttl pk-yrs)     Types: Cigarettes      Start date: 1987     Quit date: 1997     Years since quittin.9     Passive exposure: Past    Smokeless tobacco: Never   Vaping Use    Vaping status: Never Used   Substance Use Topics    Alcohol use: Yes     Comment: occasional (infrequent)    Drug use: No           2023   LAST FHS-7 RESULTS   1st degree relative breast or ovarian cancer No   Any relative bilateral breast cancer No   Any male have breast cancer No   Any ONE woman have BOTH breast AND ovarian cancer No   Any woman with breast cancer before 50yrs No   2 or more relatives with breast AND/OR ovarian cancer Yes   2 or more relatives with breast AND/OR bowel cancer Yes           Mammogram Screening - Mammogram every 1-2 years updated in Health Maintenance based on mutual decision making        2024   STI Screening   New sexual partner(s) since last STI/HIV test? No        History of abnormal Pap smear: No - age 30- 64 PAP with HPV every 5 years recommended        Latest Ref Rng & Units 2023     2:15 PM 10/24/2018     2:08 PM 10/24/2018     8:46 AM   PAP / HPV   PAP  Negative for Intraepithelial Lesion or Malignancy (NILM)      PAP (Historical)   OTHER-NIL, See Result     HPV 16 DNA Negative Negative   Negative    HPV 18 DNA Negative Negative   Negative    Other HR HPV Negative Negative   Negative      ASCVD Risk   The 10-year ASCVD risk score (Helen IVERSON, et al., 2019) is: 2.4%    Values used to calculate the score:      Age: 53 years      Sex: Female      Is Non- : No      Diabetic: No      Tobacco smoker: No      Systolic Blood Pressure: 136 mmHg      Is BP treated: Yes      HDL Cholesterol: 52 mg/dL      Total Cholesterol: 184 mg/dL    Fracture Risk Assessment Tool  Link to Frax Calculator  Use the information below to complete the Frax calculator  : 1970  Sex: female  Weight (kg): 119.8 kg (actual weight)  Height (cm): 161.2 cm  Previous Fragility Fracture:  No  History of parent  with fractured hip:  No  Current Smoking:  No  Patient has been on glucocorticoids for more than 3 months (5mg/day or more): Yes  Rheumatoid Arthritis on Problem List:  No  Secondary Osteoporosis on Problem List:  No  Consumes 3 or more units of alcohol per day: No  Femoral Neck BMD (g/cm2)           Reviewed and updated as needed this visit by Provider   Tobacco  Allergies  Meds  Problems  Med Hx  Surg Hx  Fam Hx            Patient Active Problem List   Diagnosis    GERD (gastroesophageal reflux disease)    Migraines    Yeast infection of the vagina, recurrent     Recurrent cold sores    Class 3 severe obesity with serious comorbidity and body mass index (BMI) of 40.0 to 44.9 in adult, unspecified obesity type (H)    Primary osteoarthritis of right knee    Intertrigo    History of removal of laparoscopic gastric banding device    B12 deficiency    Menorrhagia    Hypertension goal BP (blood pressure) < 140/90    Ventral hernia without obstruction or gangrene    Teratoma of left ovary    IUD (intrauterine device) in place     Past Surgical History:   Procedure Laterality Date    COLPOSCOPY,BX CERVIX/ENDOCERV CURR  07/1994    DAVINCI XI HERNIORRHAPHY VENTRAL N/A 04/15/2024    Procedure: Robot-assisted laparoscopic incarcerated ventral incisional hernia repair with mesh;  Surgeon: Aly Cai DO;  Location:  OR    ESOPHAGOSCOPY, GASTROSCOPY, DUODENOSCOPY (EGD), COMBINED  01/02/2014    Procedure: COMBINED ESOPHAGOSCOPY, GASTROSCOPY, DUODENOSCOPY (EGD);;  Surgeon: Eden Stacy MD;  Location:  GI    ESOPHAGOSCOPY, GASTROSCOPY, DUODENOSCOPY (EGD), COMBINED N/A 01/19/2017    Procedure: COMBINED ESOPHAGOSCOPY, GASTROSCOPY, DUODENOSCOPY (EGD);  Surgeon: Ciro Deras MD;  Location:  OR    LAPAROSCOPIC GASTRIC ADJUSTABLE BANDING  04/28/2014    Procedure: LAPAROSCOPIC GASTRIC ADJUSTABLE BANDING;  Surgeon: Ciro Deras MD;  Location:  OR    LAPAROSCOPIC HERNIORRHAPHY  "HIATAL  2014    Procedure: LAPAROSCOPIC HERNIORRHAPHY HIATAL;  Surgeon: Ciro Deras MD;  Location: [facility] OR    LAPAROSCOPIC REMOVAL GASTRIC ADJUSTABLE BAND N/A 2017    Procedure: LAPAROSCOPIC REMOVAL GASTRIC ADJUSTABLE BAND;  Surgeon: Ciro Deras MD;  Location: [facility] OR    LITHOTRIPSY      ZZC TREAT ECTOPIC PREG,RMV TUBE/OVARY      LT       Social History     Tobacco Use    Smoking status: Former     Current packs/day: 0.00     Average packs/day: 0.5 packs/day for 10.0 years (5.0 ttl pk-yrs)     Types: Cigarettes     Start date: 1987     Quit date: 1997     Years since quittin.9     Passive exposure: Past    Smokeless tobacco: Never   Substance Use Topics    Alcohol use: Yes     Comment: occasional (infrequent)     Family History   Problem Relation Age of Onset    Diabetes Mother     C.A.D. Mother     Cancer Father 72        d. pancreatic, melanoma     Colon Polyps Father     Obesity Brother     Breast Cancer Maternal Grandmother     Heart Disease Maternal Grandfather         CHF    Heart Disease Paternal Grandmother         CHF    Colon Polyps Paternal Grandmother     Respiratory Paternal Grandfather         TB    Breast Cancer Maternal Aunt     Glaucoma No family hx of     Macular Degeneration No family hx of     Ovarian Cancer No family hx of                 Objective    Exam  /82 (BP Location: Left arm, Patient Position: Sitting, Cuff Size: Adult Large)   Pulse 95   Temp 97.7  F (36.5  C) (Oral)   Resp 16   Ht 1.612 m (5' 3.47\")   Wt 119.7 kg (264 lb)   SpO2 98%   BMI 46.08 kg/m     Estimated body mass index is 46.08 kg/m  as calculated from the following:    Height as of this encounter: 1.612 m (5' 3.47\").    Weight as of this encounter: 119.7 kg (264 lb).    Physical Exam  GENERAL: alert and no distress  EYES: Eyes grossly normal to inspection, PERRL and conjunctivae and sclerae normal  HENT: ear canals and TM's normal, nose and mouth without ulcers or " lesions  NECK: no adenopathy, no asymmetry, masses, or scars  RESP: lungs clear to auscultation - no rales, rhonchi or wheezes  CV: regular rate and rhythm, normal S1 S2, no S3 or S4, no murmur, click or rub, no peripheral edema  ABDOMEN: soft, nontender, no hepatosplenomegaly, no masses and bowel sounds normal  MS: no gross musculoskeletal defects noted, no edema  SKIN: no suspicious lesions or rashes  NEURO: Normal strength and tone, mentation intact and speech normal  PSYCH: mentation appears normal, affect normal/bright        Signed Electronically by: Fernanda Rodriguez MD

## 2024-11-12 LAB
ANION GAP SERPL CALCULATED.3IONS-SCNC: 9 MMOL/L (ref 7–15)
BUN SERPL-MCNC: 15.7 MG/DL (ref 6–20)
CALCIUM SERPL-MCNC: 10.2 MG/DL (ref 8.8–10.4)
CHLORIDE SERPL-SCNC: 104 MMOL/L (ref 98–107)
CHOLEST SERPL-MCNC: 185 MG/DL
CREAT SERPL-MCNC: 0.88 MG/DL (ref 0.51–0.95)
EGFRCR SERPLBLD CKD-EPI 2021: 78 ML/MIN/1.73M2
FASTING STATUS PATIENT QL REPORTED: NO
FASTING STATUS PATIENT QL REPORTED: NO
GLUCOSE SERPL-MCNC: 99 MG/DL (ref 70–99)
HCO3 SERPL-SCNC: 26 MMOL/L (ref 22–29)
HDLC SERPL-MCNC: 56 MG/DL
LDLC SERPL CALC-MCNC: 91 MG/DL
NONHDLC SERPL-MCNC: 129 MG/DL
POTASSIUM SERPL-SCNC: 4.4 MMOL/L (ref 3.4–5.3)
SODIUM SERPL-SCNC: 139 MMOL/L (ref 135–145)
TRIGL SERPL-MCNC: 190 MG/DL

## 2024-11-21 ENCOUNTER — TELEPHONE (OUTPATIENT)
Dept: ENDOCRINOLOGY | Facility: CLINIC | Age: 54
End: 2024-11-21
Payer: COMMERCIAL

## 2024-11-21 NOTE — TELEPHONE ENCOUNTER
Left Voicemail (1st Attempt) and Sent Mychart (1st Attempt) for the patient to call back and schedule the following:    Appointment type: MTM Return  Provider: DM Romero  Return date: 6 weeks (around Dec 2024)  Specialty phone number: [phone]  Additional appointment(s) needed: NA- rtn mwm appts already scheduled  Additonal Notes: LVM/MyC x1    10/24/24 checkout comments:  MTM 6 weeks phone  Layla 3-4 months return MWM video ok to use new sanket Rich 6-8 mo return MWM video

## 2024-12-03 DIAGNOSIS — E66.813 CLASS 3 SEVERE OBESITY WITH SERIOUS COMORBIDITY AND BODY MASS INDEX (BMI) OF 40.0 TO 44.9 IN ADULT, UNSPECIFIED OBESITY TYPE (H): ICD-10-CM

## 2024-12-03 DIAGNOSIS — E66.01 CLASS 3 SEVERE OBESITY WITH SERIOUS COMORBIDITY AND BODY MASS INDEX (BMI) OF 40.0 TO 44.9 IN ADULT, UNSPECIFIED OBESITY TYPE (H): ICD-10-CM

## 2024-12-09 RX ORDER — SEMAGLUTIDE 0.25 MG/.5ML
0.25 INJECTION, SOLUTION SUBCUTANEOUS WEEKLY
Qty: 2 ML | Refills: 0 | OUTPATIENT
Start: 2024-12-09

## 2024-12-09 NOTE — TELEPHONE ENCOUNTER
Semaglutide-Weight Management (WEGOVY) 0.25 MG/0.5ML pen   Disp 2ml  R 0  Start: 11/11/2024     10/24/2024  [facility] Layla Amin PA-C  Surgery    Nv: 4/10/25    Creatinine   Date Value Ref Range Status   11/11/2024 0.88 0.51 - 0.95 mg/dL Final   04/26/2021 0.83 0.52 - 1.04 mg/dL Final   '    Routed  because: looks like 0.25 requested.med list has 0.5 start 11/21/24. Rf that?

## 2024-12-09 NOTE — TELEPHONE ENCOUNTER
Patient restarted 0.25mg dose on 11/8/24 due to missing 3 doses. She has rxs for 0.5mg and 1mg doses on file to continue escalation.

## 2024-12-13 ENCOUNTER — TELEPHONE (OUTPATIENT)
Dept: ENDOCRINOLOGY | Facility: CLINIC | Age: 54
End: 2024-12-13
Payer: COMMERCIAL

## 2024-12-13 NOTE — TELEPHONE ENCOUNTER
PA Initiation    Medication: WEGOVY 0.5 MG/0.5ML SC SOAJ  Insurance Company: [employer] ([facility]) - Phone [phone] Fax [phone]  Pharmacy Filling the Rx: [facility] [place] - [address]  Filling Pharmacy Phone: [phone]  Filling Pharmacy Fax: [phone]  Start Date: 12/13/2024     Key: [id number]

## 2024-12-13 NOTE — LETTER
2024    To: [employer] ([facility])    RE: Elaine Awad  [address] [address] [address]  : 1970  MRN: [id number]    To Whom It May Concern,    I am writing on behalf of my patient, Elaine Awad to document the medical necessity of Wegovy for the treatment of obesity. This letter provides information about the patient's medical history and diagnosis and a statement summarizing my treatment rationale.     Summary of Patient History and Diagnosis  I had the pleasure of seeing your patient Elaine Awad. She is a 53 year old female who I am continuing to see for treatment of obesity related to: GERD (Reflux), Weight Bearing Joint Pain.      History of lap band removal   Laparoscopic hernia repair 2024 and went well  Weight up from last visit from 248lbs  to 256lbs     Issues with eye (uveitis) and taking prednisone still which has been making weight loss difficult     Last visit May 2024 we tapered off topiramate due to not feeling it was effective.  Still taking phentermine 15mg daily     Started Wegovy in 2024 (changed from Saxenda).      Treatment Rationale      Patient was started on Wegovy in 2024. She has received less than 6 months of treatment and is still continuing to dose escalate to maintenance doses.     Duration  Up to 12 months.      Summary  In summary, Wegovy is medically necessary for this patient s medical condition. Please call my office at [phone] if I can provide you with any additional information to approve my request. I look forward to receiving your timely response and approval of this request.     Sincerely,    Layla Cyr PA-C

## 2024-12-16 NOTE — TELEPHONE ENCOUNTER
PRIOR AUTHORIZATION DENIED    Medication: WEGOVY 0.5 MG/0.5ML SC SOAJ  Insurance Company: [employer] ([facility]) - Phone [phone] Fax [phone]  Denial Date: 12/16/2024  Denial Reason(s): See denial letter   Appeal Information: See denial letter

## 2024-12-16 NOTE — TELEPHONE ENCOUNTER
Medication Appeal Request    Please initiate an appeal for the requested medication: WEGOVY 0.5 MG/0.5ML SC SOAJ    Has a letter of medical necessity been completed in EPIC?   yes    Any additional lab values/information to include?     Would you like to include any research articles?               If yes please include the hyperlink(s) below or fax to    [phone] for Specialty/Retail               [phone] for Infusion/Clinic Administered.                Include the patients name and MRN on the fax cover sheet.

## 2024-12-17 NOTE — TELEPHONE ENCOUNTER
Medication Appeal Initiation    Medication: WEGOVY 0.5 MG/0.5ML SC SOAJ  Appeal Start Date:  12/17/2024  Insurance Company: [employer] ([facility])   Insurance Phone: [phone]  Insurance Fax: [phone]  Comments: Faxed appeal letter, denial letter, and chart notes to patient's plan.

## 2024-12-17 NOTE — TELEPHONE ENCOUNTER
MEDICATION APPEAL APPROVED    Medication: WEGOVY 0.5 MG/0.5ML SC SOAJ  Authorization Effective Date: 12/13/2024  Authorization Expiration Date: 6/17/2025  Approved Dose/Quantity: uud  Reference #: Key: [id number]   Appeal Insurance Company:    Expected CoPay: $       CoPay Card Available:    Financial Assistance Needed:    Filling Pharmacy: [facility] [place] [address]  Comments:

## 2024-12-19 ENCOUNTER — ANCILLARY PROCEDURE (OUTPATIENT)
Dept: MAMMOGRAPHY | Facility: CLINIC | Age: 54
End: 2024-12-19
Attending: FAMILY MEDICINE
Payer: COMMERCIAL

## 2024-12-19 DIAGNOSIS — Z12.31 VISIT FOR SCREENING MAMMOGRAM: ICD-10-CM

## 2025-01-21 ENCOUNTER — OFFICE VISIT (OUTPATIENT)
Dept: PODIATRY | Facility: CLINIC | Age: 55
End: 2025-01-21
Payer: COMMERCIAL

## 2025-01-21 VITALS — SYSTOLIC BLOOD PRESSURE: 138 MMHG | DIASTOLIC BLOOD PRESSURE: 82 MMHG | HEART RATE: 88 BPM

## 2025-01-21 DIAGNOSIS — M76.821 POSTERIOR TIBIAL TENDONITIS, RIGHT: ICD-10-CM

## 2025-01-21 DIAGNOSIS — M21.6X2 PRONATION DEFORMITY OF BOTH FEET: Primary | ICD-10-CM

## 2025-01-21 DIAGNOSIS — M21.6X1 PRONATION DEFORMITY OF BOTH FEET: Primary | ICD-10-CM

## 2025-01-21 PROCEDURE — 99213 OFFICE O/P EST LOW 20 MIN: CPT | Performed by: PODIATRIST

## 2025-01-21 NOTE — LETTER
1/21/2025      Elaine Awad  [address] [address] [address]      Dear Colleague,    Thank you for referring your patient, Elaine Awad, to the [facility]. Please see a copy of my visit note below.    S:  Complains of right foot pain.  Points to posterior tibial tendon where is courses around the medial malleoli.  Has had this for many months perhaps a year.  Pain aggravated by activity and relieved by rest.  Denies ecchymosis weakness or increased deformity.  We saw patient almost 3 years ago for right rear foot pain.  At that time was more in the sinus tarsi.  This no longer bothers her.  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    ROS:  see above       Allergies   Allergen Reactions     Doxycycline Rash     Full body rash     Ampicillin Swelling     Tongue swelling at end of treatment course       Current Outpatient Medications   Medication Sig Dispense Refill     albuterol (PROAIR HFA/PROVENTIL HFA/VENTOLIN HFA) 108 (90 Base) MCG/ACT inhaler Inhale 2 puffs into the lungs every 4 hours as needed. 18 g 0     clindamycin (CLEOCIN T) 1 % external lotion APPLY TOPICALLY TO FACE TWICE DAILY FOR ACNE       difluprednate (DUREZOL) 0.05 % ophthalmic emulsion Left eye: 1 drop 2x/day until 9/17/24. Then 1 drop daily until 9/30/24. Then 1 drop every other day beginning 10/1/24 until next visit 5 mL 1     diphenhydrAMINE (BENADRYL) 25 MG tablet Take 25 mg by mouth nightly as needed for sleep       fexofenadine (ALLEGRA) 180 MG tablet Take 180 mg by mouth as needed       glucosamine 500 MG CAPS capsule Take 500 mg by mouth daily       levonorgestrel (MIRENA) 20 MCG/DAY IUD 1 each (20 mcg) by Intrauterine route once       losartan (COZAAR) 50 MG tablet TAKE 1 TABLET(50 MG) BY MOUTH DAILY 90 tablet 3     magnesium oxide (MAG-OX) 400 MG tablet Take 1 tablet by mouth daily       nystatin (NYSTOP) 146613 UNIT/GM external powder APPLY TO THE AFFECTED AREA  UNDER THE BREASTS THREE TIMES DAILY AS NEEDED. 60 g 5     omeprazole (PRILOSEC) 20 MG DR capsule Take 1 capsule (20 mg) by mouth daily. 90 capsule 4     phentermine 15 MG capsule Take 1 capsule (15 mg) by mouth every morning. 30 capsule 5     rizatriptan (MAXALT-MLT) 5 MG ODT TAKE 1-2 TABLETS BY MOUTH AT ONSET OF HEADACHE MAY REPEAT IN 2 HOURS. MAX OF 6 TABLETS PER 24 HOURS. 36 tablet 3     Semaglutide-Weight Management (WEGOVY) 0.25 MG/0.5ML pen Inject 0.25 mg subcutaneously once a week. 2 mL 0     Semaglutide-Weight Management (WEGOVY) 0.5 MG/0.5ML pen Inject 0.5 mg subcutaneously once a week. 2 mL 3     Semaglutide-Weight Management (WEGOVY) 1 MG/0.5ML pen Inject 1 mg subcutaneously once a week. 2 mL 3     timolol maleate (TIMOPTIC) 0.5 % ophthalmic solution Place 1 drop Into the left eye daily. 5 mL 3     valACYclovir (VALTREX) 500 MG tablet TAKE 1 TABLET BY MOUTH DAILY. INCREASE TO 4 TABLET BY MOUTH 2 TIMES DAILY AT ONSET OF OUTBREAK 100 tablet 3       Patient Active Problem List   Diagnosis     GERD (gastroesophageal reflux disease)     Migraines     Yeast infection of the vagina, recurrent      Recurrent cold sores     Class 3 severe obesity with serious comorbidity and body mass index (BMI) of 40.0 to 44.9 in adult, unspecified obesity type (H)     Primary osteoarthritis of right knee     Intertrigo     History of removal of laparoscopic gastric banding device     B12 deficiency     Menorrhagia     Hypertension goal BP (blood pressure) < 140/90     Ventral hernia without obstruction or gangrene     Teratoma of left ovary     IUD (intrauterine device) in place       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     GERD (gastroesophageal reflux disease)      Masury's disease      Hypertension goal BP (blood pressure) < 140/90      Iron deficiency anemia       Menorrhagia      Migraine      Morbid obesity (H)      Nonallergic rhinitis 04/17/2019     Recurrent cold sores      Vulvar dermatitis 10/14/2010     Yeast infection of the vagina, recurrent         Past Surgical History:   Procedure Laterality Date     COLPOSCOPY,BX CERVIX/ENDOCERV CURR  07/1994     DAVINCI XI HERNIORRHAPHY VENTRAL N/A 04/15/2024    Procedure: Robot-assisted laparoscopic incarcerated ventral incisional hernia repair with mesh;  Surgeon: Aly Cai DO;  Location: [facility]     ESOPHAGOSCOPY, GASTROSCOPY, DUODENOSCOPY (EGD), COMBINED  01/02/2014    Procedure: COMBINED ESOPHAGOSCOPY, GASTROSCOPY, DUODENOSCOPY (EGD);;  Surgeon: Eden Stacy MD;  [facility]     ESOPHAGOSCOPY, GASTROSCOPY, DUODENOSCOPY (EGD), COMBINED N/A 01/19/2017    Procedure: COMBINED ESOPHAGOSCOPY, GASTROSCOPY, DUODENOSCOPY (EGD);  Surgeon: Ciro Deras MD;  [facility]     LAPAROSCOPIC GASTRIC ADJUSTABLE BANDING  04/28/2014    Procedure: LAPAROSCOPIC GASTRIC ADJUSTABLE BANDING;  Surgeon: Ciro Deras MD;  [facility]     LAPAROSCOPIC HERNIORRHAPHY HIATAL  04/28/2014    Procedure: LAPAROSCOPIC HERNIORRHAPHY HIATAL;  Surgeon: Ciro Deras MD;  [facility]     LAPAROSCOPIC REMOVAL GASTRIC ADJUSTABLE BAND N/A 01/19/2017    Procedure: LAPAROSCOPIC REMOVAL GASTRIC ADJUSTABLE BAND;  Surgeon: Ciro Deras MD;  [facility]     LITHOTRIPSY       ZZC TREAT ECTOPIC PREG,RMV TUBE/OVARY      LT       Family History   Problem Relation Age of Onset     Diabetes Mother      C.A.D. Mother      Cancer Father 72        d. pancreatic, melanoma      Colon Polyps Father      Obesity Brother      Breast Cancer Maternal Grandmother      Heart Disease Maternal Grandfather         CHF     Heart Disease Paternal Grandmother         CHF     Colon Polyps Paternal Grandmother      Respiratory Paternal Grandfather         TB     Breast Cancer Maternal Aunt      Glaucoma No family hx of       Macular Degeneration No family hx of      Ovarian Cancer No family hx of        Social History     Tobacco Use     Smoking status: Former     Current packs/day: 0.00     Average packs/day: 0.5 packs/day for 10.0 years (5.0 ttl pk-yrs)     Types: Cigarettes     Start date: 1987     Quit date: 1997     Years since quittin.1     Passive exposure: Past     Smokeless tobacco: Never   Substance Use Topics     Alcohol use: Yes     Comment: occasional (infrequent)         Exam:  Vitals: /82   Pulse 88   BMI: There is no height or weight on file to calculate BMI.  Height: Data Unavailable    Constitutional/ general:  Pt is in no apparent distress, appears well-nourished.  Cooperative with history and physical exam.     Psych:  The patient answered questions appropriately.  Normal affect.  Seems to have reasonable expectations, in terms of treatment.     Lungs:  Non labored breathing, non labored speech. No cough.  No audible wheezing. Even, quiet breathing.       Vascular:  Pedal pulses are palpable bilaterally for both the DP and PT arteries.  CFT < 3 sec.  Ankle edema.  Pedal hair growth noted.     Neuro:  Alert and oriented x 3. Coordinated gait.  Light touch sensation is intact to the L4, L5, S1 distributions. No obvious deficits.  No evidence of neurological-based weakness, spasticity, or contracture in the lower extremities.    Derm: Normal texture and turgor.  No erythema, ecchymosis, or cyanosis.  No open lesions.     Musculoskeletal:    Lower extremity muscle strength is normal.  Patient is ambulatory without an assistive device or brace.  No gross deformities.  Normal ROM all fore foot and rearfoot joints.  No equinus.  With weightbearing patient has bilateral pronation with right being worse.  No pain with ROM.   Pain with palpation posterior tibial tendon right foot.  No erythema ecchymosis.  No pain in tibialis anterior tendon.  No pain with range of motion.  No pain on the dorsum of  the navicular.      A:  Pronation and posterior tibial tendonitis    P:   Discussed the cause of this with the patient.  Discussed right foot more pronated than left causing overuse of this tendon.  Discussed most important thing is to offload tendon and rest foot.  Will dispense ankle brace for this.  Dispensed ankle brace to be worn with good shoes at all times.  Discussed that if not improving may need additional offloading with cam walker.  Ice bid.  Minimize activities until healed.  She is wearing a malleable shoe.  I made suggestions on better shoes.  She is somewhat resistant to this.  Wrote prescription for orthotics.  Return to clinic in approximately 3 weeks to reevaluate.    Chauncey Garnica DPM, FACFAS             Again, thank you for allowing me to participate in the care of your patient.        Sincerely,        Chauncey Garnica DPM    Electronically signed

## 2025-01-21 NOTE — PROGRESS NOTES
S:  Complains of right foot pain.  Points to posterior tibial tendon where is courses around the medial malleoli.  Has had this for many months perhaps a year.  Pain aggravated by activity and relieved by rest.  Denies ecchymosis weakness or increased deformity.  We saw patient almost 3 years ago for right rear foot pain.  At that time was more in the sinus tarsi.  This no longer bothers her.  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    ROS:  see above       Allergies   Allergen Reactions    Doxycycline Rash     Full body rash    Ampicillin Swelling     Tongue swelling at end of treatment course       Current Outpatient Medications   Medication Sig Dispense Refill    albuterol (PROAIR HFA/PROVENTIL HFA/VENTOLIN HFA) 108 (90 Base) MCG/ACT inhaler Inhale 2 puffs into the lungs every 4 hours as needed. 18 g 0    clindamycin (CLEOCIN T) 1 % external lotion APPLY TOPICALLY TO FACE TWICE DAILY FOR ACNE      difluprednate (DUREZOL) 0.05 % ophthalmic emulsion Left eye: 1 drop 2x/day until 9/17/24. Then 1 drop daily until 9/30/24. Then 1 drop every other day beginning 10/1/24 until next visit 5 mL 1    diphenhydrAMINE (BENADRYL) 25 MG tablet Take 25 mg by mouth nightly as needed for sleep      fexofenadine (ALLEGRA) 180 MG tablet Take 180 mg by mouth as needed      glucosamine 500 MG CAPS capsule Take 500 mg by mouth daily      levonorgestrel (MIRENA) 20 MCG/DAY IUD 1 each (20 mcg) by Intrauterine route once      losartan (COZAAR) 50 MG tablet TAKE 1 TABLET(50 MG) BY MOUTH DAILY 90 tablet 3    magnesium oxide (MAG-OX) 400 MG tablet Take 1 tablet by mouth daily      nystatin (NYSTOP) 392443 UNIT/GM external powder APPLY TO THE AFFECTED AREA UNDER THE BREASTS THREE TIMES DAILY AS NEEDED. 60 g 5    omeprazole (PRILOSEC) 20 MG DR capsule Take 1 capsule (20 mg) by mouth daily. 90 capsule 4    phentermine 15 MG capsule Take 1 capsule (15 mg) by mouth every morning. 30 capsule 5    rizatriptan  (MAXALT-MLT) 5 MG ODT TAKE 1-2 TABLETS BY MOUTH AT ONSET OF HEADACHE MAY REPEAT IN 2 HOURS. MAX OF 6 TABLETS PER 24 HOURS. 36 tablet 3    Semaglutide-Weight Management (WEGOVY) 0.25 MG/0.5ML pen Inject 0.25 mg subcutaneously once a week. 2 mL 0    Semaglutide-Weight Management (WEGOVY) 0.5 MG/0.5ML pen Inject 0.5 mg subcutaneously once a week. 2 mL 3    Semaglutide-Weight Management (WEGOVY) 1 MG/0.5ML pen Inject 1 mg subcutaneously once a week. 2 mL 3    timolol maleate (TIMOPTIC) 0.5 % ophthalmic solution Place 1 drop Into the left eye daily. 5 mL 3    valACYclovir (VALTREX) 500 MG tablet TAKE 1 TABLET BY MOUTH DAILY. INCREASE TO 4 TABLET BY MOUTH 2 TIMES DAILY AT ONSET OF OUTBREAK 100 tablet 3       Patient Active Problem List   Diagnosis    GERD (gastroesophageal reflux disease)    Migraines    Yeast infection of the vagina, recurrent     Recurrent cold sores    Class 3 severe obesity with serious comorbidity and body mass index (BMI) of 40.0 to 44.9 in adult, unspecified obesity type (H)    Primary osteoarthritis of right knee    Intertrigo    History of removal of laparoscopic gastric banding device    B12 deficiency    Menorrhagia    Hypertension goal BP (blood pressure) < 140/90    Ventral hernia without obstruction or gangrene    Teratoma of left ovary    IUD (intrauterine device) in place       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    GERD (gastroesophageal reflux disease)     Ravenel's disease     Hypertension goal BP (blood pressure) < 140/90     Iron deficiency anemia     Menorrhagia     Migraine     Morbid obesity (H)     Nonallergic rhinitis 04/17/2019    Recurrent cold sores     Vulvar dermatitis 10/14/2010    Yeast infection of the vagina, recurrent         Past Surgical History:   Procedure Laterality Date    COLPOSCOPY,BX CERVIX/ENDOCERV CURR  07/1994     LOGAN XI HERNIORRHAPHY VENTRAL N/A 04/15/2024    Procedure: Robot-assisted laparoscopic incarcerated ventral incisional hernia repair with mesh;  Surgeon: Aly Cai DO;  [facility]    ESOPHAGOSCOPY, GASTROSCOPY, DUODENOSCOPY (EGD), COMBINED  01/02/2014    Procedure: COMBINED ESOPHAGOSCOPY, GASTROSCOPY, DUODENOSCOPY (EGD);;  Surgeon: Eden Stacy MD;  [facility]    ESOPHAGOSCOPY, GASTROSCOPY, DUODENOSCOPY (EGD), COMBINED N/A 01/19/2017    Procedure: COMBINED ESOPHAGOSCOPY, GASTROSCOPY, DUODENOSCOPY (EGD);  Surgeon: Ciro Deras MD;  [facility]    LAPAROSCOPIC GASTRIC ADJUSTABLE BANDING  04/28/2014    Procedure: LAPAROSCOPIC GASTRIC ADJUSTABLE BANDING;  Surgeon: Ciro Deras MD;  [facility]    LAPAROSCOPIC HERNIORRHAPHY HIATAL  04/28/2014    Procedure: LAPAROSCOPIC HERNIORRHAPHY HIATAL;  Surgeon: Ciro Deras MD;  [facility]    LAPAROSCOPIC REMOVAL GASTRIC ADJUSTABLE BAND N/A 01/19/2017    Procedure: LAPAROSCOPIC REMOVAL GASTRIC ADJUSTABLE BAND;  Surgeon: Ciro Deras MD;  [facility]    LITHOTRIPSY      ZZC TREAT ECTOPIC PREG,RMV TUBE/OVARY      LT       Family History   Problem Relation Age of Onset    Diabetes Mother     C.A.D. Mother     Cancer Father 72        d. pancreatic, melanoma     Colon Polyps Father     Obesity Brother     Breast Cancer Maternal Grandmother     Heart Disease Maternal Grandfather         CHF    Heart Disease Paternal Grandmother         CHF    Colon Polyps Paternal Grandmother     Respiratory Paternal Grandfather         TB    Breast Cancer Maternal Aunt     Glaucoma No family hx of     Macular Degeneration No family hx of     Ovarian Cancer No family hx of        Social History     Tobacco Use    Smoking status: Former     Current packs/day: 0.00     Average packs/day: 0.5 packs/day for 10.0 years (5.0 ttl pk-yrs)     Types: Cigarettes     Start date: 12/1/1987     Quit date: 12/1/1997     Years  since quittin.1     Passive exposure: Past    Smokeless tobacco: Never   Substance Use Topics    Alcohol use: Yes     Comment: occasional (infrequent)         Exam:  Vitals: /82   Pulse 88   BMI: There is no height or weight on file to calculate BMI.  Height: Data Unavailable    Constitutional/ general:  Pt is in no apparent distress, appears well-nourished.  Cooperative with history and physical exam.     Psych:  The patient answered questions appropriately.  Normal affect.  Seems to have reasonable expectations, in terms of treatment.     Lungs:  Non labored breathing, non labored speech. No cough.  No audible wheezing. Even, quiet breathing.       Vascular:  Pedal pulses are palpable bilaterally for both the DP and PT arteries.  CFT < 3 sec.  Ankle edema.  Pedal hair growth noted.     Neuro:  Alert and oriented x 3. Coordinated gait.  Light touch sensation is intact to the L4, L5, S1 distributions. No obvious deficits.  No evidence of neurological-based weakness, spasticity, or contracture in the lower extremities.    Derm: Normal texture and turgor.  No erythema, ecchymosis, or cyanosis.  No open lesions.     Musculoskeletal:    Lower extremity muscle strength is normal.  Patient is ambulatory without an assistive device or brace.  No gross deformities.  Normal ROM all fore foot and rearfoot joints.  No equinus.  With weightbearing patient has bilateral pronation with right being worse.  No pain with ROM.   Pain with palpation posterior tibial tendon right foot.  No erythema ecchymosis.  No pain in tibialis anterior tendon.  No pain with range of motion.  No pain on the dorsum of the navicular.      A:  Pronation and posterior tibial tendonitis    P:   Discussed the cause of this with the patient.  Discussed right foot more pronated than left causing overuse of this tendon.  Discussed most important thing is to offload tendon and rest foot.  Will dispense ankle brace for this.  Dispensed ankle brace  to be worn with good shoes at all times.  Discussed that if not improving may need additional offloading with cam walker.  Ice bid.  Minimize activities until healed.  She is wearing a malleable shoe.  I made suggestions on better shoes.  She is somewhat resistant to this.  Wrote prescription for orthotics.  Return to clinic in approximately 3 weeks to reevaluate.    Chauncey Garnica DPM, FACFAS

## 2025-01-27 ENCOUNTER — OFFICE VISIT (OUTPATIENT)
Dept: OPHTHALMOLOGY | Facility: CLINIC | Age: 55
End: 2025-01-27
Attending: OPHTHALMOLOGY
Payer: COMMERCIAL

## 2025-01-27 DIAGNOSIS — H40.052 OCULAR HYPERTENSION, LEFT EYE: ICD-10-CM

## 2025-01-27 DIAGNOSIS — H20.00 ACUTE ANTERIOR UVEITIS OF LEFT EYE: Primary | ICD-10-CM

## 2025-01-27 DIAGNOSIS — H35.81 COTTON WOOL SPOTS: ICD-10-CM

## 2025-01-27 DIAGNOSIS — H21.542 POSTERIOR SYNECHIAE, LEFT EYE: ICD-10-CM

## 2025-01-27 DIAGNOSIS — H43.392 VITREOUS OPACITIES OF LEFT EYE: ICD-10-CM

## 2025-01-27 PROCEDURE — 99212 OFFICE O/P EST SF 10 MIN: CPT | Performed by: OPHTHALMOLOGY

## 2025-01-27 RX ORDER — DIFLUPREDNATE OPHTHALMIC 0.5 MG/ML
1 EMULSION OPHTHALMIC DAILY
Qty: 5 ML | Refills: 2 | Status: SHIPPED | OUTPATIENT
Start: 2025-01-27

## 2025-01-27 RX ORDER — TIMOLOL MALEATE 5 MG/ML
1 SOLUTION/ DROPS OPHTHALMIC DAILY
Qty: 5 ML | Refills: 3 | Status: SHIPPED | OUTPATIENT
Start: 2025-01-27

## 2025-01-27 ASSESSMENT — SLIT LAMP EXAM - LIDS
COMMENTS: NORMAL
COMMENTS: NORMAL

## 2025-01-27 ASSESSMENT — CONF VISUAL FIELD
OD_SUPERIOR_TEMPORAL_RESTRICTION: 0
OD_INFERIOR_TEMPORAL_RESTRICTION: 0
OD_INFERIOR_NASAL_RESTRICTION: 0
OS_NORMAL: 1
OS_SUPERIOR_TEMPORAL_RESTRICTION: 0
METHOD: COUNTING FINGERS
OS_INFERIOR_TEMPORAL_RESTRICTION: 0
OD_NORMAL: 1
OD_SUPERIOR_NASAL_RESTRICTION: 0
OS_INFERIOR_NASAL_RESTRICTION: 0
OS_SUPERIOR_NASAL_RESTRICTION: 0

## 2025-01-27 ASSESSMENT — CUP TO DISC RATIO
OS_RATIO: 0.4
OD_RATIO: 0.4

## 2025-01-27 ASSESSMENT — VISUAL ACUITY
METHOD: SNELLEN - LINEAR
OS_SC: 20/60
OS_PH_SC: 20/30
OD_SC: 20/25
OS_SC+: -1
OD_SC+: -2

## 2025-01-27 ASSESSMENT — EXTERNAL EXAM - RIGHT EYE: OD_EXAM: NORMAL

## 2025-01-27 ASSESSMENT — TONOMETRY
OD_IOP_MMHG: 18
IOP_METHOD: TONOPEN
OS_IOP_MMHG: 18

## 2025-01-27 ASSESSMENT — EXTERNAL EXAM - LEFT EYE: OS_EXAM: NORMAL

## 2025-01-27 NOTE — PROGRESS NOTES
Chief Complaint/Presenting Concern:  Uveitis follow up    Interval History of Present Ocular Illness:  Elaine Awad is a 54 year old patient who returns for follow up of her uveitis of the left eye. Last visit we tapered drops.    Ms. Awad notes the vision is blurrier but still on and off aching in the left eye. She was awaiting the start of the new year to get glasses    Interval Updates to Medical/Family/Social History:  No updates    Relevant Review of Systems Updates:  No changes     Current eye related medications: Timolol every other day left eye Durezol every other day left eye     No Imaging    Assessment:     1. Acute anterior uveitis of left eye (Primary)  Resolved     2. Ocular hypertension, left eye  Normal on drops every other day     3. Posterior synechiae, left eye  No recurrence    4. Vitreous opacities of left eye  None new    5. Cotton wool spots - Left Eye  No recurrence    Plan/Recommendations:    Discussed findings with patient.  The left eye has no active inflammation and no active spots in the vitreous nor cotton wool spots. We will taper even further to ensure lower likelihood of recurrrence  The right eye is looking well and no drops needed  Eye pressure is normal in each eye on Timolol every other day left eye only  No drops right eye  For the timolol and Durezol left eye. Let's skip tomorrow. Then starting Wed, 1/29, use both Durezol and Timolol once that day. Then the next use would be Saturday, February 1. Then going forward, use both drops on the same day on Wednesdays and Saturdays. This pattern can be completed on 2/22/25, then no drops needed after that. Call/message if anything changes.   Okay to get glasses anytime  No other treatments needed    RTC April tonopen,no dilation, no testing    Physician Attestation     Attending Physician Attestation:  Complete documentation of historical and exam elements from today's encounter can be found in the full encounter summary report (not  reduplicated in this progress note). I personally obtained the chief complaint(s) and history of present illness. I confirmed and edited as necessary the review of systems, past medical/surgical history, family history, social history, and examination findings as documented by others; and I examined the patient myself. I personally reviewed the relevant tests, images, and reports as documented above. I formulated and edited as necessary the assessment and plan and discussed the findings and management plan with the patient and family members present at the time of this visit.  Adam Hinds M.D., Uveitis and Medical Retina, January 27, 2025

## 2025-01-27 NOTE — PATIENT INSTRUCTIONS
For the timolol and Durezol left eye. Let's skip tomorrow. Then starting Wed, 1/29, use both Durezol and Timolol once that day. Then the next use would be Saturday, February 1. Then going forward, use both drops on the same day on Wednesdays and Saturdays. This pattern can be completed on 2/22/25, then no drops needed after that. Call/message if anything changes.   Okay to get glasses anytime  No other treatments needed

## 2025-01-27 NOTE — LETTER
1/27/2025       RE: Elaine Awad  [id number] [facility] [address]     Dear Colleague,    Thank you for referring your patient, Elaine Awad, to the [facility] [place] at [facility]. Please see a copy of my visit note below.    Chief Complaint/Presenting Concern:  Uveitis follow up    Interval History of Present Ocular Illness:  Elaine Awad is a 54 year old patient who returns for follow up of her uveitis of the left eye. Last visit we tapered drops.    Ms. Awad notes the vision is blurrier but still on and off aching in the left eye. She was awaiting the start of the new year to get glasses    Interval Updates to Medical/Family/Social History:  No updates    Relevant Review of Systems Updates:  No changes     Current eye related medications: Timolol every other day left eye Durezol every other day left eye     No Imaging    Assessment:     1. Acute anterior uveitis of left eye (Primary)  Resolved     2. Ocular hypertension, left eye  Normal on drops every other day     3. Posterior synechiae, left eye  No recurrence    4. Vitreous opacities of left eye  None new    5. Cotton wool spots - Left Eye  No recurrence    Plan/Recommendations:    Discussed findings with patient.  The left eye has no active inflammation and no active spots in the vitreous nor cotton wool spots. We will taper even further to ensure lower likelihood of recurrrence  The right eye is looking well and no drops needed  Eye pressure is normal in each eye on Timolol every other day left eye only  No drops right eye  For the timolol and Durezol left eye. Let's skip tomorrow. Then starting Wed, 1/29, use both Durezol and Timolol once that day. Then the next use would be Saturday, February 1. Then going forward, use both drops on the same day on Wednesdays and Saturdays. This pattern can be completed on 2/22/25, then no drops needed after that. Call/message if anything  changes.   Okay to get glasses anytime  No other treatments needed    RTC April tonopen,no dilation, no testing    Physician Attestation    Attending Physician Attestation:  Complete documentation of historical and exam elements from today's encounter can be found in the full encounter summary report (not reduplicated in this progress note). I personally obtained the chief complaint(s) and history of present illness. I confirmed and edited as necessary the review of systems, past medical/surgical history, family history, social history, and examination findings as documented by others; and I examined the patient myself. I personally reviewed the relevant tests, images, and reports as documented above. I formulated and edited as necessary the assessment and plan and discussed the findings and management plan with the patient and family members present at the time of this visit.  Adam Hinds M.D., Uveitis and Medical Retina, January 27, 2025     Again, thank you for allowing me to participate in the care of your patient.      Sincerely,    Adam Hinds MD  Uveitis and Medical Retina    Department of Ophthalmology & Visual Neurosciences  [facility]

## 2025-01-27 NOTE — NURSING NOTE
Chief Complaints and History of Present Illnesses   Patient presents with    Uveitis Follow-Up     Chief Complaint(s) and History of Present Illness(es)       Uveitis Follow-Up              Laterality: left eye    Onset: gradual    Onset: months ago    Quality: Unsure if the va has changed      Frequency: intermittently    Associated symptoms: photophobia, flashes and floaters    Treatments tried: eye drops    Pain scale: 0/10              Comments    Here for acute anterior uveitis of left eye  Timolol every other day left eye   Durezol every other day left eye   Valtrex prn  Bronwyn Reyna COT 8:27 AM January 27, 2025

## 2025-01-30 ENCOUNTER — OFFICE VISIT (OUTPATIENT)
Dept: OBGYN | Facility: CLINIC | Age: 55
End: 2025-01-30
Payer: COMMERCIAL

## 2025-01-30 VITALS
BODY MASS INDEX: 46.6 KG/M2 | DIASTOLIC BLOOD PRESSURE: 90 MMHG | TEMPERATURE: 97.7 F | WEIGHT: 267 LBS | HEART RATE: 99 BPM | SYSTOLIC BLOOD PRESSURE: 177 MMHG

## 2025-01-30 DIAGNOSIS — N92.1 MENORRHAGIA WITH IRREGULAR CYCLE: ICD-10-CM

## 2025-01-30 DIAGNOSIS — D27.1 CYST, OVARY, DERMOID, LEFT: Primary | ICD-10-CM

## 2025-01-30 NOTE — PROGRESS NOTES
"  Assessment & Plan     Cyst, ovary, dermoid, left  Expectant management. Ultrasound to monitor for growth.   - US Transvaginal Pelvic Non-OB; Future    Menorrhagia with irregular cycle  Follicles still present on last ultrasound - still cycling  Thin EMS  IUD present on exam today.       BP today seems spurious based on other recent BP    BMI  Estimated body mass index is 46.6 kg/m  as calculated from the following:    Height as of 11/11/24: 1.612 m (5' 3.47\").    Weight as of this encounter: 121.1 kg (267 lb).             No follow-ups on file.    Teodora Mckeon is a 54 year old, presenting for the following health issues:  Follow Up    HPI   Presents for IUD check, gyn exam.   Doing well.   Occ vaginal bleeding - light spotting  Would like US recheck of teratoma.     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    GERD (gastroesophageal reflux disease)     Tyrel's disease     Hypertension goal BP (blood pressure) < 140/90     Iron deficiency anemia     Menorrhagia     Migraine     Morbid obesity (H)     Nonallergic rhinitis 04/17/2019    Recurrent cold sores     Vulvar dermatitis 10/14/2010    Yeast infection of the vagina, recurrent         Past Surgical History:   Procedure Laterality Date    COLPOSCOPY,BX CERVIX/ENDOCERV CURR  07/1994    DAVINCI XI HERNIORRHAPHY VENTRAL N/A 04/15/2024    Procedure: Robot-assisted laparoscopic incarcerated ventral incisional hernia repair with mesh;  Surgeon: Aly Cai DO;  Location:  OR    ESOPHAGOSCOPY, GASTROSCOPY, DUODENOSCOPY (EGD), COMBINED  01/02/2014    Procedure: COMBINED ESOPHAGOSCOPY, GASTROSCOPY, DUODENOSCOPY (EGD);;  Surgeon: Eden Stacy MD;  Location:  GI    ESOPHAGOSCOPY, GASTROSCOPY, DUODENOSCOPY (EGD), COMBINED N/A 01/19/2017    Procedure: COMBINED ESOPHAGOSCOPY, GASTROSCOPY, " DUODENOSCOPY (EGD);  Surgeon: Ciro Deras MD;  [facility]: [facility]    LAPAROSCOPIC GASTRIC ADJUSTABLE BANDING  2014    Procedure: LAPAROSCOPIC GASTRIC ADJUSTABLE BANDING;  Surgeon: Ciro Deras MD;  [facility]: [facility]    LAPAROSCOPIC HERNIORRHAPHY HIATAL  2014    Procedure: LAPAROSCOPIC HERNIORRHAPHY HIATAL;  Surgeon: Ciro Deras MD;  [facility]    LAPAROSCOPIC REMOVAL GASTRIC ADJUSTABLE BAND N/A 2017    Procedure: LAPAROSCOPIC REMOVAL GASTRIC ADJUSTABLE BAND;  Surgeon: Ciro Deras MD;  Location: [facility] OR    LITHOTRIPSY      ZZC TREAT ECTOPIC PREG,RMV TUBE/OVARY      LT       Family History   Problem Relation Age of Onset    Diabetes Mother     C.A.D. Mother     Cancer Father 72        d. pancreatic, melanoma     Colon Polyps Father     Obesity Brother     Breast Cancer Maternal Grandmother     Heart Disease Maternal Grandfather         CHF    Heart Disease Paternal Grandmother         CHF    Colon Polyps Paternal Grandmother     Respiratory Paternal Grandfather         TB    Breast Cancer Maternal Aunt     Glaucoma No family hx of     Macular Degeneration No family hx of     Ovarian Cancer No family hx of        Social History     Socioeconomic History    Marital status:      Spouse name: [facility]    Number of children: 2    Years of education: 16    Highest education level: Not on file   Occupational History    Occupation: RN     Employer: [employer]   Tobacco Use    Smoking status: Former     Current packs/day: 0.00     Average packs/day: 0.5 packs/day for 10.0 years (5.0 ttl pk-yrs)     Types: Cigarettes     Start date: 1987     Quit date: 1997     Years since quittin.1     Passive exposure: Past    Smokeless tobacco: Never   Vaping Use    Vaping status: Never Used   Substance and Sexual Activity    Alcohol use: Yes     Comment: occasional (infrequent)    Drug use: No    Sexual activity: Yes     Partners: Male     Birth  control/protection: I.U.D.   Other Topics Concern    Parent/sibling w/ CABG, MI or angioplasty before 65F 55M? No     Service No    Blood Transfusions No    Caffeine Concern No    Occupational Exposure Yes     Comment: nurse    Hobby Hazards No    Sleep Concern No    Stress Concern No    Weight Concern No    Special Diet No    Back Care No    Exercise Yes    Bike Helmet Yes    Seat Belt Yes    Self-Exams Yes   Social History Narrative    Not on file     Social Drivers of Health     Financial Resource Strain: Low Risk  (11/6/2024)    Financial Resource Strain     Within the past 12 months, have you or your family members you live with been unable to get utilities (heat, electricity) when it was really needed?: No   Food Insecurity: Low Risk  (11/6/2024)    Food Insecurity     Within the past 12 months, did you worry that your food would run out before you got money to buy more?: No     Within the past 12 months, did the food you bought just not last and you didn t have money to get more?: No   Transportation Needs: Low Risk  (11/6/2024)    Transportation Needs     Within the past 12 months, has lack of transportation kept you from medical appointments, getting your medicines, non-medical meetings or appointments, work, or from getting things that you need?: No   Physical Activity: Insufficiently Active (11/6/2024)    Exercise Vital Sign     Days of Exercise per Week: 2 days     Minutes of Exercise per Session: 20 min   Stress: Stress Concern Present (11/6/2024)    [employer] [place] [employer] - Occupational Stress Questionnaire     Feeling of Stress : To some extent   Social Connections: Unknown (11/6/2024)    Social Connection and Isolation Panel [NHANES]     Frequency of Communication with Friends and Family: Not on file     Frequency of Social Gatherings with Friends and Family: Twice a week     Attends Protestant Services: Not on file     Active Member of Clubs or Organizations: Not on file      Attends Club or Organization Meetings: Not on file     Marital Status: Not on file   Interpersonal Safety: Low Risk  (11/11/2024)    Interpersonal Safety     Do you feel physically and emotionally safe where you currently live?: Yes     Within the past 12 months, have you been hit, slapped, kicked or otherwise physically hurt by someone?: No     Within the past 12 months, have you been humiliated or emotionally abused in other ways by your partner or ex-partner?: No   Housing Stability: Low Risk  (11/6/2024)    Housing Stability     Do you have housing? : Yes     Are you worried about losing your housing?: No       Current Outpatient Medications   Medication Sig Dispense Refill    albuterol (PROAIR HFA/PROVENTIL HFA/VENTOLIN HFA) 108 (90 Base) MCG/ACT inhaler Inhale 2 puffs into the lungs every 4 hours as needed. 18 g 0    clindamycin (CLEOCIN T) 1 % external lotion APPLY TOPICALLY TO FACE TWICE DAILY FOR ACNE      difluprednate (DUREZOL) 0.05 % ophthalmic emulsion Place 1 drop Into the left eye daily. 5 mL 2    diphenhydrAMINE (BENADRYL) 25 MG tablet Take 25 mg by mouth nightly as needed for sleep      fexofenadine (ALLEGRA) 180 MG tablet Take 180 mg by mouth as needed      glucosamine 500 MG CAPS capsule Take 500 mg by mouth daily      levonorgestrel (MIRENA) 20 MCG/DAY IUD 1 each (20 mcg) by Intrauterine route once      losartan (COZAAR) 50 MG tablet TAKE 1 TABLET(50 MG) BY MOUTH DAILY 90 tablet 3    magnesium oxide (MAG-OX) 400 MG tablet Take 1 tablet by mouth daily      nystatin (NYSTOP) 957699 UNIT/GM external powder APPLY TO THE AFFECTED AREA UNDER THE BREASTS THREE TIMES DAILY AS NEEDED. 60 g 5    omeprazole (PRILOSEC) 20 MG DR capsule Take 1 capsule (20 mg) by mouth daily. 90 capsule 4    phentermine 15 MG capsule Take 1 capsule (15 mg) by mouth every morning. 30 capsule 5    rizatriptan (MAXALT-MLT) 5 MG ODT TAKE 1-2 TABLETS BY MOUTH AT ONSET OF HEADACHE MAY REPEAT IN 2 HOURS. MAX OF 6 TABLETS PER 24  HOURS. 36 tablet 3    Semaglutide-Weight Management (WEGOVY) 0.25 MG/0.5ML pen Inject 0.25 mg subcutaneously once a week. (Patient not taking: Reported on 1/27/2025) 2 mL 0    Semaglutide-Weight Management (WEGOVY) 0.5 MG/0.5ML pen Inject 0.5 mg subcutaneously once a week. 2 mL 3    Semaglutide-Weight Management (WEGOVY) 1 MG/0.5ML pen Inject 1 mg subcutaneously once a week. (Patient not taking: Reported on 1/27/2025) 2 mL 3    timolol maleate (TIMOPTIC) 0.5 % ophthalmic solution Place 1 drop Into the left eye daily. 5 mL 3    valACYclovir (VALTREX) 500 MG tablet TAKE 1 TABLET BY MOUTH DAILY. INCREASE TO 4 TABLET BY MOUTH 2 TIMES DAILY AT ONSET OF OUTBREAK 100 tablet 3     No current facility-administered medications for this visit.          Allergies   Allergen Reactions    Doxycycline Rash     Full body rash    Ampicillin Swelling     Tongue swelling at end of treatment course           Review of Systems  Constitutional, HEENT, cardiovascular, pulmonary, gi and gu systems are negative, except as otherwise noted.      Objective    BP (!) 177/90   Pulse 99   Temp 97.7  F (36.5  C)   Wt 121.1 kg (267 lb)   BMI 46.60 kg/m    Body mass index is 46.6 kg/m .  Physical Exam   GENERAL: alert and no distress  ABDOMEN: soft, nontender, no hepatosplenomegaly, no masses and bowel sounds normal   (female): normal female external genitalia, normal urethral meatus, normal vaginal mucosa. IUD strings palpable at os  MS: no gross musculoskeletal defects noted, no edema  PSYCH: mentation appears normal, affect normal/bright    Narrative & Impression   EXAMINATION: US PELVIC TRANSABDOMINAL AND TRANSVAGINAL 11/14/2023 9:06  AM       CLINICAL HISTORY: Left salpingectomy due to ectopic pregnancy.  Left ovarian cyst     COMPARISON: Pelvic ultrasound 3/31/2023, CT 1/16/2023     PROCEDURE COMMENT: Both transabdominal and transvaginal imaging  performed of the pelvis with color Doppler.     FINDINGS:  The uterus measures  4.3 cm x 9.1  cm x 4.6 cm. No focal myometrial  mass. Nabothian cysts.     The endometrial stripe measures 3 mm. There is an intrauterine device  within the endometrial canal.     The right ovary measures 1.9 cm x 3.3 cm x 2.9 cm. The left ovary  measures 2.3 cm x 3.4 cm x 2.7 cm. In the right ovary, there are  dominant follicles, the largest measures 1.8 cm. The known left  ovarian teratoma measures 2.5 x 2.3 x 1.6 cm.      There is no simple free fluid in the pelvis.  No adnexal mass.                                                                      IMPRESSION:  1. The intrauterine device is in expected position.  2. Benign right ovarian follicles.  3. Left ovarian teratoma, 2.5 cm.     EMILIANA JULIAN MD            Signed Electronically by: Maral Mathur MD

## 2025-02-06 ENCOUNTER — ANCILLARY PROCEDURE (OUTPATIENT)
Dept: ULTRASOUND IMAGING | Facility: CLINIC | Age: 55
End: 2025-02-06
Attending: OBSTETRICS & GYNECOLOGY
Payer: COMMERCIAL

## 2025-02-06 DIAGNOSIS — D27.1 CYST, OVARY, DERMOID, LEFT: ICD-10-CM

## 2025-02-06 PROCEDURE — 76830 TRANSVAGINAL US NON-OB: CPT

## 2025-02-06 PROCEDURE — 76856 US EXAM PELVIC COMPLETE: CPT

## 2025-02-11 ENCOUNTER — MYC MEDICAL ADVICE (OUTPATIENT)
Dept: OPHTHALMOLOGY | Facility: CLINIC | Age: 55
End: 2025-02-11

## 2025-02-11 ENCOUNTER — OFFICE VISIT (OUTPATIENT)
Dept: PODIATRY | Facility: CLINIC | Age: 55
End: 2025-02-11
Payer: COMMERCIAL

## 2025-02-11 VITALS — HEART RATE: 99 BPM | OXYGEN SATURATION: 100 %

## 2025-02-11 DIAGNOSIS — M21.6X2 PRONATION DEFORMITY OF BOTH FEET: Primary | ICD-10-CM

## 2025-02-11 DIAGNOSIS — M76.821 POSTERIOR TIBIAL TENDONITIS, RIGHT: ICD-10-CM

## 2025-02-11 DIAGNOSIS — M21.6X1 PRONATION DEFORMITY OF BOTH FEET: Primary | ICD-10-CM

## 2025-02-11 PROCEDURE — 99213 OFFICE O/P EST LOW 20 MIN: CPT | Performed by: PODIATRIST

## 2025-02-11 NOTE — LETTER
2/11/2025      Elaine Awad  [address] [facility] [address]      Dear Colleague,    Thank you for referring your patient, Elaine Awad, to the [facility]. Please see a copy of my visit note below.    S:    1/21/25   Complains of right foot pain.  Points to posterior tibial tendon where is courses around the medial malleoli.  Has had this for many months perhaps a year.  Pain aggravated by activity and relieved by rest.  Denies ecchymosis weakness or increased deformity.  We saw patient almost 3 years ago for right rear foot pain.  At that time was more in the sinus tarsi.  This no longer bothers her.  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    2/11/25 patient wearing ankle brace and using compression.  She is now feeling 70% better than last visit.  She is in the process of getting orthotics.  Denies any new ecchymosis weakness or pain.  She has no other new complaints.    ROS:  see above       Allergies   Allergen Reactions     Doxycycline Rash     Full body rash     Ampicillin Swelling     Tongue swelling at end of treatment course       Current Outpatient Medications   Medication Sig Dispense Refill     albuterol (PROAIR HFA/PROVENTIL HFA/VENTOLIN HFA) 108 (90 Base) MCG/ACT inhaler Inhale 2 puffs into the lungs every 4 hours as needed. 18 g 0     clindamycin (CLEOCIN T) 1 % external lotion APPLY TOPICALLY TO FACE TWICE DAILY FOR ACNE       difluprednate (DUREZOL) 0.05 % ophthalmic emulsion Place 1 drop Into the left eye daily. 5 mL 2     diphenhydrAMINE (BENADRYL) 25 MG tablet Take 25 mg by mouth nightly as needed for sleep       fexofenadine (ALLEGRA) 180 MG tablet Take 180 mg by mouth as needed       glucosamine 500 MG CAPS capsule Take 500 mg by mouth daily       levonorgestrel (MIRENA) 20 MCG/DAY IUD 1 each (20 mcg) by Intrauterine route once       losartan (COZAAR) 50 MG tablet TAKE 1 TABLET(50 MG) BY MOUTH DAILY 90 tablet 3     magnesium  oxide (MAG-OX) 400 MG tablet Take 1 tablet by mouth daily       nystatin (NYSTOP) 506187 UNIT/GM external powder APPLY TO THE AFFECTED AREA UNDER THE BREASTS THREE TIMES DAILY AS NEEDED. 60 g 5     omeprazole (PRILOSEC) 20 MG DR capsule Take 1 capsule (20 mg) by mouth daily. 90 capsule 4     phentermine 15 MG capsule Take 1 capsule (15 mg) by mouth every morning. 30 capsule 5     rizatriptan (MAXALT-MLT) 5 MG ODT TAKE 1-2 TABLETS BY MOUTH AT ONSET OF HEADACHE MAY REPEAT IN 2 HOURS. MAX OF 6 TABLETS PER 24 HOURS. 36 tablet 3     Semaglutide-Weight Management (WEGOVY) 0.5 MG/0.5ML pen Inject 0.5 mg subcutaneously once a week. 2 mL 3     Semaglutide-Weight Management (WEGOVY) 1 MG/0.5ML pen Inject 1 mg subcutaneously once a week. (Patient not taking: Reported on 1/27/2025) 2 mL 3     timolol maleate (TIMOPTIC) 0.5 % ophthalmic solution Place 1 drop Into the left eye daily. 5 mL 3     valACYclovir (VALTREX) 500 MG tablet TAKE 1 TABLET BY MOUTH DAILY. INCREASE TO 4 TABLET BY MOUTH 2 TIMES DAILY AT ONSET OF OUTBREAK 100 tablet 3       Patient Active Problem List   Diagnosis     GERD (gastroesophageal reflux disease)     Migraines     Yeast infection of the vagina, recurrent      Recurrent cold sores     Class 3 severe obesity with serious comorbidity and body mass index (BMI) of 40.0 to 44.9 in adult, unspecified obesity type (H)     Primary osteoarthritis of right knee     Intertrigo     History of removal of laparoscopic gastric banding device     B12 deficiency     Menorrhagia     Hypertension goal BP (blood pressure) < 140/90     Ventral hernia without obstruction or gangrene     Teratoma of left ovary     IUD (intrauterine device) in place       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     GERD (gastroesophageal reflux disease)      Tyrel's disease      Hypertension  goal BP (blood pressure) < 140/90      Iron deficiency anemia      Menorrhagia      Migraine      Morbid obesity (H)      Nonallergic rhinitis 04/17/2019     Recurrent cold sores      Vulvar dermatitis 10/14/2010     Yeast infection of the vagina, recurrent         Past Surgical History:   Procedure Laterality Date     COLPOSCOPY,BX CERVIX/ENDOCERV CURR  07/1994     DAVINCI XI HERNIORRHAPHY VENTRAL N/A 04/15/2024    Procedure: Robot-assisted laparoscopic incarcerated ventral incisional hernia repair with mesh;  Surgeon: Aly Cai DO;  Location: [facility]     ESOPHAGOSCOPY, GASTROSCOPY, DUODENOSCOPY (EGD), COMBINED  01/02/2014    Procedure: COMBINED ESOPHAGOSCOPY, GASTROSCOPY, DUODENOSCOPY (EGD);;  Surgeon: Eden Stacy MD;  [facility]     ESOPHAGOSCOPY, GASTROSCOPY, DUODENOSCOPY (EGD), COMBINED N/A 01/19/2017    Procedure: COMBINED ESOPHAGOSCOPY, GASTROSCOPY, DUODENOSCOPY (EGD);  Surgeon: Ciro Deras MD;  [facility]     LAPAROSCOPIC GASTRIC ADJUSTABLE BANDING  04/28/2014    Procedure: LAPAROSCOPIC GASTRIC ADJUSTABLE BANDING;  Surgeon: Ciro Deras MD;  [facility]     LAPAROSCOPIC HERNIORRHAPHY HIATAL  04/28/2014    Procedure: LAPAROSCOPIC HERNIORRHAPHY HIATAL;  Surgeon: Ciro Deras MD;  [facility]     LAPAROSCOPIC REMOVAL GASTRIC ADJUSTABLE BAND N/A 01/19/2017    Procedure: LAPAROSCOPIC REMOVAL GASTRIC ADJUSTABLE BAND;  Surgeon: Ciro Deras MD;  Location: [facility]     LITHOTRIPSY       ZZC TREAT ECTOPIC PREG,RMV TUBE/OVARY      LT       Family History   Problem Relation Age of Onset     Diabetes Mother      C.A.D. Mother      Cancer Father 72        d. pancreatic, melanoma      Colon Polyps Father      Obesity Brother      Breast Cancer Maternal Grandmother      Heart Disease Maternal Grandfather         CHF     Heart Disease Paternal Grandmother         CHF     Colon Polyps Paternal Grandmother      Respiratory Paternal Grandfather          TB     Breast Cancer Maternal Aunt      Glaucoma No family hx of      Macular Degeneration No family hx of      Ovarian Cancer No family hx of        Social History     Tobacco Use     Smoking status: Former     Current packs/day: 0.00     Average packs/day: 0.5 packs/day for 10.0 years (5.0 ttl pk-yrs)     Types: Cigarettes     Start date: 1987     Quit date: 1997     Years since quittin.2     Passive exposure: Past     Smokeless tobacco: Never   Substance Use Topics     Alcohol use: Yes     Comment: occasional (infrequent)         Exam:  Vitals: There were no vitals taken for this visit.  BMI: There is no height or weight on file to calculate BMI.  Height: Data Unavailable    Constitutional/ general:  Pt is in no apparent distress, appears well-nourished.  Cooperative with history and physical exam.     Psych:  The patient answered questions appropriately.  Normal affect.  Seems to have reasonable expectations, in terms of treatment.     Lungs:  Non labored breathing, non labored speech. No cough.  No audible wheezing. Even, quiet breathing.       Vascular:  Pedal pulses are palpable bilaterally for both the DP and PT arteries.  CFT < 3 sec.  Ankle edema.  Pedal hair growth noted.     Neuro:  Alert and oriented x 3. Coordinated gait.  Light touch sensation is intact to the L4, L5, S1 distributions. No obvious deficits.  No evidence of neurological-based weakness, spasticity, or contracture in the lower extremities.    Derm: Normal texture and turgor.  No erythema, ecchymosis, or cyanosis.  No open lesions.     Musculoskeletal:    Lower extremity muscle strength is normal.  Patient is ambulatory without an assistive device or brace.  No gross deformities.  Normal ROM all fore foot and rearfoot joints.  No equinus.  With weightbearing patient has bilateral pronation with right being worse.  No pain with ROM.   Today virtually no pain with palpation posterior tibial tendon right foot.  No erythema  ecchymosis.  No pain in tibialis anterior tendon.  No pain with range of motion.  No pain on the dorsum of the navicular.      A:  Pronation and posterior tibial tendonitis    P:   Significantly better.  Continue good support at all times until pain resolved.  Encouraged her to get orthotics and wear these in a good shoe.  Also discussed good how shoe which may be helpful in the future.  If not improving discussed other treatment options such as physical therapy.  She will call if she would like to do this.  Also discussed ultrasound-guided injections.  RTC as needed.    Chauncey Garnica DPM, FACFAS             Again, thank you for allowing me to participate in the care of your patient.        Sincerely,        Chauncey Garnica DPM    Electronically signed

## 2025-02-11 NOTE — PROGRESS NOTES
S:    1/21/25   Complains of right foot pain.  Points to posterior tibial tendon where is courses around the medial malleoli.  Has had this for many months perhaps a year.  Pain aggravated by activity and relieved by rest.  Denies ecchymosis weakness or increased deformity.  We saw patient almost 3 years ago for right rear foot pain.  At that time was more in the sinus tarsi.  This no longer bothers her.  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    2/11/25 patient wearing ankle brace and using compression.  She is now feeling 70% better than last visit.  She is in the process of getting orthotics.  Denies any new ecchymosis weakness or pain.  She has no other new complaints.    ROS:  see above       Allergies   Allergen Reactions    Doxycycline Rash     Full body rash    Ampicillin Swelling     Tongue swelling at end of treatment course       Current Outpatient Medications   Medication Sig Dispense Refill    albuterol (PROAIR HFA/PROVENTIL HFA/VENTOLIN HFA) 108 (90 Base) MCG/ACT inhaler Inhale 2 puffs into the lungs every 4 hours as needed. 18 g 0    clindamycin (CLEOCIN T) 1 % external lotion APPLY TOPICALLY TO FACE TWICE DAILY FOR ACNE      difluprednate (DUREZOL) 0.05 % ophthalmic emulsion Place 1 drop Into the left eye daily. 5 mL 2    diphenhydrAMINE (BENADRYL) 25 MG tablet Take 25 mg by mouth nightly as needed for sleep      fexofenadine (ALLEGRA) 180 MG tablet Take 180 mg by mouth as needed      glucosamine 500 MG CAPS capsule Take 500 mg by mouth daily      levonorgestrel (MIRENA) 20 MCG/DAY IUD 1 each (20 mcg) by Intrauterine route once      losartan (COZAAR) 50 MG tablet TAKE 1 TABLET(50 MG) BY MOUTH DAILY 90 tablet 3    magnesium oxide (MAG-OX) 400 MG tablet Take 1 tablet by mouth daily      nystatin (NYSTOP) 994057 UNIT/GM external powder APPLY TO THE AFFECTED AREA UNDER THE BREASTS THREE TIMES DAILY AS NEEDED. 60 g 5    omeprazole (PRILOSEC) 20 MG DR capsule Take 1  capsule (20 mg) by mouth daily. 90 capsule 4    phentermine 15 MG capsule Take 1 capsule (15 mg) by mouth every morning. 30 capsule 5    rizatriptan (MAXALT-MLT) 5 MG ODT TAKE 1-2 TABLETS BY MOUTH AT ONSET OF HEADACHE MAY REPEAT IN 2 HOURS. MAX OF 6 TABLETS PER 24 HOURS. 36 tablet 3    Semaglutide-Weight Management (WEGOVY) 0.5 MG/0.5ML pen Inject 0.5 mg subcutaneously once a week. 2 mL 3    Semaglutide-Weight Management (WEGOVY) 1 MG/0.5ML pen Inject 1 mg subcutaneously once a week. (Patient not taking: Reported on 1/27/2025) 2 mL 3    timolol maleate (TIMOPTIC) 0.5 % ophthalmic solution Place 1 drop Into the left eye daily. 5 mL 3    valACYclovir (VALTREX) 500 MG tablet TAKE 1 TABLET BY MOUTH DAILY. INCREASE TO 4 TABLET BY MOUTH 2 TIMES DAILY AT ONSET OF OUTBREAK 100 tablet 3       Patient Active Problem List   Diagnosis    GERD (gastroesophageal reflux disease)    Migraines    Yeast infection of the vagina, recurrent     Recurrent cold sores    Class 3 severe obesity with serious comorbidity and body mass index (BMI) of 40.0 to 44.9 in adult, unspecified obesity type (H)    Primary osteoarthritis of right knee    Intertrigo    History of removal of laparoscopic gastric banding device    B12 deficiency    Menorrhagia    Hypertension goal BP (blood pressure) < 140/90    Ventral hernia without obstruction or gangrene    Teratoma of left ovary    IUD (intrauterine device) in place       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    GERD (gastroesophageal reflux disease)     [place]'s disease     Hypertension goal BP (blood pressure) < 140/90     Iron deficiency anemia     Menorrhagia     Migraine     Morbid obesity (H)     Nonallergic rhinitis 04/17/2019    Recurrent cold sores     Vulvar dermatitis 10/14/2010    Yeast infection of the vagina, recurrent         Past Surgical  History:   Procedure Laterality Date    COLPOSCOPY,BX CERVIX/ENDOCERV CURR  07/1994    DAVINCI XI HERNIORRHAPHY VENTRAL N/A 04/15/2024    Procedure: Robot-assisted laparoscopic incarcerated ventral incisional hernia repair with mesh;  Surgeon: Aly Cai, DO;  Location: [facility]    ESOPHAGOSCOPY, GASTROSCOPY, DUODENOSCOPY (EGD), COMBINED  01/02/2014    Procedure: COMBINED ESOPHAGOSCOPY, GASTROSCOPY, DUODENOSCOPY (EGD);;  Surgeon: Eden Stacy MD;  Location: [facility]    ESOPHAGOSCOPY, GASTROSCOPY, DUODENOSCOPY (EGD), COMBINED N/A 01/19/2017    Procedure: COMBINED ESOPHAGOSCOPY, GASTROSCOPY, DUODENOSCOPY (EGD);  Surgeon: Ciro Deras MD;  [facility]    LAPAROSCOPIC GASTRIC ADJUSTABLE BANDING  04/28/2014    Procedure: LAPAROSCOPIC GASTRIC ADJUSTABLE BANDING;  Surgeon: Ciro Deras MD;  [facility]    LAPAROSCOPIC HERNIORRHAPHY HIATAL  04/28/2014    Procedure: LAPAROSCOPIC HERNIORRHAPHY HIATAL;  Surgeon: Ciro Deras MD;  [facility]    LAPAROSCOPIC REMOVAL GASTRIC ADJUSTABLE BAND N/A 01/19/2017    Procedure: LAPAROSCOPIC REMOVAL GASTRIC ADJUSTABLE BAND;  Surgeon: Ciro Deras MD;  [facility]: [facility]    LITHOTRIPSY      ZZC TREAT ECTOPIC PREG,RMV TUBE/OVARY      LT       Family History   Problem Relation Age of Onset    Diabetes Mother     C.A.D. Mother     Cancer Father 72        d. pancreatic, melanoma     Colon Polyps Father     Obesity Brother     Breast Cancer Maternal Grandmother     Heart Disease Maternal Grandfather         CHF    Heart Disease Paternal Grandmother         CHF    Colon Polyps Paternal Grandmother     Respiratory Paternal Grandfather         TB    Breast Cancer Maternal Aunt     Glaucoma No family hx of     Macular Degeneration No family hx of     Ovarian Cancer No family hx of        Social History     Tobacco Use    Smoking status: Former     Current packs/day: 0.00     Average packs/day: 0.5 packs/day for 10.0 years (5.0 ttl pk-yrs)      Types: Cigarettes     Start date: 1987     Quit date: 1997     Years since quittin.2     Passive exposure: Past    Smokeless tobacco: Never   Substance Use Topics    Alcohol use: Yes     Comment: occasional (infrequent)         Exam:  Vitals: There were no vitals taken for this visit.  BMI: There is no height or weight on file to calculate BMI.  Height: Data Unavailable    Constitutional/ general:  Pt is in no apparent distress, appears well-nourished.  Cooperative with history and physical exam.     Psych:  The patient answered questions appropriately.  Normal affect.  Seems to have reasonable expectations, in terms of treatment.     Lungs:  Non labored breathing, non labored speech. No cough.  No audible wheezing. Even, quiet breathing.       Vascular:  Pedal pulses are palpable bilaterally for both the DP and PT arteries.  CFT < 3 sec.  Ankle edema.  Pedal hair growth noted.     Neuro:  Alert and oriented x 3. Coordinated gait.  Light touch sensation is intact to the L4, L5, S1 distributions. No obvious deficits.  No evidence of neurological-based weakness, spasticity, or contracture in the lower extremities.    Derm: Normal texture and turgor.  No erythema, ecchymosis, or cyanosis.  No open lesions.     Musculoskeletal:    Lower extremity muscle strength is normal.  Patient is ambulatory without an assistive device or brace.  No gross deformities.  Normal ROM all fore foot and rearfoot joints.  No equinus.  With weightbearing patient has bilateral pronation with right being worse.  No pain with ROM.   Today virtually no pain with palpation posterior tibial tendon right foot.  No erythema ecchymosis.  No pain in tibialis anterior tendon.  No pain with range of motion.  No pain on the dorsum of the navicular.      A:  Pronation and posterior tibial tendonitis    P:   Significantly better.  Continue good support at all times until pain resolved.  Encouraged her to get orthotics and wear these in  a good shoe.  Also discussed good how shoe which may be helpful in the future.  If not improving discussed other treatment options such as physical therapy.  She will call if she would like to do this.  Also discussed ultrasound-guided injections.  RTC as needed.    Chauncey Garnica DPM, FACFAS

## 2025-02-11 NOTE — PATIENT INSTRUCTIONS
We wish you continued good healing. If you have any questions or concerns, please do not hesitate to contact us at  [phone]    [employer] (secure e-mail communication and access to your chart) to send a message or to make an appointment.    Please remember to call and schedule a follow up appointment if one was recommended at your earliest convenience.     PODIATRY CLINIC HOURS  TELEPHONE NUMBER    Dr. Chauncey TELLEZPYOBANI FACFAS        Clinics:  [facility]   [place]  Tuesday 1PM-6PM  [place]  Wednesday 745AM-330PM  [place]  Monday 2nd,4th  830AM-4PM  Thursday/Friday 745AM-230PM     [place] APPOINTMENTS  [phone]    [place] APPOINTMENTS  [phone]          If you need a medication refill, please contact us you may need lab work and/or a follow up visit prior to your refill (i.e. Antifungal medications).  If MRI needed please call Imaging at [phone]   HOW DO I GET MY KNEE SCOOTER? Knee scooters can be picked up at ANY Medical Supply stores with your knee scooter Prescription.  OR  Bring your signed prescription to an [facility] showroom.   Set up an appointment for your custom Orthotics. Call any Orthotics locations call [phone]

## 2025-02-13 ENCOUNTER — OFFICE VISIT (OUTPATIENT)
Dept: RHEUMATOLOGY | Facility: CLINIC | Age: 55
End: 2025-02-13
Payer: COMMERCIAL

## 2025-02-13 VITALS
WEIGHT: 265.7 LBS | SYSTOLIC BLOOD PRESSURE: 129 MMHG | BODY MASS INDEX: 46.37 KG/M2 | DIASTOLIC BLOOD PRESSURE: 83 MMHG | HEART RATE: 85 BPM | OXYGEN SATURATION: 100 %

## 2025-02-13 DIAGNOSIS — H20.12 CHRONIC IRITIS, LEFT EYE: ICD-10-CM

## 2025-02-13 DIAGNOSIS — M17.0 PRIMARY OSTEOARTHRITIS OF BOTH KNEES: ICD-10-CM

## 2025-02-13 DIAGNOSIS — Z15.89 HLA B27 (HLA B27 POSITIVE): Primary | ICD-10-CM

## 2025-02-13 DIAGNOSIS — M21.41 PES PLANUS OF RIGHT FOOT: ICD-10-CM

## 2025-02-13 NOTE — PROGRESS NOTES
This document was created using a software with less than 100% fidelity, at times resulting in unintended, even erroneous syntax and grammar.  The reader is advised to keep this under consideration while reviewing, interpreting this note.      Rheumatology Consult Note      Elaine Awad     YOB: 1970 Age: 54 year old    Date of visit: 2/13/25    PCP: Fernanda Rodriguez    Chief Complaint   Patient presents with:  Consult      Assessment and Plan     Linda was seen today for consult.    Diagnoses and all orders for this visit:    HLA B27 (HLA B27 positive)  -     Adult Rheumatology  Referral    Chronic iritis, left eye    Primary osteoarthritis of both knees    Pes planus of right foot         That this patient with a history of left sided iritis, now under control, was found to have HLA-B27 positive status as a part of workup for iritis.  Today we discussed what this implies.  I have reassured her that in of itself having HLA-B27 positive status is not a disease.  I have outlined to her a variety of clinical syndromes, conditions 1 would associate with this genetic marker.  Apart from the iritis it would appear thankfully that none are present in her.  As best as she is able to define her family history there is not any for psoriasis inflammatory bowel disease or ankylosing spondylitis.  She does have cervicalgia more likely than not secondary to facet arthropathy and degenerative changes.  At this point no further workup is indicated from a rheumatologic perspective.  We discussed the pros and cons of imaging the lumbar spine of the sacroiliac joints in an asymptomatic patient, and have advised against that.  We did discuss briefly that how some patients with iritis might require immunosuppression/Biologics.  That decision will be between her and her ophthalmologist.  With regards to her joint pains she has advanced osteoarthritis of the right knee, very likely of the left, x-rays  "of the right knee were shown to her.  Her ankle pain is not related with enthesitis of the Achilles tendon insertion but more of a pes planus phenomenon.  She is working with her podiatrist on that.  She will follow-up in rheumatology on as-needed basis.      JOSE Awad is a 54 year old female  is seen today for evaluation of HLA-B27 positive status from her ophthalmologist in the context of left iritis that started troubling her in July 2024 after initial oral prednisone and topical steroid drops it is now under control and she has been told that there is no residual inflammation.  This is her first episode.  She has not had involvement of the right eye.  She reports the neck pain this is associated with degenerative changes on her recent x-ray.  There are no inflammatory signals.  She has no pain in the lower back, buttock area, sacroiliac regions.  She has quite significant right knee pain without swelling however she has advanced degenerative changes of those x-rays are reviewed today which she has virtually complete loss of space in the medial compartment with osteophyte formation.  In this context importantly she is working with her primary physicians/endocrine to lose weight and is on GLP 1 agonist.  She also has right ankle pain with morning stiffness lasting no more than 10 to 15 minutes, typically this pain is more troublesome the more she is up and about on her feet at times giving the sense of giving way., .  This is been looked at by her podiatrist she has inserts, and has been told that this is because of \"tendinitis, medial.  She reports no features suggestive of dactylitis.  There is no personal history or family history that she knows of psoriasis ulcerative colitis Crohn's disease.  No family history of rheumatoid arthritis, ankylosing spondylitis.  Her workup has included negative anti-CCP antibody rheumatoid factor and ZHAO.  One of the ZHAO as well as 1: 40 subsequent negative.  She has " had modest elevation of sedimentation rate.  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  Non-smoker.  Regarding reproductive history rest of her comorbidities reviewed as noted.  2 adult sons, 1 miscarriage..           Active Problem List     Patient Active Problem List   Diagnosis    GERD (gastroesophageal reflux disease)    Migraines    Yeast infection of the vagina, recurrent     Recurrent cold sores    Class 3 severe obesity with serious comorbidity and body mass index (BMI) of 40.0 to 44.9 in adult, unspecified obesity type (H)    Primary osteoarthritis of right knee    Intertrigo    History of removal of laparoscopic gastric banding device    B12 deficiency    Menorrhagia    Hypertension goal BP (blood pressure) < 140/90    Ventral hernia without obstruction or gangrene    Teratoma of left ovary    IUD (intrauterine device) in place     Past Medical History     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    GERD (gastroesophageal reflux disease)     [place]'s disease     Hypertension goal BP (blood pressure) < 140/90     Iron deficiency anemia     Menorrhagia     Migraine     Morbid obesity (H)     Nonallergic rhinitis 04/17/2019    Recurrent cold sores     Vulvar dermatitis 10/14/2010    Yeast infection of the vagina, recurrent       Past Surgical History     Past Surgical History:   Procedure Laterality Date    COLPOSCOPY,BX CERVIX/ENDOCERV CURR  07/1994    DAVINCI XI HERNIORRHAPHY VENTRAL N/A 04/15/2024    Procedure: Robot-assisted laparoscopic incarcerated ventral incisional hernia repair with mesh;  Surgeon: Aly Cai DO;  Location:  OR    ESOPHAGOSCOPY, GASTROSCOPY, DUODENOSCOPY (EGD), COMBINED  01/02/2014    Procedure: COMBINED ESOPHAGOSCOPY, GASTROSCOPY, DUODENOSCOPY (EGD);;  Surgeon: Eden Stacy MD;  Location:   GI    ESOPHAGOSCOPY, GASTROSCOPY, DUODENOSCOPY (EGD), COMBINED N/A 01/19/2017    Procedure: COMBINED ESOPHAGOSCOPY, GASTROSCOPY, DUODENOSCOPY (EGD);  Surgeon: Ciro Deras MD;  [facility]    LAPAROSCOPIC GASTRIC ADJUSTABLE BANDING  04/28/2014    Procedure: LAPAROSCOPIC GASTRIC ADJUSTABLE BANDING;  Surgeon: Ciro Deras MD;  [facility]    LAPAROSCOPIC HERNIORRHAPHY HIATAL  04/28/2014    Procedure: LAPAROSCOPIC HERNIORRHAPHY HIATAL;  Surgeon: Ciro Deras MD;  [facility]    LAPAROSCOPIC REMOVAL GASTRIC ADJUSTABLE BAND N/A 01/19/2017    Procedure: LAPAROSCOPIC REMOVAL GASTRIC ADJUSTABLE BAND;  Surgeon: Ciro Deras MD;  [facility]: [facility]    LITHOTRIPSY      ZZC TREAT ECTOPIC PREG,RMV TUBE/OVARY      LT     Allergy     Allergies   Allergen Reactions    Doxycycline Rash     Full body rash    Ampicillin Swelling     Tongue swelling at end of treatment course     Current Medication List     Current Outpatient Medications   Medication Sig Dispense Refill    albuterol (PROAIR HFA/PROVENTIL HFA/VENTOLIN HFA) 108 (90 Base) MCG/ACT inhaler Inhale 2 puffs into the lungs every 4 hours as needed. 18 g 0    clindamycin (CLEOCIN T) 1 % external lotion APPLY TOPICALLY TO FACE TWICE DAILY FOR ACNE      difluprednate (DUREZOL) 0.05 % ophthalmic emulsion Place 1 drop Into the left eye daily. 5 mL 2    diphenhydrAMINE (BENADRYL) 25 MG tablet Take 25 mg by mouth nightly as needed for sleep      fexofenadine (ALLEGRA) 180 MG tablet Take 180 mg by mouth as needed      glucosamine 500 MG CAPS capsule Take 500 mg by mouth daily      levonorgestrel (MIRENA) 20 MCG/DAY IUD 1 each (20 mcg) by Intrauterine route once      losartan (COZAAR) 50 MG tablet TAKE 1 TABLET(50 MG) BY MOUTH DAILY 90 tablet 3    magnesium oxide (MAG-OX) 400 MG tablet Take 1 tablet by mouth daily      nystatin (NYSTOP) [id number] UNIT/GM external powder APPLY TO THE AFFECTED AREA UNDER THE BREASTS THREE TIMES DAILY AS NEEDED. 60 g 5     omeprazole (PRILOSEC) 20 MG DR capsule Take 1 capsule (20 mg) by mouth daily. 90 capsule 4    phentermine 15 MG capsule Take 1 capsule (15 mg) by mouth every morning. 30 capsule 5    rizatriptan (MAXALT-MLT) 5 MG ODT TAKE 1-2 TABLETS BY MOUTH AT ONSET OF HEADACHE MAY REPEAT IN 2 HOURS. MAX OF 6 TABLETS PER 24 HOURS. 36 tablet 3    Semaglutide-Weight Management (WEGOVY) 0.5 MG/0.5ML pen Inject 0.5 mg subcutaneously once a week. 2 mL 3    Semaglutide-Weight Management (WEGOVY) 1 MG/0.5ML pen Inject 1 mg subcutaneously once a week. (Patient not taking: Reported on 1/27/2025) 2 mL 3    timolol maleate (TIMOPTIC) 0.5 % ophthalmic solution Place 1 drop Into the left eye daily. 5 mL 3    valACYclovir (VALTREX) 500 MG tablet TAKE 1 TABLET BY MOUTH DAILY. INCREASE TO 4 TABLET BY MOUTH 2 TIMES DAILY AT ONSET OF OUTBREAK 100 tablet 3     No current facility-administered medications for this visit.       No current facility-administered medications for this visit.        Family History     Family History   Problem Relation Age of Onset    Diabetes Mother     C.A.D. Mother     Cancer Father 72        d. pancreatic, melanoma     Colon Polyps Father     Obesity Brother     Breast Cancer Maternal Grandmother     Heart Disease Maternal Grandfather         CHF    Heart Disease Paternal Grandmother         CHF    Colon Polyps Paternal Grandmother     Respiratory Paternal Grandfather         TB    Breast Cancer Maternal Aunt     Glaucoma No family hx of     Macular Degeneration No family hx of     Ovarian Cancer No family hx of          Physical Exam     COMPREHENSIVE EXAMINATION:  Vitals:    02/13/25 1425   BP: 129/83   BP Location: Right arm   Patient Position: Sitting   Cuff Size: Adult Large   Pulse: 85   SpO2: 100%   Weight: 120.5 kg (265 lb 11.2 oz)     A well appearing alert oriented female. Vital data as noted above. Her eyes examined for inflammation/scleromalacia. ENT examined for oral mucositis, moisture, thrush, nasal  deformity, external ear redness, deformity. Her neck is examined for suppleness and lymphadenopathy.  Cardiopulmonary examination without dyspnea at rest, use of accessory muscles of breathing, wheezing, edema, peripheral or central cyanosis.  Abdomen examined for softness, tenderness, obvious organomegaly.  Skin examined for heliotrope, malar area eruption, lupus pernio, periungual erythema, sclerodactyly, papules, erythema nodosum, purpura, nail pitting, onycholysis, and obvious psoriasis lesion. Neurological examination done for alertness, speech, facial symmetry,  tone and power in upper and lower extremities, and gait. The joint examination is performed for swelling, tenderness, warmth, erythema, and range of motion in the following joints: DIPs, PIPs, MCPs, wrists, first CMC's, elbows, shoulders, hips, knees, ankles, feet; spine for range of motion and paraspinal muscles for tenderness. The salient normal / abnormal findings are appended.  She does not have synovitis of palpable joints of upper and lower extremities.  She does not have dactylitis of digits or toes.  There is no enthesitis such as [place]'s tendon insertion patellar quadriceps trochanteric regions.  She has marked pes planus of the right foot.  She has lost the final 10 degrees of flexion at the knees more so on the right than the left.    Labs / Imaging (select studies)     Rheumatoid Factor   Date Value Ref Range Status   07/13/2024 <10 <14 IU/mL Final      Hemoglobin   Date Value Ref Range Status   03/07/2024 15.1 11.7 - 15.7 g/dL Final   07/31/2021 14.8 11.7 - 15.7 g/dL Final   04/26/2021 15.4 11.7 - 15.7 g/dL Final   09/12/2019 13.0 11.7 - 15.7 g/dL Final   08/12/2019 9.0 (L) 11.7 - 15.7 g/dL Final     Urea Nitrogen   Date Value Ref Range Status   11/11/2024 15.7 6.0 - 20.0 mg/dL Final   03/07/2024 11.3 6.0 - 20.0 mg/dL Final   11/06/2023 7.1 6.0 - 20.0 mg/dL Final   12/12/2022 7 7 - 30 mg/dL Final   04/13/2022 7 7 - 30 mg/dL Final    07/31/2021 11 7 - 30 mg/dL Final   04/26/2021 9 7 - 30 mg/dL Final   07/29/2019 11 7 - 30 mg/dL Final   01/16/2017 9 7 - 30 mg/dL Final     Erythrocyte Sedimentation Rate   Date Value Ref Range Status   07/15/2024 35 (H) 0 - 30 mm/hr Final     AST   Date Value Ref Range Status   03/07/2024 18 0 - 45 U/L Final     Comment:     Reference intervals for this test were updated on 6/12/2023 to more accurately reflect our healthy population. There may be differences in the flagging of prior results with similar values performed with this method. Interpretation of those prior results can be made in the context of the updated reference intervals.   04/26/2021 16 0 - 45 U/L Final   07/29/2019 14 0 - 45 U/L Final   07/16/2014 24 0 - 45 U/L Final     Albumin   Date Value Ref Range Status   03/07/2024 4.6 3.5 - 5.2 g/dL Final   04/26/2021 3.9 3.4 - 5.0 g/dL Final   07/29/2019 3.8 3.4 - 5.0 g/dL Final   07/16/2014 4.2 3.9 - 5.1 g/dL Final     Alkaline Phosphatase   Date Value Ref Range Status   03/07/2024 84 40 - 150 U/L Final     Comment:     Reference intervals for this test were updated on 11/14/2023 to more accurately reflect our healthy population. There may be differences in the flagging of prior results with similar values performed with this method. Interpretation of those prior results can be made in the context of the updated reference intervals.   04/26/2021 74 40 - 150 U/L Final   07/29/2019 90 40 - 150 U/L Final   07/16/2014 81 40 - 150 U/L Final     ALT   Date Value Ref Range Status   03/07/2024 18 0 - 50 U/L Final     Comment:     Reference intervals for this test were updated on 6/12/2023 to more accurately reflect our healthy population. There may be differences in the flagging of prior results with similar values performed with this method. Interpretation of those prior results can be made in the context of the updated reference intervals.     04/26/2021 35 0 - 50 U/L Final   07/29/2019 21 0 - 50 U/L Final    07/16/2014 26 0 - 50 U/L Final     Rheumatoid Factor   Date Value Ref Range Status   07/13/2024 <10 <14 IU/mL Final          Immunization History     Immunization History   Administered Date(s) Administered    COVID-19 12+ (Pfizer) 11/06/2023    COVID-19 Bivalent 18+ (Moderna) 12/12/2022    COVID-19 Monovalent 18+ (Moderna) 01/22/2021, 02/17/2021, 11/15/2021, 05/14/2022    Flu, Unspecified 10/07/2024    HepB 07/15/1991, 08/19/1991, 02/05/1992    Influenza (H1N1) 01/04/2010    Influenza (IIV3) PF 01/04/2012, 10/02/2012, 12/01/2013    Influenza (prior to 2024) 01/04/2012, 09/21/2012    Influenza Vaccine 18-64 (Flublok) 09/08/2021, 11/11/2022, 10/05/2023    Influenza Vaccine >6 months,quad, PF 10/26/2017, 10/24/2018, 11/26/2019, 11/07/2020    TDAP (Adacel,Boostrix) 09/08/2021    TDAP Vaccine (Adacel) 06/20/2011    Varicella 10/08/2012, 11/20/2012    Zoster recombinant adjuvanted (SHINGRIX) 12/12/2022, 05/01/2024

## 2025-04-10 ENCOUNTER — VIRTUAL VISIT (OUTPATIENT)
Dept: ENDOCRINOLOGY | Facility: CLINIC | Age: 55
End: 2025-04-10
Payer: COMMERCIAL

## 2025-04-10 VITALS — BODY MASS INDEX: 44.56 KG/M2 | WEIGHT: 261 LBS | HEIGHT: 64 IN

## 2025-04-10 DIAGNOSIS — E66.01 CLASS 3 SEVERE OBESITY WITH SERIOUS COMORBIDITY AND BODY MASS INDEX (BMI) OF 40.0 TO 44.9 IN ADULT, UNSPECIFIED OBESITY TYPE (H): Primary | ICD-10-CM

## 2025-04-10 DIAGNOSIS — E66.813 CLASS 3 SEVERE OBESITY WITH SERIOUS COMORBIDITY AND BODY MASS INDEX (BMI) OF 40.0 TO 44.9 IN ADULT, UNSPECIFIED OBESITY TYPE (H): Primary | ICD-10-CM

## 2025-04-10 RX ORDER — PHENTERMINE HYDROCHLORIDE 15 MG/1
15 CAPSULE ORAL EVERY MORNING
Qty: 30 CAPSULE | Refills: 5 | Status: SHIPPED | OUTPATIENT
Start: 2025-04-10

## 2025-04-10 NOTE — PROGRESS NOTES
Return Medical Weight Management Note     Elaine Awad  MRN:  [id number]  :  1970  KEAGAN:  4/10/2025    Dear Fernanda Rodriguez MD,    I had the pleasure of seeing your patient Elaine Awad. She is a 54 year old female who I am continuing to see for treatment of obesity related to:        2019    10:46 PM   --   I have the following health issues associated with obesity GERD (Reflux)    Weight Bearing Joint Pain   I have the following symptoms associated with obesity Knee Pain    GERD (Reflux)       Assessment & Plan   Problem List Items Addressed This Visit          Endocrine Diagnoses    Class 3 severe obesity with serious comorbidity and body mass index (BMI) of 40.0 to 44.9 in adult, unspecified obesity type (H) - Primary    Relevant Medications    Semaglutide-Weight Management (WEGOVY) 1.7 MG/0.75ML pen    phentermine 15 MG capsule        Comprehensive weight management follow up visit    Hx of lap band removal with weight gain after  Lap hernia repair 2024 and went well    Weight up from 256 to 261 since last visit 6 mo ago  Taking wegovy 1mg and phentermine  Not interested in weight loss surgery at this time    Currently taking the following medications that can help with weight loss/weight mgmt:  Wegovy 1mg, switched from Saxenda 4 mo ago.  Phentermine 15mg daily    Past weight loss medication history and considerations:  May 2024 we tapered off topiramate due to not feeling it was effective   Contrave: discussed but pt not interested in starting    Plan today:  Increase Wegovy 1.7mg weekly  Continue phentermine 15mg   Consider switching to zepbound if higher dose wegovy not effective.    Follow up plan:  Layla 4 months return MW    INTERVAL HISTORY:    CURRENT WEIGHT:   261 lbs 0 oz    Initial Weight (lbs): 239.4 lbs     Cumulative weight loss (lbs): -21.6  Weight Loss Percentage: -9.02%        2025     8:57 AM   Changes and Difficulties   With regards to my activity/exercise, I  am still struggling with: Pain             4/9/2025     8:57 AM   Weight Loss Medication History Reviewed With Patient   Which weight loss medications are you currently taking on a regular basis? Phentermine    Wegovy       Office Visit on 11/11/2024   Component Date Value Ref Range Status    Sodium 11/11/2024 139  135 - 145 mmol/L Final    Potassium 11/11/2024 4.4  3.4 - 5.3 mmol/L Final    Chloride 11/11/2024 104  98 - 107 mmol/L Final    Carbon Dioxide (CO2) 11/11/2024 26  22 - 29 mmol/L Final    Anion Gap 11/11/2024 9  7 - 15 mmol/L Final    Urea Nitrogen 11/11/2024 15.7  6.0 - 20.0 mg/dL Final    Creatinine 11/11/2024 0.88  0.51 - 0.95 mg/dL Final    GFR Estimate 11/11/2024 78  >60 mL/min/1.73m2 Final    eGFR calculated using 2021 CKD-EPI equation.    Calcium 11/11/2024 10.2  8.8 - 10.4 mg/dL Final    Reference intervals for this test were updated on 7/16/2024 to reflect our healthy population more accurately. There may be differences in the flagging of prior results with similar values performed with this method. Those prior results can be interpreted in the context of the updated reference intervals.    Glucose 11/11/2024 99  70 - 99 mg/dL Final    Patient Fasting > 8hrs? 11/11/2024 No   Final    Cholesterol 11/11/2024 185  <200 mg/dL Final    Triglycerides 11/11/2024 190 (H)  <150 mg/dL Final    Direct Measure HDL 11/11/2024 56  >=50 mg/dL Final    LDL Cholesterol Calculated 11/11/2024 91  <100 mg/dL Final    Non HDL Cholesterol 11/11/2024 129  <130 mg/dL Final    Patient Fasting > 8hrs? 11/11/2024 No   Final       MEDICATIONS:   Current Outpatient Medications   Medication Sig Dispense Refill    phentermine 15 MG capsule Take 1 capsule (15 mg) by mouth every morning. 30 capsule 5    Semaglutide-Weight Management (WEGOVY) 1.7 MG/0.75ML pen Inject 1.7 mg subcutaneously once a week. 3 mL 5    albuterol (PROAIR HFA/PROVENTIL HFA/VENTOLIN HFA) 108 (90 Base) MCG/ACT inhaler Inhale 2 puffs into the lungs every 4  "hours as needed. 18 g 0    clindamycin (CLEOCIN T) 1 % external lotion APPLY TOPICALLY TO FACE TWICE DAILY FOR ACNE      difluprednate (DUREZOL) 0.05 % ophthalmic emulsion Place 1 drop Into the left eye daily. 5 mL 2    diphenhydrAMINE (BENADRYL) 25 MG tablet Take 25 mg by mouth nightly as needed for sleep      fexofenadine (ALLEGRA) 180 MG tablet Take 180 mg by mouth as needed      glucosamine 500 MG CAPS capsule Take 500 mg by mouth daily      levonorgestrel (MIRENA) 20 MCG/DAY IUD 1 each (20 mcg) by Intrauterine route once      losartan (COZAAR) 50 MG tablet TAKE 1 TABLET(50 MG) BY MOUTH DAILY 90 tablet 3    magnesium oxide (MAG-OX) 400 MG tablet Take 1 tablet by mouth daily      nystatin (NYSTOP) 448833 UNIT/GM external powder APPLY TO THE AFFECTED AREA UNDER THE BREASTS THREE TIMES DAILY AS NEEDED. 60 g 5    omeprazole (PRILOSEC) 20 MG DR capsule Take 1 capsule (20 mg) by mouth daily. 90 capsule 4    rizatriptan (MAXALT-MLT) 5 MG ODT TAKE 1-2 TABLETS BY MOUTH AT ONSET OF HEADACHE MAY REPEAT IN 2 HOURS. MAX OF 6 TABLETS PER 24 HOURS. 36 tablet 3    timolol maleate (TIMOPTIC) 0.5 % ophthalmic solution Place 1 drop Into the left eye daily. 5 mL 3    valACYclovir (VALTREX) 500 MG tablet TAKE 1 TABLET BY MOUTH DAILY. INCREASE TO 4 TABLET BY MOUTH 2 TIMES DAILY AT ONSET OF OUTBREAK 100 tablet 3         PHYSICAL EXAM:  Objective    Ht 1.626 m (5' 4\")   Wt 118.4 kg (261 lb)   BMI 44.80 kg/m      Vitals - Patient Reported  Pain Loc: Knee (foot)        GENERAL: alert and no distress  EYES: Eyes grossly normal to inspection.  No discharge or erythema, or obvious scleral/conjunctival abnormalities.  RESP: No audible wheeze, cough, or visible cyanosis.    SKIN: Visible skin clear. No significant rash, abnormal pigmentation or lesions.  NEURO: Cranial nerves grossly intact.  Mentation and speech appropriate for age.  PSYCH: Appropriate affect, tone, and pace of words        Sincerely,    Layla Cyr, " ROLANDO      10 minutes spent by me on the date of the encounter doing chart review, history and exam, documentation and further activities per the note    Virtual Visit Details    Type of service:  Video Visit     Originating Location (pt. Location): Home    Distant Location (provider location):  Off-site  Platform used for Video Visit: [employer]    The longitudinal plan of care for the diagnosis(es)/condition(s) as documented were addressed during this visit. Due to the added complexity in care, I will continue to support Linda in the subsequent management and with ongoing continuity of care.

## 2025-04-10 NOTE — LETTER
4/10/2025       RE: Elaine Awad  [address] [address] [address]     Dear Colleague,    Thank you for referring your patient, Elaine Awad, to the [facility] [place] [facility]. Please see a copy of my visit note below.      Return Medical Weight Management Note     Elaine Awad  MRN:  [id number]  :  1970  KEAGAN:  4/10/2025    Dear Fernanda Rodriguez MD,    I had the pleasure of seeing your patient Elaine Awad. She is a 54 year old female who I am continuing to see for treatment of obesity related to:        2019    10:46 PM   --   I have the following health issues associated with obesity GERD (Reflux)    Weight Bearing Joint Pain   I have the following symptoms associated with obesity Knee Pain    GERD (Reflux)       Assessment & Plan  Problem List Items Addressed This Visit          Endocrine Diagnoses    Class 3 severe obesity with serious comorbidity and body mass index (BMI) of 40.0 to 44.9 in adult, unspecified obesity type (H) - Primary    Relevant Medications    Semaglutide-Weight Management (WEGOVY) 1.7 MG/0.75ML pen    phentermine 15 MG capsule        Comprehensive weight management follow up visit    Hx of lap band removal with weight gain after  Lap hernia repair 2024 and went well    Weight up from 256 to 261 since last visit 6 mo ago  Taking wegovy 1mg and phentermine  Not interested in weight loss surgery at this time    Currently taking the following medications that can help with weight loss/weight mgmt:  Wegovy 1mg, switched from Saxenda 4 mo ago.  Phentermine 15mg daily    Past weight loss medication history and considerations:  May 2024 we tapered off topiramate due to not feeling it was effective   Contrave: discussed but pt not interested in starting    Plan today:  Increase Wegovy 1.7mg weekly  Continue phentermine 15mg   Consider switching to zepbound if higher dose wegovy not  effective.    Follow up plan:  Layla 4 months return MWM    INTERVAL HISTORY:    CURRENT WEIGHT:   261 lbs 0 oz    Initial Weight (lbs): 239.4 lbs     Cumulative weight loss (lbs): -21.6  Weight Loss Percentage: -9.02%        4/9/2025     8:57 AM   Changes and Difficulties   With regards to my activity/exercise, I am still struggling with: Pain             4/9/2025     8:57 AM   Weight Loss Medication History Reviewed With Patient   Which weight loss medications are you currently taking on a regular basis? Phentermine    Wegovy       Office Visit on 11/11/2024   Component Date Value Ref Range Status     Sodium 11/11/2024 139  135 - 145 mmol/L Final     Potassium 11/11/2024 4.4  3.4 - 5.3 mmol/L Final     Chloride 11/11/2024 104  98 - 107 mmol/L Final     Carbon Dioxide (CO2) 11/11/2024 26  22 - 29 mmol/L Final     Anion Gap 11/11/2024 9  7 - 15 mmol/L Final     Urea Nitrogen 11/11/2024 15.7  6.0 - 20.0 mg/dL Final     Creatinine 11/11/2024 0.88  0.51 - 0.95 mg/dL Final     GFR Estimate 11/11/2024 78  >60 mL/min/1.73m2 Final    eGFR calculated using 2021 CKD-EPI equation.     Calcium 11/11/2024 10.2  8.8 - 10.4 mg/dL Final    Reference intervals for this test were updated on 7/16/2024 to reflect our healthy population more accurately. There may be differences in the flagging of prior results with similar values performed with this method. Those prior results can be interpreted in the context of the updated reference intervals.     Glucose 11/11/2024 99  70 - 99 mg/dL Final     Patient Fasting > 8hrs? 11/11/2024 No   Final     Cholesterol 11/11/2024 185  <200 mg/dL Final     Triglycerides 11/11/2024 190 (H)  <150 mg/dL Final     Direct Measure HDL 11/11/2024 56  >=50 mg/dL Final     LDL Cholesterol Calculated 11/11/2024 91  <100 mg/dL Final     Non HDL Cholesterol 11/11/2024 129  <130 mg/dL Final     Patient Fasting > 8hrs? 11/11/2024 No   Final       MEDICATIONS:   Current Outpatient Medications   Medication Sig  "Dispense Refill     phentermine 15 MG capsule Take 1 capsule (15 mg) by mouth every morning. 30 capsule 5     Semaglutide-Weight Management (WEGOVY) 1.7 MG/0.75ML pen Inject 1.7 mg subcutaneously once a week. 3 mL 5     albuterol (PROAIR HFA/PROVENTIL HFA/VENTOLIN HFA) 108 (90 Base) MCG/ACT inhaler Inhale 2 puffs into the lungs every 4 hours as needed. 18 g 0     clindamycin (CLEOCIN T) 1 % external lotion APPLY TOPICALLY TO FACE TWICE DAILY FOR ACNE       difluprednate (DUREZOL) 0.05 % ophthalmic emulsion Place 1 drop Into the left eye daily. 5 mL 2     diphenhydrAMINE (BENADRYL) 25 MG tablet Take 25 mg by mouth nightly as needed for sleep       fexofenadine (ALLEGRA) 180 MG tablet Take 180 mg by mouth as needed       glucosamine 500 MG CAPS capsule Take 500 mg by mouth daily       levonorgestrel (MIRENA) 20 MCG/DAY IUD 1 each (20 mcg) by Intrauterine route once       losartan (COZAAR) 50 MG tablet TAKE 1 TABLET(50 MG) BY MOUTH DAILY 90 tablet 3     magnesium oxide (MAG-OX) 400 MG tablet Take 1 tablet by mouth daily       nystatin (NYSTOP) 166648 UNIT/GM external powder APPLY TO THE AFFECTED AREA UNDER THE BREASTS THREE TIMES DAILY AS NEEDED. 60 g 5     omeprazole (PRILOSEC) 20 MG DR capsule Take 1 capsule (20 mg) by mouth daily. 90 capsule 4     rizatriptan (MAXALT-MLT) 5 MG ODT TAKE 1-2 TABLETS BY MOUTH AT ONSET OF HEADACHE MAY REPEAT IN 2 HOURS. MAX OF 6 TABLETS PER 24 HOURS. 36 tablet 3     timolol maleate (TIMOPTIC) 0.5 % ophthalmic solution Place 1 drop Into the left eye daily. 5 mL 3     valACYclovir (VALTREX) 500 MG tablet TAKE 1 TABLET BY MOUTH DAILY. INCREASE TO 4 TABLET BY MOUTH 2 TIMES DAILY AT ONSET OF OUTBREAK 100 tablet 3         PHYSICAL EXAM:  Objective   Ht 1.626 m (5' 4\")   Wt 118.4 kg (261 lb)   BMI 44.80 kg/m      Vitals - Patient Reported  Pain Loc: Knee (foot)        GENERAL: alert and no distress  EYES: Eyes grossly normal to inspection.  No discharge or erythema, or obvious " scleral/conjunctival abnormalities.  RESP: No audible wheeze, cough, or visible cyanosis.    SKIN: Visible skin clear. No significant rash, abnormal pigmentation or lesions.  NEURO: Cranial nerves grossly intact.  Mentation and speech appropriate for age.  PSYCH: Appropriate affect, tone, and pace of words        Sincerely,    Layla Cyr PA-C      10 minutes spent by me on the date of the encounter doing chart review, history and exam, documentation and further activities per the note    Virtual Visit Details    Type of service:  Video Visit     Originating Location (pt. Location): Home    Distant Location (provider location):  Off-site  Platform used for Video Visit: Sourav    The longitudinal plan of care for the diagnosis(es)/condition(s) as documented were addressed during this visit. Due to the added complexity in care, I will continue to support Linda in the subsequent management and with ongoing continuity of care.      Again, thank you for allowing me to participate in the care of your patient.      Sincerely,    Layla Cyr PA-C

## 2025-04-10 NOTE — NURSING NOTE
Current patient location: home    Is the patient currently in the state of [place]? YES    Visit mode: VIDEO    If the visit is dropped, the patient can be reconnected by:VIDEO VISIT: Text to cell phone:   Telephone Information:   Mobile [phone]       Will anyone else be joining the visit? NO  (If patient encounters technical issues they should call [phone] :[phone])    Are changes needed to the allergy or medication list? Pt stated no changes to allergies and Pt stated no med changes    Are refills needed on medications prescribed by this physician? Discuss with provider    Rooming Documentation:  Questionnaire(s) completed      Reason for visit: RECHECK    Wt other than 24 hrs:    Pain more than one location:  3 knee, foot  Maine PUTNAMF

## 2025-04-14 ENCOUNTER — OFFICE VISIT (OUTPATIENT)
Dept: OPHTHALMOLOGY | Facility: CLINIC | Age: 55
End: 2025-04-14
Attending: OPHTHALMOLOGY
Payer: COMMERCIAL

## 2025-04-14 DIAGNOSIS — H21.542 POSTERIOR SYNECHIAE, LEFT EYE: ICD-10-CM

## 2025-04-14 DIAGNOSIS — H40.052 OCULAR HYPERTENSION, LEFT EYE: ICD-10-CM

## 2025-04-14 DIAGNOSIS — H26.222 CATARACT IN INFLAMMATORY DISORDER, LEFT: ICD-10-CM

## 2025-04-14 DIAGNOSIS — H35.81 COTTON WOOL SPOTS: ICD-10-CM

## 2025-04-14 DIAGNOSIS — H20.12 CHRONIC ANTERIOR UVEITIS OF LEFT EYE: Primary | ICD-10-CM

## 2025-04-14 PROCEDURE — 99214 OFFICE O/P EST MOD 30 MIN: CPT | Performed by: OPHTHALMOLOGY

## 2025-04-14 ASSESSMENT — VISUAL ACUITY
OD_SC: 20/20
OS_SC+: -1
OS_SC: 20/60
OS_PH_SC: 20/30
OD_SC+: -1
METHOD: SNELLEN - LINEAR

## 2025-04-14 ASSESSMENT — CONF VISUAL FIELD
OS_NORMAL: 1
OS_SUPERIOR_TEMPORAL_RESTRICTION: 0
OD_INFERIOR_TEMPORAL_RESTRICTION: 0
OS_INFERIOR_TEMPORAL_RESTRICTION: 0
OS_INFERIOR_NASAL_RESTRICTION: 0
OD_SUPERIOR_TEMPORAL_RESTRICTION: 0
OS_SUPERIOR_NASAL_RESTRICTION: 0
OD_SUPERIOR_NASAL_RESTRICTION: 0
OD_INFERIOR_NASAL_RESTRICTION: 0
OD_NORMAL: 1
METHOD: COUNTING FINGERS

## 2025-04-14 ASSESSMENT — TONOMETRY
OS_IOP_MMHG: 17
IOP_METHOD: TONOPEN
OD_IOP_MMHG: 20

## 2025-04-14 ASSESSMENT — EXTERNAL EXAM - RIGHT EYE: OD_EXAM: NORMAL

## 2025-04-14 ASSESSMENT — CUP TO DISC RATIO
OD_RATIO: 0.4
OS_RATIO: 0.4

## 2025-04-14 ASSESSMENT — SLIT LAMP EXAM - LIDS
COMMENTS: NORMAL
COMMENTS: NORMAL

## 2025-04-14 ASSESSMENT — EXTERNAL EXAM - LEFT EYE: OS_EXAM: NORMAL

## 2025-04-14 NOTE — LETTER
April 14, 2025      Re: Elaine Awad   1970    To Whom It May Concern:    This is to confirm that the above patient was seen on 4/14/2025.  Elaine Awad is able to return to work today. Thank you for allowing Elaine Awad to take time from work for this necessary medical appointment. Please contact us for any questions.    Thank you for your cooperation in this matter.  Please do not hesitate to contact me if you have any further questions.    Sincerely,      REBEKAH HARDING

## 2025-04-14 NOTE — PATIENT INSTRUCTIONS
Keep same drops left eye but now go back up to every other day in the left eye to help reduce risk of flares: (Now Durezol every other day left eye and Timolol every other day left eye)  NO drops right eye   Keep the same glasses for now until after upcoming visit.

## 2025-04-14 NOTE — NURSING NOTE
Chief Complaints and History of Present Illnesses   Patient presents with    Uveitis Follow-Up     Chief Complaint(s) and History of Present Illness(es)       Uveitis Follow-Up              Laterality: left eye    Onset: gradual    Onset: months ago    Quality: States va is the same since last visit      Severity: mild    Frequency: infrequently    Associated symptoms: photophobia, flashes and floaters    Treatments tried: eye drops and artificial tears    Pain scale: 0/10              Comments    Here for acute anterior uveitis of left eye  Pink Wed and Saturday Once left eye   Yellow  Wed and Saturday once left eye   AT [facility] Reyna COT 9:23 AM April 14, 2025

## 2025-04-14 NOTE — LETTER
4/14/2025       RE: Elaine Awad  [address] [address] [facility] [address]     Dear Colleague,    Thank you for referring your patient, Elaine Awad, to the [facility] [place] at [facility]. Please see a copy of my visit note below.    Chief Complaint/Presenting Concern:  Uveitis follow up    Interval History of Present Ocular Illness:  Elaine Awad is a 54 year old patient who returns for follow up of her uveitis of the left eye. Last visit we elected to taper drops and try glasses    Ms. Awad notes that glasses are helpful for work but needing and to use sunglasses in the car. She has noticed that the left eye is still uncomfortable and dry. Now on drops twice weekly, there are no flare symptoms but not getting much better with the vision. Lights on road ar more bothersome. Right eye still fine    Interval Updates to Medical/Family/Social History:  No major changes    Relevant Review of Systems Updates:  No changes    Current eye related medications: Timolol on Wednesdays/Saturdays in the left eye,Durezol Wednesdays/Saturdays in the left eye     No Imaging    Assessment:     1. Chronic anterior uveitis of left eye (Primary)  Few cells today    2. Posterior synechiae, left eye  Stable without bombe    3. Ocular hypertension, left eye  Controlled on drops twice weekly    4. Cataract in inflammatory disorder, left  Progressed    5. Cotton wool spots - Left Eye  None on undilated exam    Plan/Recommendations:    Discussed findings with patient.The left eye has a few cells today but the vision is cloudier likely from lens clouding. We will go back to every other day drops but also check in about cataract consult  Eye pressure is controlled   Recommend additional testing: None needed at this time  Keep same drops left eye but now go back up to every other day in the left eye to help reduce risk of flares: (Now Durezol every other day left eye and Timolol  every other day left eye)  NO drops right eye   Work note    RTC Dr. Mathur next avail Cataract consult left eye and then JY TBD likely 1 week post op. If no surgery planned, then check with JY in Late June-early July    Physician Attestation    Attending Physician Attestation:  Complete documentation of historical and exam elements from today's encounter can be found in the full encounter summary report (not reduplicated in this progress note). I personally obtained the chief complaint(s) and history of present illness. I confirmed and edited as necessary the review of systems, past medical/surgical history, family history, social history, and examination findings as documented by others; and I examined the patient myself. I personally reviewed the relevant tests, images, and reports as documented above. I formulated and edited as necessary the assessment and plan and discussed the findings and management plan with the patient and family members present at the time of this visit.  Adam Hinds M.D., Uveitis and Medical Retina, April 14, 2025     Again, thank you for allowing me to participate in the care of your patient.      Sincerely,    Adam Hinds MD  Uveitis and Medical Retina    Department of Ophthalmology & Visual Neurosciences  [facility]

## 2025-04-14 NOTE — PROGRESS NOTES
Chief Complaint/Presenting Concern:  Uveitis follow up    Interval History of Present Ocular Illness:  Elaine Awad is a 54 year old patient who returns for follow up of her uveitis of the left eye. Last visit we elected to taper drops and try glasses    Ms. Awad notes that glasses are helpful for work but needing and to use sunglasses in the car. She has noticed that the left eye is still uncomfortable and dry. Now on drops twice weekly, there are no flare symptoms but not getting much better with the vision. Lights on road ar more bothersome. Right eye still fine    Interval Updates to Medical/Family/Social History:  No major changes    Relevant Review of Systems Updates:  No changes    Current eye related medications: Timolol on Wednesdays/Saturdays in the left eye,Durezol Wednesdays/Saturdays in the left eye     No Imaging    Assessment:     1. Chronic anterior uveitis of left eye (Primary)  Few cells today    2. Posterior synechiae, left eye  Stable without bombe    3. Ocular hypertension, left eye  Controlled on drops twice weekly    4. Cataract in inflammatory disorder, left  Progressed    5. Cotton wool spots - Left Eye  None on undilated exam    Plan/Recommendations:    Discussed findings with patient.The left eye has a few cells today but the vision is cloudier likely from lens clouding. We will go back to every other day drops but also check in about cataract consult  Eye pressure is controlled   Recommend additional testing: None needed at this time  Keep same drops left eye but now go back up to every other day in the left eye to help reduce risk of flares: (Now Durezol every other day left eye and Timolol every other day left eye)  NO drops right eye   Work note    RTC Dr. Mathur next avail Cataract consult left eye and then JY TBD likely 1 week post op. If no surgery planned, then check with JY in Late June-early July    Physician Attestation     Attending Physician Attestation:  Complete  documentation of historical and exam elements from today's encounter can be found in the full encounter summary report (not reduplicated in this progress note). I personally obtained the chief complaint(s) and history of present illness. I confirmed and edited as necessary the review of systems, past medical/surgical history, family history, social history, and examination findings as documented by others; and I examined the patient myself. I personally reviewed the relevant tests, images, and reports as documented above. I formulated and edited as necessary the assessment and plan and discussed the findings and management plan with the patient and family members present at the time of this visit.  Adam Hinds M.D., Uveitis and Medical Retina, April 14, 2025

## 2025-04-24 ENCOUNTER — OFFICE VISIT (OUTPATIENT)
Dept: OPHTHALMOLOGY | Facility: CLINIC | Age: 55
End: 2025-04-24
Attending: OPHTHALMOLOGY
Payer: COMMERCIAL

## 2025-04-24 DIAGNOSIS — H26.222 CATARACT IN INFLAMMATORY DISORDER, LEFT: Primary | ICD-10-CM

## 2025-04-24 DIAGNOSIS — H21.542 POSTERIOR SYNECHIAE, LEFT EYE: ICD-10-CM

## 2025-04-24 DIAGNOSIS — H20.12 CHRONIC ANTERIOR UVEITIS OF LEFT EYE: ICD-10-CM

## 2025-04-24 DIAGNOSIS — Z15.89 HLA B27 (HLA B27 POSITIVE): ICD-10-CM

## 2025-04-24 DIAGNOSIS — H40.052 OCULAR HYPERTENSION, LEFT EYE: ICD-10-CM

## 2025-04-24 PROCEDURE — 92025 CPTRIZED CORNEAL TOPOGRAPHY: CPT | Performed by: OPHTHALMOLOGY

## 2025-04-24 PROCEDURE — 76519 ECHO EXAM OF EYE: CPT | Performed by: OPHTHALMOLOGY

## 2025-04-24 ASSESSMENT — CONF VISUAL FIELD
OS_SUPERIOR_NASAL_RESTRICTION: 0
OD_SUPERIOR_TEMPORAL_RESTRICTION: 0
OS_SUPERIOR_TEMPORAL_RESTRICTION: 0
OD_SUPERIOR_NASAL_RESTRICTION: 0
OS_INFERIOR_TEMPORAL_RESTRICTION: 0
OS_NORMAL: 1
METHOD: COUNTING FINGERS
OD_INFERIOR_NASAL_RESTRICTION: 0
OD_NORMAL: 1
OS_INFERIOR_NASAL_RESTRICTION: 0
OD_INFERIOR_TEMPORAL_RESTRICTION: 0

## 2025-04-24 ASSESSMENT — TONOMETRY
IOP_METHOD: ICARE
OS_IOP_MMHG: 17
OD_IOP_MMHG: 19

## 2025-04-24 ASSESSMENT — EXTERNAL EXAM - LEFT EYE: OS_EXAM: NORMAL

## 2025-04-24 ASSESSMENT — REFRACTION_WEARINGRX
OS_CYLINDER: +0.75
OS_ADD: +2.25
OD_SPHERE: -0.75
OD_ADD: +2.25
OS_AXIS: 075
OD_CYLINDER: SPHERE
OS_SPHERE: -1.50

## 2025-04-24 ASSESSMENT — VISUAL ACUITY
METHOD: SNELLEN - LINEAR
OS_PH_SC+: +1
OS_SC+: +1
OS_PH_SC: 20/30
OD_SC: 20/25
OS_SC: 20/70
OD_SC+: +2

## 2025-04-24 ASSESSMENT — REFRACTION_MANIFEST
OD_ADD: +2.50
OD_SPHERE: -0.75
OS_CYLINDER: +1.00
OS_ADD: +2.50
OD_CYLINDER: SPHERE
OS_SPHERE: -1.50
OS_AXIS: 075

## 2025-04-24 ASSESSMENT — SLIT LAMP EXAM - LIDS
COMMENTS: NORMAL
COMMENTS: NORMAL

## 2025-04-24 ASSESSMENT — CUP TO DISC RATIO
OD_RATIO: 0.4
OS_RATIO: 0.4

## 2025-04-24 ASSESSMENT — EXTERNAL EXAM - RIGHT EYE: OD_EXAM: NORMAL

## 2025-04-24 NOTE — PROGRESS NOTES
HPI    Pt states vision is the same as last visit with Dr Hinds. Pt states LE is blurry and getting worse. Increased glare and halos around lights at night.  No new flashes or floaters. Dryness in each eye LE> RE, relief with refresh drops. No DM.    AMBER Langford April 24, 2025 7:45 AM      Last edited by Pancho Dee COMT on 4/24/2025  7:45 AM.          Review of systems for the eyes was negative other than the pertinent positives/negatives listed in the HPI.      Assessment & Plan      Elaine Awad is a 54 year old female with the following diagnoses:   1. Cataract in inflammatory disorder, left    2. Chronic anterior uveitis of left eye    3. Posterior synechiae, left eye    4. Ocular hypertension, left eye    5. HLA B27 (HLA B27 positive)       Referral from Dr. Hinds for progressing cataract left eye.  Bothered by worsening blur and glare  Currently using durezol and timolol every other day left eye     Cataract, left eye  Visually significant    Risks, benefits and alternatives to cataract extraction/IOL implantation discussed; consent obtained.  Will schedule surgery today     Special equipment/needs:    Anesthesia:Topical  Dilation:Moderate  Iris expansion:Synechialysis c possible hooks  Pseudoexfoliation: No pseudoexfoliation  Trypan Blue: No  KDB  Visually significant astigmatism   Discussed surgical corrective options  Reviewed elective nature of surgical correction and patient responsible fee associated  The patient wishes to proceed with toric Intraocular lens ([employer], staff case)  Goal emmetropia  Betamethasone, Bromfenac, Durezol         Attending Physician Attestation:  Complete documentation of historical and exam elements from today's encounter can be found in the full encounter summary report (not reduplicated in this progress note).  I personally obtained the chief complaint(s) and history of present illness.  I confirmed and edited as necessary the review of systems,  past medical/surgical history, family history, social history, and examination findings as documented by others; and I examined the patient myself.  I personally reviewed the relevant tests, images, and reports as documented above.  I formulated and edited as necessary the assessment and plan and discussed the findings and management plan with the patient and family. . Today with Elaine Awad  , I reviewed the indications, risks, benefits, and alternatives of the proposed surgical procedure including, but not limited to, failure obtain the desired result  and need for additional surgery, bleeding, infection, loss of vision, loss of the eye, and the remote possibility of permanent damage to any organ system or death with the use of anesthesia.  I provided multiple opportunities for the questions, answered all questions to the best of my ability, and confirmed that my answers and my discussion were understood.    - Edson Mathur MD

## 2025-04-24 NOTE — NURSING NOTE
Chief Complaints and History of Present Illnesses   Patient presents with    Cataract Evaluation     Chief Complaint(s) and History of Present Illness(es)       Cataract Evaluation               Comments    Pt states vision is the same as last visit with Dr Hinds. Pt states LE is blurry and getting worse. Increased glare and halos around lights at night.  No new flashes or floaters. Dryness in each eye LE> RE, relief with refresh drops. No DM.    AMBER Langford April 24, 2025 7:45 AM

## 2025-04-25 PROBLEM — H40.052 OCULAR HYPERTENSION, LEFT EYE: Status: ACTIVE | Noted: 2025-04-24

## 2025-04-25 PROBLEM — H20.12 CHRONIC ANTERIOR UVEITIS OF LEFT EYE: Status: ACTIVE | Noted: 2025-04-24

## 2025-04-25 PROBLEM — H26.222 CATARACT IN INFLAMMATORY DISORDER, LEFT: Status: ACTIVE | Noted: 2025-04-24

## 2025-04-29 ENCOUNTER — VIRTUAL VISIT (OUTPATIENT)
Dept: URGENT CARE | Facility: CLINIC | Age: 55
End: 2025-04-29
Payer: COMMERCIAL

## 2025-04-29 DIAGNOSIS — J06.9 UPPER RESPIRATORY INFECTION WITH COUGH AND CONGESTION: Primary | ICD-10-CM

## 2025-04-29 PROCEDURE — 98005 SYNCH AUDIO-VIDEO EST LOW 20: CPT

## 2025-04-29 RX ORDER — BENZONATATE 100 MG/1
200 CAPSULE ORAL
Qty: 30 CAPSULE | Refills: 0 | Status: SHIPPED | OUTPATIENT
Start: 2025-04-29

## 2025-04-29 RX ORDER — GUAIFENESIN 600 MG/1
1200 TABLET, EXTENDED RELEASE ORAL 2 TIMES DAILY
Qty: 28 TABLET | Refills: 0 | Status: SHIPPED | OUTPATIENT
Start: 2025-04-29 | End: 2025-05-06

## 2025-04-29 NOTE — PATIENT INSTRUCTIONS
For productive cough I suggest using over the counter medications such as guaifenesin (Mucinex). Mucinex will reduce the viscosity of mucus secretions and help with productive cough. Drink plenty of fluids while on Mucinex.   You can also take the cough suppressant I prescribed, tessalon perles at night to suppress the cough/ Follow up in clinic if symptoms persist or worsen.     Most viral URIs resolve within 10-14 days  Return if symptoms worsen or new symptoms (fever, dyspnea, chest pain) develop.

## 2025-04-29 NOTE — PROGRESS NOTES
Elaine is a 54 year old female who presents for a billable video visit.    ASSESSMENT/PLAN:  Diagnoses and all orders for this visit:    Upper respiratory infection with cough and congestion  -     benzonatate (TESSALON) 100 MG capsule; Take 2 capsules (200 mg) by mouth nightly as needed for cough.  -     guaiFENesin (MUCINEX) 600 MG 12 hr tablet; Take 2 tablets (1,200 mg) by mouth 2 times daily for 7 days.        Likely viral upper respiratory tract infection (URI) with resolving pharyngitis and ongoing post-viral cough and congestion. No signs of bacterial infection or complications at this time.    Follow up with primary care provider with any problems, questions or concerns or if symptoms worsen or fail to improve. Patient agreed to plan and verbalized understanding.     SUBJECTIVE:  Patient presents with upper respiratory symptoms that began 5 days ago. Initial symptom was sore throat, which has since improved. Currently experiencing congestion, rhinorrhea, body aches, and a productive cough (onset 2 days ago). Cough is now interfering with sleep. No fever, chills, shortness of breath, chest pain, or chest tightness reported. Patient is taking DayQuil and NyQuil with partial relief. She expresses concern regarding an upcoming surgery scheduled in 2 weeks and is seeking medication to help ensure complete resolution of symptoms before the procedure.      ROS: Pertinent ROS neg other than the symptoms noted above in the HPI.     OBJECTIVE:  Vitals not done due to this being a virtual visit    GENERAL: healthy, alert and no distress  EYES: Eyes grossly normal to inspection,conjunctivae and sclerae normal  RESP: Able to speak in complete sentences, no audible wheeze or cough  SKIN: no suspicious lesions or rashes  NEURO: mentation intact and speech normal  PSYCH: mentation appears normal, affect normal/bright    Video-Visit Details    Type of service:  Video Visit  Video Start Time: 1:25 pm  Video End Time: 1:28  pm    Originating Location: Home    Distant Location:  [facility]     Platform used for Video Visit: Sourav Galdamez PA-C

## 2025-05-07 ENCOUNTER — OFFICE VISIT (OUTPATIENT)
Dept: FAMILY MEDICINE | Facility: CLINIC | Age: 55
End: 2025-05-07
Payer: COMMERCIAL

## 2025-05-07 VITALS
BODY MASS INDEX: 47.62 KG/M2 | OXYGEN SATURATION: 97 % | HEART RATE: 85 BPM | HEIGHT: 62 IN | SYSTOLIC BLOOD PRESSURE: 115 MMHG | WEIGHT: 258.8 LBS | RESPIRATION RATE: 16 BRPM | DIASTOLIC BLOOD PRESSURE: 80 MMHG | TEMPERATURE: 98 F

## 2025-05-07 DIAGNOSIS — H26.222 CATARACT IN INFLAMMATORY DISORDER, LEFT: ICD-10-CM

## 2025-05-07 DIAGNOSIS — H20.12 CHRONIC ANTERIOR UVEITIS OF LEFT EYE: ICD-10-CM

## 2025-05-07 DIAGNOSIS — I10 HYPERTENSION GOAL BP (BLOOD PRESSURE) < 140/90: ICD-10-CM

## 2025-05-07 DIAGNOSIS — E66.813 CLASS 3 SEVERE OBESITY WITH SERIOUS COMORBIDITY AND BODY MASS INDEX (BMI) OF 40.0 TO 44.9 IN ADULT, UNSPECIFIED OBESITY TYPE (H): ICD-10-CM

## 2025-05-07 DIAGNOSIS — Z01.818 PREOP GENERAL PHYSICAL EXAM: Primary | ICD-10-CM

## 2025-05-07 DIAGNOSIS — L30.4 INTERTRIGO: ICD-10-CM

## 2025-05-07 DIAGNOSIS — B00.1 RECURRENT COLD SORES: ICD-10-CM

## 2025-05-07 PROBLEM — K43.9 VENTRAL HERNIA WITHOUT OBSTRUCTION OR GANGRENE: Status: RESOLVED | Noted: 2022-12-12 | Resolved: 2025-05-07

## 2025-05-07 PROCEDURE — 99214 OFFICE O/P EST MOD 30 MIN: CPT | Performed by: FAMILY MEDICINE

## 2025-05-07 PROCEDURE — 3079F DIAST BP 80-89 MM HG: CPT | Performed by: FAMILY MEDICINE

## 2025-05-07 PROCEDURE — 3074F SYST BP LT 130 MM HG: CPT | Performed by: FAMILY MEDICINE

## 2025-05-07 PROCEDURE — G2211 COMPLEX E/M VISIT ADD ON: HCPCS | Performed by: FAMILY MEDICINE

## 2025-05-07 RX ORDER — NYSTATIN 100000 [USP'U]/G
POWDER TOPICAL
Qty: 60 G | Refills: 5 | Status: SHIPPED | OUTPATIENT
Start: 2025-05-07

## 2025-05-07 RX ORDER — VALACYCLOVIR HYDROCHLORIDE 500 MG/1
TABLET, FILM COATED ORAL
Qty: 100 TABLET | Refills: 3 | Status: SHIPPED | OUTPATIENT
Start: 2025-05-07

## 2025-05-07 NOTE — PROGRESS NOTES
Pre[facility] Evaluation  [facility]  [address] [place] [address]  [place] [address]  Phone: [phone]  Primary Provider: Fernanda Rodriguez MD  Pre-op Performing Provider: Fernanda Rodriguez MD  May 7, 2025              5/2/2025   Surgical Information   What procedure is being done? Cataract   Facility or Hospital where procedure/surgery will be performed: [facility]   Who is doing the procedure / surgery? Dr Wilson   Date of surgery / procedure: 05/12   Time of surgery / procedure:    Where do you plan to recover after surgery? at home with family     Fax number for surgical facility: Note does not need to be faxed, will be available electronically in Epic.    Assessment & Plan     The proposed surgical procedure is considered LOW risk.    Preop general physical exam  Cataract in inflammatory disorder, left  Chronic anterior uveitis of left eye    Hypertension goal BP (blood pressure) < 140/90  Class 3 severe obesity with serious comorbidity and body mass index (BMI) of 40.0 to 44.9 in adult, unspecified obesity type (H)        - No identified additional risk factors other than previously addressed    Preoperative Medication Instructions  Antiplatelet or Anticoagulation Medication Instructions   - We reviewed the medication list and the patient is not on an antiplatelet or anticoagulation medications.    Additional Medication Instructions  We reviewed the medication list and there are no chronic medications that need to be adjusted for this procedure.    Recommendation  Approval given to proceed with proposed procedure, without further diagnostic evaluation.    Follow-up  Return in about 6 months (around 11/11/2025) for physical, your already scheduled appointment.    Teodora Mckeon is a 54 year old, presenting for the following:  Pre-Op Exam          5/7/2025     9:33 AM   Additional Questions   Roomed by Iona SANDERS CMA   Accompanied by Self         5/7/2025     9:33 AM   Patient  Reported Additional Medications   Patient reports taking the following new medications None     HPI: Elaine Awad is a 54 year old female who presents with painless, progressive vision loss. Symptom onset has been gradual, worsening for a time period of months. Severity is described as moderate, generalized and constant.  Course of her symptoms over time is worsening.            5/2/2025   Pre-Op Questionnaire   Have you ever had a heart attack or stroke? No   Have you ever had surgery on your heart or blood vessels, such as a stent placement, a coronary artery bypass, or surgery on an artery in your head, neck, heart, or legs? No   Do you have chest pain with activity? No   Do you have a history of heart failure? No   Do you currently have a cold, bronchitis or symptoms of other infection? (!) UNKNOWN     Do you have a cough, shortness of breath, or wheezing? (!) UNKNOWN     Do you or anyone in your family have previous history of blood clots? No   Do you or does anyone in your family have a serious bleeding problem such as prolonged bleeding following surgeries or cuts? No   Have you ever had problems with anemia or been told to take iron pills? (!) YES     Have you had any abnormal blood loss such as black, tarry or bloody stools, or abnormal vaginal bleeding? No   Have you ever had a blood transfusion? No   Are you willing to have a blood transfusion if it is medically needed before, during, or after your surgery? Yes   Have you or any of your relatives ever had problems with anesthesia? No   Do you have sleep apnea, excessive snoring or daytime drowsiness? No   Do you have any artifical heart valves or other implanted medical devices like a pacemaker, defibrillator, or continuous glucose monitor? No   Do you have artificial joints? No   Are you allergic to latex? No     Advance Care Planning    Discussed advance care planning with patient; informed AVS has link to [employer].    Preoperative Review of     reviewed - controlled substances reflected in medication list.       Status of Chronic Conditions:  See problem list for active medical problems.  Problems all longstanding and stable, except as noted/documented.  See ROS for pertinent symptoms related to these conditions.    Patient Active Problem List    Diagnosis Date Noted    Hypertension goal BP (blood pressure) < 140/90      Priority: High    Class 3 severe obesity with serious comorbidity and body mass index (BMI) of 40.0 to 44.9 in adult, unspecified obesity type (H)      Priority: High    Teratoma of left ovary 11/14/2023     Priority: Medium    IUD (intrauterine device) in place 11/14/2023     Priority: Medium    Menorrhagia      Priority: Medium    Migraines      Priority: Medium    Cataract in inflammatory disorder, left 04/24/2025     Priority: Low    Chronic anterior uveitis of left eye 04/24/2025     Priority: Low    Ocular hypertension, left eye 04/24/2025     Priority: Low    B12 deficiency      Priority: Low    History of removal of laparoscopic gastric banding device 07/30/2019     Priority: Low    Intertrigo 10/24/2018     Priority: Low    Primary osteoarthritis of right knee 09/13/2017     Priority: Low    Recurrent cold sores      Priority: Low    Yeast infection of the vagina, recurrent  05/21/2013     Priority: Low    GERD (gastroesophageal reflux disease) 05/05/2010     Priority: Low      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    GERD (gastroesophageal reflux disease)     Tyrel's disease     Hypertension goal BP (blood pressure) < 140/90     Iron deficiency anemia     Menorrhagia     Migraine     Morbid obesity (H)     Nonallergic rhinitis 04/17/2019    Recurrent cold sores     Vulvar dermatitis 10/14/2010    Yeast infection of the vagina, recurrent       Past Surgical History:    Procedure Laterality Date    COLPOSCOPY,BX CERVIX/ENDOCERV CURR  07/1994    DAVINCI XI HERNIORRHAPHY VENTRAL N/A 04/15/2024    Procedure: Robot-assisted laparoscopic incarcerated ventral incisional hernia repair with mesh;  Surgeon: Aly Cai DO;  Location: [facility]    ESOPHAGOSCOPY, GASTROSCOPY, DUODENOSCOPY (EGD), COMBINED  01/02/2014    Procedure: COMBINED ESOPHAGOSCOPY, GASTROSCOPY, DUODENOSCOPY (EGD);;  Surgeon: Eden Stacy MD;  Location: [facility]    ESOPHAGOSCOPY, GASTROSCOPY, DUODENOSCOPY (EGD), COMBINED N/A 01/19/2017    Procedure: COMBINED ESOPHAGOSCOPY, GASTROSCOPY, DUODENOSCOPY (EGD);  Surgeon: Ciro Deras MD;  Location: [facility]    LAPAROSCOPIC GASTRIC ADJUSTABLE BANDING  04/28/2014    Procedure: LAPAROSCOPIC GASTRIC ADJUSTABLE BANDING;  Surgeon: Ciro Deras MD;  Location: [facility]    LAPAROSCOPIC HERNIORRHAPHY HIATAL  04/28/2014    Procedure: LAPAROSCOPIC HERNIORRHAPHY HIATAL;  Surgeon: Ciro Deras MD;  Location: [facility]    LAPAROSCOPIC REMOVAL GASTRIC ADJUSTABLE BAND N/A 01/19/2017    Procedure: LAPAROSCOPIC REMOVAL GASTRIC ADJUSTABLE BAND;  Surgeon: Ciro Deras MD;  Location: [facility]    LITHOTRIPSY      ZZC TREAT ECTOPIC PREG,RMV TUBE/OVARY      LT     Current Outpatient Medications   Medication Sig Dispense Refill    difluprednate (DUREZOL) 0.05 % ophthalmic emulsion Place 1 drop Into the left eye daily. 5 mL 2    diphenhydrAMINE (BENADRYL) 25 MG tablet Take 25 mg by mouth nightly as needed for sleep      fexofenadine (ALLEGRA) 180 MG tablet Take 180 mg by mouth as needed      glucosamine 500 MG CAPS capsule Take 500 mg by mouth daily      levonorgestrel (MIRENA) 20 MCG/DAY IUD 1 each (20 mcg) by Intrauterine route once      losartan (COZAAR) 50 MG tablet TAKE 1 TABLET(50 MG) BY MOUTH DAILY 90 tablet 3    magnesium oxide (MAG-OX) 400 MG tablet Take 1 tablet by mouth daily      nystatin (NYSTOP) 872492 UNIT/GM external powder APPLY TO THE AFFECTED AREA  UNDER THE BREASTS THREE TIMES DAILY AS NEEDED. 60 g 5    omeprazole (PRILOSEC) 20 MG DR capsule Take 1 capsule (20 mg) by mouth daily. 90 capsule 4    phentermine 15 MG capsule Take 1 capsule (15 mg) by mouth every morning. 30 capsule 5    rizatriptan (MAXALT-MLT) 5 MG ODT TAKE 1-2 TABLETS BY MOUTH AT ONSET OF HEADACHE MAY REPEAT IN 2 HOURS. MAX OF 6 TABLETS PER 24 HOURS. 36 tablet 3    Semaglutide-Weight Management (WEGOVY) 1.7 MG/0.75ML pen Inject 1.7 mg subcutaneously once a week. 3 mL 5    timolol maleate (TIMOPTIC) 0.5 % ophthalmic solution Place 1 drop Into the left eye daily. 5 mL 3    valACYclovir (VALTREX) 500 MG tablet TAKE 1 TABLET BY MOUTH DAILY. INCREASE TO 4 TABLET BY MOUTH 2 TIMES DAILY AT ONSET OF OUTBREAK 100 tablet 3       Allergies   Allergen Reactions    Doxycycline Rash     Full body rash    Ampicillin Swelling     Tongue swelling at end of treatment course        Social History     Tobacco Use    Smoking status: Former     Current packs/day: 0.00     Average packs/day: 0.5 packs/day for 10.0 years (5.0 ttl pk-yrs)     Types: Cigarettes     Start date: 1987     Quit date: 1997     Years since quittin.4     Passive exposure: Past    Smokeless tobacco: Never   Substance Use Topics    Alcohol use: Yes     Comment: occasional (infrequent)     Family History   Problem Relation Age of Onset    Diabetes Mother     C.A.D. Mother     Cancer Father 72        d. pancreatic, melanoma     Colon Polyps Father     Breast Cancer Maternal Grandmother     Heart Disease Maternal Grandfather         CHF    Heart Disease Paternal Grandmother         CHF    Colon Polyps Paternal Grandmother     Respiratory Paternal Grandfather         TB    Breast Cancer Maternal Aunt     Glaucoma No family hx of     Macular Degeneration No family hx of     Ovarian Cancer No family hx of      History   Drug Use No             Review of Systems  CONSTITUTIONAL: NEGATIVE for fever, chills, change in  "weight  INTEGUMENTARY/SKIN: NEGATIVE for worrisome rashes, moles or lesions  EYES: see HPI   ENT/MOUTH: NEGATIVE for ear, mouth and throat problems  RESP: NEGATIVE for significant cough or SOB  CV: NEGATIVE for chest pain, palpitations or peripheral edema  GI: Hx GERD  : NEGATIVE for frequency, dysuria, or hematuria  MUSCULOSKELETAL: NEGATIVE for significant arthralgias or myalgia  NEURO: Hx headaches-migraine  ENDOCRINE: NEGATIVE for temperature intolerance, skin/hair changes  HEME: NEGATIVE for bleeding problems  PSYCHIATRIC: NEGATIVE for changes in mood or affect    Objective    /80 (BP Location: Right arm, Patient Position: Sitting, Cuff Size: Adult Large)   Pulse 85   Temp 98  F (36.7  C) (Oral)   Resp 16   Ht 1.585 m (5' 2.4\")   Wt 117.4 kg (258 lb 12.8 oz)   SpO2 97%   BMI 46.73 kg/m     Estimated body mass index is 46.73 kg/m  as calculated from the following:    Height as of this encounter: 1.585 m (5' 2.4\").    Weight as of this encounter: 117.4 kg (258 lb 12.8 oz).  Physical Exam  GENERAL: alert and no distress  EYES: Eyes grossly normal to inspection, PERRL and conjunctivae and sclerae normal  HENT: ear canals and TM's normal, nose and mouth without ulcers or lesions  NECK: no adenopathy, no asymmetry, masses, or scars  RESP: lungs clear to auscultation - no rales, rhonchi or wheezes  CV: regular rate and rhythm, normal S1 S2, no S3 or S4, no murmur, click or rub, no peripheral edema  ABDOMEN: soft, nontender, no hepatosplenomegaly, no masses and bowel sounds normal  MS: no gross musculoskeletal defects noted, no edema  SKIN: no suspicious lesions or rashes  NEURO: Normal strength and tone, mentation intact and speech normal  PSYCH: mentation appears normal, affect normal/bright    Recent Labs   Lab Test 11/11/24  1700      POTASSIUM 4.4   CR 0.88        Diagnostics  No labs were ordered during this visit.   No EKG required for low risk surgery (cataract, skin procedure, breast " biopsy, etc).    Revised Cardiac Risk Index (RCRI)  The patient has the following serious cardiovascular risks for perioperative complications:   - No serious cardiac risks = 0 points     RCRI Interpretation: 0 points: Class I (very low risk - 0.4% complication rate)         Signed Electronically by: Fernanda Rodriguez MD  A copy of this evaluation report is provided to the requesting physician.

## 2025-05-07 NOTE — PATIENT INSTRUCTIONS
How to Take Your Medication Before Surgery  Preoperative Medication Instructions   Antiplatelet or Anticoagulation Medication Instructions   - We reviewed the medication list and the patient is not on an antiplatelet or anticoagulation medications.    Additional Medication Instructions  We reviewed the medication list and there are no chronic medications that need to be adjusted for this procedure.       Patient Education   Preparing for Your Surgery  For Adults  Getting started  In most cases, a nurse will call to review your health history and instructions. They will give you an arrival time based on your scheduled surgery time. Please be ready to share:  Your doctor's clinic name and phone number  Your medical, surgical, and anesthesia history  A list of allergies and sensitivities  A list of medicines, including herbal treatments and over-the-counter drugs  Whether the patient has a legal guardian (ask how to send us the papers in advance)  Note: You may not receive a call if you were seen at our PAC (Preoperative Assessment Center).  Please tell us if you're pregnant--or if there's any chance you might be pregnant. Some surgeries may injure a fetus (unborn baby), so they require a pregnancy test. Surgeries that are safe for a fetus don't always need a test, and you can choose whether to have one.   Preparing for surgery  Within 10 to 30 days of surgery: Have a pre-op exam (sometimes called an H&P, or History and Physical). This can be done at a clinic or pre-operative center.  If you're having a , you may not need this exam. Talk to your care team.  At your pre-op exam, talk to your care team about all medicines you take. (This includes CBD oil and any drugs, such as THC, marijuana, and other forms of cannabis.) If you need to stop any medicine before surgery, ask when to start taking it again.  This is for your safety. Many medicines and drugs can make you bleed too much during surgery. Some change  how well surgery (anesthesia) drugs work.  Call your insurance company to let them know you're having surgery. (If you don't have insurance, call [phone].)  Call your clinic if there's any change in your health. This includes a scrape or scratch near the surgery site, or any signs of a cold (sore throat, runny nose, cough, rash, fever).  Eating and drinking guidelines  For your safety: Unless your surgeon tells you otherwise, follow the guidelines below.  Eat and drink as normal until 8 hours before you arrive for surgery. After that, no food or milk. You can spit out gum when you arrive.  Drink clear liquids until 2 hours before you arrive. These are liquids you can see through, like water, Gatorade, and Propel Water. They also include plain black coffee and tea (no cream or milk).  No alcohol for 24 hours before you arrive. The night before surgery, stop any drinks that contain THC.  If your care team tells you to take medicine on the morning of surgery, it's okay to take it with a sip of water. No other medicines or drugs are allowed (including CBD oil)--follow your care team's instructions.  If you have questions the day of surgery, call your hospital or surgery center.   Preventing infection  Shower or bathe the night before and the morning of surgery. Follow the instructions your clinic gave you. (If no instructions, use regular soap.)  Don't shave or clip hair near your surgery site. We'll remove the hair if needed.  Don't smoke or vape the morning of surgery. No chewing tobacco for 6 hours before you arrive. A nicotine patch is okay. You may spit out nicotine gum when you arrive.  For some surgeries, the surgeon will tell you to fully quit smoking and nicotine.  We will make every effort to keep you safe from infection. We will:  Clean our hands often with soap and water (or an alcohol-based hand rub).  Clean the skin at your surgery site with a special soap that kills germs.  Give you a special gown to  keep you warm. (Cold raises the risk of infection.)  Wear hair covers, masks, gowns, and gloves during surgery.  Give antibiotic medicine, if prescribed. Not all surgeries need this medicine.  What to bring on the day of surgery  Photo ID and insurance card  Copy of your health care directive, if you have one  Glasses and hearing aids (bring cases)  You can't wear contacts during surgery  Inhaler and eye drops, if you use them (tell us about these when you arrive)  CPAP machine or breathing device, if you use them  A few personal items, if spending the night  If you have . . .  A pacemaker, ICD (cardiac defibrillator), or other implant: Bring the ID card.  An implanted stimulator: Bring the remote control.  A legal guardian: Bring a copy of the certified (court-stamped) guardianship papers.  Please remove any jewelry, including body piercings. Leave jewelry and other valuables at home.  If you're going home the day of surgery  You must have a responsible adult drive you home. They should stay with you overnight as well.  If you don't have someone to stay with you, and you aren't safe to go home alone, we may keep you overnight. Insurance often won't pay for this.  After surgery  If it's hard to control your pain or you need more pain medicine, please call your surgeon's office.  Questions?   If you have any questions for your care team, list them here:   ____________________________________________________________________________________________________________________________________________________________________________________________________________________________________________________________  For informational purposes only. Not to replace the advice of your health care provider. Copyright   2003, 2019 Health system. All rights reserved. Clinically reviewed by Shai Limon MD. [employer] [id number] - REV 08/24.

## 2025-05-09 ENCOUNTER — ANESTHESIA EVENT (OUTPATIENT)
Dept: SURGERY | Facility: AMBULATORY SURGERY CENTER | Age: 55
End: 2025-05-09
Payer: COMMERCIAL

## 2025-05-12 ENCOUNTER — ANESTHESIA (OUTPATIENT)
Dept: SURGERY | Facility: AMBULATORY SURGERY CENTER | Age: 55
End: 2025-05-12
Payer: COMMERCIAL

## 2025-05-12 ENCOUNTER — HOSPITAL ENCOUNTER (OUTPATIENT)
Facility: AMBULATORY SURGERY CENTER | Age: 55
Discharge: HOME | End: 2025-05-12
Attending: OPHTHALMOLOGY
Payer: COMMERCIAL

## 2025-05-12 VITALS
TEMPERATURE: 97.5 F | HEART RATE: 77 BPM | SYSTOLIC BLOOD PRESSURE: 155 MMHG | OXYGEN SATURATION: 99 % | DIASTOLIC BLOOD PRESSURE: 94 MMHG | RESPIRATION RATE: 16 BRPM

## 2025-05-12 DIAGNOSIS — H26.222 CATARACT IN INFLAMMATORY DISORDER, LEFT: Primary | ICD-10-CM

## 2025-05-12 DIAGNOSIS — H40.052 OCULAR HYPERTENSION, LEFT EYE: ICD-10-CM

## 2025-05-12 DIAGNOSIS — H20.12 CHRONIC ANTERIOR UVEITIS OF LEFT EYE: ICD-10-CM

## 2025-05-12 DEVICE — TEC EYHANCE TOR II SMPLCTY 19.5D CYL2.25
Type: IMPLANTABLE DEVICE | Site: EYE | Status: FUNCTIONAL
Brand: TECNIS IOL

## 2025-05-12 RX ORDER — SODIUM CHLORIDE, SODIUM LACTATE, POTASSIUM CHLORIDE, CALCIUM CHLORIDE 600; 310; 30; 20 MG/100ML; MG/100ML; MG/100ML; MG/100ML
INJECTION, SOLUTION INTRAVENOUS CONTINUOUS
Status: DISCONTINUED | OUTPATIENT
Start: 2025-05-12 | End: 2025-05-12 | Stop reason: HOSPADM

## 2025-05-12 RX ORDER — SODIUM CHLORIDE, SODIUM LACTATE, POTASSIUM CHLORIDE, CALCIUM CHLORIDE 600; 310; 30; 20 MG/100ML; MG/100ML; MG/100ML; MG/100ML
INJECTION, SOLUTION INTRAVENOUS CONTINUOUS PRN
Status: DISCONTINUED | OUTPATIENT
Start: 2025-05-12 | End: 2025-05-12

## 2025-05-12 RX ORDER — TIMOLOL MALEATE 5 MG/ML
SOLUTION/ DROPS OPHTHALMIC PRN
Status: DISCONTINUED | OUTPATIENT
Start: 2025-05-12 | End: 2025-05-12 | Stop reason: HOSPADM

## 2025-05-12 RX ORDER — LIDOCAINE 40 MG/G
CREAM TOPICAL
Status: DISCONTINUED | OUTPATIENT
Start: 2025-05-12 | End: 2025-05-12 | Stop reason: HOSPADM

## 2025-05-12 RX ORDER — PROPARACAINE HYDROCHLORIDE 5 MG/ML
1 SOLUTION/ DROPS OPHTHALMIC ONCE
Status: COMPLETED | OUTPATIENT
Start: 2025-05-12 | End: 2025-05-12

## 2025-05-12 RX ORDER — CYCLOPENTOLAT/TROPIC/PHENYLEPH 1%-1%-2.5%
1 DROPS (EA) OPHTHALMIC (EYE)
Status: COMPLETED | OUTPATIENT
Start: 2025-05-12 | End: 2025-05-12

## 2025-05-12 RX ORDER — ONDANSETRON 4 MG/1
4 TABLET, ORALLY DISINTEGRATING ORAL EVERY 30 MIN PRN
Status: DISCONTINUED | OUTPATIENT
Start: 2025-05-12 | End: 2025-05-13 | Stop reason: HOSPADM

## 2025-05-12 RX ORDER — ONDANSETRON 4 MG/1
4 TABLET, ORALLY DISINTEGRATING ORAL EVERY 30 MIN PRN
Status: DISCONTINUED | OUTPATIENT
Start: 2025-05-12 | End: 2025-05-12 | Stop reason: HOSPADM

## 2025-05-12 RX ORDER — ACETAMINOPHEN 325 MG/1
975 TABLET ORAL ONCE
Status: COMPLETED | OUTPATIENT
Start: 2025-05-12 | End: 2025-05-12

## 2025-05-12 RX ORDER — NALOXONE HYDROCHLORIDE 0.4 MG/ML
0.1 INJECTION, SOLUTION INTRAMUSCULAR; INTRAVENOUS; SUBCUTANEOUS
Status: DISCONTINUED | OUTPATIENT
Start: 2025-05-12 | End: 2025-05-13 | Stop reason: HOSPADM

## 2025-05-12 RX ORDER — FENTANYL CITRATE 50 UG/ML
25 INJECTION, SOLUTION INTRAMUSCULAR; INTRAVENOUS EVERY 5 MIN PRN
Status: DISCONTINUED | OUTPATIENT
Start: 2025-05-12 | End: 2025-05-12 | Stop reason: HOSPADM

## 2025-05-12 RX ORDER — POVIDONE-IODINE 5 %
1 SOLUTION, NON-ORAL OPHTHALMIC (EYE) ONCE
Status: DISCONTINUED | OUTPATIENT
Start: 2025-05-12 | End: 2025-05-12 | Stop reason: HOSPADM

## 2025-05-12 RX ORDER — BROMFENAC 1.03 MG/ML
1 SOLUTION/ DROPS OPHTHALMIC DAILY
Qty: 1.7 ML | Refills: 1 | Status: SHIPPED | OUTPATIENT
Start: 2025-05-12

## 2025-05-12 RX ORDER — DEXAMETHASONE SODIUM PHOSPHATE 10 MG/ML
4 INJECTION, SOLUTION INTRAMUSCULAR; INTRAVENOUS
Status: DISCONTINUED | OUTPATIENT
Start: 2025-05-12 | End: 2025-05-13 | Stop reason: HOSPADM

## 2025-05-12 RX ORDER — MOXIFLOXACIN 5 MG/ML
1 SOLUTION/ DROPS OPHTHALMIC 3 TIMES DAILY
Qty: 3 ML | Refills: 1 | Status: SHIPPED | OUTPATIENT
Start: 2025-05-12

## 2025-05-12 RX ORDER — OXYCODONE HYDROCHLORIDE 5 MG/1
10 TABLET ORAL
Status: DISCONTINUED | OUTPATIENT
Start: 2025-05-12 | End: 2025-05-13 | Stop reason: HOSPADM

## 2025-05-12 RX ORDER — BALANCED SALT SOLUTION 6.4; .75; .48; .3; 3.9; 1.7 MG/ML; MG/ML; MG/ML; MG/ML; MG/ML; MG/ML
SOLUTION OPHTHALMIC PRN
Status: DISCONTINUED | OUTPATIENT
Start: 2025-05-12 | End: 2025-05-12 | Stop reason: HOSPADM

## 2025-05-12 RX ORDER — TETRACAINE HYDROCHLORIDE 5 MG/ML
SOLUTION OPHTHALMIC PRN
Status: DISCONTINUED | OUTPATIENT
Start: 2025-05-12 | End: 2025-05-12 | Stop reason: HOSPADM

## 2025-05-12 RX ORDER — OXYCODONE HYDROCHLORIDE 5 MG/1
5 TABLET ORAL
Status: DISCONTINUED | OUTPATIENT
Start: 2025-05-12 | End: 2025-05-13 | Stop reason: HOSPADM

## 2025-05-12 RX ORDER — HYDROMORPHONE HYDROCHLORIDE 1 MG/ML
0.2 INJECTION, SOLUTION INTRAMUSCULAR; INTRAVENOUS; SUBCUTANEOUS EVERY 5 MIN PRN
Status: DISCONTINUED | OUTPATIENT
Start: 2025-05-12 | End: 2025-05-12 | Stop reason: HOSPADM

## 2025-05-12 RX ORDER — SODIUM CHLORIDE, SODIUM LACTATE, POTASSIUM CHLORIDE, CALCIUM CHLORIDE 600; 310; 30; 20 MG/100ML; MG/100ML; MG/100ML; MG/100ML
INJECTION, SOLUTION INTRAVENOUS CONTINUOUS
Status: DISCONTINUED | OUTPATIENT
Start: 2025-05-12 | End: 2025-05-13 | Stop reason: HOSPADM

## 2025-05-12 RX ORDER — FENTANYL CITRATE 50 UG/ML
INJECTION, SOLUTION INTRAMUSCULAR; INTRAVENOUS PRN
Status: DISCONTINUED | OUTPATIENT
Start: 2025-05-12 | End: 2025-05-12

## 2025-05-12 RX ORDER — FENTANYL CITRATE 50 UG/ML
50 INJECTION, SOLUTION INTRAMUSCULAR; INTRAVENOUS EVERY 5 MIN PRN
Status: DISCONTINUED | OUTPATIENT
Start: 2025-05-12 | End: 2025-05-12 | Stop reason: HOSPADM

## 2025-05-12 RX ORDER — HYDROMORPHONE HYDROCHLORIDE 1 MG/ML
0.4 INJECTION, SOLUTION INTRAMUSCULAR; INTRAVENOUS; SUBCUTANEOUS EVERY 5 MIN PRN
Status: DISCONTINUED | OUTPATIENT
Start: 2025-05-12 | End: 2025-05-12 | Stop reason: HOSPADM

## 2025-05-12 RX ORDER — ONDANSETRON 2 MG/ML
4 INJECTION INTRAMUSCULAR; INTRAVENOUS EVERY 30 MIN PRN
Status: DISCONTINUED | OUTPATIENT
Start: 2025-05-12 | End: 2025-05-13 | Stop reason: HOSPADM

## 2025-05-12 RX ORDER — ONDANSETRON 2 MG/ML
4 INJECTION INTRAMUSCULAR; INTRAVENOUS EVERY 30 MIN PRN
Status: DISCONTINUED | OUTPATIENT
Start: 2025-05-12 | End: 2025-05-12 | Stop reason: HOSPADM

## 2025-05-12 RX ORDER — LIDOCAINE HYDROCHLORIDE 10 MG/ML
INJECTION, SOLUTION EPIDURAL; INFILTRATION; INTRACAUDAL; PERINEURAL PRN
Status: DISCONTINUED | OUTPATIENT
Start: 2025-05-12 | End: 2025-05-12 | Stop reason: HOSPADM

## 2025-05-12 RX ORDER — LABETALOL HYDROCHLORIDE 5 MG/ML
10 INJECTION, SOLUTION INTRAVENOUS
Status: DISCONTINUED | OUTPATIENT
Start: 2025-05-12 | End: 2025-05-12 | Stop reason: HOSPADM

## 2025-05-12 RX ORDER — BETAMETHASONE SODIUM PHOSPHATE AND BETAMETHASONE ACETATE 3; 3 MG/ML; MG/ML
INJECTION, SUSPENSION INTRA-ARTICULAR; INTRALESIONAL; INTRAMUSCULAR; SOFT TISSUE PRN
Status: DISCONTINUED | OUTPATIENT
Start: 2025-05-12 | End: 2025-05-12 | Stop reason: HOSPADM

## 2025-05-12 RX ORDER — DEXAMETHASONE SODIUM PHOSPHATE 10 MG/ML
4 INJECTION, SOLUTION INTRAMUSCULAR; INTRAVENOUS
Status: DISCONTINUED | OUTPATIENT
Start: 2025-05-12 | End: 2025-05-12 | Stop reason: HOSPADM

## 2025-05-12 RX ORDER — MOXIFLOXACIN IN NACL,ISO-OS/PF 0.3MG/0.3
SYRINGE (ML) INTRAOCULAR PRN
Status: DISCONTINUED | OUTPATIENT
Start: 2025-05-12 | End: 2025-05-12 | Stop reason: HOSPADM

## 2025-05-12 RX ORDER — ONDANSETRON 2 MG/ML
INJECTION INTRAMUSCULAR; INTRAVENOUS PRN
Status: DISCONTINUED | OUTPATIENT
Start: 2025-05-12 | End: 2025-05-12

## 2025-05-12 RX ORDER — NALOXONE HYDROCHLORIDE 0.4 MG/ML
0.1 INJECTION, SOLUTION INTRAMUSCULAR; INTRAVENOUS; SUBCUTANEOUS
Status: DISCONTINUED | OUTPATIENT
Start: 2025-05-12 | End: 2025-05-12 | Stop reason: HOSPADM

## 2025-05-12 RX ADMIN — Medication 1 DROP: at 06:27

## 2025-05-12 RX ADMIN — Medication 1 DROP: at 06:32

## 2025-05-12 RX ADMIN — FENTANYL CITRATE 25 MCG: 50 INJECTION, SOLUTION INTRAMUSCULAR; INTRAVENOUS at 07:14

## 2025-05-12 RX ADMIN — ONDANSETRON 4 MG: 2 INJECTION INTRAMUSCULAR; INTRAVENOUS at 07:17

## 2025-05-12 RX ADMIN — SODIUM CHLORIDE, SODIUM LACTATE, POTASSIUM CHLORIDE, CALCIUM CHLORIDE: 600; 310; 30; 20 INJECTION, SOLUTION INTRAVENOUS at 07:09

## 2025-05-12 RX ADMIN — Medication 1 DROP: at 06:38

## 2025-05-12 RX ADMIN — ACETAMINOPHEN 975 MG: 325 TABLET ORAL at 06:25

## 2025-05-12 RX ADMIN — PROPARACAINE HYDROCHLORIDE 1 DROP: 5 SOLUTION/ DROPS OPHTHALMIC at 06:26

## 2025-05-12 ASSESSMENT — LIFESTYLE VARIABLES: TOBACCO_USE: 1

## 2025-05-12 NOTE — ANESTHESIA POSTPROCEDURE EVALUATION
Patient: Elaine Awad    Procedure: Procedure(s):  LEFT EYE PHACOEMULSIFICATION, CATARACT, WITH INTRAOCULAR LENS IMPLANT, TORIC LENS  GONIOTOMY  SYNECHIALYSIS       Anesthesia Type:  MAC    Note:  Disposition: Outpatient   Postop Pain Control: Uneventful            Sign Out: Well controlled pain   PONV: No   Neuro/Psych: Uneventful            Sign Out: Acceptable/Baseline neuro status   Airway/Respiratory: Uneventful            Sign Out: Acceptable/Baseline resp. status   CV/Hemodynamics: Uneventful            Sign Out: Acceptable CV status; No obvious hypovolemia; No obvious fluid overload   Other NRE: NONE   DID A NON-ROUTINE EVENT OCCUR?            Last vitals:  Vitals Value Taken Time   /94 05/12/25 08:00   Temp 36.4  C (97.5  F) 05/12/25 08:00   Pulse 77 05/12/25 07:45   Resp 16 05/12/25 08:00   SpO2 99 % 05/12/25 08:00   Vitals shown include unfiled device data.    Electronically Signed By: Chauncey Ross MD  May 12, 2025  11:37 AM

## 2025-05-12 NOTE — DISCHARGE INSTRUCTIONS
[facility]  Home Care Following Anesthesia  For 24 hours after surgery:  Get plenty of rest.  A responsible adult must stay with you for at least 24 hours after you leave the surgery center.  Do not drive or use heavy equipment.  If you have weakness or tingling, don't drive or use heavy equipment until this feeling goes away.   Do not drink alcohol.   Avoid strenuous or risky activities.  Ask for help when climbing stairs.  You may feel lightheaded.  IF so, sit for a few minutes before standing.  Have someone help you get up.   If you have nausea (feel sick to your stomach): Drink only clear liquids such as apple juice, ginger ale, broth or 7-Up.  Rest may also help.  Be sure to drink enough fluids.  Move to a regular diet as you feel able.   You may have a slight fever.  Call the doctor if your fever is over 100 F (37.7 C) (taken under the tongue) or lasts longer than 24 hours.  You may have a dry mouth, a sore throat, muscle aches or trouble sleeping. These should go away after 24 hours.  Do not make important or legal decisions.   It is recommended to avoid smoking.               Tips for taking pain medications  To get the best pain relief possible, remember these points:  Take pain medications as directed, before pain becomes severe.  Pain medication can upset your stomach: taking it with food may help.  Constipation is a common side effect of pain medication. Drink plenty of  fluids.  Eat foods high in fiber. Take a stool softener if recommended by your doctor or pharmacist.  Do not drink alcohol, drive or operate machinery while taking pain medications.  Ask about other ways to control pain, such as with heat, ice or relaxation.    Tylenol/Acetaminophen Consumption    If you feel your pain relief is insufficient, you may take Tylenol/Acetaminophen in addition to your narcotic pain medication.   Be careful not to exceed 4,000 mg of Tylenol/Acetaminophen in a 24 hour  period from all sources.  If you are taking extra strength Tylenol/acetaminophen (500 mg), the maximum dose is 8 tablets in 24 hours.  If you are taking regular strength acetaminophen (325 mg), the maximum dose is 12 tablets in 24 hours.    Tylenol 975 mg given at 6:25 AM.  Ok to take more after 12:25 PM.     Call a doctor for any of the following:  Signs of infection (fever, growing tenderness at the surgery site, a large amount of drainage or bleeding, severe pain, foul-smelling drainage, redness, swelling).  It has been over 8 to 10 hours since surgery and you are still not able to urinate (pass water).  Headache for over 24 hours.  Signs of Covid-19 infection (temperature over 100 degrees, shortness of breath, cough, loss of taste/smell, generalized body aches, persistent headache, chills, sore throat, nausea/vomiting/diarrhea)    Your doctor is:   Dr. Edson Mathur, Ophthalmology: [phone]               After hours and weekends call the hospital @ [phone] and ask for the resident on call for:  Ophthalmology  For emergency care, call the:  [facility]:  [phone] (TTY for hearing impaired: [phone])

## 2025-05-12 NOTE — OP NOTE
PREOPERATIVE DIAGNOSIS:  1. 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 Open angle glaucoma (POAG), left eye   3. Chronic anterior uveitis, left eye   4. Iris synechiae, left eye   POSTOPERATIVE DIAGNOSIS: Same   PROCEDURES:   1. Complex cataract extraction with toric intraocular lens implant left eye   2. Synechialysis, left eye   3. Goniotomy, left eye   4. Periocular steroid injection, left eye     SURGEON: Edson Mathur M.D.  INDICATIONS: The patient Elaine Awad presented to the eye clinic with decreased vision secondary to cataract in the left eye. The risks, benefits and alternatives to cataract extraction were discussed. The patient elected to proceed. All questions were answered to the patient's satisfaction.   DESCRIPTION OF PROCEDURE:  Prior to the procedure, appropriate cardiac and respiratory monitors were applied to the patient. In the pre-operative holding area, a drop of topical tetracaine followed by lidocaine gel followed by povidone iodine.  The patient was brought to the operating room where a surgical pause was carried out to identify with all members of the surgical team the correct surgical site.  With adequate anesthesia, the left eye was prepped and draped in the usual sterile fashion.  A lid speculum was placed, and the operating microscope was rotated into position. A paracentesis was created.  Through this limbal paracentesis, the anterior chamber was inflated with viscoelastic. A temporal wound was created at the limbus using a 2.5 mm blade. Synechiae were bluntly lysed with a canula for 180 degrees nasally.  Due to poor mydriasis, two Kuglen hooks were used to manually stretch the iris for pupillary expansion. A capsulorrhexis was initiated using a bent 25-gauge needle and was completed in continuous and circular fashion using the capsulorrhexis forceps. The lens nucleus was hydrodissected using balanced salt solution.  The lens nucleus was rotated and removed  using phacoemulsification in a stop and chop technique.  Residual cortical material was removed using irrigation-aspiration.  The capsular bag was reinflated to its maximal extent with viscoelastic.  A 19.5 diopter [id number] was inserted into the capsular bag and aligned with pre-operative markings.  The lens power selected was reviewed using the intraocular lens power measurements that were obtained preoperatively to confirm that the correct lens was selected for the desired post-operative refractive state. Removal of the viscoelastic from the posterior capsular space was removed.  The patient and the operating microscope were repositioned to allow for direct gonioscopy.  A [place]-Demond lens was placed to visualize the nasal angle anatomy.  The intended incision site at the trabecular meshwork was identified.  Using a [place] Dual Blade, the Schlemm's canal was entered and goniotomy was performed for about 120 degrees nasally.  Gentle manipulation was done to confirm complete removal of the trabecular meshwork.  The remaining viscoelastic was removed from the anterior chamber in its entirety, the wound were hydrated and found to be self-sealing.  Intracameral moxifloxacin was administered. Tactile pressure was confirmed to be in a normal range.  SubTenon Betamethasone (3 mg) was injected. The lid speculum was removed and a patch and shield were applied.  The patient tolerated the procedure well, and there were no complications.     PLAN: The patient will be discharged to home and will follow up tomorrow morning in the eye clinic.  EBL:  Minimal  Complications:  None  Implant Name Type Inv. Item Serial No.  Lot No. LRB No. Used Action   LENS IOL TECNIS TORIC  [id number] 19.5 [id number] - [id number] Lens/Eye Implant LENS IOL TECNIS TORIC  [id number] 19.5 [id number] [employer]  Left 1 Implanted

## 2025-05-12 NOTE — ANESTHESIA PREPROCEDURE EVALUATION
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    GERD (gastroesophageal reflux disease)     Buffalo's disease     Hypertension goal BP (blood pressure) < 140/90     Iron deficiency anemia     Menorrhagia     Migraine     Morbid obesity (H)     Nonallergic rhinitis 2019    Recurrent cold sores     Vulvar dermatitis 10/14/2010    Yeast infection of the vagina, recurrent        Past Surgical History:   Procedure Laterality Date    COLPOSCOPY,BX CERVIX/ENDOCERV CURR  1994    DAVINCI XI HERNIORRHAPHY VENTRAL N/A 04/15/2024    Procedure: Robot-assisted laparoscopic incarcerated ventral incisional hernia repair with mesh;  Surgeon: Aly Cai DO;  Location:  OR    ESOPHAGOSCOPY, GASTROSCOPY, DUODENOSCOPY (EGD), COMBINED  2014    Procedure: COMBINED ESOPHAGOSCOPY, GASTROSCOPY, DUODENOSCOPY (EGD);;  Surgeon: Eden Stacy MD;  Location:  GI    ESOPHAGOSCOPY, GASTROSCOPY, DUODENOSCOPY (EGD), COMBINED N/A 2017    Procedure: COMBINED ESOPHAGOSCOPY, GASTROSCOPY, DUODENOSCOPY (EGD);  Surgeon: Ciro Deras MD;  [facility]    LAPAROSCOPIC GASTRIC ADJUSTABLE BANDING  2014    Procedure: LAPAROSCOPIC GASTRIC ADJUSTABLE BANDING;  Surgeon: Ciro Deras MD;  [facility]    LAPAROSCOPIC HERNIORRHAPHY HIATAL  2014    Procedure: LAPAROSCOPIC HERNIORRHAPHY HIATAL;  Surgeon: Cior Deras MD;  [facility]    LAPAROSCOPIC REMOVAL GASTRIC ADJUSTABLE BAND N/A 2017    Procedure: LAPAROSCOPIC REMOVAL GASTRIC ADJUSTABLE BAND;  Surgeon: Ciro Deras MD;   [facility]    LITHOTRIPSY      ZZC TREAT ECTOPIC PREG,RMV TUBE/OVARY      LT      Allergies   Allergen Reactions    Doxycycline Rash     Full body rash    Ampicillin Swelling     Tongue swelling at end of treatment course      Social History     Tobacco Use    Smoking status: Former     Current packs/day: 0.00     Average packs/day: 0.5 packs/day for 10.0 years (5.0 ttl pk-yrs)     Types: Cigarettes     Start date: 1987     Quit date: 1997     Years since quittin.4     Passive exposure: Past    Smokeless tobacco: Never   Substance Use Topics    Alcohol use: Yes     Comment: occasional (infrequent)      Wt Readings from Last 1 Encounters:   25 117.4 kg (258 lb 12.8 oz)        Anesthesia Evaluation   Pt has had prior anesthetic. Type: MAC and General.        ROS/MED HX  ENT/Pulmonary:     (+)           allergic rhinitis,     tobacco use, Past use,  5  Pack-Year Hx,                      Neurologic:     (+)      migraines,                          Cardiovascular:     (+)  hypertension- -   -  - -                                      METS/Exercise Tolerance:     Hematologic:  - neg hematologic  ROS     Musculoskeletal:   (+)  arthritis,             GI/Hepatic:     (+) GERD, Symptomatic,                  Renal/Genitourinary:  - neg Renal ROS     Endo: Comment: On GLP-1 agonist.    (+)               Obesity,       Psychiatric/Substance Use:  - neg psychiatric ROS     Infectious Disease:  - neg infectious disease ROS     Malignancy:  - neg malignancy ROS     Other:  - neg other ROS            Physical Exam  Airway  Mallampati: III    Cardiovascular   Rhythm: regular     Dental   (+) Minor Abnormalities - some fillings, tiny chips      Pulmonary       Neurological   Other Findings       OUTSIDE LABS:  CBC:   Lab Results   Component Value Date    WBC 8.9 2024    WBC 7.1 2021    HGB 15.1 2024    HGB 14.8 2021    HCT 47.0 2024    HCT 47.0 2021     2024  "    04/26/2021     BMP:   Lab Results   Component Value Date     11/11/2024     03/07/2024    POTASSIUM 4.4 11/11/2024    POTASSIUM 4.1 03/07/2024    CHLORIDE 104 11/11/2024    CHLORIDE 105 03/07/2024    CO2 26 11/11/2024    CO2 24 03/07/2024    BUN 15.7 11/11/2024    BUN 11.3 03/07/2024    CR 0.88 11/11/2024    CR 0.92 03/07/2024    GLC 99 11/11/2024     (H) 03/07/2024     COAGS: No results found for: \"PTT\", \"INR\", \"FIBR\"  POC:   Lab Results   Component Value Date    HCG Negative 03/13/2020     HEPATIC:   Lab Results   Component Value Date    ALBUMIN 4.6 03/07/2024    PROTTOTAL 7.2 03/07/2024    ALT 18 03/07/2024    AST 18 03/07/2024    ALKPHOS 84 03/07/2024    BILITOTAL 0.5 03/07/2024     OTHER:   Lab Results   Component Value Date    A1C 5.0 04/26/2021    TAMEKA 10.2 11/11/2024    PHOS 2.6 04/29/2014    MAG 2.0 04/29/2014    TSH 0.98 04/26/2021    T4 1.05 01/04/2010    SED 35 (H) 07/15/2024       Anesthesia Plan    ASA Status:  3      NPO Status: NPO Appropriate   Anesthesia Type: MAC.           Consents    Anesthesia Plan(s) and associated risks, benefits, and realistic alternatives discussed. Questions answered and patient/representative(s) expressed understanding.     - Discussed:     - Discussed with:  Patient            Postoperative Care            Comments:                   Chauncey Ross MD    I have reviewed the pertinent notes and labs in the chart from the past 30 days and (re)examined the patient.  Any updates or changes from those notes are reflected in this note.    Clinically Significant Risk Factors Present on Admission                   # Hypertension: Noted on problem list           # Morbid Obesity: Estimated body mass index is 46.73 kg/m  as calculated from the following:    Height as of 5/7/25: 1.585 m (5' 2.4\").    Weight as of 5/7/25: 117.4 kg (258 lb 12.8 oz).                    "

## 2025-05-12 NOTE — ANESTHESIA CARE TRANSFER NOTE
Patient: Elaine Awad    Procedure: Procedure(s):  LEFT EYE PHACOEMULSIFICATION, CATARACT, WITH INTRAOCULAR LENS IMPLANT, TORIC LENS  GONIOTOMY  SYNECHIALYSIS       Diagnosis: Cataract in inflammatory disorder, left [H26.222]  Chronic anterior uveitis of left eye [H20.12]  Ocular hypertension, left eye [H40.052]  Diagnosis Additional Information: No value filed.    Anesthesia Type:   MAC     Note:    Oropharynx: oropharynx clear of all foreign objects and spontaneously breathing  Level of Consciousness: awake  Oxygen Supplementation: room air    Independent Airway: airway patency satisfactory and stable  Dentition: dentition unchanged  Vital Signs Stable: post-procedure vital signs reviewed and stable  Report to RN Given: handoff report given  Patient transferred to: Phase II    Handoff Report: Identifed the Patient, Identified the Reponsible Provider, Reviewed the pertinent medical history, Discussed the surgical course, Reviewed Intra-OP anesthesia mangement and issues during anesthesia, Set expectations for post-procedure period and Allowed opportunity for questions and acknowledgement of understanding      Vitals:  Vitals Value Taken Time   /102 05/12/25 07:43   Temp 97    Pulse 77 05/12/25 07:43   Resp 15    SpO2 100 % 05/12/25 07:44   Vitals shown include unfiled device data.    Electronically Signed By: KYLE Barrow CRNA  May 12, 2025  7:46 AM

## 2025-05-13 ENCOUNTER — OFFICE VISIT (OUTPATIENT)
Dept: OPHTHALMOLOGY | Facility: CLINIC | Age: 55
End: 2025-05-13
Attending: OPHTHALMOLOGY
Payer: COMMERCIAL

## 2025-05-13 DIAGNOSIS — H20.12 CHRONIC ANTERIOR UVEITIS OF LEFT EYE: ICD-10-CM

## 2025-05-13 DIAGNOSIS — H21.02 HYPHEMA OF LEFT EYE: ICD-10-CM

## 2025-05-13 DIAGNOSIS — Z96.1 PSEUDOPHAKIA OF LEFT EYE: Primary | ICD-10-CM

## 2025-05-13 DIAGNOSIS — H40.052 OCULAR HYPERTENSION, LEFT EYE: ICD-10-CM

## 2025-05-13 DIAGNOSIS — H20.00 ACUTE ANTERIOR UVEITIS OF LEFT EYE: ICD-10-CM

## 2025-05-13 DIAGNOSIS — Z96.1 PSEUDOPHAKIA: Primary | ICD-10-CM

## 2025-05-13 PROCEDURE — 99213 OFFICE O/P EST LOW 20 MIN: CPT | Performed by: OPHTHALMOLOGY

## 2025-05-13 PROCEDURE — 99211 OFF/OP EST MAY X REQ PHY/QHP: CPT | Performed by: OPHTHALMOLOGY

## 2025-05-13 RX ORDER — DIFLUPREDNATE OPHTHALMIC 0.5 MG/ML
1 EMULSION OPHTHALMIC 4 TIMES DAILY
Qty: 5 ML | Refills: 4 | Status: SHIPPED | OUTPATIENT
Start: 2025-05-13

## 2025-05-13 RX ORDER — CYCLOPENTOLATE HYDROCHLORIDE 10 MG/ML
1 SOLUTION/ DROPS OPHTHALMIC 2 TIMES DAILY
Qty: 5 ML | Refills: 1 | Status: SHIPPED | OUTPATIENT
Start: 2025-05-13 | End: 2025-05-13

## 2025-05-13 RX ORDER — METHYLPREDNISOLONE 4 MG/1
TABLET ORAL
Qty: 21 TABLET | Refills: 0 | Status: SHIPPED | OUTPATIENT
Start: 2025-05-13

## 2025-05-13 ASSESSMENT — CUP TO DISC RATIO
OD_RATIO: 0.4
OS_RATIO: 0.4
OD_RATIO: 0.4
OS_RATIO: 0.4

## 2025-05-13 ASSESSMENT — CONF VISUAL FIELD
OD_INFERIOR_NASAL_RESTRICTION: 0
METHOD: COUNTING FINGERS
OS_SUPERIOR_TEMPORAL_RESTRICTION: 1
OD_NORMAL: 1
OS_INFERIOR_NASAL_RESTRICTION: 1
OS_INFERIOR_TEMPORAL_RESTRICTION: 1
OS_SUPERIOR_TEMPORAL_RESTRICTION: 1
OD_SUPERIOR_TEMPORAL_RESTRICTION: 0
METHOD: COUNTING FINGERS
OS_SUPERIOR_NASAL_RESTRICTION: 1
OD_INFERIOR_TEMPORAL_RESTRICTION: 0
OD_NORMAL: 1
OD_INFERIOR_TEMPORAL_RESTRICTION: 0
OD_SUPERIOR_TEMPORAL_RESTRICTION: 0
OS_INFERIOR_TEMPORAL_RESTRICTION: 1
OD_SUPERIOR_NASAL_RESTRICTION: 0
OS_INFERIOR_NASAL_RESTRICTION: 1
OD_INFERIOR_NASAL_RESTRICTION: 0
OD_SUPERIOR_NASAL_RESTRICTION: 0
OS_SUPERIOR_NASAL_RESTRICTION: 1

## 2025-05-13 ASSESSMENT — VISUAL ACUITY
OD_PH_SC+: -1
OD_SC+: +1
METHOD: SNELLEN - LINEAR
OD_PH_SC: 20/20
OS_SC: HM
OD_SC: 20/30
OD_SC+: +1
OD_PH_CC: 20/20
OD_PH_CC+: -1
OS_SC: HM
METHOD: SNELLEN - LINEAR
OD_SC: 20/30

## 2025-05-13 ASSESSMENT — EXTERNAL EXAM - RIGHT EYE
OD_EXAM: NORMAL
OD_EXAM: NORMAL

## 2025-05-13 ASSESSMENT — TONOMETRY
OS_IOP_MMHG: 13
OD_IOP_MMHG: 19
IOP_METHOD: TONOPEN
OS_IOP_MMHG: 13
OD_IOP_MMHG: 19
IOP_METHOD: TONOPEN

## 2025-05-13 ASSESSMENT — SLIT LAMP EXAM - LIDS
COMMENTS: NORMAL

## 2025-05-13 ASSESSMENT — EXTERNAL EXAM - LEFT EYE
OS_EXAM: NORMAL
OS_EXAM: NORMAL

## 2025-05-13 NOTE — NURSING NOTE
Chief Complaints and History of Present Illnesses   Patient presents with    Post Op (Ophthalmology) Left Eye     POD1 s/p CE IOL LE 05/12/2025     Chief Complaint(s) and History of Present Illness(es)       Post Op (Ophthalmology) Left Eye              Comments: POD1 s/p CE IOL LE 05/12/2025              Comments    Pt is here for 1 day post op, pt states surgery went well. Pt slept okay. She has been having pressure pain LE. Pt unsure at this moment but feels like she does see flashes or floaters but feel she may be just use to it by now.     Treatment:   Timolol daily left eye   Durezol daily left eye   Moxifloxacin TID left eye   Bromfenac daily left eye   Valtrex as needed (hasn't used for a while)     Nydia Moser 9:12 AM May 13, 2025

## 2025-05-13 NOTE — PATIENT INSTRUCTIONS
Here's the plan for drops: Let's go up on Durezol 4x/day left eye, Continue Moxifloxacin 3x/day left eye  Let's increase Bromfenac 2x/day left eye   Let's add a dilating drop called cyclopentolate 2x/day left eye   Let's do timolol once a day left eye   Let's start a medrol dose pack today if possible. Take each day's dose with breakfast for 6 days total.  Please wear eye shield on the left eye at night for one week and limit activity

## 2025-05-13 NOTE — Clinical Note
Thanks for seeing Ms. Awad today.  I told her I am available to look next week as needed, but didn't formally schedule an appt at this time.    Shai

## 2025-05-13 NOTE — LETTER
May 13, 2025      To Whom It May Concern:      This is a note for Mohinder Awad. Thank you for allowing him to take time off work to bring his Wife, Linda, to eye surgery on 5/12/25 and then follow up on 5/13/25.    Please contact us for questions.    Adam Hinds M.D.  Uveitis and Medical Retina  [facility] Department of Ophthalmology and Visual Neurosciences

## 2025-05-13 NOTE — LETTER
May 13, 2025      Re: Elaine ANGUIANO Ritesh   1970    To Whom It May Concern:    This is to confirm that the above patient was seen on 5/13/2025 after eye surgery on 5/12/25. She may take a few more days to recover than anticipated so she may not be able to return to work this week but may resume some duties if she is feeling comfortable based on her vision. We will see her back in 1 week.     Thank you for your cooperation in this matter.  Please do not hesitate to contact me if you have any further questions.    Sincerely,      REBEKAH HARDING

## 2025-05-13 NOTE — NURSING NOTE
Chief Complaints and History of Present Illnesses   Patient presents with    Uveitis Follow-Up     4wk follow up - Chronic anterior uveitis of left eye     Chief Complaint(s) and History of Present Illness(es)       Uveitis Follow-Up              Comments: 4wk follow up - Chronic anterior uveitis of left eye              Comments    Pt states she cannot see out of LE. Pt just had surgery yesterday s/p ce iol 05/12/2025 LE. She has been having pressure pain LE. Pt unsure at this moment but feels like she does see flashes or floaters but feel she may be just use to it by now.     Treatment:  Timolol daily left eye   Durezol daily left eye   Moxifloxacin TID left eye   Bromfenac daily left eye   Valtrex as needed (hasn't used for a while)     Nydia Moser 9:12 AM  May 13, 2025

## 2025-05-13 NOTE — PROGRESS NOTES
HPI       Post Op (Ophthalmology) Left Eye     Additional comments: POD1 s/p CE IOL LE 05/12/2025             Comments    Pt is here for 1 day post op, pt states surgery went well. Pt slept okay. She has been having pressure pain LE. Pt unsure at this moment but feels like she does see flashes or floaters but feel she may be just use to it by now.     Treatment:   Timolol daily left eye   Durezol daily left eye   Moxifloxacin TID left eye   Bromfenac daily left eye   Valtrex as needed (hasn't used for a while)     Nydia Moser 9:12 AM May 13, 2025             Last edited by Nydia Moser on 5/13/2025  9:30 AM.          Review of systems for the eyes was negative other than the pertinent positives/negatives listed in the HPI.      Assessment & Plan      Elaine Awad is a 54 year old female with the following diagnoses:   1. Pseudophakia    2. Chronic anterior uveitis of left eye    3. Ocular hypertension, left eye         PostOp, left eye s/p cataract extraction c toric intraocular lens and KDB, day 1    Intraocular pressure excellent  Hyphema today       Keep patch in place at night for 5 days  Keep HOB elevated and avoid straining  Expectant resolution discussed  No indication for cycloplegia   Start post-operative drops and taper according to instructions  Post-operative do's and don'ts reviewed, questions answered          Patient disposition:   Return in about 1 week (around 5/20/2025) for VT only.           Attending Physician Attestation:  Complete documentation of historical and exam elements from today's encounter can be found in the full encounter summary report (not reduplicated in this progress note).  I personally obtained the chief complaint(s) and history of present illness.  I confirmed and edited as necessary the review of systems, past medical/surgical history, family history, social history, and examination findings as documented by others; and I examined the patient myself.  I personally reviewed  the relevant tests, images, and reports as documented above.  I formulated and edited as necessary the assessment and plan and discussed the findings and management plan with the patient and family. . - Edson Mathur MD

## 2025-05-13 NOTE — PROGRESS NOTES
Chief Complaint/Presenting Concern:  Uveitis post op    Interval History of Present Ocular Illness:  Eliane Awad is a 54 year old patient who returns for post op visit after cataract surgery and gonitomy yesterday. She notices some discomfort but the vision is very limited.    No health updates     Current eye related medications: Timolol daily left eye, Durezol every other day left eye,Moxifloxacin TID left eye, Bromfenac daily left eye, no Valtrex    Assessment:     1. Pseudophakia of left eye (Primary)  2. Chronic anterior uveitis of left eye  3. Hyphema of left eye  Lens in appropriate position but with some inflammation and layering blood    4. Ocular hypertension, left eye  IOP 13    Plan/Recommendations:    Discussed findings with patient. The left eye has some inflammation and bleeding after surgery and we will modify the course to help with healing.  Eye pressure is 13 in the left eye   Here's the plan for drops: Let's go up on Durezol 4x/day left eye, Continue Moxifloxacin 3x/day left eye  Let's increase Bromfenac 2x/day left eye   Let's add a dilating drop called cyclopentolate 2x/day left eye   Let's do timolol once a day left eye   Let's start a medrol dose pack today if possible. Take each day's dose with breakfast for 6 days total.  Please wear eye shield on the left eye at night for one week and limit activity    RTC Dr. Mathur today. Add JY in 7-10 days tonopen no dilation no testing    Physician Attestation     Attending Physician Attestation:  Complete documentation of historical and exam elements from today's encounter can be found in the full encounter summary report (not reduplicated in this progress note). I personally obtained the chief complaint(s) and history of present illness. I confirmed and edited as necessary the review of systems, past medical/surgical history, family history, social history, and examination findings as documented by others; and I examined the patient myself. I  personally reviewed the relevant tests, images, and reports as documented above. I formulated and edited as necessary the assessment and plan and discussed the findings and management plan with the patient and family members present at the time of this visit.  Adam Hinds M.D., Uveitis and Medical Retina, May 13, 2025

## 2025-05-21 ENCOUNTER — OFFICE VISIT (OUTPATIENT)
Dept: OPHTHALMOLOGY | Facility: CLINIC | Age: 55
End: 2025-05-21
Attending: OPHTHALMOLOGY
Payer: COMMERCIAL

## 2025-05-21 DIAGNOSIS — H40.052 OCULAR HYPERTENSION, LEFT EYE: ICD-10-CM

## 2025-05-21 DIAGNOSIS — H20.12 CHRONIC ANTERIOR UVEITIS OF LEFT EYE: Primary | ICD-10-CM

## 2025-05-21 DIAGNOSIS — H21.02 HYPHEMA OF LEFT EYE: ICD-10-CM

## 2025-05-21 DIAGNOSIS — Z96.1 PSEUDOPHAKIA OF LEFT EYE: ICD-10-CM

## 2025-05-21 PROCEDURE — 99213 OFFICE O/P EST LOW 20 MIN: CPT | Performed by: OPHTHALMOLOGY

## 2025-05-21 ASSESSMENT — VISUAL ACUITY
OS_CC: 20/20
METHOD: SNELLEN - LINEAR
CORRECTION_TYPE: GLASSES
OD_CC: 20/25
OD_CC+: -2

## 2025-05-21 ASSESSMENT — CONF VISUAL FIELD
OD_NORMAL: 1
OS_SUPERIOR_TEMPORAL_RESTRICTION: 0
OD_SUPERIOR_NASAL_RESTRICTION: 0
OS_SUPERIOR_NASAL_RESTRICTION: 0
METHOD: COUNTING FINGERS
OD_SUPERIOR_TEMPORAL_RESTRICTION: 0
OS_NORMAL: 1
OS_INFERIOR_NASAL_RESTRICTION: 0
OD_INFERIOR_TEMPORAL_RESTRICTION: 0
OS_INFERIOR_TEMPORAL_RESTRICTION: 0
OD_INFERIOR_NASAL_RESTRICTION: 0

## 2025-05-21 ASSESSMENT — TONOMETRY
IOP_METHOD: TONOPEN
OS_IOP_MMHG: 20
OD_IOP_MMHG: 18

## 2025-05-21 ASSESSMENT — EXTERNAL EXAM - RIGHT EYE: OD_EXAM: NORMAL

## 2025-05-21 ASSESSMENT — CUP TO DISC RATIO
OS_RATIO: 0.4
OD_RATIO: 0.4

## 2025-05-21 ASSESSMENT — SLIT LAMP EXAM - LIDS
COMMENTS: NORMAL
COMMENTS: NORMAL

## 2025-05-21 ASSESSMENT — EXTERNAL EXAM - LEFT EYE: OS_EXAM: NORMAL

## 2025-05-21 NOTE — PATIENT INSTRUCTIONS
For the moxifloxacin drop, okay to stop after today  Continue Bromfenac 2x/day left eye  Continue Timolol daily left eye  For the Durezol left eye:  continue 4x/day for one week, then reduce to 3x/day for one week, then 2x/day until next visit  No need for shield, okay for swimming/hot tub, okay for normal activity  You can try some over the counter glasses for computer  No need for more oral steroid packs

## 2025-05-21 NOTE — NURSING NOTE
Chief Complaints and History of Present Illnesses   Patient presents with    Uveitis Follow-Up     Chief Complaint(s) and History of Present Illness(es)       Uveitis Follow-Up              Laterality: left eye    Onset: gradual    Onset: months ago    Quality: States the va is doing way better than last week    Severity: mild    Frequency: intermittently    Course: stable    Associated symptoms: headache and floaters (have increased since last week).  Negative for photophobia and flashes    Treatments tried: eye drops    Pain scale: 0/10              Comments    Here for chronic anterior uveitis of left eye  Durezol 4x/day left eye  Moxifloxacin 3x/day left eye  Bromfenac not taking  timolol once a day left eye   Bronwyn Reyna COT 7:33 AM May 21, 2025

## 2025-05-21 NOTE — PROGRESS NOTES
Chief Complaint/Presenting Concern:  Uveitis post op    Interval History of Present Ocular Illness:  Elaine Awad is a 54 year old patient who returns for follow up of her uveitis after cataract surgery on 5/12/25. Last visit there was active inflammation and we modified drops and added a short course of oral steroid. Dr. Mathur felt the pressure was fine and no dilating drop was needed.    Ms. Awad notes things gradually got better each day. Some flashes on/off but no large floaters. No true shimmering    Interval Updates to Medical/Family/Social History:  We completed forms for time off    Relevant Review of Systems Updates:  Mild headaches otherwise fine.     Current eye related medications: Durezol 4x/day left eye, Moxifloxacin 3x/day left eye timolol once a day left eye, Bromfenac 2x/day left eye, Timolol daily left eye, completed medrol dose pack    No Imaging today    Assessment:     1. Chronic anterior uveitis of left eye (Primary)  Improved     2. Pseudophakia of left eye  Well visualized    3. Hyphema of left eye  Resolved    4. Ocular hypertension, left eye  Well controlled    Plan/Recommendations:    Discussed findings with patient and her .  The left eye has much improved inflammation. We can stay off medrol dose pack and taper Durezol   Eye pressure is 18,20.We should continue Timolol daily left eye   For the moxifloxacin drop, okay to stop after today  Continue Bromfenac 2x/day left eye  Continue Timolol daily left eye  For the Durezol left eye:  continue 4x/day for one week, then reduce to 3x/day for one week, then 2x/day until next visit  No need for shield, okay for swimming/hot tub, okay for normal activity  You can try some over the counter glasses for computer  No need for more oral steroid packs    RTC 6/17 as scheduled Balta to include refraction, dilate left eye and RNFL (ordered)    Physician Attestation     Attending Physician Attestation:  Complete documentation of historical  and exam elements from today's encounter can be found in the full encounter summary report (not reduplicated in this progress note). I personally obtained the chief complaint(s) and history of present illness. I confirmed and edited as necessary the review of systems, past medical/surgical history, family history, social history, and examination findings as documented by others; and I examined the patient myself. I personally reviewed the relevant tests, images, and reports as documented above. I formulated and edited as necessary the assessment and plan and discussed the findings and management plan with the patient and family members present at the time of this visit.  Adam Hinds M.D., Uveitis and Medical Retina, May 21, 2025

## 2025-05-29 ENCOUNTER — TELEPHONE (OUTPATIENT)
Dept: ENDOCRINOLOGY | Facility: CLINIC | Age: 55
End: 2025-05-29
Payer: COMMERCIAL

## 2025-05-29 NOTE — TELEPHONE ENCOUNTER
Patient confirmed scheduled appointment:     Date: 9/17/25  Time: 8:30 PM  Visit type: Return Weight Management  Visit mode: Virtual Visit  Provider:  Layla Cyr PA-C  Location: [facility] [place]    Additional Notes:   Rescheduled from 8/14/25

## (undated) DEVICE — SOL WATER IRRIG 1000ML BOTTLE 2F7114

## (undated) DEVICE — SOL WATER IRRIG 500ML BOTTLE 2F7113

## (undated) DEVICE — TUBING SUCTION 10'X3/16" N510

## (undated) DEVICE — CATARACT BLADE PACK 2.6MM 58001899

## (undated) DEVICE — ENDO TROCAR 12MM VERSAONE BLADELESS W/STD FIX CAN NONB12STF

## (undated) DEVICE — EYE PACK CUSTOM ANTERIOR 30DEG TIP CENTURION PPK6682-04

## (undated) DEVICE — DRSG ABDOMINAL 07 1/2X8" 7197D

## (undated) DEVICE — GLOVE BIOGEL PI INDICATOR 8.0 LF 41680

## (undated) DEVICE — ESU GROUND PAD ADULT W/CORD E7507

## (undated) DEVICE — PACK CATARACT CUSTOM ASC SEY15CPUMC

## (undated) DEVICE — DAVINCI XI SEAL UNIVERSAL 5-8MM 470361

## (undated) DEVICE — LINEN TOWEL PACK X5 5464

## (undated) DEVICE — SU STRATAFIX PDS PLUS 0 CT-2 9" SXPP1A446

## (undated) DEVICE — ENDO TROCAR OPTICAL 12MM VERSAONE W/STD FIX CAN UNVCA12STF

## (undated) DEVICE — JELLY LUBRICATING SURGILUBE 2OZ TUBE

## (undated) DEVICE — SU VICRYL 0 TIE 54" J608H

## (undated) DEVICE — ENDO CANNULA 05MM VERSAONE UNIVERSAL UNVCA5STF

## (undated) DEVICE — SYSTEM OMNI SURGICAL LF 1-102

## (undated) DEVICE — DEVICE SUTURE GRASPER TROCAR CLOSURE 14GA PMITCSG

## (undated) DEVICE — SU STRATAFIX PDS PLUS 2-0 SPIRAL SH 30CM SXPP1B416

## (undated) DEVICE — DAVINCI XI DRAPE ARM 470015

## (undated) DEVICE — DRSG PRIMAPORE 02X3" 7133

## (undated) DEVICE — SU MONOCRYL 4-0 PS-2 27" UND Y426H

## (undated) DEVICE — SYSTEM LAPAROVUE VISIBILITY LAPVUE10

## (undated) DEVICE — Device

## (undated) DEVICE — SPECIMEN CONTAINER 5OZ STERILE 2600SA

## (undated) DEVICE — SU VICRYL 3-0 SH 27" J316H

## (undated) DEVICE — SU MONOCRYL 4-0 PS-2 18" UND Y496G

## (undated) DEVICE — EYE SHIELD PLASTIC

## (undated) DEVICE — BNDG ABDOMINAL BINDER 10X26-50" 08140145

## (undated) DEVICE — NDL INSUFFLATION 150MM VERRES 172016

## (undated) DEVICE — DAVINCI XI DRAPE COLUMN 470341

## (undated) DEVICE — LINEN TOWEL PACK X30 5481

## (undated) DEVICE — ENDO TROCAR 05MM VERSAONE BLADELESS W/STD FIX CAN NONB5STF

## (undated) DEVICE — EYE TIP IRRIGATION & ASPIRATION POLYMER CVD 0.3MM 8065751512

## (undated) DEVICE — DRAPE U SPLIT 74X120" 29440

## (undated) DEVICE — SU DERMABOND ADVANCED .7ML DNX12

## (undated) DEVICE — NDL INSUFFLATION 120MM VERRES 172015

## (undated) DEVICE — SUCTION MANIFOLD DORNOCH ULTRA CART UL-CL500

## (undated) DEVICE — DAVINCI HOT SHEARS TIP COVER  400180

## (undated) DEVICE — SUCTION TIP YANKAUER W/O VENT K86

## (undated) DEVICE — GOWN XLG DISP 9545

## (undated) DEVICE — ENDO SHEARS 5MM 176643

## (undated) DEVICE — ANTIFOG SOLUTION W/FOAM PAD 31142527

## (undated) DEVICE — GLOVE BIOGEL PI MICRO SZ 7.5 48575

## (undated) DEVICE — PITCHER STERILE 1000ML  SSK9004A

## (undated) DEVICE — ESU PENCIL W/COATED BLADE E2450H

## (undated) DEVICE — ESU DISSECTOR SONICISION CORDLESS 39CM SCD396

## (undated) DEVICE — DRSG TEGADERM 4X4 3/4" 1626W

## (undated) DEVICE — KIT PATIENT POSITIONING PIGAZZI LATEX FREE 40580

## (undated) DEVICE — GLOVE BIOGEL PI ULTRATOUCH G SZ 7.5 42175

## (undated) DEVICE — PACK GENERAL LAPAOSCOPY

## (undated) DEVICE — SYR 50ML SLIP TIP W/O NDL 309654

## (undated) DEVICE — PREP CHLORAPREP 26ML TINTED ORANGE  260815

## (undated) DEVICE — KIT ENDO FIRST STEP DISINFECTANT 200ML W/POUCH EP-4

## (undated) DEVICE — SPONGE RAY-TEC 4X8" 7318

## (undated) DEVICE — DRSG GAUZE 4X4" 2187

## (undated) DEVICE — LINEN TOWEL PACK X6 WHITE 5487

## (undated) DEVICE — DRSG TEGADERM 6X8" 1628

## (undated) DEVICE — DAVINCI XI OBTURATOR BLADELESS 8MM 470359

## (undated) RX ORDER — CLINDAMYCIN PHOSPHATE 900 MG/50ML
INJECTION, SOLUTION INTRAVENOUS
Status: DISPENSED
Start: 2017-01-19

## (undated) RX ORDER — OXYCODONE HCL 5 MG/5 ML
SOLUTION, ORAL ORAL
Status: DISPENSED
Start: 2017-01-19

## (undated) RX ORDER — ONDANSETRON 2 MG/ML
INJECTION INTRAMUSCULAR; INTRAVENOUS
Status: DISPENSED
Start: 2025-05-12

## (undated) RX ORDER — DEXAMETHASONE SODIUM PHOSPHATE 10 MG/ML
INJECTION, SOLUTION INTRAMUSCULAR; INTRAVENOUS
Status: DISPENSED
Start: 2024-04-15

## (undated) RX ORDER — HYDROMORPHONE HYDROCHLORIDE 1 MG/ML
INJECTION, SOLUTION INTRAMUSCULAR; INTRAVENOUS; SUBCUTANEOUS
Status: DISPENSED
Start: 2017-01-19

## (undated) RX ORDER — FENTANYL CITRATE 50 UG/ML
INJECTION, SOLUTION INTRAMUSCULAR; INTRAVENOUS
Status: DISPENSED
Start: 2024-04-15

## (undated) RX ORDER — OXYCODONE HYDROCHLORIDE 5 MG/1
TABLET ORAL
Status: DISPENSED
Start: 2017-01-19

## (undated) RX ORDER — ONDANSETRON 2 MG/ML
INJECTION INTRAMUSCULAR; INTRAVENOUS
Status: DISPENSED
Start: 2017-01-19

## (undated) RX ORDER — KETOROLAC TROMETHAMINE 30 MG/ML
INJECTION, SOLUTION INTRAMUSCULAR; INTRAVENOUS
Status: DISPENSED
Start: 2024-04-15

## (undated) RX ORDER — HYDROMORPHONE HYDROCHLORIDE 1 MG/ML
INJECTION, SOLUTION INTRAMUSCULAR; INTRAVENOUS; SUBCUTANEOUS
Status: DISPENSED
Start: 2024-04-15

## (undated) RX ORDER — ACETAMINOPHEN 325 MG/1
TABLET ORAL
Status: DISPENSED
Start: 2025-05-12

## (undated) RX ORDER — CLINDAMYCIN PHOSPHATE 900 MG/50ML
INJECTION, SOLUTION INTRAVENOUS
Status: DISPENSED
Start: 2024-04-15

## (undated) RX ORDER — BETAMETHASONE SODIUM PHOSPHATE AND BETAMETHASONE ACETATE 3; 3 MG/ML; MG/ML
INJECTION, SUSPENSION INTRA-ARTICULAR; INTRALESIONAL; INTRAMUSCULAR; SOFT TISSUE
Status: DISPENSED
Start: 2025-05-12

## (undated) RX ORDER — PROPOFOL 10 MG/ML
INJECTION, EMULSION INTRAVENOUS
Status: DISPENSED
Start: 2024-04-15

## (undated) RX ORDER — FENTANYL CITRATE 50 UG/ML
INJECTION, SOLUTION INTRAMUSCULAR; INTRAVENOUS
Status: DISPENSED
Start: 2025-05-12

## (undated) RX ORDER — EPINEPHRINE 1 MG/ML
INJECTION, SOLUTION INTRAMUSCULAR; SUBCUTANEOUS
Status: DISPENSED
Start: 2024-04-15

## (undated) RX ORDER — ONDANSETRON 2 MG/ML
INJECTION INTRAMUSCULAR; INTRAVENOUS
Status: DISPENSED
Start: 2024-04-15

## (undated) RX ORDER — PROPOFOL 10 MG/ML
INJECTION, EMULSION INTRAVENOUS
Status: DISPENSED
Start: 2025-05-12

## (undated) RX ORDER — BUPIVACAINE HYDROCHLORIDE 2.5 MG/ML
INJECTION, SOLUTION EPIDURAL; INFILTRATION; INTRACAUDAL
Status: DISPENSED
Start: 2024-04-15

## (undated) RX ORDER — LIDOCAINE HYDROCHLORIDE 10 MG/ML
INJECTION, SOLUTION EPIDURAL; INFILTRATION; INTRACAUDAL; PERINEURAL
Status: DISPENSED
Start: 2024-04-15

## (undated) RX ORDER — TIMOLOL 5 MG/ML
SOLUTION/ DROPS OPHTHALMIC
Status: DISPENSED
Start: 2025-05-12